# Patient Record
Sex: FEMALE | Race: WHITE | NOT HISPANIC OR LATINO | Employment: OTHER | URBAN - METROPOLITAN AREA
[De-identification: names, ages, dates, MRNs, and addresses within clinical notes are randomized per-mention and may not be internally consistent; named-entity substitution may affect disease eponyms.]

---

## 2017-01-12 ENCOUNTER — OFFICE VISIT (OUTPATIENT)
Dept: FAMILY MEDICINE | Facility: CLINIC | Age: 67
End: 2017-01-12
Payer: MEDICAID

## 2017-01-12 VITALS
SYSTOLIC BLOOD PRESSURE: 156 MMHG | DIASTOLIC BLOOD PRESSURE: 74 MMHG | TEMPERATURE: 97.7 F | BODY MASS INDEX: 19.04 KG/M2 | OXYGEN SATURATION: 98 % | WEIGHT: 113.6 LBS | HEART RATE: 76 BPM

## 2017-01-12 DIAGNOSIS — G24.3 SPASMODIC TORTICOLLIS: ICD-10-CM

## 2017-01-12 DIAGNOSIS — G25.81 RESTLESS LEG SYNDROME: ICD-10-CM

## 2017-01-12 DIAGNOSIS — Z12.11 SCREEN FOR COLON CANCER: ICD-10-CM

## 2017-01-12 DIAGNOSIS — Z23 NEED FOR PROPHYLACTIC VACCINATION AND INOCULATION AGAINST INFLUENZA: ICD-10-CM

## 2017-01-12 DIAGNOSIS — F17.200 TOBACCO USE DISORDER: ICD-10-CM

## 2017-01-12 DIAGNOSIS — M79.7 FIBROMYALGIA: Primary | ICD-10-CM

## 2017-01-12 PROCEDURE — 99213 OFFICE O/P EST LOW 20 MIN: CPT | Performed by: INTERNAL MEDICINE

## 2017-01-12 RX ORDER — NORTRIPTYLINE HCL 10 MG
50 CAPSULE ORAL AT BEDTIME
Qty: 450 CAPSULE | Refills: 3 | Status: SHIPPED | OUTPATIENT
Start: 2017-01-12 | End: 2017-11-14

## 2017-01-12 RX ORDER — BACLOFEN 10 MG/1
20 TABLET ORAL 3 TIMES DAILY
Qty: 540 TABLET | Refills: 3 | Status: SHIPPED | OUTPATIENT
Start: 2017-01-12 | End: 2017-11-14

## 2017-01-12 RX ORDER — HYDROCODONE BITARTRATE AND ACETAMINOPHEN 7.5; 325 MG/1; MG/1
2 TABLET ORAL 3 TIMES DAILY
Qty: 180 TABLET | Refills: 0 | Status: SHIPPED | OUTPATIENT
Start: 2017-01-12 | End: 2017-01-12

## 2017-01-12 RX ORDER — TRAZODONE HYDROCHLORIDE 100 MG/1
TABLET ORAL
Qty: 360 TABLET | Refills: 3 | Status: SHIPPED | OUTPATIENT
Start: 2017-01-12 | End: 2017-11-14

## 2017-01-12 RX ORDER — GABAPENTIN 300 MG/1
CAPSULE ORAL
Qty: 540 CAPSULE | Refills: 3 | Status: SHIPPED | OUTPATIENT
Start: 2017-01-12 | End: 2017-11-14

## 2017-01-12 RX ORDER — HYDROCODONE BITARTRATE AND ACETAMINOPHEN 7.5; 325 MG/1; MG/1
2 TABLET ORAL 3 TIMES DAILY
Qty: 180 TABLET | Refills: 0 | Status: SHIPPED | OUTPATIENT
Start: 2017-01-12 | End: 2017-05-15

## 2017-01-12 ASSESSMENT — PAIN SCALES - GENERAL: PAINLEVEL: MODERATE PAIN (5)

## 2017-01-12 ASSESSMENT — PATIENT HEALTH QUESTIONNAIRE - PHQ9: 5. POOR APPETITE OR OVEREATING: NOT AT ALL

## 2017-01-12 ASSESSMENT — ANXIETY QUESTIONNAIRES
2. NOT BEING ABLE TO STOP OR CONTROL WORRYING: NOT AT ALL
6. BECOMING EASILY ANNOYED OR IRRITABLE: NOT AT ALL
5. BEING SO RESTLESS THAT IT IS HARD TO SIT STILL: NOT AT ALL
1. FEELING NERVOUS, ANXIOUS, OR ON EDGE: NOT AT ALL
7. FEELING AFRAID AS IF SOMETHING AWFUL MIGHT HAPPEN: NOT AT ALL
3. WORRYING TOO MUCH ABOUT DIFFERENT THINGS: NOT AT ALL
GAD7 TOTAL SCORE: 0
IF YOU CHECKED OFF ANY PROBLEMS ON THIS QUESTIONNAIRE, HOW DIFFICULT HAVE THESE PROBLEMS MADE IT FOR YOU TO DO YOUR WORK, TAKE CARE OF THINGS AT HOME, OR GET ALONG WITH OTHER PEOPLE: NOT DIFFICULT AT ALL

## 2017-01-12 NOTE — MR AVS SNAPSHOT
After Visit Summary   1/12/2017    Carina Calvert    MRN: [id number]           Patient Information     Date Of Birth          1950        Visit Information        Provider Department      1/12/2017 2:10 PM Gerber Jain MD [facility]        Today's Diagnoses     Fibromyalgia    -  1     Spasmodic torticollis         Screen for colon cancer         Tobacco use disorder         Need for prophylactic vaccination and inoculation against influenza         Restless leg syndrome           Care Instructions      HOW TO QUIT SMOKING  Smoking is one of the hardest habits to break. About half of all those who have ever smoked have been able to quit, and most of those (about 70%) who still smoke want to quit. Here are some of the best ways to stop smoking.     KEEP TRYING:  It takes most smokers about 8 tries before they are finally able to fully quit. So, the more often you try and fail, the better your chance of quitting the next time! So, don't give up!    GO COLD TURKEY:  Most ex-smokers quit cold turkey. Trying to cut back gradually doesn't seem to work as well, perhaps because it continues the smoking habit. Also, it is possible to fool yourself by inhaling more while smoking fewer cigarettes. This results in the same amount of nicotine in your body!    GET SUPPORT:  Support programs can make an important difference, especially for the heavy smoker. These groups offer lectures, methods to change your behavior and peer support. Call the free national Quitline for more information. 800-QUIT-NOW (784-902-1252). Low-cost or free programs are offered by many hospitals, local chapters of the American Lung Association (734-901-6791) and the American Cancer Society (804-620-8068). Support at home is important too. Non-smokers can help by offering praise and encouragement. If the smoker fails to quit, encourage them to try again!    OVER-THE-COUNTER MEDICINES:  For those who can't quit on their  own, Nicotine Replacement Therapy (NRT) may make quitting much easier. Certain aids such as the nicotine patch, gum and lozenge are available without a prescription. However, it is best to use these under the guidance of your doctor. The skin patch provides a steady supply of nicotine to the body. Nicotine gum and lozenge gives temporary bursts of low levels of nicotine. Both methods take the edge off the craving for cigarettes. WARNING: If you feel symptoms of nicotine overdose, such as nausea, vomiting, dizziness, weakness, or fast heartbeat, stop using these and see your doctor.    PRESCRIPTION MEDICINES:  After evaluating your smoking patterns and prior attempts at quitting, your doctor may offer a prescription medicine such as bupropion (Zyban, Wellbutrin), varenicline (Chantix, Champix), a niocotine inhaler or nasal spray. Each has its unique advantage and side effects which your doctor can review with you.    HEALTH BENEFITS OF QUITTING:  The benefits of quitting start right away and keep improving the longer you go without smokin minutes: blood pressure and pulse return to normal  8 hours: oxygen levels return to normal  2 days: ability to smell and taste begins to improve as damaged nerves start to regrow  2-3 weeks: circulation and lung function improves  1-9 months: decreased cough, congestion and shortness of breath; less tired  1 year: risk of heart attack decreases by half  5 years: risk of lung cancer decreases by half; risk of stroke becomes the same as a non-smoker  For information about how to quit smoking, visit the following links:  National Cancer [place] ,   Clearing the Air, Quit Smoking Today   - an online booklet. http://www.smokefree.gov/pubs/clearing_the_air.pdf  Smokefree.gov http://smokefree.gov/  QuitNet http://www.quitnet.com/    7616-7971 Saskia Landry, [address] [facility] [address], [place] [address]. All rights reserved. This information is not intended as a substitute for  professional medical care. Always follow your healthcare professional's instructions.    The Benefits of Living Smoke Free  What do you want to gain from quitting? Check off some reasons to quit.  Health Benefits  ___ Reduce my risk of lung cancer, heart disease, chronic lung disease  ___ Have fewer wrinkles and softer skin  ___ Improve my sense of taste and smell  ___ For pregnant women--reduce the risk of having a miscarriage, stillbirth, premature birth, or low-birth-weight baby  Personal Benefits  ___ Feel more in control of my life  ___ Have better-smelling hair, breath, clothes, home, and car  ___ Save time by not having to take smoke breaks, buy cigarettes, or hunt for a light  ___ Have whiter teeth  Family Benefits  ___ Reduce my children s respiratory tract infections  ___ Set a good example for my children  ___ Reduce my family s cancer risk  Financial Benefits  ___ Save hundreds of dollars each year that would be spent on cigarettes  ___ Save money on medical bills  ___ Save on life, health, and car insurance premiums    Those Dollars Add Up!  Cigarettes are expensive, and getting more expensive all the time. Do you realize how much money you are spending on cigarettes per year? What is the average amount you spend on a pack of cigarettes? What is the average number of packs that you smoke per day? Using your answers to these questions, fill in this formula to help you find out:  ($ _____ per pack) ×  ( _____ number of packs per day) × (365 days) =  $ _____ yearly cost of smoking  Besides tobacco, there are other costs, including extra cleaning bills and replacement costs for clothing and furniture; medical expenses for smoking-related illnesses; and higher health, life, and car insurance premiums.    Cigars and Pipes Count Too!  Cigars and pipes are also dangerous. So are smokeless (chewing) tobacco and snuff. All of these products contain nicotine, a highly addictive substance that has harmful effects  on your body. Quitting smoking means giving up all tobacco products.      6023-3694 Saskia Landry, [address] [facility], [place] [address]. All rights reserved. This information is not intended as a substitute for professional medical care. Always follow your healthcare professional's instructions.      [facility]    If you have any questions regarding to your visit please contact your care team:     Team Pink:   Clinic Hours Telephone Number   Internal Medicine:  Dr. Lisset Ignacio NP       7am-7pm  Monday - Thursday   7am-5pm  Fridays  [phone]  (Appointment scheduling available 24/7)    Questions about your visit?  Team Line  [phone]   Urgent Care - [place] and [place] - 11am-9pm Monday-Friday Saturday-Sunday- 9am-5pm   [place] - 5pm-9pm Monday-Friday Saturday-Sunday- 9am-5pm  [phone] - [place]   [phone] - [place]       What options do I have for visits at the clinic other than the traditional office visit?  To expand how we care for you, many of our providers are utilizing electronic visits (e-visits) and telephone visits, when medically appropriate, for interactions with their patients rather than a visit in the clinic.   We also offer nurse visits for many medical concerns. Just like any other service, we will bill your insurance company for this type of visit based on time spent on the phone with your provider. Not all insurance companies cover these visits. Please check with your medical insurance if this type of visit is covered. You will be responsible for any charges that are not paid by your insurance.      E-visits via SiteJabber:  generally incur a $35.00 fee.  Telephone visits:  Time spent on the phone: *charged based on time that is spent on the phone in increments of 10 minutes. Estimated cost:   5-10 mins $30.00   11-20 mins. $59.00   21-30 mins. $85.00   Use SiteJabber (secure email communication and  "access to your chart) to send your primary care provider a message or make an appointment. Ask someone on your Team how to sign up for Medical Envelope.    For a Price Quote for your services, please call our Consumer Price Line at [phone].    As always, Thank you for trusting us with your health care needs!    Violette Mccullough, KARLY          Follow-ups after your visit        Who to contact     If you have questions or need follow up information about today's clinic visit or your schedule please contact [facility] directly at [phone].  Normal or non-critical lab and imaging results will be communicated to you by Triboldhart, letter or phone within 4 business days after the clinic has received the results. If you do not hear from us within 7 days, please contact the clinic through [employer] or phone. If you have a critical or abnormal lab result, we will notify you by phone as soon as possible.  Submit refill requests through Medical Envelope or call your pharmacy and they will forward the refill request to us. Please allow 3 business days for your refill to be completed.          Additional Information About Your Visit        Triboldhart Information     Medical Envelope lets you send messages to your doctor, view your test results, renew your prescriptions, schedule appointments and more. To sign up, go to [email or web address] . Click on \"Log in\" on the left side of the screen, which will take you to the Welcome page. Then click on \"Sign up Now\" on the right side of the page.     You will be asked to enter the access code listed below, as well as some personal information. Please follow the directions to create your username and password.     Your access code is: [email or web address]  Expires: 2017  2:19 PM     Your access code will  in 90 days. If you need help or a new code, please call your [facility] or [phone].        Care EveryWhere ID     This is your Care EveryWhere ID. This could be used by other " organizations to access your [place] medical records  [id number]        Your Vitals Were     Pulse Temperature Pulse Oximetry Breastfeeding?          76 97.7  F (36.5  C) (Oral) 98% No         Blood Pressure from Last 3 Encounters:   01/12/17 156/74   10/18/16 136/74   07/21/16 118/70    Weight from Last 3 Encounters:   01/12/17 113 lb 9.6 oz (51.529 kg)   10/18/16 112 lb (50.803 kg)   07/21/16 110 lb 3.2 oz (49.986 kg)              Today, you had the following     No orders found for display         Today's Medication Changes          These changes are accurate as of: 1/12/17  2:19 PM.  If you have any questions, ask your nurse or doctor.               Start taking these medicines.        Dose/Directions    HYDROcodone-acetaminophen 7.5-325 MG per tablet   Commonly known as:  NORCO   Used for:  Spasmodic torticollis, Fibromyalgia   Started by:  Gerber Jain MD        Dose:  2 tablet   Take 2 tablets by mouth 3 times daily 28 days or more between refills for controlled medications   Quantity:  180 tablet   Refills:  0         These medicines have changed or have updated prescriptions.        Dose/Directions    gabapentin 300 MG capsule   Commonly known as:  NEURONTIN   This may have changed:  additional instructions   Used for:  Spasmodic torticollis, Fibromyalgia, Restless leg syndrome   Changed by:  Gerber Jain MD        1 capsule am, 1 at noon 2 at 5pm and 2 at bedtime ( 6 per day ) Not to be refilled until patient calls   Quantity:  540 capsule   Refills:  3       nortriptyline 10 MG capsule   Commonly known as:  PAMELOR   This may have changed:  additional instructions   Used for:  Fibromyalgia   Changed by:  Gerber Jain MD        Dose:  50 mg   Take 5 capsules (50 mg) by mouth At Bedtime Not to be refilled until patient calls   Quantity:  450 capsule   Refills:  3            Where to get your medicines      These medications were sent to [facility] [address] [place] [address]   [address] [place] [id number]     Phone:  [phone]    - baclofen 10 MG tablet  - gabapentin 300 MG capsule  - nortriptyline 10 MG capsule  - traZODone 100 MG tablet      Some of these will need a paper prescription and others can be bought over the counter.  Ask your nurse if you have questions.     Bring a paper prescription for each of these medications    - HYDROcodone-acetaminophen 7.5-325 MG per tablet             Primary Care Provider Office Phone # Fax #    Gerber Jain [phone]       [address] [address]        Thank you!     Thank you for choosing [facility]  for your care. Our goal is always to provide you with excellent care. Hearing back from our patients is one way we can continue to improve our services. Please take a few minutes to complete the written survey that you may receive in the mail after your visit with us. Thank you!             Your Updated Medication List - Protect others around you: Learn how to safely use, store and throw away your medicines at www.disposemymeds.org.          This list is accurate as of: 1/12/17  2:19 PM.  Always use your most recent med list.                   Brand Name Dispense Instructions for use    aspirin 81 MG tablet      Take 81 mg by mouth daily       baclofen 10 MG tablet    LIORESAL    540 tablet    Take 2 tablets (20 mg) by mouth 3 times daily       cyanocobalamin 1000 MCG tablet    vitamin  B-12     1 tablet daily       gabapentin 300 MG capsule    NEURONTIN    540 capsule    1 capsule am, 1 at noon 2 at 5pm and 2 at bedtime ( 6 per day ) Not to be refilled until patient calls       HYDROcodone-acetaminophen 7.5-325 MG per tablet    NORCO    180 tablet    Take 2 tablets by mouth 3 times daily 28 days or more between refills for controlled medications       LIPITOR 80 MG tablet   Generic drug:  atorvastatin      Take 80 mg by mouth daily       metoprolol 25 MG  tablet    LOPRESSOR     Take 50 mg by mouth 2 times daily       nitroglycerin 0.4 MG sublingual tablet    NITROSTAT     Place 0.4 mg under the tongue every 5 minutes as needed for chest pain       nortriptyline 10 MG capsule    PAMELOR    450 capsule    Take 5 capsules (50 mg) by mouth At Bedtime Not to be refilled until patient calls       polyethylene glycol powder    MIRALAX/GLYCOLAX     Take 17 g by mouth daily       traZODone 100 MG tablet    DESYREL    360 tablet    TAKE FOUR TABLETS NIGHTLY AT BEDTIME       vitamin D 2000 UNITS tablet      Take 1 tablet by mouth daily

## 2017-01-12 NOTE — NURSING NOTE
"Chief Complaint   Patient presents with     Recheck Medication     Refills.       Initial /68 mmHg  Pulse 133  Temp(Src) 97.7  F (36.5  C) (Oral)  Wt 113 lb 9.6 oz (51.529 kg)  SpO2 98%  Breastfeeding? No Estimated body mass index is 19.04 kg/(m^2) as calculated from the following:    Height as of 1/22/16: 5' 4.75\" (1.645 m).    Weight as of this encounter: 113 lb 9.6 oz (51.529 kg).  BP completed using cuff size: piotr Mccullough CMA      "

## 2017-01-12 NOTE — PROGRESS NOTES
SUBJECTIVE:                                                    Carina Calvert is a 66 year old female who presents to clinic today for the following health issues:       Fibromyalgia  Spasmodic torticollis  Screen for colon cancer  Tobacco use disorder  Need for prophylactic vaccination and inoculation against influenza  Restless leg syndrome     Medication Followup of Norco, Trazaodone, Baclofen    Taking Medication as prescribed: yes    Side Effects:  None    Medication Helping Symptoms:  yes     A patient seen for refill on Norco 5/325 (Hydrocodone Bitartrate and Acetaminophen). She's a long term patient with me and has no new issues to discuss. She is status post myocardial infarction and continues with a close follow up with cardiology  With [facility] . During our office visit today I asked patient if it would be ok to download medical records from the Her[employer]ment system which allows immediate access to medical records from outside sources. Patient gave me verbal permission and agreed to sign care everywhere authorization form today.     She also continues in follow up with Dr. Junior Doyle with [facility] for botox injections to her neck for the spasmodic torticollis . She's really in a tough way today with neck stiffness and she's due for the injection in a matter of days. She's absolutely positive this is why her blood pressure is up because she's in pain. She wants the refills but otherwise has nothing else to discuss today.    In review of her chart she has made it explicitly clear that she eschews healthcare maintenance issues and we have agreed to only pursue these topics annually. She is due for the annual urine toxicology screen as a part of the chronic pain care package . She is unable to void today but will do so when we see her back in 3 months     Problem list and histories reviewed & adjusted, as indicated.  Additional history: as documented    Patient Active  Problem List   Diagnosis     Neuropathy (H)     Fibromyalgia     Restless leg syndrome     Spasmodic torticollis     Insomnia     Hyperlipidemia with target LDL less than 100     History of hepatitis C     Ex-smoker     Recurrent cold sores     Osteopenia     Migraine headache     Positive occult stool blood test     Subclinical hyperthyroidism     Hypovitaminosis D     Cramp of limb     Opioid use agreement exists     NSTEMI (non-ST elevated myocardial infarction) (H)     Chronic pain     Past Surgical History   Procedure Laterality Date     Tubal ligation       Tonsillectomy       Lithotripsy       2 x      Hernia repair  9/2009     ventral       Social History   Substance Use Topics     Smoking status: Current Every Day Smoker -- 0.05 packs/day for 15 years     Types: Cigarettes     Last Attempt to Quit: 03/31/2013     Smokeless tobacco: Never Used     Alcohol Use: No     Family History   Problem Relation Age of Onset     Neurologic Disorder Mother 65     Parkinsons     Alcohol/Drug Mother      Respiratory Father      COPD     Alcohol/Drug Father      DIABETES Sister 52     CEREBROVASCULAR DISEASE Mother      CANCER Maternal Aunt      CANCER Maternal Uncle      C.A.D. No family hx of      CANCER Brother          Current Outpatient Prescriptions   Medication Sig Dispense Refill     traZODone (DESYREL) 100 MG tablet TAKE FOUR TABLETS NIGHTLY AT BEDTIME 360 tablet 3     baclofen (LIORESAL) 10 MG tablet Take 2 tablets (20 mg) by mouth 3 times daily 540 tablet 3     gabapentin (NEURONTIN) 300 MG capsule 1 capsule am, 1 at noon 2 at 5pm and 2 at bedtime ( 6 per day ) Not to be refilled until patient calls 540 capsule 3     nortriptyline (PAMELOR) 10 MG capsule Take 5 capsules (50 mg) by mouth At Bedtime Not to be refilled until patient calls 450 capsule 3     HYDROcodone-acetaminophen (NORCO) 7.5-325 MG per tablet Take 2 tablets by mouth 3 times daily 28 days or more between refills for controlled medications 180  tablet 0     metoprolol (LOPRESSOR) 25 MG tablet Take 50 mg by mouth 2 times daily       aspirin 81 MG tablet Take 81 mg by mouth daily       atorvastatin (LIPITOR) 80 MG tablet Take 80 mg by mouth daily       nitroglycerin (NITROSTAT) 0.4 MG SL tablet Place 0.4 mg under the tongue every 5 minutes as needed for chest pain       polyethylene glycol (MIRALAX/GLYCOLAX) powder Take 17 g by mouth daily       Cholecalciferol (VITAMIN D) 2000 UNITS tablet Take 1 tablet by mouth daily       VITAMIN B-12 1000 MCG OR TABS 1 tablet daily       [DISCONTINUED] gabapentin (NEURONTIN) 300 MG capsule 1 capsule am, 1 at noon 2 at 5pm and 2 at bedtime ( 6 per day ) 540 capsule 3     [DISCONTINUED] traZODone (DESYREL) 100 MG tablet TAKE FOUR TABLETS NIGHTLY AT BEDTIME 360 tablet 3     [DISCONTINUED] baclofen (LIORESAL) 10 MG tablet Take 2 tablets (20 mg) by mouth 3 times daily 540 tablet 3     [DISCONTINUED] nortriptyline (PAMELOR) 10 MG capsule Take 5 capsules (50 mg) by mouth At Bedtime 450 capsule 3     Allergies   Allergen Reactions     Droperidol      Ivp Dye [Contrast Dye]      Recent Labs   Lab Test  10/18/16   1532  12/16/13   1410 07/15/13  05/29/12   05/12/11   1621   03/16/09   0913   LDL  50   --    --    --    --    --   96   --   123   HDL  42*   --    --    --    --    --    --    --   70   TRIG   --    --    --    --    --    --    --    --   60   ALT  29   --   12   --   11   --   <6   --   9   CR  0.69   --   0.75   < >   --    < >  0.67   < >  0.66   GFRESTIMATED  85   --   >60   < >   --    < >  >90   --   >90   GFRESTBLACK  >90   GFR Calc     --   >60   < >   --    < >  >90   --   >90   POTASSIUM  4.7   --   4.0   < >  4.4   --   3.8   < >  4.4   TSH  1.84  1.08  1.39   --   1.98   --    --    < >  1.26    < > = values in this interval not displayed.      BP Readings from Last 3 Encounters:   01/12/17 156/74   10/18/16 136/74   07/21/16 118/70    Wt Readings from Last 3 Encounters:   01/12/17 113  lb 9.6 oz (51.529 kg)   10/18/16 112 lb (50.803 kg)   07/21/16 110 lb 3.2 oz (49.986 kg)                  Labs reviewed in EPIC  Problem list, Medication list, Allergies, and Medical/Social/Surgical histories reviewed in [facility] and updated as appropriate.    ROS:  Constitutional, HEENT, cardiovascular, pulmonary, gi and gu systems are negative, except as otherwise noted.    OBJECTIVE:                                                    /74 mmHg  Pulse 76  Temp(Src) 97.7  F (36.5  C) (Oral)  Wt 113 lb 9.6 oz (51.529 kg)  SpO2 98%  Breastfeeding? No  Body mass index is 19.04 kg/(m^2).  GENERAL APPEARANCE: healthy, alert and no distress, a wiry patient with thin build, no significant weight changes  Wt Readings from Last 5 Encounters:   01/12/17 113 lb 9.6 oz (51.529 kg)   10/18/16 112 lb (50.803 kg)   07/21/16 110 lb 3.2 oz (49.986 kg)   01/22/16 109 lb (49.442 kg)   10/27/15 109 lb (49.442 kg)       EYES: Eyes grossly normal to inspection, PERRL and conjunctivae and sclerae normal  RESP: lungs clear to auscultation - no rales, rhonchi or wheezes  CV: regular rates and rhythm, normal S1 S2, no S3 or S4 and no murmur, click or rub  MS: extremities normal- no gross deformities noted    Diagnostic test results:  Diagnostic Test Results:  No orders of the defined types were placed in this encounter.          ASSESSMENT/PLAN:                                                    1. Fibromyalgia  3 month follow up   - traZODone (DESYREL) 100 MG tablet; TAKE FOUR TABLETS NIGHTLY AT BEDTIME  Dispense: 360 tablet; Refill: 3  - baclofen (LIORESAL) 10 MG tablet; Take 2 tablets (20 mg) by mouth 3 times daily  Dispense: 540 tablet; Refill: 3  - gabapentin (NEURONTIN) 300 MG capsule; 1 capsule am, 1 at noon 2 at 5pm and 2 at bedtime ( 6 per day ) Not to be refilled until patient calls  Dispense: 540 capsule; Refill: 3  - nortriptyline (PAMELOR) 10 MG capsule; Take 5 capsules (50 mg) by mouth At Bedtime Not to be refilled until  patient calls  Dispense: 450 capsule; Refill: 3  - HYDROcodone-acetaminophen (NORCO) 7.5-325 MG per tablet; Take 2 tablets by mouth 3 times daily 28 days or more between refills for controlled medications  Dispense: 180 tablet; Refill: 0    2. Spasmodic torticollis  3 month follow up   - baclofen (LIORESAL) 10 MG tablet; Take 2 tablets (20 mg) by mouth 3 times daily  Dispense: 540 tablet; Refill: 3  - gabapentin (NEURONTIN) 300 MG capsule; 1 capsule am, 1 at noon 2 at 5pm and 2 at bedtime ( 6 per day ) Not to be refilled until patient calls  Dispense: 540 capsule; Refill: 3  - HYDROcodone-acetaminophen (NORCO) 7.5-325 MG per tablet; Take 2 tablets by mouth 3 times daily 28 days or more between refills for controlled medications  Dispense: 180 tablet; Refill: 0    3. Screen for colon cancer  Declines     4. Tobacco use disorder  Not interested in the quit plan but intends to quit smoking on her own    5. Need for prophylactic vaccination and inoculation against influenza      6. Restless leg syndrome    - gabapentin (NEURONTIN) 300 MG capsule; 1 capsule am, 1 at noon 2 at 5pm and 2 at bedtime ( 6 per day ) Not to be refilled until patient calls  Dispense: 540 capsule; Refill: 3      Follow up with Provider - as detailed above      [facility]ain MD  [facility]    Strongly recommended recheck of blood pressure either with an MA blood pressure recheck or with her pharmacy

## 2017-01-12 NOTE — PATIENT INSTRUCTIONS
HOW TO QUIT SMOKING  Smoking is one of the hardest habits to break. About half of all those who have ever smoked have been able to quit, and most of those (about 70%) who still smoke want to quit. Here are some of the best ways to stop smoking.     KEEP TRYING:  It takes most smokers about 8 tries before they are finally able to fully quit. So, the more often you try and fail, the better your chance of quitting the next time! So, don't give up!    GO COLD TURKEY:  Most ex-smokers quit cold turkey. Trying to cut back gradually doesn't seem to work as well, perhaps because it continues the smoking habit. Also, it is possible to fool yourself by inhaling more while smoking fewer cigarettes. This results in the same amount of nicotine in your body!    GET SUPPORT:  Support programs can make an important difference, especially for the heavy smoker. These groups offer lectures, methods to change your behavior and peer support. Call the free national Quitline for more information. 800-QUIT-NOW (098-164-0243). Low-cost or free programs are offered by many hospitals, local chapters of the American Lung Association (801-323-0627) and the American Cancer Society (192-317-7600). Support at home is important too. Non-smokers can help by offering praise and encouragement. If the smoker fails to quit, encourage them to try again!    OVER-THE-COUNTER MEDICINES:  For those who can't quit on their own, Nicotine Replacement Therapy (NRT) may make quitting much easier. Certain aids such as the nicotine patch, gum and lozenge are available without a prescription. However, it is best to use these under the guidance of your doctor. The skin patch provides a steady supply of nicotine to the body. Nicotine gum and lozenge gives temporary bursts of low levels of nicotine. Both methods take the edge off the craving for cigarettes. WARNING: If you feel symptoms of nicotine overdose, such as nausea, vomiting, dizziness, weakness, or fast  heartbeat, stop using these and see your doctor.    PRESCRIPTION MEDICINES:  After evaluating your smoking patterns and prior attempts at quitting, your doctor may offer a prescription medicine such as bupropion (Zyban, Wellbutrin), varenicline (Chantix, Champix), a niocotine inhaler or nasal spray. Each has its unique advantage and side effects which your doctor can review with you.    HEALTH BENEFITS OF QUITTING:  The benefits of quitting start right away and keep improving the longer you go without smokin minutes: blood pressure and pulse return to normal  8 hours: oxygen levels return to normal  2 days: ability to smell and taste begins to improve as damaged nerves start to regrow  2-3 weeks: circulation and lung function improves  1-9 months: decreased cough, congestion and shortness of breath; less tired  1 year: risk of heart attack decreases by half  5 years: risk of lung cancer decreases by half; risk of stroke becomes the same as a non-smoker  For information about how to quit smoking, visit the following links:  National Cancer Ring[employer] ,   Clearing the Air, Quit Smoking Today   - an online booklet. http://www.smokefree.gov/pubs/clearing_the_air.pdf  Smokefree.gov http://smokefree.gov/  QuitNet http://www.quitnet.com/    9418-3225 Saskia Landry, [address] [place] [address], [place] [address]. All rights reserved. This information is not intended as a substitute for professional medical care. Always follow your healthcare professional's instructions.    The Benefits of Living Smoke Free  What do you want to gain from quitting? Check off some reasons to quit.  Health Benefits  ___ Reduce my risk of lung cancer, heart disease, chronic lung disease  ___ Have fewer wrinkles and softer skin  ___ Improve my sense of taste and smell  ___ For pregnant women--reduce the risk of having a miscarriage, stillbirth, premature birth, or low-birth-weight baby  Personal Benefits  ___ Feel more in control of my  life  ___ Have better-smelling hair, breath, clothes, home, and car  ___ Save time by not having to take smoke breaks, buy cigarettes, or hunt for a light  ___ Have whiter teeth  Family Benefits  ___ Reduce my children s respiratory tract infections  ___ Set a good example for my children  ___ Reduce my family s cancer risk  Financial Benefits  ___ Save hundreds of dollars each year that would be spent on cigarettes  ___ Save money on medical bills  ___ Save on life, health, and car insurance premiums    Those Dollars Add Up!  Cigarettes are expensive, and getting more expensive all the time. Do you realize how much money you are spending on cigarettes per year? What is the average amount you spend on a pack of cigarettes? What is the average number of packs that you smoke per day? Using your answers to these questions, fill in this formula to help you find out:  ($ _____ per pack) ×  ( _____ number of packs per day) × (365 days) =  $ _____ yearly cost of smoking  Besides tobacco, there are other costs, including extra cleaning bills and replacement costs for clothing and furniture; medical expenses for smoking-related illnesses; and higher health, life, and car insurance premiums.    Cigars and Pipes Count Too!  Cigars and pipes are also dangerous. So are smokeless (chewing) tobacco and snuff. All of these products contain nicotine, a highly addictive substance that has harmful effects on your body. Quitting smoking means giving up all tobacco products.      8277-7419 [address], [place] [address]. All rights reserved. This information is not intended as a substitute for professional medical care. Always follow your healthcare professional's instructions.      [facility]    If you have any questions regarding to your visit please contact your care team:     Team Pink:   Clinic Hours Telephone Number   Internal Medicine:  Dr. Lisset Ignacio,  NP       7am-7pm  Monday - Thursday   7am-5pm  Fridays  [phone]  (Appointment scheduling available 24/7)    Questions about your visit?  Team Line  [phone]   Urgent Care - [place] - 11am-9pm Monday-Friday Saturday-Sunday- 9am-5pm   [place] - 5pm-9pm Monday-Friday Saturday-Sunday- 9am-5pm  [phone] - [place]   [phone] - [place]       What options do I have for visits at the clinic other than the traditional office visit?  To expand how we care for you, many of our providers are utilizing electronic visits (e-visits) and telephone visits, when medically appropriate, for interactions with their patients rather than a visit in the clinic.   We also offer nurse visits for many medical concerns. Just like any other service, we will bill your insurance company for this type of visit based on time spent on the phone with your provider. Not all insurance companies cover these visits. Please check with your medical insurance if this type of visit is covered. You will be responsible for any charges that are not paid by your insurance.      E-visits via [employer]:  generally incur a $35.00 fee.  Telephone visits:  Time spent on the phone: *charged based on time that is spent on the phone in increments of 10 minutes. Estimated cost:   5-10 mins $30.00   11-20 mins. $59.00   21-30 mins. $85.00   Use GigsJamt (secure email communication and access to your chart) to send your primary care provider a message or make an appointment. Ask someone on your Team how to sign up for [employer].    For a Price Quote for your services, please call our Consumer Price Line at [phone].    As always, Thank you for trusting us with your health care needs!    Violette Mccullough CMA

## 2017-01-13 ASSESSMENT — ANXIETY QUESTIONNAIRES: GAD7 TOTAL SCORE: 0

## 2017-01-13 ASSESSMENT — PATIENT HEALTH QUESTIONNAIRE - PHQ9: SUM OF ALL RESPONSES TO PHQ QUESTIONS 1-9: 0

## 2017-04-25 ENCOUNTER — TELEPHONE (OUTPATIENT)
Dept: FAMILY MEDICINE | Facility: CLINIC | Age: 67
End: 2017-04-25

## 2017-04-25 NOTE — TELEPHONE ENCOUNTER
Panel Management Review      Patient has the following on her problem list:       Composite cancer screening  Chart review shows that this patient is due/due soon for the following Fecal Colorectal (FIT)  Summary:    Patient is due/failing the following:   FIT    Action needed:   Routed to provider for review.    Type of outreach:    None, routed to provider for review.    Questions for provider review:    Please review order and sign if mailing a fit card would be appropriate                                                                                                                                      Vera Alonso CMA       Chart routed to Provider .

## 2017-05-11 NOTE — PROGRESS NOTES
"  SUBJECTIVE:                                                    Carina Calvert is a 67 year old female who presents to clinic today for the following health issues:    Note 5/15/2017 --  Chief Complaint   Patient presents with     Recheck Medication     refill, take over heart medication     Musculoskeletal Problem     Arms     Constipation     x3 months     Musculoskeletal Pain -- Patient states she has pain in her arms for the last 4 months. She describes it as worse on the right side and worsening. Notes she does not exercise much. She has difficulty dressing and cannot lift her hands above her head. She has been compliant with Lipitor. Patient reports she did not take Lipitor last year for a short amount of time, but it did not work well for her.    NSTEMI -- Patient has been seeing Dr. Fernandez for cardiology. She had 3 stents and a balloon angioplasty at her hospital visit in 5/2015. When she first got out of the hospital she notes she took a few Nitro because she was \"scared\". She notes she has not had symptoms for the last year. Patient would like to transfer cardiovascular care to Dr. Jain.    Constipation -- Patient notes she has had constipation for the last 3 months. She believes it is related to her medications. She describes she has not have bowel movements for 7 days at a time.    Additional Notes -- Patient declines preventative health measures today. She notes she has been trying to quit smoking.    Note 7/18/2016 --  HISTORY OF PRESENT ILLNESS: Carina Calvert is a 66 y.o. old female with a PMH significant for fibromyalgia and tobacco use presenting to the cardiology clinic for follow-up. Pt was admitted to [facility] 5/2015 with c/o chest pain found to have NSTEMI. She undwerwent coronary angiogram 5/30/15 with PCI to RCA, and plan for staged intervention of LCx and LAD in 1 month. During her hospital admission, patient had recurrence of chest pain and subsequently underwent repeat angiogram " 2 days later and received PCI to LCx with DESx1, and LAD with DESx1, POBA of D1.     At our last visit (8/2015), patient reported some occasional chest pain since her hospitalization occuring maybe a couple times a month, always at rest and relieved after a couple minutes without intervention. I had offered her antianginal medication but she refused at the time.     At today's visit, patient reports she is doing very well. She has remained active with walking, using stairs, and recently just moved to a new [facility] in [place] with her . She denies any exertional symptoms. She denies any CP, SOB, palpitations or edema. She will occasionally have some dizziness when getting out of bed in the AM but does not experience dizziness during the day. She stopped taking her atorvastatin because she had some trouble getting it refilled.     Problem list and histories reviewed & adjusted, as indicated.  Additional history: as documented    Patient Active Problem List   Diagnosis     Neuropathy (H)     Fibromyalgia     Restless leg syndrome     Spasmodic torticollis     Insomnia     Hyperlipidemia with target LDL less than 100     History of hepatitis C     Ex-smoker     Recurrent cold sores     Osteopenia     Migraine headache     Positive occult stool blood test     Subclinical hyperthyroidism     Hypovitaminosis D     Cramp of limb     Opioid use agreement exists     NSTEMI (non-ST elevated myocardial infarction) (H)     Chronic pain     Past Surgical History:   Procedure Laterality Date     HERNIA REPAIR  9/2009    ventral     LITHOTRIPSY      2 x      TONSILLECTOMY       TUBAL LIGATION         Social History   Substance Use Topics     Smoking status: Current Every Day Smoker     Packs/day: 0.05     Years: 15.00     Types: Cigarettes     Last attempt to quit: 3/31/2013     Smokeless tobacco: Never Used     Alcohol use No     Family History   Problem Relation Age of Onset     Neurologic Disorder Mother 65     Parkinsons      Alcohol/Drug Mother      CEREBROVASCULAR DISEASE Mother      Respiratory Father      COPD     Alcohol/Drug Father      CANCER Brother      DIABETES Sister 52     CANCER Maternal Aunt      CANCER Maternal Uncle      C.A.D. No family hx of          Current Outpatient Prescriptions   Medication Sig Dispense Refill     rosuvastatin (CRESTOR) 10 MG tablet Take 1 tablet (10 mg) by mouth daily 30 tablet 1     HYDROcodone-acetaminophen (NORCO) 7.5-325 MG per tablet Take 2 tablets by mouth 3 times daily 28 days or more between refills for controlled medications 180 tablet 0     traZODone (DESYREL) 100 MG tablet TAKE FOUR TABLETS NIGHTLY AT BEDTIME 360 tablet 3     baclofen (LIORESAL) 10 MG tablet Take 2 tablets (20 mg) by mouth 3 times daily 540 tablet 3     gabapentin (NEURONTIN) 300 MG capsule 1 capsule am, 1 at noon 2 at 5pm and 2 at bedtime ( 6 per day ) Not to be refilled until patient calls 540 capsule 3     nortriptyline (PAMELOR) 10 MG capsule Take 5 capsules (50 mg) by mouth At Bedtime Not to be refilled until patient calls 450 capsule 3     metoprolol (LOPRESSOR) 25 MG tablet Take 50 mg by mouth 2 times daily       aspirin 81 MG tablet Take 81 mg by mouth daily       atorvastatin (LIPITOR) 80 MG tablet Take 80 mg by mouth daily       nitroglycerin (NITROSTAT) 0.4 MG SL tablet Place 0.4 mg under the tongue every 5 minutes as needed for chest pain       Cholecalciferol (VITAMIN D) 2000 UNITS tablet Take 1 tablet by mouth daily       VITAMIN B-12 1000 MCG OR TABS 1 tablet daily       polyethylene glycol (MIRALAX/GLYCOLAX) powder Take 17 g by mouth daily Reported on 5/15/2017       Labs reviewed in EPIC    Reviewed and updated as needed this visit by clinical staff  Tobacco  Allergies  Meds  Med Hx  Surg Hx  Fam Hx  Soc Hx      Reviewed and updated as needed this visit by Provider         ROS:  Constitutional, HEENT, cardiovascular, pulmonary, GI, , musculoskeletal, neuro, skin, endocrine and psych systems  "are negative, except as otherwise noted.    This document serves as a record of the services and decisions personally performed and made by Gerber Jain MD. It was created on their behalf by Davi Lucero, a trained medical scribe. The creation of this document is based the provider's statements to the medical scribe.  Davi Lucero May 15, 2017 10:29 AM    OBJECTIVE:                                                    /70 (BP Location: Right arm, Patient Position: Chair, Cuff Size: Adult Regular)  Pulse 60  Temp 97  F (36.1  C) (Oral)  Ht 5' 3.75\" (1.619 m)  Wt 116 lb 12.8 oz (53 kg)  SpO2 99%  BMI 20.21 kg/m2  Body mass index is 20.21 kg/(m^2).  GENERAL: healthy, alert and no distress  EYES: Eyes grossly normal to inspection, PERRL and conjunctivae and sclerae normal  RESP: lungs clear to auscultation - no rales, rhonchi or wheezes  CV: regular rate and rhythm, normal S1 S2, no S3 or S4, no murmur, click or rub, no peripheral edema and peripheral pulses strong  SKIN: no suspicious lesions or rashes to visible skin  NEURO: mentation intact and speech normal  PSYCH: mentation appears normal, affect normal/bright    Diagnostic Test Results:  Results for orders placed or performed in visit on 10/18/16   LDL cholesterol direct   Result Value Ref Range    LDL Cholesterol Direct 50 <100 mg/dL   HDL cholesterol   Result Value Ref Range    HDL Cholesterol 42 (L) >49 mg/dL   Cholesterol   Result Value Ref Range    Cholesterol 103 <200 mg/dL   TSH with free T4 reflex   Result Value Ref Range    TSH 1.84 0.40 - 4.00 mU/L   Vitamin D Deficiency   Result Value Ref Range    Vitamin D Deficiency screening 56 20 - 75 ug/L   CBC with platelets   Result Value Ref Range    WBC 7.3 4.0 - 11.0 10e9/L    RBC Count 4.47 3.8 - 5.2 10e12/L    Hemoglobin 14.6 11.7 - 15.7 g/dL    Hematocrit 44.3 35.0 - 47.0 %    MCV 99 78 - 100 fl    MCH 32.7 26.5 - 33.0 pg    MCHC 33.0 31.5 - 36.5 g/dL    RDW 13.1 10.0 - 15.0 %    Platelet " Count 120 (L) 150 - 450 10e9/L   Comprehensive metabolic panel   Result Value Ref Range    Sodium 140 133 - 144 mmol/L    Potassium 4.7 3.4 - 5.3 mmol/L    Chloride 102 94 - 109 mmol/L    Carbon Dioxide 34 (H) 20 - 32 mmol/L    Anion Gap 4 3 - 14 mmol/L    Glucose 88 70 - 99 mg/dL    Urea Nitrogen 12 7 - 30 mg/dL    Creatinine 0.69 0.52 - 1.04 mg/dL    GFR Estimate 85 >60 mL/min/1.7m2    GFR Estimate If Black >90   GFR Calc   >60 mL/min/1.7m2    Calcium 9.3 8.5 - 10.1 mg/dL    Bilirubin Total 0.3 0.2 - 1.3 mg/dL    Albumin 4.2 3.4 - 5.0 g/dL    Protein Total 7.4 6.8 - 8.8 g/dL    Alkaline Phosphatase 57 40 - 150 U/L    ALT 29 0 - 50 U/L    AST 19 0 - 45 U/L        ASSESSMENT/PLAN:                                                      (E78.5) Hyperlipidemia with target LDL less than 100  (primary encounter diagnosis)  Comment: we reviewed in significant detail  The possibilities here; maybe the atorvastatin [ lipitor ]  Is causing some muscle soreness and we canh try changed from atorvastatin [ lipitor ]  To Crestor [ rosuvastatin ]   Plan: rosuvastatin (CRESTOR) 10 MG tablet        And further follow up depending on how things go     (M79.7) Fibromyalgia  Comment: chronic severe fibromyalgia syndrome and she's willing to try [facility] for physical therapy for arm / shoulder mobilization / treatment   Plan: ART PT, HAND, AND CHIROPRACTIC REFERRAL,         HYDROcodone-acetaminophen (NORCO) 7.5-325 MG         per tablet, DISCONTINUED:         HYDROcodone-acetaminophen (NORCO) 7.5-325 MG         per tablet, DISCONTINUED:         HYDROcodone-acetaminophen (NORCO) 7.5-325 MG         per tablet            (G24.3) Spasmodic torticollis  Comment: she used to regularly get botox injections with Dr. Junior Doyle with [facility] and these are no longer a covered benefit . Will try physical therapy and possibly iontophoresis with Dexamethasone Sodium Phosphate - 4mg/ml  injectable, 30 cc total if recommended by physical therapy   Plan: ART PT, HAND, AND CHIROPRACTIC REFERRAL,         HYDROcodone-acetaminophen (NORCO) 7.5-325 MG         per tablet, DISCONTINUED:         HYDROcodone-acetaminophen (NORCO) 7.5-325 MG         per tablet, DISCONTINUED:         HYDROcodone-acetaminophen (NORCO) 7.5-325 MG         per tablet            (I21.4) NSTEMI (non-ST elevated myocardial infarction) (H)  Comment: she is no longer a covered benefit with her previous cardiologist and wants me to assume prescribing  And care of her cardiac disease   Plan: she is asymptomatic and we will refill her metoprolol and other medication when necessary . She needs recheck in 6 months for this also    (Z87.891) Personal history of tobacco use, presenting hazards to health  Comment: I was disheartened to hear of her continued smoking   Plan: smoking cessation and will revisit this issues anterior next appointment     Patient Instructions     - Miralax: If you have not had a bowel movement in 3 days, take 2 doses at night. If you have not had a bowel movement in 7 days, take 3 doses.    - Stop using Lipitor while using Crestor, for arm pain. If it reduces arm pain, we will switch to that instead.    - Follow up with physical therapy.    - When you need refills on heart medications, have your pharmacist send us a fax.    - We will follow up with you regarding potential interaction between Trazodone and Nortriptyline.    - Follow up with me in 3 months.    For helping with keeping stool pattern normal , recommend first a high fiber diet   SOURCES OF HIGH FIBER   1. Beans. Think three-bean salad, bean burritos, chili, soup.  2. Whole grains. That means whole-wheat bread, pasta, etc.  3. Brown rice. White rice doesn't offer much fiber.  4. Popcorn. It's a great source of fiber.  5. Nuts. Almonds, pecans, and walnuts have more fiber than other nuts.  6. Baked potato with skin. It's the skin that's important here.  7.  Berries. All those seeds, plus the skin, give great fiber to any berry.  8. Bran cereal. Actually, any cereal that has 5 grams of fiber or more in a serving counts as high fiber.  9. Oatmeal. Whether its microwaved or stove-cooked, oatmeal is good fiber.  10. Vegetables. The crunchier, the better.    If these diet modifications are not enough add metamucil first thing in the morning every day - this is an over the counter nonprescription product  If this is not enough then you have to add a nightly dose of miralax [ Glycolax ] - this is an over the counter nonprescription product    [facility]    If you have any questions regarding to your visit please contact your care team:     Team Pink:   Clinic Hours Telephone Number   Internal Medicine:  Dr. Lisset Ignacio, NP       7am-7pm  Monday - Thursday   7am-5pm  Fridays  [phone]  (Appointment scheduling available 24/7)    Questions about your visit?  Team Line  [phone]   [place] [facility] [place] - 11am-9pm Monday-Friday Saturday-Sunday- 9am-5pm   [place] - 5pm-9pm Monday-Friday Saturday-Sunday- 9am-5pm  [phone] - [place]   [phone] [facility]       What options do I have for visits at the clinic other than the traditional office visit?  To expand how we care for you, many of our providers are utilizing electronic visits (e-visits) and telephone visits, when medically appropriate, for interactions with their patients rather than a visit in the clinic.   We also offer nurse visits for many medical concerns. Just like any other service, we will bill your insurance company for this type of visit based on time spent on the phone with your provider. Not all insurance companies cover these visits. Please check with your medical insurance if this type of visit is covered. You will be responsible for any charges that are not paid by your insurance.      E-visits via Cloudvu:   generally incur a $35.00 fee.  Telephone visits:  Time spent on the phone: *charged based on time that is spent on the phone in increments of 10 minutes. Estimated cost:   5-10 mins $30.00   11-20 mins. $59.00   21-30 mins. $85.00   Use TransUnionhart (secure email communication and access to your chart) to send your primary care provider a message or make an appointment. Ask someone on your Team how to sign up for Instaclustrt.    For a Price Quote for your services, please call our Consumer Price Line at [phone].    As always, Thank you for trusting us with your health care needs!    Discharged by Yadira ANDERSON CMA ([facility])      The information in this document, created by the medical scribe for me, accurately reflects the services I personally performed and the decisions made by me. I have reviewed and approved this document for accuracy.   MD Gerber Dixon MD  [facility]

## 2017-05-15 ENCOUNTER — OFFICE VISIT (OUTPATIENT)
Dept: FAMILY MEDICINE | Facility: CLINIC | Age: 67
End: 2017-05-15
Payer: MEDICARE

## 2017-05-15 VITALS
TEMPERATURE: 97 F | OXYGEN SATURATION: 99 % | HEIGHT: 64 IN | HEART RATE: 60 BPM | DIASTOLIC BLOOD PRESSURE: 70 MMHG | WEIGHT: 116.8 LBS | SYSTOLIC BLOOD PRESSURE: 152 MMHG | BODY MASS INDEX: 19.94 KG/M2

## 2017-05-15 DIAGNOSIS — G24.3 SPASMODIC TORTICOLLIS: ICD-10-CM

## 2017-05-15 DIAGNOSIS — I21.4 NSTEMI (NON-ST ELEVATED MYOCARDIAL INFARCTION) (H): ICD-10-CM

## 2017-05-15 DIAGNOSIS — M79.7 FIBROMYALGIA: ICD-10-CM

## 2017-05-15 DIAGNOSIS — Z87.891 PERSONAL HISTORY OF TOBACCO USE, PRESENTING HAZARDS TO HEALTH: ICD-10-CM

## 2017-05-15 DIAGNOSIS — E78.5 HYPERLIPIDEMIA WITH TARGET LDL LESS THAN 100: Primary | ICD-10-CM

## 2017-05-15 PROCEDURE — 99214 OFFICE O/P EST MOD 30 MIN: CPT | Performed by: INTERNAL MEDICINE

## 2017-05-15 RX ORDER — HYDROCODONE BITARTRATE AND ACETAMINOPHEN 7.5; 325 MG/1; MG/1
2 TABLET ORAL 3 TIMES DAILY
Qty: 180 TABLET | Refills: 0 | Status: SHIPPED | OUTPATIENT
Start: 2017-05-15 | End: 2017-05-15

## 2017-05-15 RX ORDER — ROSUVASTATIN CALCIUM 10 MG/1
10 TABLET, COATED ORAL DAILY
Qty: 30 TABLET | Refills: 1 | Status: SHIPPED | OUTPATIENT
Start: 2017-05-15 | End: 2017-07-14

## 2017-05-15 RX ORDER — HYDROCODONE BITARTRATE AND ACETAMINOPHEN 7.5; 325 MG/1; MG/1
2 TABLET ORAL 3 TIMES DAILY
Qty: 180 TABLET | Refills: 0 | Status: SHIPPED | OUTPATIENT
Start: 2017-05-15 | End: 2017-08-18

## 2017-05-15 NOTE — PATIENT INSTRUCTIONS
- Miralax: If you have not had a bowel movement in 3 days, take 2 doses at night. If you have not had a bowel movement in 7 days, take 3 doses.    - Stop using Lipitor while using Crestor, for arm pain. If it reduces arm pain, we will switch to that instead.    - Follow up with physical therapy.    - When you need refills on heart medications, have your pharmacist send us a fax.    - We will follow up with you regarding potential interaction between Trazodone and Nortriptyline.    - Follow up with me in 3 months.    For helping with keeping stool pattern normal , recommend first a high fiber diet   SOURCES OF HIGH FIBER   1. Beans. Think three-bean salad, bean burritos, chili, soup.  2. Whole grains. That means whole-wheat bread, pasta, etc.  3. Brown rice. White rice doesn't offer much fiber.  4. Popcorn. It's a great source of fiber.  5. Nuts. Almonds, pecans, and walnuts have more fiber than other nuts.  6. Baked potato with skin. It's the skin that's important here.  7. Berries. All those seeds, plus the skin, give great fiber to any berry.  8. Bran cereal. Actually, any cereal that has 5 grams of fiber or more in a serving counts as high fiber.  9. Oatmeal. Whether its microwaved or stove-cooked, oatmeal is good fiber.  10. Vegetables. The crunchier, the better.    If these diet modifications are not enough add metamucil first thing in the morning every day - this is an over the counter nonprescription product  If this is not enough then you have to add a nightly dose of miralax [ Glycolax ] - this is an over the counter nonprescription product    [facility] [place]    If you have any questions regarding to your visit please contact your care team:     Team Pink:   Clinic Hours Telephone Number   Internal Medicine:  Dr. Lisset Ignacio NP       7am-7pm  Monday - Thursday   7am-5pm  Fridays  [phone]  (Appointment scheduling available 24/7)    Questions about your  visit?  Team Line  [phone]   Urgent Care - [place] and [place] - 11am-9pm Monday-Friday Saturday-Sunday- 9am-5pm   [place] - 5pm-9pm Monday-Friday Saturday-Sunday- 9am-5pm  [phone] - [place]  [phone] - [place]       What options do I have for visits at the clinic other than the traditional office visit?  To expand how we care for you, many of our providers are utilizing electronic visits (e-visits) and telephone visits, when medically appropriate, for interactions with their patients rather than a visit in the clinic.   We also offer nurse visits for many medical concerns. Just like any other service, we will bill your insurance company for this type of visit based on time spent on the phone with your provider. Not all insurance companies cover these visits. Please check with your medical insurance if this type of visit is covered. You will be responsible for any charges that are not paid by your insurance.      E-visits via Astech:  generally incur a $35.00 fee.  Telephone visits:  Time spent on the phone: *charged based on time that is spent on the phone in increments of 10 minutes. Estimated cost:   5-10 mins $30.00   11-20 mins. $59.00   21-30 mins. $85.00   Use Expert Dynamicshart (secure email communication and access to your chart) to send your primary care provider a message or make an appointment. Ask someone on your Team how to sign up for Astech.    For a Price Quote for your services, please call our Consumer Price Line at [phone].    As always, Thank you for trusting us with your health care needs!    Discharged by Yadira ANDERSON CMA ([facility])

## 2017-05-15 NOTE — MR AVS SNAPSHOT
After Visit Summary   5/15/2017    Carina Calvert    MRN: [id number]           Patient Information     Date Of Birth          1950        Visit Information        Provider Department      5/15/2017 10:30 AM [facility]ber Jain MD [facility]        Today's Diagnoses     Hyperlipidemia with target LDL less than 100    -  1    Fibromyalgia        Spasmodic torticollis        NSTEMI (non-ST elevated myocardial infarction) (H)        Personal history of tobacco use, presenting hazards to health          Care Instructions    - Miralax: If you have not had a bowel movement in 3 days, take 2 doses at night. If you have not had a bowel movement in 7 days, take 3 doses.    - Stop using Lipitor while using Crestor, for arm pain. If it reduces arm pain, we will switch to that instead.    - Follow up with physical therapy.    - When you need refills on heart medications, have your pharmacist send us a fax.    - We will follow up with you regarding potential interaction between Trazodone and Nortriptyline.    - Follow up with me in 3 months.    For helping with keeping stool pattern normal , recommend first a high fiber diet   SOURCES OF HIGH FIBER   1. Beans. Think three-bean salad, bean burritos, chili, soup.  2. Whole grains. That means whole-wheat bread, pasta, etc.  3. Brown rice. White rice doesn't offer much fiber.  4. Popcorn. It's a great source of fiber.  5. Nuts. Almonds, pecans, and walnuts have more fiber than other nuts.  6. Baked potato with skin. It's the skin that's important here.  7. Berries. All those seeds, plus the skin, give great fiber to any berry.  8. Bran cereal. Actually, any cereal that has 5 grams of fiber or more in a serving counts as high fiber.  9. Oatmeal. Whether its microwaved or stove-cooked, oatmeal is good fiber.  10. Vegetables. The crunchier, the better.    If these diet modifications are not enough add metamucil first thing in the morning every day - this is  an over the counter nonprescription product  If this is not enough then you have to add a nightly dose of miralax [ Glycolax ] - this is an over the counter nonprescription product    [facility]3]    If you have any questions regarding to your visit please contact your care team:     Team Pink:   Clinic Hours Telephone Number   Internal Medicine:  Dr. Lisset Ignacio, NP       7am-7pm  Monday - Thursday   7am-5pm  Fridays  [phone]  (Appointment scheduling available 24/7)    Questions about your visit?  Team Line  [phone]   [facility] [place] [facility]am-9pm Monday-Friday Saturday-Sunday- 9am-5pm   [place] - 5pm-9pm Monday-Friday Saturday-Sunday- 9am-5pm  [phone] - [place]   [phone] - [place]       What options do I have for visits at the clinic other than the traditional office visit?  To expand how we care for you, many of our providers are utilizing electronic visits (e-visits) and telephone visits, when medically appropriate, for interactions with their patients rather than a visit in the clinic.   We also offer nurse visits for many medical concerns. Just like any other service, we will bill your insurance company for this type of visit based on time spent on the phone with your provider. Not all insurance companies cover these visits. Please check with your medical insurance if this type of visit is covered. You will be responsible for any charges that are not paid by your insurance.      E-visits via Red-rabbit:  generally incur a $35.00 fee.  Telephone visits:  Time spent on the phone: *charged based on time that is spent on the phone in increments of 10 minutes. Estimated cost:   5-10 mins $30.00   11-20 mins. $59.00   21-30 mins. $85.00   Use Red-rabbit (secure email communication and access to your chart) to send your primary care provider a message or make an appointment. Ask someone on your Team how to sign  up for fake company 2.0.    For a Price Quote for your services, please call our Consumer Price Line at [phone].    As always, Thank you for trusting us with your health care needs!    Discharged by Yadira ANDERSON CMA ([facility])          Follow-ups after your visit        Additional Services     ART PT, HAND, AND CHIROPRACTIC REFERRAL       **This order will print in the [facility] Scheduling Office**    Physical Therapy, Hand Therapy and Chiropractic Care are available through:    *[facility]  *[facility]  *[facility]    Call one number to schedule at any of the above locations: [phone].    Your provider has referred you to: Physical Therapy at [facility] or [facility]    Indication/Reason for Referral: persistent spasmodic torticollis and chronic neck pain and worsened uper arm pains  Onset of Illness: worse since February 2017  Therapy Orders: Evaluate and Treat  Special Programs: None  Special Request: None    Sarina Branch      Additional Comments for the Therapist or Chiropractor: see office visit notes     Please be aware that coverage of these services is subject to the terms and limitations of your health insurance plan.  Call member services at your health plan with any benefit or coverage questions.      Please bring the following to your appointment:    *Your personal calendar for scheduling future appointments  *Comfortable clothing                  Who to contact     If you have questions or need follow up information about today's clinic visit or your schedule please contact [facility] directly at [phone].  Normal or non-critical lab and imaging results will be communicated to you by MyChart, letter or phone within 4 business days after the clinic has received the results. If you do not hear from us within 7 days, please contact the clinic through MyChart or phone. If you have a critical or abnormal lab result, we will notify you by phone as soon as  "possible.  Submit refill requests through [email or web address] or call your pharmacy and they will forward the refill request to us. Please allow 3 business days for your refill to be completed.          Additional Information About Your Visit        [email or web address] Information     [email or web address] lets you send messages to your doctor, view your test results, renew your prescriptions, schedule appointments and more. To sign up, go to [email or web address] . Click on \"Log in\" on the left side of the screen, which will take you to the Welcome page. Then click on \"Sign up Now\" on the right side of the page.     You will be asked to enter the access code listed below, as well as some personal information. Please follow the directions to create your username and password.     Your access code is: [email or web address]  Expires: 2017 10:55 AM     Your access code will  in 90 days. If you need help or a new code, please call your Mobile clinic or [phone].        Care EveryWhere ID     This is your Care EveryWhere ID. This could be used by other organizations to access your Mobile medical records  [id number]        Your Vitals Were     Pulse Temperature Height Pulse Oximetry BMI (Body Mass Index)       60 97  F (36.1  C) (Oral) 5' 3.75\" (1.619 m) 99% 20.21 kg/m2        Blood Pressure from Last 3 Encounters:   05/15/17 152/70   17 156/74   10/18/16 136/74    Weight from Last 3 Encounters:   05/15/17 116 lb 12.8 oz (53 kg)   17 113 lb 9.6 oz (51.5 kg)   10/18/16 112 lb (50.8 kg)              We Performed the Following     ART PT, HAND, AND CHIROPRACTIC REFERRAL          Today's Medication Changes          These changes are accurate as of: 5/15/17 10:55 AM.  If you have any questions, ask your nurse or doctor.               Start taking these medicines.        Dose/Directions    HYDROcodone-acetaminophen 7.5-325 MG per tablet   Commonly known as:  NORCO   Used for:  Spasmodic torticollis, Fibromyalgia   Started by:  Cristobal, " MD Gerber        Dose:  2 tablet   Take 2 tablets by mouth 3 times daily 28 days or more between refills for controlled medications   Quantity:  180 tablet   Refills:  0       rosuvastatin 10 MG tablet   Commonly known as:  CRESTOR   Used for:  Hyperlipidemia with target LDL less than 100   Started by:  Gerber Jain MD        Dose:  10 mg   Take 1 tablet (10 mg) by mouth daily   Quantity:  30 tablet   Refills:  1            Where to get your medicines      These medications were sent to [place] [address] [place] [address] [address]     Phone:  [phone]     rosuvastatin 10 MG tablet         Some of these will need a paper prescription and others can be bought over the counter.  Ask your nurse if you have questions.     Bring a paper prescription for each of these medications     HYDROcodone-acetaminophen 7.5-325 MG per tablet                Primary Care Provider Office Phone # Fax #    Gerber Jain [phone]       [address] [address]        Thank you!     Thank you for choosing [facility]  for your care. Our goal is always to provide you with excellent care. Hearing back from our patients is one way we can continue to improve our services. Please take a few minutes to complete the written survey that you may receive in the mail after your visit with us. Thank you!             Your Updated Medication List - Protect others around you: Learn how to safely use, store and throw away your medicines at www.disposemymeds.org.          This list is accurate as of: 5/15/17 10:55 AM.  Always use your most recent med list.                   Brand Name Dispense Instructions for use    aspirin 81 MG tablet      Take 81 mg by mouth daily       baclofen 10 MG tablet    LIORESAL    540 tablet    Take 2 tablets (20 mg) by mouth 3 times daily       cyanocobalamin 1000 MCG tablet    vitamin  B-12      1 tablet daily       gabapentin 300 MG capsule    NEURONTIN    540 capsule    1 capsule am, 1 at noon 2 at 5pm and 2 at bedtime ( 6 per day ) Not to be refilled until patient calls       HYDROcodone-acetaminophen 7.5-325 MG per tablet    NORCO    180 tablet    Take 2 tablets by mouth 3 times daily 28 days or more between refills for controlled medications       LIPITOR 80 MG tablet   Generic drug:  atorvastatin      Take 80 mg by mouth daily       metoprolol 25 MG tablet    LOPRESSOR     Take 50 mg by mouth 2 times daily       nitroglycerin 0.4 MG sublingual tablet    NITROSTAT     Place 0.4 mg under the tongue every 5 minutes as needed for chest pain       nortriptyline 10 MG capsule    PAMELOR    450 capsule    Take 5 capsules (50 mg) by mouth At Bedtime Not to be refilled until patient calls       polyethylene glycol powder    MIRALAX/GLYCOLAX     Take 17 g by mouth daily Reported on 5/15/2017       rosuvastatin 10 MG tablet    CRESTOR    30 tablet    Take 1 tablet (10 mg) by mouth daily       traZODone 100 MG tablet    DESYREL    360 tablet    TAKE FOUR TABLETS NIGHTLY AT BEDTIME       vitamin D 2000 UNITS tablet      Take 1 tablet by mouth daily

## 2017-05-15 NOTE — NURSING NOTE
"Chief Complaint   Patient presents with     Recheck Medication     refill, take over heart medication     Musculoskeletal Problem     Arms     Constipation     x3 months       Initial /70 (BP Location: Right arm, Patient Position: Chair, Cuff Size: Adult Regular)  Pulse 60  Temp 97  F (36.1  C) (Oral)  Ht 5' 3.75\" (1.619 m)  Wt 116 lb 12.8 oz (53 kg)  SpO2 99%  BMI 20.21 kg/m2 Estimated body mass index is 20.21 kg/(m^2) as calculated from the following:    Height as of this encounter: 5' 3.75\" (1.619 m).    Weight as of this encounter: 116 lb 12.8 oz (53 kg).  Medication Reconciliation: complete   Yadira ANDERSON CMA ([facility])      "

## 2017-05-18 ENCOUNTER — TELEPHONE (OUTPATIENT)
Dept: FAMILY MEDICINE | Facility: CLINIC | Age: 67
End: 2017-05-18

## 2017-05-18 DIAGNOSIS — Z78.9 DRUG-DRUG INTERACTION: ICD-10-CM

## 2017-05-18 DIAGNOSIS — Z79.899 HIGH RISK MEDICATION USE: Primary | ICD-10-CM

## 2017-05-18 NOTE — TELEPHONE ENCOUNTER
EKG order placed.  Need to schedule EKG (ancillary 30 min appointment) d/t possible drug to drug interaction between trazodone and nortriptyline that may cause prolong QT    Left message on voicemail for patient to return call to RN hotline at # [phone].  Earline Watt RN

## 2017-05-18 NOTE — TELEPHONE ENCOUNTER
Message  Received: Yesterday       Gerber Jain MD  P Fz Rn Triage Pool                   Please let patient know we reviewed her drug to drug interactions between the medications. She should come back to the clinic for an electrocardiogram , this is a safety precaution given her Desyrel Desyrel (Trazodone) and nortriptyline (PAMELOR), we want to rule out long QT interval which is a potential danger     Gerber Jain MD                  Previous Messages       ----- Message -----      From: Jacob Burgos, [facility]      Sent: 5/17/2017  11:06 AM        To: MD Dr. Cristobal Dixon:     I received the scan of the drug interaction between trazodone and nortriptyline.  Both of these medications can pro-long QT.  It is difficult to estimate her risk given the high dose of trazodone, her age, and her gender - but likely it would be elevated given that the risk increases with age. The best way to assess interaction would be to obtain an updated ECG since the last documented one was done in 2009.       Also could consider alternative to trazodone such as mirtazapine (lower doses such as 7.5-15 mg tend to be better than higher dose for sleep) to avoid QT prolongation risk (still would have two serotonergic agents).       Let me know if you have other questions,     Devin Burgos, PharmD   Medication Therapy Management Provider   Pager (voicemail): [phone]

## 2017-05-22 ENCOUNTER — THERAPY VISIT (OUTPATIENT)
Dept: PHYSICAL THERAPY | Facility: CLINIC | Age: 67
End: 2017-05-22
Payer: MEDICARE

## 2017-05-22 ENCOUNTER — ALLIED HEALTH/NURSE VISIT (OUTPATIENT)
Dept: NURSING | Facility: CLINIC | Age: 67
End: 2017-05-22
Payer: MEDICARE

## 2017-05-22 DIAGNOSIS — Z78.9 DRUG-DRUG INTERACTION: ICD-10-CM

## 2017-05-22 DIAGNOSIS — M25.519 SHOULDER PAIN: ICD-10-CM

## 2017-05-22 DIAGNOSIS — Z79.899 HIGH RISK MEDICATION USE: Primary | ICD-10-CM

## 2017-05-22 DIAGNOSIS — M54.2 CERVICALGIA: Primary | ICD-10-CM

## 2017-05-22 PROCEDURE — 93000 ELECTROCARDIOGRAM COMPLETE: CPT

## 2017-05-22 PROCEDURE — 99207 ZZC NO CHARGE NURSE ONLY: CPT

## 2017-05-22 PROCEDURE — G8981 BODY POS CURRENT STATUS: HCPCS | Mod: GP | Performed by: PHYSICAL THERAPIST

## 2017-05-22 PROCEDURE — 97161 PT EVAL LOW COMPLEX 20 MIN: CPT | Mod: GP | Performed by: PHYSICAL THERAPIST

## 2017-05-22 PROCEDURE — G8982 BODY POS GOAL STATUS: HCPCS | Mod: GP | Performed by: PHYSICAL THERAPIST

## 2017-05-22 PROCEDURE — 97110 THERAPEUTIC EXERCISES: CPT | Mod: GP | Performed by: PHYSICAL THERAPIST

## 2017-05-22 PROCEDURE — 97140 MANUAL THERAPY 1/> REGIONS: CPT | Mod: GP | Performed by: PHYSICAL THERAPIST

## 2017-05-22 NOTE — MR AVS SNAPSHOT
"              After Visit Summary   5/22/2017    Carina Calvert    MRN: [id number]           Patient Information     Date Of Birth          1950        Visit Information        Provider Department      5/22/2017 3:00 PM [facility] ANCILLARY [facility]        Today's Diagnoses     High risk medication use    -  1    Drug-drug interaction           Follow-ups after your visit        Your next 10 appointments already scheduled     May 30, 2017 11:40 AM CDT   ART Spine with Lencho Xavier, PT   ART REG PT (ART [place])    [address]  [address]  [place] [facility] [address]   [phone]            Jun 05, 2017  2:30 PM CDT   ART Spine with Lencho Xavier, PT   ART REG PT (ART [place])    [address]  [address]  [place] [facility] [id number] [phone]              Who to contact     If you have questions or need follow up information about today's clinic visit or your schedule please contact [facility] directly at [phone].  Normal or non-critical lab and imaging results will be communicated to you by Buzzerohart, letter or phone within 4 business days after the clinic has received the results. If you do not hear from us within 7 days, please contact the clinic through Buzzerohart or phone. If you have a critical or abnormal lab result, we will notify you by phone as soon as possible.  Submit refill requests through [employer] or call your pharmacy and they will forward the refill request to us. Please allow 3 business days for your refill to be completed.          Additional Information About Your Visit        [employer] Information     [employer] lets you send messages to your doctor, view your test results, renew your prescriptions, schedule appointments and more. To sign up, go to [email or web address] . Click on \"Log in\" on the left side of the screen, which will take you to the Welcome page. Then click on \"Sign up Now\" on the right side of the page.     You will be " asked to enter the access code listed below, as well as some personal information. Please follow the directions to create your username and password.     Your access code is: [email or web address]  Expires: 2017 10:55 AM     Your access code will  in 90 days. If you need help or a new code, please call your [place] clinic or [phone].        Care EveryWhere ID     This is your Care EveryWhere ID. This could be used by other organizations to access your [place] medical records  [id number]         Blood Pressure from Last 3 Encounters:   05/15/17 152/70   17 156/74   10/18/16 136/74    Weight from Last 3 Encounters:   05/15/17 116 lb 12.8 oz (53 kg)   17 113 lb 9.6 oz (51.5 kg)   10/18/16 112 lb (50.8 kg)              We Performed the Following     EKG 12-lead complete with read (future)        Primary Care Provider Office Phone # Fax #    Gerber Jain [phone]       [address] [address]        Thank you!     Thank you for choosing [facility]  for your care. Our goal is always to provide you with excellent care. Hearing back from our patients is one way we can continue to improve our services. Please take a few minutes to complete the written survey that you may receive in the mail after your visit with us. Thank you!             Your Updated Medication List - Protect others around you: Learn how to safely use, store and throw away your medicines at www.disposemymeds.org.          This list is accurate as of: 17  3:04 PM.  Always use your most recent med list.                   Brand Name Dispense Instructions for use    aspirin 81 MG tablet      Take 81 mg by mouth daily       baclofen 10 MG tablet    LIORESAL    540 tablet    Take 2 tablets (20 mg) by mouth 3 times daily       cyanocobalamin 1000 MCG tablet    vitamin  B-12     1 tablet daily       gabapentin 300 MG capsule    NEURONTIN    540 capsule    1  capsule am, 1 at noon 2 at 5pm and 2 at bedtime ( 6 per day ) Not to be refilled until patient calls       HYDROcodone-acetaminophen 7.5-325 MG per tablet    NORCO    180 tablet    Take 2 tablets by mouth 3 times daily 28 days or more between refills for controlled medications       LIPITOR 80 MG tablet   Generic drug:  atorvastatin      Take 80 mg by mouth daily       metoprolol 25 MG tablet    LOPRESSOR     Take 50 mg by mouth 2 times daily       nitroglycerin 0.4 MG sublingual tablet    NITROSTAT     Place 0.4 mg under the tongue every 5 minutes as needed for chest pain       nortriptyline 10 MG capsule    PAMELOR    450 capsule    Take 5 capsules (50 mg) by mouth At Bedtime Not to be refilled until patient calls       polyethylene glycol powder    MIRALAX/GLYCOLAX     Take 17 g by mouth daily Reported on 5/15/2017       rosuvastatin 10 MG tablet    CRESTOR    30 tablet    Take 1 tablet (10 mg) by mouth daily       traZODone 100 MG tablet    DESYREL    360 tablet    TAKE FOUR TABLETS NIGHTLY AT BEDTIME       vitamin D 2000 UNITS tablet      Take 1 tablet by mouth daily

## 2017-05-22 NOTE — LETTER
DEPARTMENT OF HEALTH AND HUMAN SERVICES  [facility]ENTERS FOR MEDICARE & MEDICAID SERVICES    PLAN/UPDATED PLAN OF PROGRESS FOR OUTPATIENT REHABILITATION    PATIENTS NAME:  Carina Calvert   : 1950  PROVIDER NUMBER:    [id number]  [id number]  PROVIDER NAME: [facility]  MEDICAL RECORD NUMBER: [id number]     START OF CARE DATE:  SOC Date: 17   TYPE:  PT    PRIMARY/TREATMENT DIAGNOSIS: (Pertinent Medical Diagnosis)     Cervicalgia  Shoulder pain    VISITS FROM START OF CARE:  Rxs Used: 1     Subjective:  Patient is a 67 year old female presenting with rehab cervical spine hpi.   Carina Calvert is a 67 year old female with a cervical spine condition.  Condition occurred with:  Insidious onset.  Condition occurred: for unknown reasons.  This is a new condition  Patient reports onset of neck pain and entire body pain in 2017 without a specific mechanism of injury or change in daily routine. Patient reports pain:  Cervical left side and cervical right side.  Radiates to:  Shoulder right and shoulder left.  Pain is described as aching and sharp and is constant and reported as 5/10.  Associated symptoms:  Loss of strength and loss of motion/stiffness. Pain is worse during the day.  Symptoms are exacerbated by lifting, carrying, rotating head and looking up or down and relieved by muscle relaxants and analgesics (takes pain medication every 4 hours).  Since onset symptoms are unchanged.  Special tests:  MRI (see epic). General health as reported by patient is good. MD order 5/15/2017.                 Red flags:  None as reported by the patient.              Objective:  Standing Alignment:    Cervical/Thoracic:  Forward head  Shoulder/UE:  Rounded shoulders  Cervical/Thoracic Evaluation  AROM:  AROM Cervical:  Flexion:            Moderate limitation, painful  Extension:       Moderate limitation, painful  Rotation:         Left: 30 deg, painful     Right: 30 deg, painful  Side Bend:      Left: 15  deg, painful     Right:  10 deg, painful    Headaches: migraine  Cervical Myotomes:  Cervical myotomes: grossly 3+/4 bilaterally.    PATIENTS NAME:  Carina Calvert   : 1950    Neural Tension:    Left side:  Radial; Ulnar and Median positive.  Right side:  Radial; Ulnar and Median positive.  Cervical Dermatomes:  normal  Cervical Palpation:    Tenderness present at Left:    Sternocleidomstoid; Scalenes; Rhomboids; Upper Trap; Levator; Erector Spinae; Facet and Suboccipitals  Tenderness present at Right:    Sternocleidomstoid; Scalenes; Rhomboids; Upper Trap; Levator; Erector Spinae; Facet and Suboccipitals  Cervical Stability/Joint Clearing:  normal  Spinal Segmental Conclusions:    Level:  Hypo at C3, C2, C1, C5, C4, C6 and C7  Shoulder Evaluation:  ROM:  AROM:    Flexion:  Left:  90 deg    Right:  90 deg  Extension: Left: WNLRight: WNL  Abduction:  Left: 80 deg   Right:  80 deg  Adduction:   Left:  WNL  Right:  WNL  Internal Rotation:  Left:  Back pocket    Right:  Back pocket  External Rotation:  Left:  40 deg    Right:  40 deg  Elbow Flexion:  Left:  WNL    Right:  WNL  Elbow Extension:  Left:  WNL   Right:  WNL  PROM:    Flexion:  Left:  130 deg    Right: 130 deg    Abduction:  Left:  90 deg    Right:  90 deg  External Rotation:  Left:  45 deg    Right:  45 deg  Strength:  : grossly 3+/5 bilaterally.  Stability Testing:  Stability testing shoulder: significant guarding, unable to obtain accurate information.  Special Tests:    Left shoulder positive for the following special tests:  Impingement; Neural Tension and Rotator cuff tear  Right shoulder positive for the following special tests:Impingement; Neural Tension and Rotator cuff tear (?)  Palpation:    Left shoulder tenderness present at:  Sternoclavicular; Acrimioclavicular; Biceps; Triceps; Supraspinatus; Infraspinatus; Subscapularis; Levator; Rhomboids; Upper Trap and Bicipital Groove  Right shoulder tenderness present at: Sternoclavicular;  Acrimioclavicular; Biceps; Triceps; Supraspinatus; Infraspinatus; Subscapularis; Levator; Rhomboids; Upper Trap and Bicipital Groove  Mobility Tests:  Mobility wnl shoulder: too painful to test today.            PATIENTS NAME:  Carina Calvert   : 1950    Assessment/Plan:    Patient is a 67 year old female with cervical and body pain complaints.    Patient has the following significant findings with corresponding treatment plan.                Diagnosis 1:  Neck pain; whole body pain  Pain -  hot/cold therapy, electric stimulation, mechanical traction, manual therapy, self management, education, directional preference exercise and home program  Decreased ROM/flexibility - manual therapy, therapeutic exercise, therapeutic activity and home program  Decreased joint mobility - manual therapy, therapeutic exercise, therapeutic activity and home program  Decreased strength - therapeutic exercise, therapeutic activities and home program  Decreased function - therapeutic activities and home program  Impaired posture - neuro re-education, therapeutic activities and home program    Therapy Evaluation Codes:   1) History comprised of:   Personal factors that impact the plan of care:      Overall behavior pattern, Past/current experiences, Social history/culture and Time since onset of symptoms.    Comorbidity factors that impact the plan of care are:      Fibromyalgia and Heart problems.     Medications impacting care: Muscle relaxant and Pain.  2) Examination of Body Systems comprised of:   Body structures and functions that impact the plan of care:      Cervical spine, Hip, Knee and Shoulder.   Activity limitations that impact the plan of care are:      Bathing, Bending, Cooking, Driving, Dressing, Grasping, Jumping, Lifting, Reading/Computer work, Running, Sitting, Sports, Squatting/kneeling, Stairs, Standing, Throwing, Walking, Sleeping and Laying down.  3) Clinical presentation characteristics  "are:   Unstable/Unpredictable.  4) Decision-Making    High complexity using standardized patient assessment instrument and/or measureable assessment of functional outcome.  Cumulative Therapy Evaluation is: High complexity.    Previous and current functional limitations:  (See Goal Flow Sheet for this information)    Short term and Long term goals: (See Goal Flow Sheet for this information)     Communication ability:  Patient appears to be able to clearly communicate and understand verbal and written communication and follow directions correctly.  Treatment Explanation - The following has been discussed with the patient:   RX ordered/plan of care  Anticipated outcomes  Possible risks and side effects  This patient would benefit from PT intervention to resume normal activities.   Rehab potential is fair.  PATIENTS NAME:  Carina Calvert   : 1950    Frequency:  1 X week, once daily  Duration:  for 6 weeks  Discharge Plan:  Achieve all LTG.  Independent in home treatment program.  Reach maximal therapeutic benefit.    Please refer to the daily flowsheet for treatment today, total treatment time and time spent performing 1:1 timed codes.             Caregiver Signature/Credentials _____________________________ Date ________       Treating Provider: Lencho Xavier DPT   I have reviewed and certified the need for these services and plan of treatment while under my care.        PHYSICIAN'S SIGNATURE:   _________________________________________  Date___________   Gerber Jain    Certification period:  Beginning of Cert date period: 17 to  End of Cert period date: 17     Functional Level Progress Report: Please see attached \"Goal Flow sheet for Functional level.\"    ____X____ Continue Services or       ________ DC Services                Service dates: From  SOC Date: 17 date to present                         "

## 2017-05-22 NOTE — MR AVS SNAPSHOT
"              After Visit Summary   5/22/2017    Carina Calvert    MRN: [id number]           Patient Information     Date Of Birth          1950        Visit Information        Provider Department      5/22/2017 1:50 PM Lencho Xavier, PT [facility]        Today's Diagnoses     Cervicalgia    -  1    Shoulder pain           Follow-ups after your visit        Your next 10 appointments already scheduled     May 30, 2017 11:40 AM CDT   [facility] with Lencho Xavier, PT   [facility]    [address]  [address]  [facility] [address]   [phone]            Jun 05, 2017  2:30 PM CDT   [facility] with Lencho Xavier, PT   [facility]    [address]  [address]  [place] [address]   [phone]              Who to contact     If you have questions or need follow up information about today's clinic visit or your schedule please contact [facility] directly at [phone].  Normal or non-critical lab and imaging results will be communicated to you by Audiencehart, letter or phone within 4 business days after the clinic has received the results. If you do not hear from us within 7 days, please contact the clinic through [employer] or phone. If you have a critical or abnormal lab result, we will notify you by phone as soon as possible.  Submit refill requests through Vungle or call your pharmacy and they will forward the refill request to us. Please allow 3 business days for your refill to be completed.          Additional Information About Your Visit        Vungle Information     Vungle lets you send messages to your doctor, view your test results, renew your prescriptions, schedule appointments and more. To sign up, go to [email or web address] . Click on \"Log in\" on the left side of the screen, which will take you to the Welcome page. Then click on \"Sign up Now\" on the right side of the page.     You will be asked to enter the access code " listed below, as well as some personal information. Please follow the directions to create your username and password.     Your access code is: [email or web address]  Expires: 2017 10:55 AM     Your access code will  in 90 days. If you need help or a new code, please call your [place] clinic or [phone].        Care EveryWhere ID     This is your Care EveryWhere ID. This could be used by other organizations to access your [place] medical records  [id number]         Blood Pressure from Last 3 Encounters:   05/15/17 152/70   17 156/74   10/18/16 136/74    Weight from Last 3 Encounters:   05/15/17 53 kg (116 lb 12.8 oz)   17 51.5 kg (113 lb 9.6 oz)   10/18/16 50.8 kg (112 lb)              We Performed the Following     Manual Ther Tech, 1+Regions, EA 15 min     PT Eval, Low Complexity (83901)     Therapeutic Exercises        Primary Care Provider Office Phone # Fax #    Gerber Jain [phone]       [address] [address]        Thank you!     Thank you for choosing [facility]  for your care. Our goal is always to provide you with excellent care. Hearing back from our patients is one way we can continue to improve our services. Please take a few minutes to complete the written survey that you may receive in the mail after your visit with us. Thank you!             Your Updated Medication List - Protect others around you: Learn how to safely use, store and throw away your medicines at www.disposemymeds.org.          This list is accurate as of: 17  3:06 PM.  Always use your most recent med list.                   Brand Name Dispense Instructions for use    aspirin 81 MG tablet      Take 81 mg by mouth daily       baclofen 10 MG tablet    LIORESAL    540 tablet    Take 2 tablets (20 mg) by mouth 3 times daily       cyanocobalamin 1000 MCG tablet    vitamin  B-12     1 tablet daily       gabapentin 300 MG capsule    NEURONTIN    540  capsule    1 capsule am, 1 at noon 2 at 5pm and 2 at bedtime ( 6 per day ) Not to be refilled until patient calls       HYDROcodone-acetaminophen 7.5-325 MG per tablet    NORCO    180 tablet    Take 2 tablets by mouth 3 times daily 28 days or more between refills for controlled medications       LIPITOR 80 MG tablet   Generic drug:  atorvastatin      Take 80 mg by mouth daily       metoprolol 25 MG tablet    LOPRESSOR     Take 50 mg by mouth 2 times daily       nitroglycerin 0.4 MG sublingual tablet    NITROSTAT     Place 0.4 mg under the tongue every 5 minutes as needed for chest pain       nortriptyline 10 MG capsule    PAMELOR    450 capsule    Take 5 capsules (50 mg) by mouth At Bedtime Not to be refilled until patient calls       polyethylene glycol powder    MIRALAX/GLYCOLAX     Take 17 g by mouth daily Reported on 5/15/2017       rosuvastatin 10 MG tablet    CRESTOR    30 tablet    Take 1 tablet (10 mg) by mouth daily       traZODone 100 MG tablet    DESYREL    360 tablet    TAKE FOUR TABLETS NIGHTLY AT BEDTIME       vitamin D 2000 UNITS tablet      Take 1 tablet by mouth daily

## 2017-05-22 NOTE — PROGRESS NOTES
Subjective:    Patient is a 67 year old female presenting with rehab cervical spine hpi.   Carina Calvert is a 67 year old female with a cervical spine condition.  Condition occurred with:  Insidious onset.  Condition occurred: for unknown reasons.  This is a new condition  Patient reports onset of neck pain and entire body pain in February 2017 without a specific mechanism of injury or change in daily routine..    Patient reports pain:  Cervical left side and cervical right side.  Radiates to:  Shoulder right and shoulder left.  Pain is described as aching and sharp and is constant and reported as 5/10.  Associated symptoms:  Loss of strength and loss of motion/stiffness. Pain is worse during the day.  Symptoms are exacerbated by lifting, carrying, rotating head and looking up or down and relieved by muscle relaxants and analgesics (takes pain medication every 4 hours).  Since onset symptoms are unchanged.  Special tests:  MRI (see epic).      General health as reported by patient is good.                      Red flags:  None as reported by the patient.                        Objective:    Standing Alignment:    Cervical/Thoracic:  Forward head  Shoulder/UE:  Rounded shoulders                                  Cervical/Thoracic Evaluation    AROM:  AROM Cervical:    Flexion:            Moderate limitation, painful  Extension:       Moderate limitation, painful  Rotation:         Left: 30 deg, painful     Right: 30 deg, painful  Side Bend:      Left: 15 deg, painful     Right:  10 deg, painful      Headaches: migraine  Cervical Myotomes:  Cervical myotomes: grossly 3+/4 bilaterally.                    Neural Tension:    Left side:  Radial; Ulnar and Median positive.  Right side:  Radial; Ulnar and Median positive.  Cervical Dermatomes:  normal                    Cervical Palpation:    Tenderness present at Left:    Sternocleidomstoid; Scalenes; Rhomboids; Upper Trap; Levator; Erector Spinae; Facet and  Suboccipitals  Tenderness present at Right:    Sternocleidomstoid; Scalenes; Rhomboids; Upper Trap; Levator; Erector Spinae; Facet and Suboccipitals    Cervical Stability/Joint Clearing:  normal      Spinal Segmental Conclusions:    Level:  Hypo at C3, C2, C1, C5, C4, C6 and C7             Shoulder Evaluation:  ROM:  AROM:    Flexion:  Left:  90 deg    Right:  90 deg  Extension: Left: WNLRight: WNL  Abduction:  Left: 80 deg   Right:  80 deg  Adduction:   Left:  WNL  Right:  WNL  Internal Rotation:  Left:  Back pocket    Right:  Back pocket  External Rotation:  Left:  40 deg    Right:  40 deg      Elbow Flexion:  Left:  WNL    Right:  WNL  Elbow Extension:  Left:  WNL   Right:  WNL        PROM:    Flexion:  Left:  130 deg    Right: 130 deg      Abduction:  Left:  90 deg    Right:  90 deg      External Rotation:  Left:  45 deg    Right:  45 deg                    Strength:  : grossly 3+/5 bilaterally.                      Stability Testing:  Stability testing shoulder: significant guarding, unable to obtain accurate information.      Special Tests:    Left shoulder positive for the following special tests:  Impingement; Neural Tension and Rotator cuff tear  Right shoulder positive for the following special tests:Impingement; Neural Tension and Rotator cuff tear (?)  Palpation:    Left shoulder tenderness present at:  Sternoclavicular; Acrimioclavicular; Biceps; Triceps; Supraspinatus; Infraspinatus; Subscapularis; Levator; Rhomboids; Upper Trap and Bicipital Groove  Right shoulder tenderness present at: Sternoclavicular; Acrimioclavicular; Biceps; Triceps; Supraspinatus; Infraspinatus; Subscapularis; Levator; Rhomboids; Upper Trap and Bicipital Groove  Mobility Tests:  Mobility wnl shoulder: too painful to test today.                                                 General     ROS    Assessment/Plan:      Patient is a 67 year old female with cervical and body pain complaints.    Patient has the following significant  findings with corresponding treatment plan.                Diagnosis 1:  Neck pain; whole body pain  Pain -  hot/cold therapy, electric stimulation, mechanical traction, manual therapy, self management, education, directional preference exercise and home program  Decreased ROM/flexibility - manual therapy, therapeutic exercise, therapeutic activity and home program  Decreased joint mobility - manual therapy, therapeutic exercise, therapeutic activity and home program  Decreased strength - therapeutic exercise, therapeutic activities and home program  Decreased function - therapeutic activities and home program  Impaired posture - neuro re-education, therapeutic activities and home program    Therapy Evaluation Codes:   1) History comprised of:   Personal factors that impact the plan of care:      Overall behavior pattern, Past/current experiences, Social history/culture and Time since onset of symptoms.    Comorbidity factors that impact the plan of care are:      Fibromyalgia and Heart problems.     Medications impacting care: Muscle relaxant and Pain.  2) Examination of Body Systems comprised of:   Body structures and functions that impact the plan of care:      Cervical spine, Hip, Knee and Shoulder.   Activity limitations that impact the plan of care are:      Bathing, Bending, Cooking, Driving, Dressing, Grasping, Jumping, Lifting, Reading/Computer work, Running, Sitting, Sports, Squatting/kneeling, Stairs, Standing, Throwing, Walking, Sleeping and Laying down.  3) Clinical presentation characteristics are:   Unstable/Unpredictable.  4) Decision-Making    High complexity using standardized patient assessment instrument and/or measureable assessment of functional outcome.  Cumulative Therapy Evaluation is: High complexity.    Previous and current functional limitations:  (See Goal Flow Sheet for this information)    Short term and Long term goals: (See Goal Flow Sheet for this information)     Communication  ability:  Patient appears to be able to clearly communicate and understand verbal and written communication and follow directions correctly.  Treatment Explanation - The following has been discussed with the patient:   RX ordered/plan of care  Anticipated outcomes  Possible risks and side effects  This patient would benefit from PT intervention to resume normal activities.   Rehab potential is fair.    Frequency:  1 X week, once daily  Duration:  for 6 weeks  Discharge Plan:  Achieve all LTG.  Independent in home treatment program.  Reach maximal therapeutic benefit.    Please refer to the daily flowsheet for treatment today, total treatment time and time spent performing 1:1 timed codes.

## 2017-05-23 NOTE — PROGRESS NOTES
Subjective:    Patient is a 67 year old female presenting with rehab left ankle/foot hpi.                                      Pertinent medical history includes:  Osteoporosis, heart problems, fibromyalgia, depression, migraines, hepatitis, smoking, sleep disorder/apnea and other.  Medical allergies: yes.  Other surgeries include:  Heart surgery and other.  Current medications:  Pain medication, sleep medication, cardiac medication, muscle relaxants and anti-depressants.  Current occupation is retired.                                    Objective:    System    Physical Exam    General     ROS    Assessment/Plan:

## 2017-05-30 ENCOUNTER — THERAPY VISIT (OUTPATIENT)
Dept: PHYSICAL THERAPY | Facility: CLINIC | Age: 67
End: 2017-05-30
Payer: MEDICARE

## 2017-05-30 DIAGNOSIS — M54.2 CERVICALGIA: Primary | ICD-10-CM

## 2017-05-30 DIAGNOSIS — M25.519 SHOULDER PAIN: ICD-10-CM

## 2017-05-30 PROCEDURE — 97110 THERAPEUTIC EXERCISES: CPT | Mod: GP | Performed by: PHYSICAL THERAPIST

## 2017-05-30 PROCEDURE — 97140 MANUAL THERAPY 1/> REGIONS: CPT | Mod: GP | Performed by: PHYSICAL THERAPIST

## 2017-06-05 ENCOUNTER — THERAPY VISIT (OUTPATIENT)
Dept: PHYSICAL THERAPY | Facility: CLINIC | Age: 67
End: 2017-06-05
Payer: MEDICARE

## 2017-06-05 DIAGNOSIS — M54.2 CERVICALGIA: Primary | ICD-10-CM

## 2017-06-05 DIAGNOSIS — M25.519 SHOULDER PAIN: ICD-10-CM

## 2017-06-05 PROCEDURE — 97140 MANUAL THERAPY 1/> REGIONS: CPT | Mod: GP | Performed by: PHYSICAL THERAPIST

## 2017-06-05 PROCEDURE — 97110 THERAPEUTIC EXERCISES: CPT | Mod: GP | Performed by: PHYSICAL THERAPIST

## 2017-06-12 ENCOUNTER — THERAPY VISIT (OUTPATIENT)
Dept: PHYSICAL THERAPY | Facility: CLINIC | Age: 67
End: 2017-06-12
Payer: MEDICARE

## 2017-06-12 DIAGNOSIS — M54.2 CERVICALGIA: Primary | ICD-10-CM

## 2017-06-12 DIAGNOSIS — M25.519 SHOULDER PAIN: ICD-10-CM

## 2017-06-12 PROCEDURE — 97110 THERAPEUTIC EXERCISES: CPT | Mod: GP | Performed by: PHYSICAL THERAPIST

## 2017-06-12 PROCEDURE — 97032 APPL MODALITY 1+ESTIM EA 15: CPT | Mod: GP | Performed by: PHYSICAL THERAPIST

## 2017-06-26 ENCOUNTER — THERAPY VISIT (OUTPATIENT)
Dept: PHYSICAL THERAPY | Facility: CLINIC | Age: 67
End: 2017-06-26
Payer: MEDICARE

## 2017-06-26 DIAGNOSIS — M25.512 LEFT SHOULDER PAIN: Primary | ICD-10-CM

## 2017-06-26 PROCEDURE — 97140 MANUAL THERAPY 1/> REGIONS: CPT | Mod: GP | Performed by: PHYSICAL THERAPIST

## 2017-06-26 NOTE — PROGRESS NOTES
Subjective:    HPI                    Objective:    System    Physical Exam    General     ROS    Assessment/Plan:      PROGRESS  REPORT/DISCHARGE NOTE    Progress reporting period is from 5/22/2017 to 6/26/2017.       SUBJECTIVE  Subjective changes noted by patient  Subjective: Patient reports no significant changes in her condition since last PT session    Current pain level is 10/10  .     Previous pain level was   Initial Pain level: 6/10.   Changes in function:  Yes, Worsening of condition  Adverse reaction to treatment or activity: None    OBJECTIVE  Changes noted in objective findings:  Yes,    Objective: Patient demonstrates a significant worsening in her condition with an increase in pain and remarkable decrease in left shoulder AROM/PROM over the last 2 weeks. It appears the shoulder is now frozen. Patient does not toelrate any manual contact including PROM and joint mobilizations. Patient did not respond to TENS with PROM or AAROM. Supine forward flexion 105 deg, ABduction 90 deg,  ER 0 deg, IR back pocket.    ASSESSMENT/PLAN  Updated problem list and treatment plan: Diagnosis 1:  Left shoulder pain; frozen shoulder  Pain -  hot/cold therapy, electric stimulation, manual therapy, self management, education and home program  Decreased ROM/flexibility - manual therapy, therapeutic exercise, therapeutic activity and home program  Decreased joint mobility - manual therapy, therapeutic exercise, therapeutic activity and home program  Decreased strength - therapeutic exercise, therapeutic activities and home program  Decreased function - therapeutic activities and home program  STG/LTGs have been met or progress has been made towards goals:  Yes (See Goal flow sheet completed today.)  Assessment of Progress: The patient's condition has exacerbated.  Self Management Plans:  Patient has been instructed in a home treatment program.  Patient  has been instructed in self management of symptoms.  I have re-evaluated  this patient and find that the nature, scope, duration and intensity of the therapy is appropriate for the medical condition of the patient.  Carina continues to require the following intervention to meet STG and LTG's:  PT    Recommendations:  This patient would benefit from further evaluation.    Please refer to the daily flowsheet for treatment today, total treatment time and time spent performing 1:1 timed codes.

## 2017-06-26 NOTE — MR AVS SNAPSHOT
"              After Visit Summary   2017    Carina Calvert    MRN: [id number]           Patient Information     Date Of Birth          1950        Visit Information        Provider Department      2017 1:50 PM Lencho Xavier, PT [facility]        Today's Diagnoses     Left shoulder pain    -  1       Follow-ups after your visit        Who to contact     If you have questions or need follow up information about today's clinic visit or your schedule please contact [facility] directly at [phone].  Normal or non-critical lab and imaging results will be communicated to you by [employer], letter or phone within 4 business days after the clinic has received the results. If you do not hear from us within 7 days, please contact the clinic through [employer] or phone. If you have a critical or abnormal lab result, we will notify you by phone as soon as possible.  Submit refill requests through Mercari or call your pharmacy and they will forward the refill request to us. Please allow 3 business days for your refill to be completed.          Additional Information About Your Visit        MyChart Information     Mercari lets you send messages to your doctor, view your test results, renew your prescriptions, schedule appointments and more. To sign up, go to [email or web address] . Click on \"Log in\" on the left side of the screen, which will take you to the Welcome page. Then click on \"Sign up Now\" on the right side of the page.     You will be asked to enter the access code listed below, as well as some personal information. Please follow the directions to create your username and password.     Your access code is: [email or web address]  Expires: 2017 10:55 AM     Your access code will  in 90 days. If you need help or a new code, please call your [place] clinic or [phone].        Care EveryWhere ID     This is your Care EveryWhere ID. This could be used by other organizations to access your " [place] medical records  [id number]         Blood Pressure from Last 3 Encounters:   05/15/17 152/70   01/12/17 156/74   10/18/16 136/74    Weight from Last 3 Encounters:   05/15/17 53 kg (116 lb 12.8 oz)   01/12/17 51.5 kg (113 lb 9.6 oz)   10/18/16 50.8 kg (112 lb)              We Performed the Following     Manual Ther Tech, 1+Regions, EA 15 min        Primary Care Provider Office Phone # Fax #    Gerber Jain [phone]       [facility] [address] [address]        Equal Access to Services     NICHOLAS BILLY : Hadii beverly ku hadasho Soomaali, waaxda luqadaha, qaybta kaalmada adeegyada, sofya gardner . So [facility] [phone].    ATENCIÓN: Si habla español, tiene a healy disposición servicios gratuitos de asistencia lingüística. Llame al [phone].    We comply with applicable federal civil rights laws and [place] laws. We do not discriminate on the basis of race, color, national origin, age, disability sex, sexual orientation or gender identity.            Thank you!     Thank you for choosing [facility]  for your care. Our goal is always to provide you with excellent care. Hearing back from our patients is one way we can continue to improve our services. Please take a few minutes to complete the written survey that you may receive in the mail after your visit with us. Thank you!             Your Updated Medication List - Protect others around you: Learn how to safely use, store and throw away your medicines at www.disposemymeds.org.          This list is accurate as of: 6/26/17  2:23 PM.  Always use your most recent med list.                   Brand Name Dispense Instructions for use Diagnosis    aspirin 81 MG tablet      Take 81 mg by mouth daily        baclofen 10 MG tablet    LIORESAL    540 tablet    Take 2 tablets (20 mg) by mouth 3 times daily    Spasmodic torticollis, Fibromyalgia       cyanocobalamin 1000 MCG tablet     vitamin  B-12     1 tablet daily        gabapentin 300 MG capsule    NEURONTIN    540 capsule    1 capsule am, 1 at noon 2 at 5pm and 2 at bedtime ( 6 per day ) Not to be refilled until patient calls    Spasmodic torticollis, Fibromyalgia, Restless leg syndrome       HYDROcodone-acetaminophen 7.5-325 MG per tablet    NORCO    180 tablet    Take 2 tablets by mouth 3 times daily 28 days or more between refills for controlled medications    Spasmodic torticollis, Fibromyalgia       LIPITOR 80 MG tablet   Generic drug:  atorvastatin      Take 80 mg by mouth daily        metoprolol 25 MG tablet    LOPRESSOR     Take 50 mg by mouth 2 times daily        nitroglycerin 0.4 MG sublingual tablet    NITROSTAT     Place 0.4 mg under the tongue every 5 minutes as needed for chest pain        nortriptyline 10 MG capsule    PAMELOR    450 capsule    Take 5 capsules (50 mg) by mouth At Bedtime Not to be refilled until patient calls    Fibromyalgia       polyethylene glycol powder    MIRALAX/GLYCOLAX     Take 17 g by mouth daily Reported on 5/15/2017        rosuvastatin 10 MG tablet    CRESTOR    30 tablet    Take 1 tablet (10 mg) by mouth daily    Hyperlipidemia with target LDL less than 100       traZODone 100 MG tablet    DESYREL    360 tablet    TAKE FOUR TABLETS NIGHTLY AT BEDTIME    Fibromyalgia       vitamin D 2000 UNITS tablet      Take 1 tablet by mouth daily    Hypovitaminosis D

## 2017-07-14 DIAGNOSIS — E78.5 HYPERLIPIDEMIA WITH TARGET LDL LESS THAN 100: ICD-10-CM

## 2017-07-14 NOTE — TELEPHONE ENCOUNTER
rosuvastatin (CRESTOR) 10 MG tablet     Last Written Prescription Date: 5/15/17  Last Fill Quantity: 30, # refills: 1  Last Office Visit with G, [facility] or [facility] prescribing provider: 5/15/17       Lab Results   Component Value Date    CHOL 103 10/18/2016     Lab Results   Component Value Date    HDL 42 10/18/2016     Lab Results   Component Value Date    LDL 50 10/18/2016     03/16/2009     Lab Results   Component Value Date    TRIG 60 03/16/2009     Lab Results   Component Value Date    CHOLHDLRATIO 2.9 03/16/2009

## 2017-07-17 RX ORDER — ROSUVASTATIN CALCIUM 10 MG/1
TABLET, COATED ORAL
Qty: 30 TABLET | Refills: 2 | Status: SHIPPED | OUTPATIENT
Start: 2017-07-17 | End: 2017-08-18

## 2017-07-17 NOTE — TELEPHONE ENCOUNTER
Prescription approved per [facility] Refill Protocol.  Angle Tariq RN

## 2017-08-07 ENCOUNTER — TELEPHONE (OUTPATIENT)
Dept: INTERNAL MEDICINE | Facility: CLINIC | Age: 67
End: 2017-08-07

## 2017-08-07 NOTE — LETTER
[address]  [place] [address]  Phone: [phone]            August 7, 2017          Carina Calvert,  [address]  [place] [address]        Dear Carina Calvert      Monitoring and managing your preventative and chronic health conditions are very important to us. Our records indicate that you have not scheduled for a Blood Pressure check and a Mammogram  which was recommended by Dr. Jain.      If you have received your health care elsewhere, please call the clinic so the information can be documented in your chart.    Please call [phone] or message us through your ViaCube account to schedule an appointment or provide information for your chart.     Feel free to contact us if you have any questions or concerns!    I look forward to seeing you and working with you on your health care needs.     Sincerely,         Gerber Jain / Violette Mccullough, CMA

## 2017-08-07 NOTE — TELEPHONE ENCOUNTER
Panel Management Review      Patient has the following on her problem list:     Hypertension   Last three blood pressure readings:  BP Readings from Last 3 Encounters:   05/15/17 152/70   01/12/17 156/74   10/18/16 136/74     Blood pressure: FAILED    HTN Guidelines:  Age 18-59 BP range:  Less than 140/90  Age 60-85 with Diabetes:  Less than 140/90  Age 60-85 without Diabetes:  less than 150/90        Composite cancer screening  Chart review shows that this patient is due/due soon for the following Mammogram  Summary:    Patient is due/failing the following:   BP CHECK    Action needed:   Patient needs office visit for BP check.    Type of outreach:    Sent letter.    Questions for provider review:    None                                                                                                                                    Violette Mccullough CMA       Chart routed to  .

## 2017-08-15 NOTE — PROGRESS NOTES
"  SUBJECTIVE:                                                    Carina Calvert is a 67 year old female who presents to clinic today for the following health issues:      Hypertension Follow-up      Outpatient blood pressures are not being checked.    Low Salt Diet: low salt        Amount of exercise or physical activity: 2-3 days/week for an average of 30-45 minutes    Problems taking medications regularly: No    Medication side effects: none  Diet: low salt and no sugar no fried food    CLIFTON/MA    {additional problems for provider to add:503087}    Problem list and histories reviewed & adjusted, as indicated.  Additional history: {NONE - AS DOCUMENTED:693117::\"as documented\"}    {HIST REVIEW/ LINKS 2:230283}    Reviewed and updated as needed this visit by clinical staff       Reviewed and updated as needed this visit by Provider         {PROVIDER CHARTING PREFERENCE:462880}  "

## 2017-08-18 ENCOUNTER — OFFICE VISIT (OUTPATIENT)
Dept: INTERNAL MEDICINE | Facility: CLINIC | Age: 67
End: 2017-08-18
Payer: MEDICARE

## 2017-08-18 VITALS
OXYGEN SATURATION: 94 % | WEIGHT: 121 LBS | SYSTOLIC BLOOD PRESSURE: 122 MMHG | DIASTOLIC BLOOD PRESSURE: 70 MMHG | HEART RATE: 62 BPM | TEMPERATURE: 97.7 F | BODY MASS INDEX: 20.93 KG/M2

## 2017-08-18 DIAGNOSIS — M54.2 CERVICALGIA: ICD-10-CM

## 2017-08-18 DIAGNOSIS — E78.5 HYPERLIPIDEMIA WITH TARGET LDL LESS THAN 100: ICD-10-CM

## 2017-08-18 DIAGNOSIS — M79.7 FIBROMYALGIA: ICD-10-CM

## 2017-08-18 DIAGNOSIS — I10 HYPERTENSION GOAL BP (BLOOD PRESSURE) < 140/90: ICD-10-CM

## 2017-08-18 DIAGNOSIS — G24.3 SPASMODIC TORTICOLLIS: Primary | ICD-10-CM

## 2017-08-18 PROCEDURE — 99214 OFFICE O/P EST MOD 30 MIN: CPT | Performed by: NURSE PRACTITIONER

## 2017-08-18 RX ORDER — HYDROCODONE BITARTRATE AND ACETAMINOPHEN 7.5; 325 MG/1; MG/1
2 TABLET ORAL 3 TIMES DAILY
Qty: 180 TABLET | Refills: 0 | Status: SHIPPED | OUTPATIENT
Start: 2017-08-18 | End: 2017-08-18

## 2017-08-18 RX ORDER — ROSUVASTATIN CALCIUM 10 MG/1
TABLET, COATED ORAL
Qty: 90 TABLET | Refills: 0 | Status: SHIPPED | OUTPATIENT
Start: 2017-08-18 | End: 2017-11-14

## 2017-08-18 RX ORDER — HYDROCODONE BITARTRATE AND ACETAMINOPHEN 7.5; 325 MG/1; MG/1
2 TABLET ORAL 3 TIMES DAILY
Qty: 180 TABLET | Refills: 0 | Status: SHIPPED | OUTPATIENT
Start: 2017-08-18 | End: 2017-11-14

## 2017-08-18 ASSESSMENT — PAIN SCALES - GENERAL: PAINLEVEL: SEVERE PAIN (6)

## 2017-08-18 NOTE — MR AVS SNAPSHOT
After Visit Summary   8/18/2017    Carina Calvert    MRN: [id number]           Patient Information     Date Of Birth          1950        Visit Information        Provider Department      8/18/2017 12:20 PM Dot Ignacio APRN [facility]        Today's Diagnoses     Spasmodic torticollis    -  1    Fibromyalgia        Cervicalgia        Hypertension goal BP (blood pressure) < 140/90        Hyperlipidemia with target LDL less than 100          Care Instructions    [facility]    If you have any questions regarding to your visit please contact your care team:     Team Pink:   Clinic Hours Telephone Number   Internal Medicine:  Dr. Lisset Ignacio, NP       7am-7pm  Monday - Thursday   7am-5pm  Fridays  [phone]  (Appointment scheduling available 24/7)    Questions about your visit?  Team Line  [phone]   [facility] [place] [facility] - 11am-9pm Monday-Friday Saturday-Sunday- 9am-5pm   [place] - 5pm-9pm Monday-Friday Saturday-Sunday- 9am-5pm  [phone] - [place]   [phone] - [place]       What options do I have for visits at the clinic other than the traditional office visit?  To expand how we care for you, many of our providers are utilizing electronic visits (e-visits) and telephone visits, when medically appropriate, for interactions with their patients rather than a visit in the clinic.   We also offer nurse visits for many medical concerns. Just like any other service, we will bill your insurance company for this type of visit based on time spent on the phone with your provider. Not all insurance companies cover these visits. Please check with your medical insurance if this type of visit is covered. You will be responsible for any charges that are not paid by your insurance.      E-visits via Virtualtwo:  generally incur a $35.00 fee.  Telephone visits:  Time spent on the  "phone: *charged based on time that is spent on the phone in increments of 10 minutes. Estimated cost:   5-10 mins $30.00   11-20 mins. $59.00   21-30 mins. $85.00   Use [employer] (secure email communication and access to your chart) to send your primary care provider a message or make an appointment. Ask someone on your Team how to sign up for [employer] home renovationst.    For a Price Quote for your services, please call our [employer] Line at [phone].    As always, Thank you for trusting us with your health care needs!    CLIFTON/MA            Follow-ups after your visit        Future tests that were ordered for you today     Open Future Orders        Priority Expected Expires Ordered    MR Cervical Spine w/o Contrast Routine  8/18/2018 8/18/2017            Who to contact     If you have questions or need follow up information about today's clinic visit or your schedule please contact [facility] directly at [phone].  Normal or non-critical lab and imaging results will be communicated to you by [employer], letter or phone within 4 business days after the clinic has received the results. If you do not hear from us within 7 days, please contact the clinic through [employer] or phone. If you have a critical or abnormal lab result, we will notify you by phone as soon as possible.  Submit refill requests through MyDocTime or call your pharmacy and they will forward the refill request to us. Please allow 3 business days for your refill to be completed.          Additional Information About Your Visit        MyDocTime Information     MyDocTime lets you send messages to your doctor, view your test results, renew your prescriptions, schedule appointments and more. To sign up, go to [email or web address] . Click on \"Log in\" on the left side of the screen, which will take you to the Welcome page. Then click on \"Sign up Now\" on the right side of the page.     You will be asked to enter the access code listed below, as well " as some personal information. Please follow the directions to create your username and password.     Your access code is: [email or web address]  Expires: 2017 12:52 PM     Your access code will  in 90 days. If you need help or a new code, please call your [place] clinic or [phone].        Care EveryWhere ID     This is your Care EveryWhere ID. This could be used by other organizations to access your [place] medical records  [id number]        Your Vitals Were     Pulse Temperature Pulse Oximetry Breastfeeding? BMI (Body Mass Index)       62 97.7  F (36.5  C) (Oral) 94% No 20.93 kg/m2        Blood Pressure from Last 3 Encounters:   17 122/70   05/15/17 152/70   17 156/74    Weight from Last 3 Encounters:   17 121 lb (54.9 kg)   05/15/17 116 lb 12.8 oz (53 kg)   17 113 lb 9.6 oz (51.5 kg)                 Today's Medication Changes          These changes are accurate as of: 17 12:52 PM.  If you have any questions, ask your nurse or doctor.               Start taking these medicines.        Dose/Directions    HYDROcodone-acetaminophen 7.5-325 MG per tablet   Commonly known as:  NORCO   Used for:  Spasmodic torticollis, Fibromyalgia   Started by:  Dot Ignacio APRN CNP        Dose:  2 tablet   Take 2 tablets by mouth 3 times daily 28 days or more between refills for controlled medications   Quantity:  180 tablet   Refills:  0         These medicines have changed or have updated prescriptions.        Dose/Directions    rosuvastatin 10 MG tablet   Commonly known as:  CRESTOR   This may have changed:  See the new instructions.   Used for:  Hyperlipidemia with target LDL less than 100   Changed by:  Dot Ignacio APRN CNP        TAKE 1 TABLET (10 MG) BY MOUTH DAILY   Quantity:  90 tablet   Refills:  0            Where to get your medicines      These medications were sent to [facility] [address] [place] [address],  [facility] [address]     Phone:  [phone]     rosuvastatin 10 MG tablet         Some of these will need a paper prescription and others can be bought over the counter.  Ask your nurse if you have questions.     Bring a paper prescription for each of these medications     HYDROcodone-acetaminophen 7.5-325 MG per tablet                Primary Care Provider Office Phone # Fax #    Gerber Jain [phone] [address] [address]        Equal Access to Services     NICHOLAS BILLY : Hadii aad ku hadasho Soomaali, waaxda luqadaha, qaybta kaalmada adeegyada, waxay idiin hayaan alfreda gardner . So [facility] [phone].    ATENCIÓN: Si habla español, tiene a healy disposición servicios gratuitos de asistencia lingüística. [facility] [phone].    We comply with applicable federal civil rights laws and [place] laws. We do not discriminate on the basis of race, color, national origin, age, disability sex, sexual orientation or gender identity.            Thank you!     Thank you for choosing [facility]  for your care. Our goal is always to provide you with excellent care. Hearing back from our patients is one way we can continue to improve our services. Please take a few minutes to complete the written survey that you may receive in the mail after your visit with us. Thank you!             Your Updated Medication List - Protect others around you: Learn how to safely use, store and throw away your medicines at www.disposemymeds.org.          This list is accurate as of: 8/18/17 12:52 PM.  Always use your most recent med list.                   Brand Name Dispense Instructions for use Diagnosis    aspirin 81 MG tablet      Take 81 mg by mouth daily        baclofen 10 MG tablet    LIORESAL    540 tablet    Take 2 tablets (20 mg) by mouth 3 times daily    Spasmodic torticollis, Fibromyalgia       cyanocobalamin 1000 MCG tablet    vitamin  B-12     1 tablet daily         gabapentin 300 MG capsule    NEURONTIN    540 capsule    1 capsule am, 1 at noon 2 at 5pm and 2 at bedtime ( 6 per day ) Not to be refilled until patient calls    Spasmodic torticollis, Fibromyalgia, Restless leg syndrome       HYDROcodone-acetaminophen 7.5-325 MG per tablet    NORCO    180 tablet    Take 2 tablets by mouth 3 times daily 28 days or more between refills for controlled medications    Spasmodic torticollis, Fibromyalgia       metoprolol 25 MG tablet    LOPRESSOR     Take 50 mg by mouth 2 times daily        nitroGLYcerin 0.4 MG sublingual tablet    NITROSTAT     Place 0.4 mg under the tongue every 5 minutes as needed for chest pain        nortriptyline 10 MG capsule    PAMELOR    450 capsule    Take 5 capsules (50 mg) by mouth At Bedtime Not to be refilled until patient calls    Fibromyalgia       polyethylene glycol powder    MIRALAX/GLYCOLAX     Take 17 g by mouth daily Reported on 5/15/2017        rosuvastatin 10 MG tablet    CRESTOR    90 tablet    TAKE 1 TABLET (10 MG) BY MOUTH DAILY    Hyperlipidemia with target LDL less than 100       traZODone 100 MG tablet    DESYREL    360 tablet    TAKE FOUR TABLETS NIGHTLY AT BEDTIME    Fibromyalgia       vitamin D 2000 UNITS tablet      Take 1 tablet by mouth daily    Hypovitaminosis D

## 2017-08-18 NOTE — NURSING NOTE
"Chief Complaint   Patient presents with     Hypertension       Initial /68  Pulse 62  Temp 97.7  F (36.5  C) (Oral)  Wt 121 lb (54.9 kg)  SpO2 94%  Breastfeeding? No  BMI 20.93 kg/m2 Estimated body mass index is 20.93 kg/(m^2) as calculated from the following:    Height as of 5/15/17: 5' 3.75\" (1.619 m).    Weight as of this encounter: 121 lb (54.9 kg).  Medication Reconciliation: complete   CLIFTON/MA      "

## 2017-08-18 NOTE — PATIENT INSTRUCTIONS
[facility]    If you have any questions regarding to your visit please contact your care team:     Team Pink:   Clinic Hours Telephone Number   Internal Medicine:  Dr. Lisset Ignacio NP       7am-7pm  Monday - Thursday   7am-5pm  Fridays  [phone]  (Appointment scheduling available 24/7)    Questions about your visit?  Team Line  [phone]   Urgent Care - [place] [facility] and [facility] - 11am-9pm Monday-Friday Saturday-Sunday- 9am-5pm   [place] - 5pm-9pm Monday-Friday Saturday-Sunday- 9am-5pm  [phone] - [place]   [phone] - [place]       What options do I have for visits at the clinic other than the traditional office visit?  To expand how we care for you, many of our providers are utilizing electronic visits (e-visits) and telephone visits, when medically appropriate, for interactions with their patients rather than a visit in the clinic.   We also offer nurse visits for many medical concerns. Just like any other service, we will bill your insurance company for this type of visit based on time spent on the phone with your provider. Not all insurance companies cover these visits. Please check with your medical insurance if this type of visit is covered. You will be responsible for any charges that are not paid by your insurance.      E-visits via appCREAR:  generally incur a $35.00 fee.  Telephone visits:  Time spent on the phone: *charged based on time that is spent on the phone in increments of 10 minutes. Estimated cost:   5-10 mins $30.00   11-20 mins. $59.00   21-30 mins. $85.00   Use [employer] (secure email communication and access to your chart) to send your primary care provider a message or make an appointment. Ask someone on your Team how to sign up for appCREAR.    For a Price Quote for your services, please call our Consumer Price Line at [phone].    As always, Thank you for trusting us with your health care  needs!    JOSUE

## 2017-08-21 NOTE — PROGRESS NOTES
SUBJECTIVE:   Carina Calvert is a 67 year old female who presents to clinic today for the following health issues:    Patient presents with:  Hypertension  Medication Follow-up      Hypertension Follow-up      Outpatient blood pressures are not being checked.    Low Salt Diet: no added salt    Patient is here for her chronic pain med refills.  She notes an increase in pain since her insurance stopped covering botox.  She also notes pain to bilateral shoulders and a decrease in the range of motion of both shoulder.  She feels that her arms are weak.  She has tried physical therapy without much improvement.  She is wondering what else she can do for her chronic pain.  Patient denies paresthesias to her arms.      Problem list and histories reviewed & adjusted, as indicated.  Additional history: as documented    Patient Active Problem List   Diagnosis     Neuropathy (H)     Fibromyalgia     Restless leg syndrome     Spasmodic torticollis     Insomnia     Hyperlipidemia with target LDL less than 100     History of hepatitis C     Tobacco abuse     Recurrent cold sores     Osteopenia     Migraine headache     Positive occult stool blood test     Subclinical hyperthyroidism     Hypovitaminosis D     Cramp of limb     Opioid use agreement exists     NSTEMI (non-ST elevated myocardial infarction) (H)     Chronic pain     Hypertension goal BP (blood pressure) < 140/90     Past Surgical History:   Procedure Laterality Date     HERNIA REPAIR  9/2009    ventral     LITHOTRIPSY      2 x      TONSILLECTOMY       TUBAL LIGATION         Social History   Substance Use Topics     Smoking status: Current Every Day Smoker     Packs/day: 0.05     Years: 15.00     Types: Cigarettes     Last attempt to quit: 3/31/2013     Smokeless tobacco: Never Used     Alcohol use No     Family History   Problem Relation Age of Onset     Neurologic Disorder Mother 65     Parkinsons     Alcohol/Drug Mother      CEREBROVASCULAR DISEASE Mother       Respiratory Father      COPD     Alcohol/Drug Father      CANCER Brother      DIABETES Sister 52     CANCER Maternal Aunt      CANCER Maternal Uncle      C.A.D. No family hx of          Current Outpatient Prescriptions   Medication Sig Dispense Refill     HYDROcodone-acetaminophen (NORCO) 7.5-325 MG per tablet Take 2 tablets by mouth 3 times daily 28 days or more between refills for controlled medications 180 tablet 0     rosuvastatin (CRESTOR) 10 MG tablet TAKE 1 TABLET (10 MG) BY MOUTH DAILY 90 tablet 0     traZODone (DESYREL) 100 MG tablet TAKE FOUR TABLETS NIGHTLY AT BEDTIME 360 tablet 3     baclofen (LIORESAL) 10 MG tablet Take 2 tablets (20 mg) by mouth 3 times daily 540 tablet 3     gabapentin (NEURONTIN) 300 MG capsule 1 capsule am, 1 at noon 2 at 5pm and 2 at bedtime ( 6 per day ) Not to be refilled until patient calls 540 capsule 3     nortriptyline (PAMELOR) 10 MG capsule Take 5 capsules (50 mg) by mouth At Bedtime Not to be refilled until patient calls 450 capsule 3     metoprolol (LOPRESSOR) 25 MG tablet Take 50 mg by mouth 2 times daily       aspirin 81 MG tablet Take 81 mg by mouth daily       nitroglycerin (NITROSTAT) 0.4 MG SL tablet Place 0.4 mg under the tongue every 5 minutes as needed for chest pain       polyethylene glycol (MIRALAX/GLYCOLAX) powder Take 17 g by mouth daily Reported on 5/15/2017       Cholecalciferol (VITAMIN D) 2000 UNITS tablet Take 1 tablet by mouth daily       VITAMIN B-12 1000 MCG OR TABS 1 tablet daily       Allergies   Allergen Reactions     Droperidol      Ivp Dye [Contrast Dye]      Recent Labs   Lab Test  10/18/16   1532  12/16/13   1410 07/15/13  05/29/12   05/12/11   1621   LDL  50   --    --    --    --    --   96   HDL  42*   --    --    --    --    --    --    ALT  29   --   12   --   11   --   <6   CR  0.69   --   0.75   < >   --    < >  0.67   GFRESTIMATED  85   --   >60   < >   --    < >  >90   GFRESTBLACK  >90   GFR Calc     --   >60   < >    --    < >  >90   POTASSIUM  4.7   --   4.0   < >  4.4   --   3.8   TSH  1.84  1.08  1.39   --   1.98   --    --     < > = values in this interval not displayed.      BP Readings from Last 3 Encounters:   08/18/17 122/70   05/15/17 152/70   01/12/17 156/74    Wt Readings from Last 3 Encounters:   08/18/17 121 lb (54.9 kg)   05/15/17 116 lb 12.8 oz (53 kg)   01/12/17 113 lb 9.6 oz (51.5 kg)                  Labs reviewed in EPIC          Reviewed and updated as needed this visit by clinical staffTobacco  Allergies  Meds  Med Hx  Surg Hx  Fam Hx  Soc Hx      Reviewed and updated as needed this visit by Provider  Allergies  Meds         ROS:  Constitutional, HEENT, cardiovascular, pulmonary, gi and gu systems are negative, except as otherwise noted.      OBJECTIVE:   /70  Pulse 62  Temp 97.7  F (36.5  C) (Oral)  Wt 121 lb (54.9 kg)  SpO2 94%  Breastfeeding? No  BMI 20.93 kg/m2  Body mass index is 20.93 kg/(m^2).  GENERAL: healthy, alert and no distress  RESP: lungs clear to auscultation - no rales, rhonchi or wheezes  CV: regular rate and rhythm, normal S1 S2, no S3 or S4, no murmur, click or rub, no peripheral edema and peripheral pulses strong  MS: Neck: Tenderness to bilateral paracervical muscles; decreased range of motion; Pain with all range of motion; 4/5 strength and sensation intact to bilateral upper extremities  PSYCH: mentation appears normal, affect normal/bright    Diagnostic Test Results:  none     ASSESSMENT/PLAN:     1. Spasmodic torticollis    - HYDROcodone-acetaminophen (NORCO) 7.5-325 MG per tablet; Take 2 tablets by mouth 3 times daily 28 days or more between refills for controlled medications  Dispense: 180 tablet; Refill: 0    2. Fibromyalgia    - HYDROcodone-acetaminophen (NORCO) 7.5-325 MG per tablet; Take 2 tablets by mouth 3 times daily 28 days or more between refills for controlled medications  Dispense: 180 tablet; Refill: 0    3. Cervicalgia  We discussed other possible  treatments for pain and further evaluation of her arm weakness and pain.  Patient to undergo MRI and consider cervical epidural injection to see if this improves pain in the future after ruling out any significant cause for weakness.  We also discussed acupuncture, MMJ program.    - MR Cervical Spine w/o Contrast; Future    4. Hypertension goal BP (blood pressure) < 140/90  Stable.  Continue current treatment plan and medications.      5. Hyperlipidemia with target LDL less than 100  Refill requested.  - rosuvastatin (CRESTOR) 10 MG tablet; TAKE 1 TABLET (10 MG) BY MOUTH DAILY  Dispense: 90 tablet; Refill: 0    FUTURE APPOINTMENTS:       - Follow-up visit in 3 months.    VINOD Malloy [facility]

## 2017-08-23 ENCOUNTER — RADIANT APPOINTMENT (OUTPATIENT)
Dept: MRI IMAGING | Facility: CLINIC | Age: 67
End: 2017-08-23
Attending: NURSE PRACTITIONER
Payer: MEDICARE

## 2017-08-23 DIAGNOSIS — M54.2 CERVICALGIA: ICD-10-CM

## 2017-08-23 PROCEDURE — 72141 MRI NECK SPINE W/O DYE: CPT | Mod: TC

## 2017-08-24 ENCOUNTER — TELEPHONE (OUTPATIENT)
Dept: PALLIATIVE MEDICINE | Facility: CLINIC | Age: 67
End: 2017-08-24

## 2017-08-24 NOTE — TELEPHONE ENCOUNTER
Pre-screening questions for Radiology Injections:    Injection to be done at which interventional clinic site? [facility] [place]    Procedure ordered by Dot Ignacio APRN CNP    Procedure ordered? Cervical KARSON    What insurance would patient like us to bill for this procedure? Medicare      [employer]'s comp- Any injection DO NOT SCHEDULE and route to Mary Kay Chamberlain.      [employer] insurance - If scheduling an SI joint injection DO NOT SCHEDULE and route to Neelam Cruz.     [employer]- MBB's must be scheduled at LEAST two weeks apart      [employer] - Any injection besides hip/shoulder/knee joint DO NOT SCHEDULE and route to Neelam Cruz. She will obtain PA and call pt back to schedule procedure or notify pt of denial.     [employer]- PA required for all MBB's    Is an  needed? No     Patient has a drive home? (mandatory) Yes     Is patient taking any blood thinners (plavix, coumadin, jantoven, warfarin, heparin, pradaxa or dabigatran )? No   (If so, do not schedule, contact RN and/or MD)     Is patient taking any aspirin products? Yes - Pt takes 81 mg daily; instructed to hold 6 day(s) prior to procedure.    (If more than 325mg/day do not schedule; Contact RN/MD. For all non-cervical interventional procedures if patient is taking MORE than 325mg/day, limit aspirin to 81-325mg/day x 1 week. No hold required day of procedure.  For CERVICAL procedures, hold all aspirin products for 6 days.)      Does the patient have a bleeding or clotting disorder? No   (If yes, okay to schedule, but contact RN/MD).  **For any patients with platelet count <100, must be forwarded to provider**    Is patient diabetic? No If YES, have them bring their glucometer.    Does patient have an active infection or treated for one within the past week? No    Is patient currently taking any antibiotics?  No  For patients on chronic, preventative, or prophylactic antibiotics, procedures can be  scheduled.   For patients on antibiotics for active or recent infection:  Silvia Hernandez [place], [place]-antibiotic course must have been completed for 4 days  Lobo Hitchcock-antibiotic course must have been completed for 7 days    Is patient currently taking any steroid medications? (i.e. Prednisone, Medrol)  No   For patients on steroid medications:  Silvia Hernandez [place], [place]-steroid course must have been completed for 4 days  Lobo Hitchcock-steroid course must have been completed for 7 days    Review with patient:  If you are started on any steroids or antibiotics between now and your appointment, you must contact us because it may affect our ability to perform your procedure INFORMED    Is patient actively being treated for cancer or immunocompromised, including the spleen having been removed? No  **For Dr. Levin patients without spleens should have the chart sent to her**  (If YES, do NOT schedule and route to RN)    Are you able to get on and off an exam table with minimal or no assistance? Yes  (If NO, do NOT schedule and route to RN)  Are you able to roll over and lay on your stomach with minimal or no assistance? Yes  (If NO, do NOT schedule and route to RN)         Any allergies to contrast dye, iodine, shellfish, or numbing and steroid medications? Yes - Contrast Dye  (If so, inform nursing and note in scheduling comments.)    Allergies: Droperidol and Ivp dye [contrast dye]      Any chance of pregnancy? No    Has the patient had a flu shot or any other vaccinations within 7 days before or after the procedure.    No       Does patient have an MRI/CT? 8/23/2017  (SI joint, hip injections, lumbar sympathetic blocks, and stellate ganglion blocks do not require an MRI)    If so, was it done at [place]? Yes      If not, where was it done? NA     Was the MRI done w/in the last 3 years? Yes     If MRI was not done at [place], [facility] or [facility] do NOT schedule. Route to  nursing.  (If pt has disc the injection can be scheduled but pt has to bring disc to appt. If they show up w/out disc the injection cannot be done)      **Must be scheduled with elapsed time interval of at least 2 weeks and not more than 6 months between the First MBB and the Second MBB**       Medial Branch Block Pre-Procedure Instructions    It is okay to take long acting pain medications (if you are on them) the day of the procedure but try not to take any short acting medications unless absolutely necessary. INFORMED        Long acting meds would include: Gabapentin (Neurontin), MS Contin, Oxycontin        Short acting meds would include:  Percocet, Oxycodone, Vicodin, Ibuprofen     The day of the procedure, you should try to do things that provoke your pain, since the injection is being done to see if it will relieve your pain . INFORMED    If your pain level is a 4 out of 10 or less on the day of the procedure, please call [phone] to reschedule.  INFORMED      Reminders (please tell patient if applicable):        Instructed pt to arrive 30 minutes early for IV start if this is for a cervical procedure, ALL sympathetic (stellate ganglion, hypogastric, or lumbar sympathetic block) and all sedation procedures (RFA, spinal cord stimulation trials). INFORMED        -IVs are not routinely placed for [place] cervical cases       If NPO for sedation, it is okay to take medications with sips of water (except if they are to hold blood thinners). INFORMED   *DO take blood pressure medication if it is prescribed*      If this is for a cervical MBB aspirin needs to be held for 6 days.  INFORMED      Do not schedule procedures requiring IV placement in the first appointment after lunch         For patients 85 or older we recommend having an adult stay w/ them for the remainder of the day.              Does the patient have any questions? SHAMA HARE    [facility]

## 2017-08-25 NOTE — TELEPHONE ENCOUNTER
Information noted in appt notes already. Closing    Mattie Childers  BSN-RN Care Coordinator  [facility]

## 2017-09-22 ENCOUNTER — TELEPHONE (OUTPATIENT)
Dept: INTERNAL MEDICINE | Facility: CLINIC | Age: 67
End: 2017-09-22

## 2017-09-22 NOTE — TELEPHONE ENCOUNTER
Panel Management Review      Patient has the following on her problem list: None      Composite cancer screening  Chart review shows that this patient is due/due soon for the following Fecal Colorectal (FIT)  Summary:    Patient is due/failing the following:   FIT    Action needed:   Patient needs office visit for FIT.    Type of outreach:    Sent letter.    Questions for provider review:    None                                                                                                                                    Violette Mccullough CMA       Chart routed to  .

## 2017-10-23 ENCOUNTER — RADIANT APPOINTMENT (OUTPATIENT)
Dept: RADIOLOGY | Facility: CLINIC | Age: 67
End: 2017-10-23
Attending: ANESTHESIOLOGY

## 2017-10-23 ENCOUNTER — RADIOLOGY INJECTION OFFICE VISIT (OUTPATIENT)
Dept: PALLIATIVE MEDICINE | Facility: CLINIC | Age: 67
End: 2017-10-23
Payer: MEDICARE

## 2017-10-23 VITALS — SYSTOLIC BLOOD PRESSURE: 181 MMHG | OXYGEN SATURATION: 99 % | DIASTOLIC BLOOD PRESSURE: 93 MMHG | HEART RATE: 60 BPM

## 2017-10-23 DIAGNOSIS — M47.812 CERVICAL FACET JOINT SYNDROME: Primary | ICD-10-CM

## 2017-10-23 DIAGNOSIS — G44.86 CERVICOGENIC HEADACHE: ICD-10-CM

## 2017-10-23 DIAGNOSIS — M47.812 CERVICAL SPONDYLOSIS WITHOUT MYELOPATHY: ICD-10-CM

## 2017-10-23 DIAGNOSIS — M54.2 CERVICALGIA: ICD-10-CM

## 2017-10-23 PROCEDURE — 64490 INJ PARAVERT F JNT C/T 1 LEV: CPT | Mod: RT | Performed by: ANESTHESIOLOGY

## 2017-10-23 PROCEDURE — 64491 INJ PARAVERT F JNT C/T 2 LEV: CPT | Mod: RT | Performed by: ANESTHESIOLOGY

## 2017-10-23 ASSESSMENT — PAIN SCALES - GENERAL: PAINLEVEL: EXTREME PAIN (8)

## 2017-10-23 NOTE — PROGRESS NOTES
Pre procedure Diagnosis: cervical facet arthropathy, cervical spondylosis   Post procedure Diagnosis: Same  Procedure performed: cervical facet joint injections at C4/5 and C5/6 on the right, fluoroscopically guided, contrast controlled  Indication:  Therapeutic for pain  Anesthesia: none  Complication:  none  Operators: MD Claudy Cerda DO (pain fellow)                                    Indications:   Carina Calvert is a 67 year old female was sent by Dr. Ignacio for cervical procedures.  The patient has a history of chronic neck pain associated with occipital headache without significant radiation.  Exam shows tenderness to palpation along the bilateral cervical paraspinals and articular pillars and reproduction of pain with extension and lateral rotation of the cervical spine.  They have tried conservative treatment including physical therapy and oral medications with minimal relief .    MRI CERVICAL SPINE WITHOUT CONTRAST  8/23/2017 1:38 PM     FINDINGS: Normal cervical lordosis. Anterior posterior alignment of  the spine is within normal limits. Vertebral body height is maintained  without evidence of fracture. There are no destructive osseous lesions  in the cervical spine. Partially visualized STIR hyperintense ovoid  lesion within the T3 vertebral body.      Mild loss of intervertebral disc space with disc dehydration and  associated degenerative endplate changes at C4-C5, C5-C6, and C6-C7.  No associated bone marrow edema.     The cervical spinal cord and visualized portions of the thoracic  spinal cord appear normal. The visualized portions of the brainstem  and cerebellum appear normal.     Level by level as follows:     C2-C3:  No significant spinal canal or neural foraminal narrowing.      C3-C4:  Bilateral facet hypertrophy without spinal canal or neural  foraminal narrowing.      C4-C5:  Posterior disc osteophyte complex along with bilateral facet  hypertrophy  without significant spinal canal or neural foraminal  narrowing.      C5-C6:  Posterior disc osteophyte complex and bilateral facet  hypertrophy without significant spinal canal or neural foraminal  narrowing.      C6-C7:  Posterior disc osteophyte complex abuts the ventral aspect of  the spinal cord and causes mild spinal canal narrowing. Bilateral  uncinate spurring and facet hypertrophy results in mild bilateral  neural foraminal narrowing.      C7-T1: No significant spinal canal or neural foraminal narrowing.      Paraspinous soft tissues are unremarkable.       IMPRESSION:  Multilevel degenerative changes in the cervical spine as  described above, most pronounced at C6-C7 where there is mild spinal  canal narrowing and mild bilateral neural foraminal narrowing.       Options/alternatives, benefits and risks were discussed with the patient including bleeding, infection, flared pain, tissue trauma, exposure to radiation, reaction to medications including seizure, spinal cord injury, paralysis, weakness, numbness and headache.     Questions were answered to his satisfaction and he wishes to proceed. Voluntary informed consent was obtained and signed.      Vitals were reviewed: Yes  BP (!) 166/105  Pulse 52  Allergies were reviewed:  Yes   Medications were reviewed:  Yes   Pre-procedure pain score: 8/10     Procedure:  After obtaining signed informed consent, the patient was brought into the procedure suite and was placed in a prone position on the [facility] Frame.   A Pause for the Cause was performed.  The patient was prepped and draped in the usual sterile fashion.      Under AP fluoroscopic guidance the C4/5 and C5/6 facet joints on the right side were identified, and the C-arm was rotated caudally to open the joint spaces. Then, Lidocaine 1% was injected at the needle entry points and needle tracts for local anesthetic. Then 27 gauge 3.5 inch spinal needles were inserted and advanced under AP and Lateral  fluoroscopic guidance into the facet joints with positive pain reproduction at all the levels.  Aspiration was negative at all the levels.      Then, Omniscan contrast dye was injected after negative aspiration for heme and CSF in each joint, confirming appropriate placement.  A total of  0.5 ml of Omniscan was used.  Omnipaque wasted:  4.5 ml.     Then, each facet joint was injected with 1 ml of a combination of Depomedrol 40 mg and Bupivacaine 0.5% 1 ml for a total injectate volume of 2 ml and the needles were flushed with Lidocaine 1% as they were withdrawn.     Patient initially intended to have bilateral C4/5 and C5/6 facet joints injected however during the procedure she stated she only wanted to do the right side because she was unable to tolerate the procedure.        Hemostasis was achieved, the area was cleaned, and bandaids were placed when appropriate.  The patient tolerated the procedure well, and was taken to the recovery room.    Images were saved to PACS.     Post-procedure pain score: 6/10 with improved range of motion  Follow-up includes:   -f/u phone call in one week  -f/u with referring provider     Naldo Stoll MD  [facility]

## 2017-10-23 NOTE — NURSING NOTE
"Chief Complaint   Patient presents with     Pain       Initial BP (!) 166/105  Pulse 52 Estimated body mass index is 20.93 kg/(m^2) as calculated from the following:    Height as of 5/15/17: 1.619 m (5' 3.75\").    Weight as of 8/18/17: 54.9 kg (121 lb).  Medication Reconciliation: complete       Pre-procedure Intake    Have you been fasting? Yes    If yes, for how long? 3 hr    Are you taking a prescribed blood thinner such as coumadin, Plavix, Xarelto?    No    If yes, when did you take your last dose? No    Do you take aspirin?  Yes  If cervical procedure, have you held aspirin for 6 days?   No  Do you have any allergies to contrast dye, iodine, steroid and/or numbing medications?  YES: contrast Dye     Are you currently taking antibiotics or have an active infection?  NO    Have you had a fever/elevated temperature within the past week? NO    Are you currently taking oral steroids? NO    Do you have a ? Yes       Are you pregnant or breastfeeding?  NO    Are the vital signs normal?  Yes    Amberly Winston MA          "

## 2017-10-23 NOTE — NURSING NOTE
Discharge Information    IV Discontiued Time:  1220 catheter intact    Amount of Fluid Infused:  NA    Discharge Criteria = When patient returns to baseline or as per MD order    Consciousness:  Pt is fully awake    Circulation:  BP +/- 20% of pre-procedure level    Respiration:  Patient is able to breathe deeply    O2 Sat:  Patient is able to maintain O2 Sat >92% on room air    Activity:  Moves 4 extremities on command    Ambulation:  Patient is able to stand and walk or stand and pivot into wheelchair    Dressing:  Clean/dry or No Dressing    Notes:   Discharge instructions and AVS given to patient    Patient meets criteria for discharge?  YES    Admitted to PCU?  No    Responsible adult present to accompany patient home?  Yes    Signature/Title:    Radha Jay RN Care Coordinator  [facility]

## 2017-10-23 NOTE — MR AVS SNAPSHOT
After Visit Summary   10/23/2017    Carina Calvert    MRN: [id number]           Patient Information     Date Of Birth          1950        Visit Information        Provider Department      10/23/2017 10:45 AM Naldo Palomares MD [facility]        Care Instructions    [facility]   Procedure Discharge Instructions    Today you saw:  Dr. Naldo Stoll      You had an:  Cervical  facet joint injection     Medications used:  Lidocaine   Bupivacaine  Depo-medrol  Omniscan            Be cautious when walking. Numbness and/or weakness in the lower extremities may occur for up to 6-8 hours after the procedure due to effect of the local anesthetic    Do not drive for 6 hours. The effect of the local anesthetic could slow your reflexes.     You may resume your regular activities after 24 hours    Avoid strenuous activity for the first 24 hours    You may shower, however avoid swimming, tub baths or hot tubs for 24 hours following your procedure    You may have a mild to moderate increase in pain for several days following the injection.    It may take up to 14 days for the steroid medication to start working although you may feel the effect as early as a few days after the procedure.       You may use ice packs for 10-15 minutes, 3 to 4 times a day at the injection site for comfort    Do not use heat to painful areas for 6 to 8 hours. This will give the local anesthetic time to wear off and prevent you from accidentally burning your skin.     You may use anti-inflammatory medications (such as Ibuprofen or Aleve or Advil) or Tylenol for pain control if necessary    If you were fasting, you may resume your normal diet and medications after the procedure    If you experience any of the following, call the pain center nursing line during work hours at [phone] or the after hours provider line at [phone]:  -Fever over 100 degree F  -Swelling, bleeding,  "redness, drainage, warmth at the injection site  -Progressive weakness or numbness in your legs or arms  -Unusual headache that is not relieved by Tylenol  -Unusual new onset of pain that is not improving      Phone #s:  Appointment line: [phone];  Nurse line: [phone]              Follow-ups after your visit        Who to contact     If you have questions or need follow up information about today's clinic visit or your schedule please contact [facility] directly at [phone].  Normal or non-critical lab and imaging results will be communicated to you by [employer], letter or phone within 4 business days after the clinic has received the results. If you do not hear from us within 7 days, please contact the clinic through [employer] or phone. If you have a critical or abnormal lab result, we will notify you by phone as soon as possible.  Submit refill requests through [employer] or call your pharmacy and they will forward the refill request to us. Please allow 3 business days for your refill to be completed.          Additional Information About Your Visit        [employer] Information     [employer] lets you send messages to your doctor, view your test results, renew your prescriptions, schedule appointments and more. To sign up, go to [email or web address] . Click on \"Log in\" on the left side of the screen, which will take you to the Welcome page. Then click on \"Sign up Now\" on the right side of the page.     You will be asked to enter the access code listed below, as well as some personal information. Please follow the directions to create your username and password.     Your access code is: [email or web address]  Expires: 2017 12:52 PM     Your access code will  in 90 days. If you need help or a new code, please call your [facility] or [phone].        Care EveryWhere ID     This is your Care EveryWhere ID. This could be used by other organizations to access your [place] medical " records  [id number]        Your Vitals Were     Pulse                   52            Blood Pressure from Last 3 Encounters:   10/23/17 (!) 166/105   08/18/17 122/70   05/15/17 152/70    Weight from Last 3 Encounters:   08/18/17 54.9 kg (121 lb)   05/15/17 53 kg (116 lb 12.8 oz)   01/12/17 51.5 kg (113 lb 9.6 oz)              Today, you had the following     No orders found for display       Primary Care Provider Office Phone # Fax #    Gerber Jain [phone] [address] [facility]  [place] [address]        Equal Access to Services     [facility] : Hadii beverly montero Soarpit, wasamir tavares, qadiana kaalmada adeshelby, sofya gardner . So [facility] [phone].    ATENCIÓN: Si habla español, tiene a healy disposición servicios gratuitos de asistencia lingüística. Llame al [phone].    We comply with applicable federal civil rights laws and [place] laws. We do not discriminate on the basis of race, color, national origin, age, disability, sex, sexual orientation, or gender identity.            Thank you!     Thank you for choosing [facility]  for your care. Our goal is always to provide you with excellent care. Hearing back from our patients is one way we can continue to improve our services. Please take a few minutes to complete the written survey that you may receive in the mail after your visit with us. Thank you!             Your Updated Medication List - Protect others around you: Learn how to safely use, store and throw away your medicines at www.disposemymeds.org.          This list is accurate as of: 10/23/17 12:04 PM.  Always use your most recent med list.                   Brand Name Dispense Instructions for use Diagnosis    aspirin 81 MG tablet      Take 81 mg by mouth daily        baclofen 10 MG tablet    LIORESAL    540 tablet    Take 2 tablets (20 mg) by mouth 3 times daily    Spasmodic torticollis, Fibromyalgia       cyanocobalamin  1000 MCG tablet    vitamin  B-12     1 tablet daily        gabapentin 300 MG capsule    NEURONTIN    540 capsule    1 capsule am, 1 at noon 2 at 5pm and 2 at bedtime ( 6 per day ) Not to be refilled until patient calls    Spasmodic torticollis, Fibromyalgia, Restless leg syndrome       HYDROcodone-acetaminophen 7.5-325 MG per tablet    NORCO    180 tablet    Take 2 tablets by mouth 3 times daily 28 days or more between refills for controlled medications    Spasmodic torticollis, Fibromyalgia       metoprolol 25 MG tablet    LOPRESSOR     Take 50 mg by mouth 2 times daily        nitroGLYcerin 0.4 MG sublingual tablet    NITROSTAT     Place 0.4 mg under the tongue every 5 minutes as needed for chest pain        nortriptyline 10 MG capsule    PAMELOR    450 capsule    Take 5 capsules (50 mg) by mouth At Bedtime Not to be refilled until patient calls    Fibromyalgia       polyethylene glycol powder    MIRALAX/GLYCOLAX     Take 17 g by mouth daily Reported on 5/15/2017        rosuvastatin 10 MG tablet    CRESTOR    90 tablet    TAKE 1 TABLET (10 MG) BY MOUTH DAILY    Hyperlipidemia with target LDL less than 100       traZODone 100 MG tablet    DESYREL    360 tablet    TAKE FOUR TABLETS NIGHTLY AT BEDTIME    Fibromyalgia       vitamin D 2000 UNITS tablet      Take 1 tablet by mouth daily    Hypovitaminosis D

## 2017-10-23 NOTE — PATIENT INSTRUCTIONS
[facility]   Procedure Discharge Instructions    Today you saw:  Dr. Naldo Stoll      You had an:  Cervical  facet joint injection     Medications used:  Lidocaine   Bupivacaine  Depo-medrol  Omniscan            Be cautious when walking. Numbness and/or weakness in the lower extremities may occur for up to 6-8 hours after the procedure due to effect of the local anesthetic    Do not drive for 6 hours. The effect of the local anesthetic could slow your reflexes.     You may resume your regular activities after 24 hours    Avoid strenuous activity for the first 24 hours    You may shower, however avoid swimming, tub baths or hot tubs for 24 hours following your procedure    You may have a mild to moderate increase in pain for several days following the injection.    It may take up to 14 days for the steroid medication to start working although you may feel the effect as early as a few days after the procedure.       You may use ice packs for 10-15 minutes, 3 to 4 times a day at the injection site for comfort    Do not use heat to painful areas for 6 to 8 hours. This will give the local anesthetic time to wear off and prevent you from accidentally burning your skin.     You may use anti-inflammatory medications (such as Ibuprofen or Aleve or Advil) or Tylenol for pain control if necessary    If you were fasting, you may resume your normal diet and medications after the procedure    If you experience any of the following, call the pain center nursing line during work hours at [phone] or the after hours provider line at [phone]:  -Fever over 100 degree F  -Swelling, bleeding, redness, drainage, warmth at the injection site  -Progressive weakness or numbness in your legs or arms  -Unusual headache that is not relieved by Tylenol  -Unusual new onset of pain that is not improving      Phone #s:  Appointment line: [phone];  Nurse line: [phone]

## 2017-10-23 NOTE — NURSING NOTE
22 gauge Peripheral IV inserted into right anticubital - attempts: 2    GEETA RobleroN, RN-BC  Care Coordinator  [facility] [place]

## 2017-10-24 ENCOUNTER — TELEPHONE (OUTPATIENT)
Dept: INTERNAL MEDICINE | Facility: CLINIC | Age: 67
End: 2017-10-24

## 2017-10-24 DIAGNOSIS — I10 HYPERTENSION GOAL BP (BLOOD PRESSURE) < 140/90: ICD-10-CM

## 2017-10-24 DIAGNOSIS — I21.4 NSTEMI (NON-ST ELEVATED MYOCARDIAL INFARCTION) (H): Primary | ICD-10-CM

## 2017-10-24 RX ORDER — METOPROLOL TARTRATE 25 MG/1
50 TABLET, FILM COATED ORAL 2 TIMES DAILY
Qty: 60 TABLET | Refills: 3 | Status: SHIPPED | OUTPATIENT
Start: 2017-10-24 | End: 2017-11-14 | Stop reason: DRUGHIGH

## 2017-10-24 NOTE — TELEPHONE ENCOUNTER
Reason for Call:  Other prescription    Detailed comments: Patient states the pharmacy told her to call to request refill of metoprolol. Please call.    Phone Number Patient can be reached at: Home number on file [phone] (home)    Best Time: any    Can we leave a detailed message on this number? YES    Call taken on 10/24/2017 at 3:57 PM by Daphne Herrmann

## 2017-10-24 NOTE — TELEPHONE ENCOUNTER
metoprolol (LOPRESSOR) 25 MG tablet    Last Written Prescription Date:    Last Fill Quantity: ,   # refills:   Future Office visit:       Routing refill request to provider for review/approval because:  Medication is reported/historical    Kaya Smith MA

## 2017-10-25 NOTE — TELEPHONE ENCOUNTER
Spoke to patient and confirmed she is taking Metoprolol 50 mg twice daily sent to the [facility] in [employer] in [place].  Elizabet POSEY CMA ([facility])

## 2017-10-31 ENCOUNTER — TELEPHONE (OUTPATIENT)
Dept: PALLIATIVE MEDICINE | Facility: CLINIC | Age: 67
End: 2017-10-31

## 2017-10-31 NOTE — TELEPHONE ENCOUNTER
Patient had cervical facet joint injections at C4/5 and C5/6 on the right, on 10/23/17.  Called patient for an update.      Pt reported the following details:  She said the injections are working well, no concerns.   Told patient that the information will be forwarded to her provider.  Also explained that, if a steroid medication was used, it could take up to 14 days to feel the full effect and if pt has any further questions or concerns pt should call the nurse line at [phone].

## 2017-11-03 ENCOUNTER — TELEPHONE (OUTPATIENT)
Dept: FAMILY MEDICINE | Facility: CLINIC | Age: 67
End: 2017-11-03

## 2017-11-03 NOTE — TELEPHONE ENCOUNTER
The Patient declined Preventive Health Screens for: Colonoscopy and Mammogram  Please review chart and follow-up with patient if needed.    Thank you

## 2017-11-14 ENCOUNTER — OFFICE VISIT (OUTPATIENT)
Dept: INTERNAL MEDICINE | Facility: CLINIC | Age: 67
End: 2017-11-14
Payer: MEDICARE

## 2017-11-14 VITALS
HEART RATE: 62 BPM | OXYGEN SATURATION: 97 % | TEMPERATURE: 97.9 F | WEIGHT: 124 LBS | SYSTOLIC BLOOD PRESSURE: 136 MMHG | DIASTOLIC BLOOD PRESSURE: 70 MMHG | BODY MASS INDEX: 21.45 KG/M2

## 2017-11-14 DIAGNOSIS — I10 HYPERTENSION GOAL BP (BLOOD PRESSURE) < 140/90: ICD-10-CM

## 2017-11-14 DIAGNOSIS — G24.3 SPASMODIC TORTICOLLIS: ICD-10-CM

## 2017-11-14 DIAGNOSIS — Z23 NEED FOR PROPHYLACTIC VACCINATION AGAINST STREPTOCOCCUS PNEUMONIAE (PNEUMOCOCCUS): ICD-10-CM

## 2017-11-14 DIAGNOSIS — E78.5 HYPERLIPIDEMIA WITH TARGET LDL LESS THAN 100: ICD-10-CM

## 2017-11-14 DIAGNOSIS — Z78.0 ASYMPTOMATIC POSTMENOPAUSAL STATUS: ICD-10-CM

## 2017-11-14 DIAGNOSIS — F17.200 TOBACCO USE DISORDER: ICD-10-CM

## 2017-11-14 DIAGNOSIS — Z12.11 SCREEN FOR COLON CANCER: ICD-10-CM

## 2017-11-14 DIAGNOSIS — I21.4 NSTEMI (NON-ST ELEVATED MYOCARDIAL INFARCTION) (H): ICD-10-CM

## 2017-11-14 DIAGNOSIS — Z23 NEED FOR PROPHYLACTIC VACCINATION AND INOCULATION AGAINST INFLUENZA: ICD-10-CM

## 2017-11-14 DIAGNOSIS — E05.90 SUBCLINICAL HYPERTHYROIDISM: ICD-10-CM

## 2017-11-14 DIAGNOSIS — Z12.31 VISIT FOR SCREENING MAMMOGRAM: ICD-10-CM

## 2017-11-14 DIAGNOSIS — G25.81 RESTLESS LEG SYNDROME: ICD-10-CM

## 2017-11-14 DIAGNOSIS — M79.7 FIBROMYALGIA: Primary | ICD-10-CM

## 2017-11-14 LAB
ALBUMIN SERPL-MCNC: 4.1 G/DL (ref 3.4–5)
ALP SERPL-CCNC: 46 U/L (ref 40–150)
ALT SERPL W P-5'-P-CCNC: 23 U/L (ref 0–50)
ANION GAP SERPL CALCULATED.3IONS-SCNC: 9 MMOL/L (ref 3–14)
AST SERPL W P-5'-P-CCNC: 15 U/L (ref 0–45)
BASOPHILS # BLD AUTO: 0.1 10E9/L (ref 0–0.2)
BASOPHILS NFR BLD AUTO: 1 %
BILIRUB SERPL-MCNC: 0.4 MG/DL (ref 0.2–1.3)
BUN SERPL-MCNC: 10 MG/DL (ref 7–30)
CALCIUM SERPL-MCNC: 9.4 MG/DL (ref 8.5–10.1)
CHLORIDE SERPL-SCNC: 103 MMOL/L (ref 94–109)
CHOLEST SERPL-MCNC: 108 MG/DL
CO2 SERPL-SCNC: 28 MMOL/L (ref 20–32)
CREAT SERPL-MCNC: 0.67 MG/DL (ref 0.52–1.04)
DIFFERENTIAL METHOD BLD: ABNORMAL
ERYTHROCYTE [DISTWIDTH] IN BLOOD BY AUTOMATED COUNT: 13.5 % (ref 10–15)
GFR SERPL CREATININE-BSD FRML MDRD: 88 ML/MIN/1.7M2
GLUCOSE SERPL-MCNC: 86 MG/DL (ref 70–99)
HCT VFR BLD AUTO: 45.4 % (ref 35–47)
HDLC SERPL-MCNC: 57 MG/DL
HGB BLD-MCNC: 15.1 G/DL (ref 11.7–15.7)
LDLC SERPL CALC-MCNC: 30 MG/DL
LYMPHOCYTES # BLD AUTO: 4.1 10E9/L (ref 0.8–5.3)
LYMPHOCYTES NFR BLD AUTO: 59 %
MCH RBC QN AUTO: 32.1 PG (ref 26.5–33)
MCHC RBC AUTO-ENTMCNC: 33.3 G/DL (ref 31.5–36.5)
MCV RBC AUTO: 96 FL (ref 78–100)
MONOCYTES # BLD AUTO: 0.4 10E9/L (ref 0–1.3)
MONOCYTES NFR BLD AUTO: 6 %
NEUTROPHILS # BLD AUTO: 2.3 10E9/L (ref 1.6–8.3)
NEUTROPHILS NFR BLD AUTO: 34 %
NONHDLC SERPL-MCNC: 51 MG/DL
PLATELET # BLD AUTO: 108 10E9/L (ref 150–450)
POTASSIUM SERPL-SCNC: 4.3 MMOL/L (ref 3.4–5.3)
PROT SERPL-MCNC: 7.7 G/DL (ref 6.8–8.8)
RBC # BLD AUTO: 4.71 10E12/L (ref 3.8–5.2)
RBC MORPH BLD: NORMAL
SODIUM SERPL-SCNC: 140 MMOL/L (ref 133–144)
TRIGL SERPL-MCNC: 103 MG/DL
TSH SERPL DL<=0.005 MIU/L-ACNC: 1.03 MU/L (ref 0.4–4)
VARIANT LYMPHS BLD QL SMEAR: PRESENT
WBC # BLD AUTO: 6.9 10E9/L (ref 4–11)

## 2017-11-14 PROCEDURE — 36415 COLL VENOUS BLD VENIPUNCTURE: CPT | Performed by: INTERNAL MEDICINE

## 2017-11-14 PROCEDURE — 80061 LIPID PANEL: CPT | Performed by: INTERNAL MEDICINE

## 2017-11-14 PROCEDURE — 80050 GENERAL HEALTH PANEL: CPT | Performed by: INTERNAL MEDICINE

## 2017-11-14 PROCEDURE — 99214 OFFICE O/P EST MOD 30 MIN: CPT | Performed by: INTERNAL MEDICINE

## 2017-11-14 RX ORDER — TRAZODONE HYDROCHLORIDE 100 MG/1
TABLET ORAL
Qty: 360 TABLET | Refills: 3 | Status: SHIPPED | OUTPATIENT
Start: 2017-11-14 | End: 2018-05-10

## 2017-11-14 RX ORDER — HYDROCODONE BITARTRATE AND ACETAMINOPHEN 7.5; 325 MG/1; MG/1
2 TABLET ORAL 3 TIMES DAILY
Qty: 180 TABLET | Refills: 0 | Status: SHIPPED | OUTPATIENT
Start: 2017-11-14 | End: 2017-11-14

## 2017-11-14 RX ORDER — ROSUVASTATIN CALCIUM 10 MG/1
TABLET, COATED ORAL
Qty: 90 TABLET | Refills: 3 | Status: SHIPPED | OUTPATIENT
Start: 2017-11-14 | End: 2018-05-10

## 2017-11-14 RX ORDER — METOPROLOL TARTRATE 50 MG
50 TABLET ORAL 2 TIMES DAILY
Qty: 180 TABLET | Refills: 1 | Status: SHIPPED | OUTPATIENT
Start: 2017-11-14 | End: 2018-02-27

## 2017-11-14 RX ORDER — NORTRIPTYLINE HCL 10 MG
50 CAPSULE ORAL AT BEDTIME
Qty: 450 CAPSULE | Refills: 3 | Status: SHIPPED | OUTPATIENT
Start: 2017-11-14 | End: 2018-02-15

## 2017-11-14 RX ORDER — GABAPENTIN 300 MG/1
CAPSULE ORAL
Qty: 540 CAPSULE | Refills: 3 | Status: SHIPPED | OUTPATIENT
Start: 2017-11-14 | End: 2018-02-15

## 2017-11-14 RX ORDER — HYDROCODONE BITARTRATE AND ACETAMINOPHEN 7.5; 325 MG/1; MG/1
2 TABLET ORAL 3 TIMES DAILY
Qty: 180 TABLET | Refills: 0 | Status: SHIPPED | OUTPATIENT
Start: 2017-11-14 | End: 2018-02-15

## 2017-11-14 RX ORDER — BACLOFEN 10 MG/1
20 TABLET ORAL 3 TIMES DAILY
Qty: 540 TABLET | Refills: 3 | Status: SHIPPED | OUTPATIENT
Start: 2017-11-14 | End: 2018-05-10

## 2017-11-14 NOTE — LETTER
[facility]  [address] [place] [address]    November 15, 2017    Carina Calvert  [address]  [facility] [address]          Dear Carina,  All of these tests are within acceptable limits , there's a mild and stable low platelet count without change.   Recheck in 6 months to a year.  Results for orders placed or performed in visit on 11/14/17   Lipid panel reflex to direct LDL Non-fasting   Result Value Ref Range    Cholesterol 108 <200 mg/dL    Triglycerides 103 <150 mg/dL    HDL Cholesterol 57 >49 mg/dL    LDL Cholesterol Calculated 30 <100 mg/dL    Non HDL Cholesterol 51 <130 mg/dL   CBC with platelets differential   Result Value Ref Range    WBC 6.9 4.0 - 11.0 10e9/L    RBC Count 4.71 3.8 - 5.2 10e12/L    Hemoglobin 15.1 11.7 - 15.7 g/dL    Hematocrit 45.4 35.0 - 47.0 %    MCV 96 78 - 100 fl    MCH 32.1 26.5 - 33.0 pg    MCHC 33.3 31.5 - 36.5 g/dL    RDW 13.5 10.0 - 15.0 %    Platelet Count 108 (L) 150 - 450 10e9/L    % Neutrophils 34.0 %    % Lymphocytes 59.0 %    % Monocytes 6.0 %    % Basophils 1.0 %    Absolute Neutrophil 2.3 1.6 - 8.3 10e9/L    Absolute Lymphocytes 4.1 0.8 - 5.3 10e9/L    Absolute Monocytes 0.4 0.0 - 1.3 10e9/L    Absolute Basophils 0.1 0.0 - 0.2 10e9/L    Reactive Lymphs Present     RBC Morphology Normal     Diff Method Manual Differential    Comprehensive metabolic panel   Result Value Ref Range    Sodium 140 133 - 144 mmol/L    Potassium 4.3 3.4 - 5.3 mmol/L    Chloride 103 94 - 109 mmol/L    Carbon Dioxide 28 20 - 32 mmol/L    Anion Gap 9 3 - 14 mmol/L    Glucose 86 70 - 99 mg/dL    Urea Nitrogen 10 7 - 30 mg/dL    Creatinine 0.67 0.52 - 1.04 mg/dL    GFR Estimate 88 >60 mL/min/1.7m2    GFR Estimate If Black >90 >60 mL/min/1.7m2    Calcium 9.4 8.5 - 10.1 mg/dL    Bilirubin Total 0.4 0.2 - 1.3 mg/dL    Albumin 4.1 3.4 - 5.0 g/dL    Protein Total 7.7 6.8 - 8.8 g/dL    Alkaline Phosphatase 46  40 - 150 U/L    ALT 23 0 - 50 U/L    AST 15 0 - 45 U/L   TSH with free T4 reflex   Result Value Ref Range    TSH 1.03 0.40 - 4.00 mU/L       If you have any questions or concerns, please me or my clinic team at [phone].      Sincerely,        Gerber Jain MD/bt

## 2017-11-14 NOTE — NURSING NOTE
"Chief Complaint   Patient presents with     Recheck Medication     Refills.     Headache       Initial /70  Pulse 62  Temp 97.9  F (36.6  C) (Oral)  Wt 124 lb (56.2 kg)  SpO2 97%  BMI 21.45 kg/m2 Estimated body mass index is 21.45 kg/(m^2) as calculated from the following:    Height as of 5/15/17: 5' 3.75\" (1.619 m).    Weight as of this encounter: 124 lb (56.2 kg).  Medication Reconciliation: complete     Kaya Smith MA    "

## 2017-11-14 NOTE — PROGRESS NOTES
SUBJECTIVE:   Carina Calvert is a 67 year old female who presents to clinic today for the following health issues:       Fibromyalgia  Screen for colon cancer  Asymptomatic postmenopausal status  Visit for screening mammogram  Tobacco use disorder  Need for prophylactic vaccination and inoculation against influenza  Need for prophylactic vaccination against Streptococcus pneumoniae (pneumococcus)  Hyperlipidemia with target LDL less than 100  Spasmodic torticollis  NSTEMI (non-ST elevated myocardial infarction) (H)  Subclinical hyperthyroidism  Hypertension goal BP (blood pressure) < 140/90  Restless leg syndrome     Longterm patient with me for primary care . Here for refills and discussion of different issues.    Headache -- Patient states she had a steroid shot in the right side of her head which helped with the pain. She was scheduled to have injections on both sides of her head, but was unable to tolerate the shot and skipped the second injection. Still has pain in her neck and on the left side of her head. She had Botox injections in the past which worked well, but it is no longer covered by her insurance. Patient requests refills of Neurontin [Gabapentin] and Norco [Hydrocodone-Acetaminophen] as well as many other medications.     Insomnia -- She would like to wean off of Pamelor [Nortriptyline] and feels Desyrel [Trazodone] is working well for her.see patient after-visit summary for titration / gradual dose reduction schedule    Hypertension -- Wants 50 MG tablets of Lopressor [Metoprolol] instead of two 25 MG tablets.  BP Readings from Last 3 Encounters:   11/14/17 136/70   10/23/17 (!) 181/93   08/18/17 122/70     Hyperlipidemia -- Needs a refill of Crestor [Rosuvastatin] 10 MG qd. She continues with annual cardiologist appointment for her history of ST elevation myocardial infarction   Recent Labs   Lab Test  10/18/16   1532  05/12/11   1621   CHOL  103   --    HDL  42*   --    LDL  50  96     Tobacco  Cessation -- She reports she has cut down on smoking. Not quit smoking completely though    Additional Notes -- Declines all health maintenance and preventative health measures today.    Problem list and histories reviewed & adjusted, as indicated.  Additional history: as documented    Patient Active Problem List   Diagnosis     Neuropathy     Fibromyalgia     Restless leg syndrome     Spasmodic torticollis     Insomnia     Hyperlipidemia with target LDL less than 100     History of hepatitis C     Tobacco abuse     Recurrent cold sores     Osteopenia     Migraine headache     Positive occult stool blood test     Subclinical hyperthyroidism     Hypovitaminosis D     Cramp of limb     Opioid use agreement exists     NSTEMI (non-ST elevated myocardial infarction) (H)     Chronic pain     Hypertension goal BP (blood pressure) < 140/90     Past Surgical History:   Procedure Laterality Date     HERNIA REPAIR  9/2009    ventral     LITHOTRIPSY      2 x      TONSILLECTOMY       TUBAL LIGATION         Social History   Substance Use Topics     Smoking status: Current Every Day Smoker     Packs/day: 0.05     Years: 15.00     Types: Cigarettes     Last attempt to quit: 3/31/2013     Smokeless tobacco: Never Used     Alcohol use No     Family History   Problem Relation Age of Onset     Neurologic Disorder Mother 65     Parkinsons     Alcohol/Drug Mother      CEREBROVASCULAR DISEASE Mother      Respiratory Father      COPD     Alcohol/Drug Father      CANCER Brother      DIABETES Sister 52     CANCER Maternal Aunt      CANCER Maternal Uncle      C.A.D. No family hx of          Current Outpatient Prescriptions   Medication Sig Dispense Refill     rosuvastatin (CRESTOR) 10 MG tablet TAKE 1 TABLET (10 MG) BY MOUTH DAILY 90 tablet 3     metoprolol (LOPRESSOR) 50 MG tablet Take 1 tablet (50 mg) by mouth 2 times daily 180 tablet 1     baclofen (LIORESAL) 10 MG tablet Take 2 tablets (20 mg) by mouth 3 times daily 540 tablet 3      gabapentin (NEURONTIN) 300 MG capsule 1 capsule am, 1 at noon 2 at 5pm and 2 at bedtime ( 6 per day ) Not to be refilled until patient calls 540 capsule 3     nortriptyline (PAMELOR) 10 MG capsule Take 5 capsules (50 mg) by mouth At Bedtime Not to be refilled until patient calls 450 capsule 3     traZODone (DESYREL) 100 MG tablet TAKE FOUR TABLETS NIGHTLY AT BEDTIME 360 tablet 3     HYDROcodone-acetaminophen (NORCO) 7.5-325 MG per tablet Take 2 tablets by mouth 3 times daily 28 days or more between refills for controlled medications 180 tablet 0     aspirin 81 MG tablet Take 81 mg by mouth daily       nitroglycerin (NITROSTAT) 0.4 MG SL tablet Place 0.4 mg under the tongue every 5 minutes as needed for chest pain       polyethylene glycol (MIRALAX/GLYCOLAX) powder Take 17 g by mouth daily Reported on 5/15/2017       Cholecalciferol (VITAMIN D) 2000 UNITS tablet Take 1 tablet by mouth daily       VITAMIN B-12 1000 MCG OR TABS 1 tablet daily       [DISCONTINUED] metoprolol (LOPRESSOR) 25 MG tablet Take 2 tablets (50 mg) by mouth 2 times daily 60 tablet 3     [DISCONTINUED] rosuvastatin (CRESTOR) 10 MG tablet TAKE 1 TABLET (10 MG) BY MOUTH DAILY 90 tablet 0     [DISCONTINUED] traZODone (DESYREL) 100 MG tablet TAKE FOUR TABLETS NIGHTLY AT BEDTIME 360 tablet 3     [DISCONTINUED] baclofen (LIORESAL) 10 MG tablet Take 2 tablets (20 mg) by mouth 3 times daily 540 tablet 3     [DISCONTINUED] gabapentin (NEURONTIN) 300 MG capsule 1 capsule am, 1 at noon 2 at 5pm and 2 at bedtime ( 6 per day ) Not to be refilled until patient calls 540 capsule 3     [DISCONTINUED] nortriptyline (PAMELOR) 10 MG capsule Take 5 capsules (50 mg) by mouth At Bedtime Not to be refilled until patient calls 450 capsule 3     Labs reviewed in EPIC      Reviewed and updated as needed this visit by clinical staffTobacco  Allergies  Meds       Reviewed and updated as needed this visit by Provider         ROS:  Constitutional, HEENT, cardiovascular,  pulmonary, GI, , musculoskeletal, neuro, skin, endocrine and psych systems are negative, except as otherwise noted.    This document serves as a record of the services and decisions personally performed and made by Gerber Jain MD. It was created on their behalf by Davi Lucero, a trained medical scribe. The creation of this document is based the provider's statements to the medical scribe.  Davi Lucero November 14, 2017 2:31 PM    OBJECTIVE:   /70  Pulse 62  Temp 97.9  F (36.6  C) (Oral)  Wt 124 lb (56.2 kg)  SpO2 97%  BMI 21.45 kg/m2  Body mass index is 21.45 kg/(m^2).  GENERAL: healthy, alert and no distress  EYES: Eyes grossly normal to inspection, EOMI, conjunctivae and sclerae normal  SKIN: no suspicious lesions or rashes to visible skin   NEURO: mentation intact and speech normal  PSYCH: mentation appears normal, affect normal/bright    Diagnostic Test Results:  Results for orders placed or performed in visit on 10/23/17   XR Cervical/Thoracic Epidural Inj Incl Imaging    Narrative    This exam was marked as non-reportable because it will not be read by a   radiologist or a [place] non-radiologist provider.               ASSESSMENT/PLAN:     (M79.7) Fibromyalgia  (primary encounter diagnosis)  Comment: refills provided   Plan: baclofen (LIORESAL) 10 MG tablet, gabapentin         (NEURONTIN) 300 MG capsule, nortriptyline         (PAMELOR) 10 MG capsule, traZODone (DESYREL)         100 MG tablet, Comprehensive metabolic panel,         HYDROcodone-acetaminophen (NORCO) 7.5-325 MG         per tablet, DISCONTINUED:         HYDROcodone-acetaminophen (NORCO) 7.5-325 MG         per tablet, DISCONTINUED:         HYDROcodone-acetaminophen (NORCO) 7.5-325 MG         per tablet            (Z12.11) Screen for colon cancer  Comment: completely dismisses   Plan:     (Z78.0) Asymptomatic postmenopausal status  Comment: dismisses   Plan:     (Z12.31) Visit for screening mammogram  Comment: dismisses    Plan:     (F17.200) Tobacco use disorder  Comment: trying to quit smoking   Plan: TOBACCO CESSATION ORDER FOR HM            (Z23) Need for prophylactic vaccination and inoculation against influenza  Comment: dismisses   Plan:     (Z23) Need for prophylactic vaccination against Streptococcus pneumoniae (pneumococcus)  Comment: dismisses   Plan:     (E78.5) Hyperlipidemia with target LDL less than 100  Comment: recheck   Plan: rosuvastatin (CRESTOR) 10 MG tablet, Lipid         panel reflex to direct LDL Non-fasting            (G24.3) Spasmodic torticollis  Comment: refills provided   Plan: baclofen (LIORESAL) 10 MG tablet, gabapentin         (NEURONTIN) 300 MG capsule,         HYDROcodone-acetaminophen (NORCO) 7.5-325 MG         per tablet, DISCONTINUED:         HYDROcodone-acetaminophen (NORCO) 7.5-325 MG         per tablet, DISCONTINUED:         HYDROcodone-acetaminophen (NORCO) 7.5-325 MG         per tablet            (I21.4) NSTEMI (non-ST elevated myocardial infarction) (H)  Comment: metoprolol dose adjustment   Plan: metoprolol (LOPRESSOR) 50 MG tablet, CBC with         platelets differential, Comprehensive metabolic        panel            (E05.90) Subclinical hyperthyroidism  Comment: recheck   Plan: TSH with free T4 reflex        And follow up as indicated     (I10) Hypertension goal BP (blood pressure) < 140/90  Comment: controlled within acceptable limits   Plan: metoprolol (LOPRESSOR) 50 MG tablet,         Comprehensive metabolic panel            (G25.81) Restless leg syndrome  Comment: refills provided   Plan: gabapentin (NEURONTIN) 300 MG capsule, CBC with        platelets differential, Comprehensive metabolic        panel              Patient Instructions   - Reduce Pamelor usage by 1 tablet per month.    - I will follow up with you about lab results.     The information in this document, created by the medical scribe for me, accurately reflects the services I personally performed and the decisions  made by me. I have reviewed and approved this document for accuracy.   MD Gerber Dixon MD  [facility]

## 2017-11-14 NOTE — MR AVS SNAPSHOT
After Visit Summary   11/14/2017    Carina Calvert    MRN: [id number]           Patient Information     Date Of Birth          1950        Visit Information        Provider Department      11/14/2017 2:10 PM Gerber Jain MD [facility]        Today's Diagnoses     Fibromyalgia    -  1    Screen for colon cancer        Asymptomatic postmenopausal status        Visit for screening mammogram        Tobacco use disorder        Need for prophylactic vaccination and inoculation against influenza        Need for prophylactic vaccination against Streptococcus pneumoniae (pneumococcus)        Hyperlipidemia with target LDL less than 100        Spasmodic torticollis        NSTEMI (non-ST elevated myocardial infarction) (H)        Subclinical hyperthyroidism        Hypertension goal BP (blood pressure) < 140/90        Restless leg syndrome          Care Instructions    - Reduce Pamelor usage by 1 tablet per month.    - I will follow up with you about lab results.     [facility]    If you have any questions regarding to your visit please contact your care team:     Team Pink:   Clinic Hours Telephone Number   Internal Medicine:  Dr. Lisset Ignacio NP       7am-7pm  Monday - Thursday   7am-5pm  Fridays  [phone]  (Appointment scheduling available 24/7)    Questions about your visit?  Team Line  [phone]   Urgent Care - [place] - 11am-9pm Monday-Friday Saturday-Sunday- 9am-5pm   [place] - 5pm-9pm Monday-Friday Saturday-Sunday- 9am-5pm  [phone] - [place]   [phone] - [place]       What options do I have for visits at the clinic other than the traditional office visit?  To expand how we care for you, many of our providers are utilizing electronic visits (e-visits) and telephone visits, when medically appropriate, for interactions with their patients rather than a visit in the clinic.   We  also offer nurse visits for many medical concerns. Just like any other service, we will bill your insurance company for this type of visit based on time spent on the phone with your provider. Not all insurance companies cover these visits. Please check with your medical insurance if this type of visit is covered. You will be responsible for any charges that are not paid by your insurance.      E-visits via [employer] (ROOF)hart:  generally incur a $35.00 fee.  Telephone visits:  Time spent on the phone: *charged based on time that is spent on the phone in increments of 10 minutes. Estimated cost:   5-10 mins $30.00   11-20 mins. $59.00   21-30 mins. $85.00   Use Affinaquest (secure email communication and access to your chart) to send your primary care provider a message or make an appointment. Ask someone on your Team how to sign up for Affinaquest.    For a Price Quote for your services, please call our PeopleCube Line at [phone].    As always, Thank you for trusting us with your health care needs!    Discharged by Wendy Rosenthal MA.          Follow-ups after your visit        Who to contact     If you have questions or need follow up information about today's clinic visit or your schedule please contact [facility] directly at [phone].  Normal or non-critical lab and imaging results will be communicated to you by [employer] (ROOF)hart, letter or phone within 4 business days after the clinic has received the results. If you do not hear from us within 7 days, please contact the clinic through [employer] (ROO[employer]hart or phone. If you have a critical or abnormal lab result, we will notify you by phone as soon as possible.  Submit refill requests through Affinaquest or call your pharmacy and they will forward the refill request to us. Please allow 3 business days for your refill to be completed.          Additional Information About Your Visit        Affinaquest Information     Affinaquest lets you send messages to your doctor, view your test results,  "renew your prescriptions, schedule appointments and more. To sign up, go to [email or web address] . Click on \"Log in\" on the left side of the screen, which will take you to the Welcome page. Then click on \"Sign up Now\" on the right side of the page.     You will be asked to enter the access code listed below, as well as some personal information. Please follow the directions to create your username and password.     Your access code is: [email or web address]  Expires: 2017 11:52 AM     Your access code will  in 90 days. If you need help or a new code, please call your [place] clinic or [phone].        Care EveryWhere ID     This is your Care EveryWhere ID. This could be used by other organizations to access your [place] medical records  [id number]        Your Vitals Were     Pulse Temperature Pulse Oximetry BMI (Body Mass Index)          62 97.9  F (36.6  C) (Oral) 97% 21.45 kg/m2         Blood Pressure from Last 3 Encounters:   17 140/70   10/23/17 (!) 181/93   17 122/70    Weight from Last 3 Encounters:   17 124 lb (56.2 kg)   17 121 lb (54.9 kg)   05/15/17 116 lb 12.8 oz (53 kg)              We Performed the Following     CBC with platelets differential     Comprehensive metabolic panel     Lipid panel reflex to direct LDL Non-fasting     TOBACCO CESSATION ORDER FOR HM     TSH with free T4 reflex          Today's Medication Changes          These changes are accurate as of: 17  2:40 PM.  If you have any questions, ask your nurse or doctor.               Start taking these medicines.        Dose/Directions    HYDROcodone-acetaminophen 7.5-325 MG per tablet   Commonly known as:  NORCO   Used for:  Spasmodic torticollis, Fibromyalgia   Started by:  Gerber Jain MD        Dose:  2 tablet   Take 2 tablets by mouth 3 times daily 28 days or more between refills for controlled medications   Quantity:  180 tablet   Refills:  0         These medicines have changed or have " updated prescriptions.        Dose/Directions    metoprolol 50 MG tablet   Commonly known as:  LOPRESSOR   This may have changed:  medication strength   Used for:  NSTEMI (non-ST elevated myocardial infarction) (H), Hypertension goal BP (blood pressure) < 140/90   Changed by:  Gerber Jain MD        Dose:  50 mg   Take 1 tablet (50 mg) by mouth 2 times daily   Quantity:  180 tablet   Refills:  1            Where to get your medicines      These medications were sent to [place] [address] [place] [address] [address]     Phone:  [phone]     baclofen 10 MG tablet    gabapentin 300 MG capsule    metoprolol 50 MG tablet    nortriptyline 10 MG capsule    rosuvastatin 10 MG tablet    traZODone 100 MG tablet         Some of these will need a paper prescription and others can be bought over the counter.  Ask your nurse if you have questions.     Bring a paper prescription for each of these medications     HYDROcodone-acetaminophen 7.5-325 MG per tablet                Primary Care Provider Office Phone # Fax #    Gerber Jain [phone] [address] [facility] [address]        Equal Access to Services     [facility]: Hadii beverly dalton hadasho Soarpit, waaxda luqadaha, qaybta kaalmada adeegyada, sofya gardner . So [facility] [phone].    ATENCIÓN: Si habla español, tiene a healy disposición servicios gratPresbyterian Esp[facility]ola Hospitalos de asistencia lingüística. Llame al [phone].    We comply with applicable federal civil rights laws and [place] laws. We do not discriminate on the basis of race, color, national origin, age, disability, sex, sexual orientation, or gender identity.            Thank you!     Thank you for choosing [facility]  for your care. Our goal is always to provide you with excellent care. Hearing back from our patients is one way we can continue to improve our services. Please take a few minutes to  complete the written survey that you may receive in the mail after your visit with us. Thank you!             Your Updated Medication List - Protect others around you: Learn how to safely use, store and throw away your medicines at www.disposemymeds.org.          This list is accurate as of: 11/14/17  2:40 PM.  Always use your most recent med list.                   Brand Name Dispense Instructions for use Diagnosis    aspirin 81 MG tablet      Take 81 mg by mouth daily        baclofen 10 MG tablet    LIORESAL    540 tablet    Take 2 tablets (20 mg) by mouth 3 times daily    Spasmodic torticollis, Fibromyalgia       cyanocobalamin 1000 MCG tablet    vitamin  B-12     1 tablet daily        gabapentin 300 MG capsule    NEURONTIN    540 capsule    1 capsule am, 1 at noon 2 at 5pm and 2 at bedtime ( 6 per day ) Not to be refilled until patient calls    Spasmodic torticollis, Fibromyalgia, Restless leg syndrome       HYDROcodone-acetaminophen 7.5-325 MG per tablet    NORCO    180 tablet    Take 2 tablets by mouth 3 times daily 28 days or more between refills for controlled medications    Spasmodic torticollis, Fibromyalgia       metoprolol 50 MG tablet    LOPRESSOR    180 tablet    Take 1 tablet (50 mg) by mouth 2 times daily    NSTEMI (non-ST elevated myocardial infarction) (H), Hypertension goal BP (blood pressure) < 140/90       nitroGLYcerin 0.4 MG sublingual tablet    NITROSTAT     Place 0.4 mg under the tongue every 5 minutes as needed for chest pain        nortriptyline 10 MG capsule    PAMELOR    450 capsule    Take 5 capsules (50 mg) by mouth At Bedtime Not to be refilled until patient calls    Fibromyalgia       polyethylene glycol powder    MIRALAX/GLYCOLAX     Take 17 g by mouth daily Reported on 5/15/2017        rosuvastatin 10 MG tablet    CRESTOR    90 tablet    TAKE 1 TABLET (10 MG) BY MOUTH DAILY    Hyperlipidemia with target LDL less than 100       traZODone 100 MG tablet    DESYREL    360 tablet     TAKE FOUR TABLETS NIGHTLY AT BEDTIME    Fibromyalgia       vitamin D 2000 UNITS tablet      Take 1 tablet by mouth daily    Hypovitaminosis D

## 2017-11-14 NOTE — PATIENT INSTRUCTIONS
- Reduce Pamelor usage by 1 tablet per month.    - I will follow up with you about lab results.     [facility]    If you have any questions regarding to your visit please contact your care team:     Team Pink:   Clinic Hours Telephone Number   Internal Medicine:  Dr. Lisset Ignacio NP       7am-7pm  Monday - Thursday   7am-5pm  Fridays  [phone]  (Appointment scheduling available 24/7)    Questions about your visit?  Team Line  [phone]   Urgent Care - [place] - 11am-9pm Monday-Friday Saturday-Sunday- 9am-5pm   [place] - 5pm-9pm Monday-Friday Saturday-Sunday- 9am-5pm  [phone] - [facility]  [phone] - [place]       What options do I have for visits at the clinic other than the traditional office visit?  To expand how we care for you, many of our providers are utilizing electronic visits (e-visits) and telephone visits, when medically appropriate, for interactions with their patients rather than a visit in the clinic.   We also offer nurse visits for many medical concerns. Just like any other service, we will bill your insurance company for this type of visit based on time spent on the phone with your provider. Not all insurance companies cover these visits. Please check with your medical insurance if this type of visit is covered. You will be responsible for any charges that are not paid by your insurance.      E-visits via [employer]:  generally incur a $35.00 fee.  Telephone visits:  Time spent on the phone: *charged based on time that is spent on the phone in increments of 10 minutes. Estimated cost:   5-10 mins $30.00   11-20 mins. $59.00   21-30 mins. $85.00   Use [employer] (secure email communication and access to your chart) to send your primary care provider a message or make an appointment. Ask someone on your Team how to sign up for [employer]Hour.    For a Price Quote for your services, please call our Consumer  [employer] Line at [phone].    As always, Thank you for trusting us with your health care needs!    Discharged by Wendy Rosenthal MA.

## 2018-02-15 ENCOUNTER — OFFICE VISIT (OUTPATIENT)
Dept: INTERNAL MEDICINE | Facility: CLINIC | Age: 68
End: 2018-02-15
Payer: MEDICARE

## 2018-02-15 VITALS
RESPIRATION RATE: 14 BRPM | BODY MASS INDEX: 21.13 KG/M2 | HEART RATE: 56 BPM | OXYGEN SATURATION: 100 % | HEIGHT: 64 IN | TEMPERATURE: 97.3 F | WEIGHT: 123.8 LBS | DIASTOLIC BLOOD PRESSURE: 70 MMHG | SYSTOLIC BLOOD PRESSURE: 168 MMHG

## 2018-02-15 DIAGNOSIS — M79.7 FIBROMYALGIA: ICD-10-CM

## 2018-02-15 DIAGNOSIS — G24.3 SPASMODIC TORTICOLLIS: ICD-10-CM

## 2018-02-15 DIAGNOSIS — Z23 NEED FOR PROPHYLACTIC VACCINATION AGAINST STREPTOCOCCUS PNEUMONIAE (PNEUMOCOCCUS): ICD-10-CM

## 2018-02-15 DIAGNOSIS — G25.81 RESTLESS LEG SYNDROME: ICD-10-CM

## 2018-02-15 DIAGNOSIS — Z12.31 VISIT FOR SCREENING MAMMOGRAM: Primary | ICD-10-CM

## 2018-02-15 DIAGNOSIS — I10 HYPERTENSION GOAL BP (BLOOD PRESSURE) < 140/90: ICD-10-CM

## 2018-02-15 DIAGNOSIS — Z23 NEED FOR PROPHYLACTIC VACCINATION AND INOCULATION AGAINST INFLUENZA: ICD-10-CM

## 2018-02-15 PROCEDURE — 99214 OFFICE O/P EST MOD 30 MIN: CPT | Performed by: INTERNAL MEDICINE

## 2018-02-15 RX ORDER — HYDROCODONE BITARTRATE AND ACETAMINOPHEN 7.5; 325 MG/1; MG/1
2 TABLET ORAL 3 TIMES DAILY
Qty: 180 TABLET | Refills: 0 | Status: SHIPPED | OUTPATIENT
Start: 2018-02-15 | End: 2018-02-15

## 2018-02-15 RX ORDER — GABAPENTIN 300 MG/1
CAPSULE ORAL
Qty: 540 CAPSULE | Refills: 3 | Status: SHIPPED | OUTPATIENT
Start: 2018-02-15 | End: 2018-05-10

## 2018-02-15 RX ORDER — HYDROCODONE BITARTRATE AND ACETAMINOPHEN 7.5; 325 MG/1; MG/1
2 TABLET ORAL 3 TIMES DAILY
Qty: 180 TABLET | Refills: 0 | Status: SHIPPED | OUTPATIENT
Start: 2018-02-15 | End: 2018-05-10

## 2018-02-15 RX ORDER — NORTRIPTYLINE HCL 10 MG
50 CAPSULE ORAL AT BEDTIME
Qty: 450 CAPSULE | Refills: 3 | Status: SHIPPED | OUTPATIENT
Start: 2018-02-15 | End: 2018-02-15

## 2018-02-15 ASSESSMENT — PAIN SCALES - GENERAL: PAINLEVEL: SEVERE PAIN (6)

## 2018-02-15 NOTE — PROGRESS NOTES
SUBJECTIVE:   Carina Calvert is a 67 year old female who presents to clinic today for the following health issues:    Migraines-- Patient has been having ongoing problems with migraines. She has recently had botox injections to provide some relief for her headaches, but says that she could not withstand the pain and only let him do the injections in one side. It has recently been recommended that she try acupuncture, which she is considering as a possible treatment course. Patient has chronic neck pain, which worsens her problems with headaches. She has had cortisone injections in her neck before to some relief. She has also not tried chiropractics, but is not interested. Patient has successfully tapered down her nortriptyline, and continues to take gabapentin as prescribed.    NSTEMI-- Patient says that she is no longer seeing cardiology for management of her heart health. She says that because I can fill her prescriptions she does not see the need to see them. There have been no changes in her heart health and she believes she is in good health. Her blood pressure is elevated today at 168/70, and says that her pressure is high today because of her neck and head pain. She is status post ST elevation myocardial infarction about 2 years ago with no recurrence of symptoms, for complete details please see care everywhere.    Problem list and histories reviewed & adjusted, as indicated.  Additional history: as documented    Patient Active Problem List   Diagnosis     Neuropathy     Fibromyalgia     Restless leg syndrome     Spasmodic torticollis     Insomnia     Hyperlipidemia with target LDL less than 100     History of hepatitis C     Tobacco abuse     Recurrent cold sores     Osteopenia     Migraine headache     Positive occult stool blood test     Subclinical hyperthyroidism     Hypovitaminosis D     Cramp of limb     Opioid use agreement exists     NSTEMI (non-ST elevated myocardial infarction) (H)     Chronic  pain     Hypertension goal BP (blood pressure) < 140/90     Past Surgical History:   Procedure Laterality Date     HERNIA REPAIR  9/2009    ventral     LITHOTRIPSY      2 x      TONSILLECTOMY       TUBAL LIGATION         Social History   Substance Use Topics     Smoking status: Current Every Day Smoker     Packs/day: 0.05     Years: 15.00     Types: Cigarettes     Last attempt to quit: 3/31/2013     Smokeless tobacco: Never Used     Alcohol use No     Family History   Problem Relation Age of Onset     Neurologic Disorder Mother 65     Parkinsons     Alcohol/Drug Mother      CEREBROVASCULAR DISEASE Mother      Respiratory Father      COPD     Alcohol/Drug Father      CANCER Brother      DIABETES Sister 52     CANCER Maternal Aunt      CANCER Maternal Uncle      C.A.D. No family hx of          Current Outpatient Prescriptions   Medication Sig Dispense Refill     rosuvastatin (CRESTOR) 10 MG tablet TAKE 1 TABLET (10 MG) BY MOUTH DAILY 90 tablet 3     metoprolol (LOPRESSOR) 50 MG tablet Take 1 tablet (50 mg) by mouth 2 times daily 180 tablet 1     baclofen (LIORESAL) 10 MG tablet Take 2 tablets (20 mg) by mouth 3 times daily 540 tablet 3     gabapentin (NEURONTIN) 300 MG capsule 1 capsule am, 1 at noon 2 at 5pm and 2 at bedtime ( 6 per day ) Not to be refilled until patient calls 540 capsule 3     nortriptyline (PAMELOR) 10 MG capsule Take 5 capsules (50 mg) by mouth At Bedtime Not to be refilled until patient calls 450 capsule 3     traZODone (DESYREL) 100 MG tablet TAKE FOUR TABLETS NIGHTLY AT BEDTIME 360 tablet 3     HYDROcodone-acetaminophen (NORCO) 7.5-325 MG per tablet Take 2 tablets by mouth 3 times daily 28 days or more between refills for controlled medications 180 tablet 0     aspirin 81 MG tablet Take 81 mg by mouth daily       nitroglycerin (NITROSTAT) 0.4 MG SL tablet Place 0.4 mg under the tongue every 5 minutes as needed for chest pain       polyethylene glycol (MIRALAX/GLYCOLAX) powder Take 17 g by mouth  "daily Reported on 5/15/2017       Cholecalciferol (VITAMIN D) 2000 UNITS tablet Take 1 tablet by mouth daily       VITAMIN B-12 1000 MCG OR TABS 1 tablet daily       Allergies   Allergen Reactions     Droperidol      Ivp Dye [Contrast Dye]      BP Readings from Last 3 Encounters:   02/15/18 168/70   11/14/17 136/70   10/23/17 (!) 181/93    Wt Readings from Last 3 Encounters:   02/15/18 56.2 kg (123 lb 12.8 oz)   11/14/17 56.2 kg (124 lb)   08/18/17 54.9 kg (121 lb)        Reviewed and updated as needed this visit by clinical staff  Tobacco  Allergies  Meds  Med Hx  Surg Hx  Fam Hx  Soc Hx      Reviewed and updated as needed this visit by Provider         ROS:  Positive for headaches and neck pain.    Constitutional, HEENT, cardiovascular, pulmonary, gi and gu systems are negative, except as otherwise noted.    This document serves as a record of the services and decisions personally performed and made by Gerber Jain MD. It was created on his behalf by Chicho Larose, a trained medical scribe. The creation of this document is based on the provider's statements to the medical scribe.  Chicho Larose 3:43 PM February 15, 2018    OBJECTIVE:     /70  Pulse 56  Temp 97.3  F (36.3  C) (Oral)  Resp 14  Ht 5' 3.75\" (1.619 m)  Wt 123 lb 12.8 oz (56.2 kg)  SpO2 100%  BMI 21.42 kg/m2  Body mass index is 21.42 kg/(m^2).  GENERAL: healthy, alert and no distress  RESP: lungs clear to auscultation - no rales, rhonchi or wheezes  CV: regular rate and rhythm, normal S1 S2, no S3 or S4, no murmur, click or rub, no peripheral edema and peripheral pulses strong  NEURO: Normal strength and tone, mentation intact and speech normal  PSYCH: mentation appears normal, affect normal/bright    Diagnostic Test Results:  none     ASSESSMENT/PLAN:     (Z12.31) Visit for screening mammogram  (primary encounter diagnosis) declines - I tried to update healthcare maintenance section so we would stop bothering this one " who has significant changes disdain for healthcare maintenance issues     (G25.81) Restless leg syndrome  Comment: Controlled on gabapentin.  Plan: gabapentin (NEURONTIN) 300 MG capsule        Continue on current medication. Follow up as needed.    (M79.7) Fibromyalgia  Comment: Controlled on current medications.  Plan: gabapentin (NEURONTIN) 300 MG capsule,         HYDROcodone-acetaminophen (NORCO) 7.5-325 MG         per tablet, DISCONTINUED: nortriptyline         (PAMELOR) 10 MG capsule, DISCONTINUED:         HYDROcodone-acetaminophen (NORCO) 7.5-325 MG         per tablet, DISCONTINUED:         HYDROcodone-acetaminophen (NORCO) 7.5-325 MG         per tablet        Continue on current medications. Follow up as needed.    (G24.3) Spasmodic torticollis  Comment: Controlled on current medications. Referral given for acupuncture.  Plan: gabapentin (NEURONTIN) 300 MG capsule, ART PT,         HAND, AND CHIROPRACTIC REFERRAL,         HYDROcodone-acetaminophen (NORCO) 7.5-325 MG         per tablet, DISCONTINUED:         HYDROcodone-acetaminophen (NORCO) 7.5-325 MG         per tablet, DISCONTINUED:         HYDROcodone-acetaminophen (NORCO) 7.5-325 MG         per tablet        Continue on current medications. Follow up with acupuncture.    (Z23) Need for prophylactic vaccination and inoculation against influenza    (I10) Hypertension goal BP (blood pressure) < 140/90  Comment: Not at goal. Controlled on metoprolol.   Plan: Continue on current medication. Return in 2-4 weeks for BP recheck. I argued unsuccessfully  To adjustment anti-hypertensive medication ; if blood pressure remains elevated we will need to MAKE BP MEDICATION ADJUSTMENT     (Z23) Need for prophylactic vaccination against Streptococcus pneumoniae (pneumococcus)    Patient Instructions     -Please return in 2-4 weeks for a MA visit to have your blood pressure rechecked.    [facility]    If you have any questions regarding to your visit please  contact your care team:     Team Pink:   Clinic Hours Telephone Number   Internal Medicine:  Dr. Lisset Ignacio, NP       7am-7pm  Monday - Thursday   7am-5pm  Fridays  [phone]  (Appointment scheduling available 24/7)    Questions about your visit?  Team Line  [phone]   Urgent Care - [place] - 11am-9pm Monday-Friday Saturday-Sunday- 9am-5pm   [place] - 5pm-9pm Monday-Friday Saturday-Sunday- 9am-5pm  [phone] - [facility]  [phone] - [place]       What options do I have for visits at the clinic other than the traditional office visit?  To expand how we care for you, many of our providers are utilizing electronic visits (e-visits) and telephone visits, when medically appropriate, for interactions with their patients rather than a visit in the clinic.   We also offer nurse visits for many medical concerns. Just like any other service, we will bill your insurance company for this type of visit based on time spent on the phone with your provider. Not all insurance companies cover these visits. Please check with your medical insurance if this type of visit is covered. You will be responsible for any charges that are not paid by your insurance.      E-visits via [employer]:  generally incur a $35.00 fee.  Telephone visits:  Time spent on the phone: *charged based on time that is spent on the phone in increments of 10 minutes. Estimated cost:   5-10 mins $30.00   11-20 mins. $59.00   21-30 mins. $85.00   Use import2t (secure email communication and access to your chart) to send your primary care provider a message or make an appointment. Ask someone on your Team how to sign up for [employer].    For a Price Quote for your services, please call our Consumer Price Line at [phone].    As always, Thank you for trusting us with your health care needs!    Violette Mccullough, CMA      The information in this document, created by the medical [facility]  for me, accurately reflects the services I personally performed and the decisions made by me. I have reviewed and approved this document for accuracy prior to leaving the patient care area.   Gerber Jain MD 3:44 PM February 15, 2018  [facility]

## 2018-02-15 NOTE — MR AVS SNAPSHOT
After Visit Summary   2/15/2018    Carina Calvert    MRN: [id number]           Patient Information     Date Of Birth          1950        Visit Information        Provider Department      2/15/2018 3:10 PM Gerber Jain MD [facility]        Today's Diagnoses     Visit for screening mammogram    -  1    Restless leg syndrome        Fibromyalgia        Spasmodic torticollis        Need for prophylactic vaccination and inoculation against influenza        Hypertension goal BP (blood pressure) < 140/90        Need for prophylactic vaccination against Streptococcus pneumoniae (pneumococcus)          Care Instructions    -Please return in 2-4 weeks for a MA visit to have your blood pressure rechecked.    [facility] (formerly [facility])    If you have any questions regarding to your visit please contact your care team:     Team Pink:   Clinic Hours Telephone Number   Internal Medicine:  Dr. Lisset Ignacio, NP       7am-7pm  Monday - Thursday   7am-5pm  Fridays  [phone]  (Appointment scheduling available 24/7)    Questions about your visit?  Team Line  [phone]   Urgent Care - [place] and [place] - 11am-9pm Monday-Friday Saturday-Sunday- 9am-5pm   [place] - 5pm-9pm Monday-Friday Saturday-Sunday- 9am-5pm  [phone] - [place] LOPEZ  [phone] - [place]       What options do I have for visits at the clinic other than the traditional office visit?  To expand how we care for you, many of our providers are utilizing electronic visits (e-visits) and telephone visits, when medically appropriate, for interactions with their patients rather than a visit in the clinic.   We also offer nurse visits for many medical concerns. Just like any other service, we will bill your insurance company for this type of visit based on time spent on the phone with your provider. Not all insurance companies cover these visits. Please check with your  medical insurance if this type of visit is covered. You will be responsible for any charges that are not paid by your insurance.      E-visits via ClearCyclehart:  generally incur a $35.00 fee.  Telephone visits:  Time spent on the phone: *charged based on time that is spent on the phone in increments of 10 minutes. Estimated cost:   5-10 mins $30.00   11-20 mins. $59.00   21-30 mins. $85.00   Use RemitDATA (secure email communication and access to your chart) to send your primary care provider a message or make an appointment. Ask someone on your Team how to sign up for RemitDATA.    For a Price Quote for your services, please call our Paystik Line at [phone].    As always, Thank you for trusting us with your health care needs!    Violette Mccullough, [facility]          Follow-ups after your visit        Additional Services     ART PT, HAND, AND CHIROPRACTIC REFERRAL       **This order will print in the ART Scheduling Office**    Physical Therapy, Hand Therapy and Chiropractic Care are available through:    *[facility]  *[facility]  *[facility]    Call one number to schedule at any of the above locations: [phone].    Your provider has referred you to: acupuncture     Indication/Reason for Referral: Neck Pain  Onset of Illness: chronic   Therapy Orders: Evaluate and Treat  Special Programs: Acupuncture and None  Special Request: None    Sarina Branch      Additional Comments for the Therapist or Chiropractor: see office visit notes     Please be aware that coverage of these services is subject to the terms and limitations of your health insurance plan.  Call member services at your health plan with any benefit or coverage questions.      Please bring the following to your appointment:    *Your personal calendar for scheduling future appointments  *Comfortable clothing                  Who to contact     If you have questions or need follow up information about  "today's clinic visit or your schedule please contact [facility] directly at [phone].  Normal or non-critical lab and imaging results will be communicated to you by Second Decimalhart, letter or phone within 4 business days after the clinic has received the results. If you do not hear from us within 7 days, please contact the clinic through Second Decimalhart or phone. If you have a critical or abnormal lab result, we will notify you by phone as soon as possible.  Submit refill requests through [employer] or call your pharmacy and they will forward the refill request to us. Please allow 3 business days for your refill to be completed.          Additional Information About Your Visit        [employer]el[employer] Information     [employer] lets you send messages to your doctor, view your test results, renew your prescriptions, schedule appointments and more. To sign up, go to [email or web address] . Click on \"Log in\" on the left side of the screen, which will take you to the Welcome page. Then click on \"Sign up Now\" on the right side of the page.     You will be asked to enter the access code listed below, as well as some personal information. Please follow the directions to create your username and password.     Your access code is: [email or web address]  Expires: 2018  3:58 PM     Your access code will  in 90 days. If you need help or a new code, please call your [place] clinic or [phone].        Care EveryWhere ID     This is your Care EveryWhere ID. This could be used by other organizations to access your [place] medical records  [id number]        Your Vitals Were     Pulse Temperature Respirations Height Pulse Oximetry BMI (Body Mass Index)    56 97.3  F (36.3  C) (Oral) 14 5' 3.75\" (1.619 m) 100% 21.42 kg/m2       Blood Pressure from Last 3 Encounters:   02/15/18 168/70   17 136/70   10/23/17 (!) 181/93    Weight from Last 3 Encounters:   02/15/18 123 lb 12.8 oz (56.2 kg)   17 124 lb (56.2 kg)   17 121 lb " (54.9 kg)              We Performed the Following     ART PT, HAND, AND CHIROPRACTIC REFERRAL          Today's Medication Changes          These changes are accurate as of 2/15/18  3:58 PM.  If you have any questions, ask your nurse or doctor.               Start taking these medicines.        Dose/Directions    HYDROcodone-acetaminophen 7.5-325 MG per tablet   Commonly known as:  NORCO   Used for:  Spasmodic torticollis, Fibromyalgia   Started by:  Gerber Jain MD        Dose:  2 tablet   Take 2 tablets by mouth 3 times daily 28 days or more between refills for controlled medications   Quantity:  180 tablet   Refills:  0         Stop taking these medicines if you haven't already. Please contact your care team if you have questions.     nortriptyline 10 MG capsule   Commonly known as:  PAMELOR   Stopped by:  Gerber Jain MD                Where to get your medicines      These medications were sent to [place] [address] [place] [address] [address]     Phone:  [phone]     gabapentin 300 MG capsule         Some of these will need a paper prescription and others can be bought over the counter.  Ask your nurse if you have questions.     Bring a paper prescription for each of these medications     HYDROcodone-acetaminophen 7.5-325 MG per tablet                Primary Care Provider Office Phone # Fax #    Gerber Jain [phone]       [address] [facility] [address]        Equal Access to Services     [facility] AH: Hadii beverly dalton hadasho Soarpit, waaxda luqadaha, qaybta kaalmada adeegyada, sofay gardner . So [facility] [phone].    ATENCIÓN: Si habla español, tiene a healy disposición servicios gratuitos de asistencia lingüística. Llame al [phone].    We comply with applicable federal civil rights laws and [place] laws. We do not discriminate on the basis of race, color, national origin, age, disability,  sex, sexual orientation, or gender identity.            Thank you!     Thank you for choosing [facility]  for your care. Our goal is always to provide you with excellent care. Hearing back from our patients is one way we can continue to improve our services. Please take a few minutes to complete the written survey that you may receive in the mail after your visit with us. Thank you!             Your Updated Medication List - Protect others around you: Learn how to safely use, store and throw away your medicines at www.disposemymeds.org.          This list is accurate as of 2/15/18  3:58 PM.  Always use your most recent med list.                   Brand Name Dispense Instructions for use Diagnosis    aspirin 81 MG tablet      Take 81 mg by mouth daily        baclofen 10 MG tablet    LIORESAL    540 tablet    Take 2 tablets (20 mg) by mouth 3 times daily    Spasmodic torticollis, Fibromyalgia       cyanocobalamin 1000 MCG tablet    vitamin  B-12     1 tablet daily        gabapentin 300 MG capsule    NEURONTIN    540 capsule    1 capsule am, 1 at noon 2 at 5pm and 2 at bedtime ( 6 per day ) Not to be refilled until patient calls    Spasmodic torticollis, Fibromyalgia, Restless leg syndrome       HYDROcodone-acetaminophen 7.5-325 MG per tablet    NORCO    180 tablet    Take 2 tablets by mouth 3 times daily 28 days or more between refills for controlled medications    Spasmodic torticollis, Fibromyalgia       metoprolol tartrate 50 MG tablet    LOPRESSOR    180 tablet    Take 1 tablet (50 mg) by mouth 2 times daily    NSTEMI (non-ST elevated myocardial infarction) (H), Hypertension goal BP (blood pressure) < 140/90       nitroGLYcerin 0.4 MG sublingual tablet    NITROSTAT     Place 0.4 mg under the tongue every 5 minutes as needed for chest pain        polyethylene glycol powder    MIRALAX/GLYCOLAX     Take 17 g by mouth daily Reported on 5/15/2017        rosuvastatin 10 MG tablet    CRESTOR    90 tablet     TAKE 1 TABLET (10 MG) BY MOUTH DAILY    Hyperlipidemia with target LDL less than 100       traZODone 100 MG tablet    DESYREL    360 tablet    TAKE FOUR TABLETS NIGHTLY AT BEDTIME    Fibromyalgia       vitamin D 2000 UNITS tablet      Take 1 tablet by mouth daily    Hypovitaminosis D

## 2018-02-15 NOTE — PATIENT INSTRUCTIONS
-Please return in 2-4 weeks for a MA visit to have your blood pressure rechecked.    [facility]    If you have any questions regarding to your visit please contact your care team:     Team Pink:   Clinic Hours Telephone Number   Internal Medicine:  Dr. Lisset Ignacio, NP       7am-7pm  Monday - Thursday   7am-5pm  Fridays  [phone]  (Appointment scheduling available 24/7)    Questions about your visit?  Team Line  [phone]   [facility] [place] [facility] 11am-9pm Monday-Friday Saturday-Sunday- 9am-5pm   [place] - 5pm-9pm Monday-Friday Saturday-Sunday- 9am-5pm  [phone] - [place]   [phone] - [place]       What options do I have for visits at the clinic other than the traditional office visit?  To expand how we care for you, many of our providers are utilizing electronic visits (e-visits) and telephone visits, when medically appropriate, for interactions with their patients rather than a visit in the clinic.   We also offer nurse visits for many medical concerns. Just like any other service, we will bill your insurance company for this type of visit based on time spent on the phone with your provider. Not all insurance companies cover these visits. Please check with your medical insurance if this type of visit is covered. You will be responsible for any charges that are not paid by your insurance.      E-visits via Kinnek:  generally incur a $35.00 fee.  Telephone visits:  Time spent on the phone: *charged based on time that is spent on the phone in increments of 10 minutes. Estimated cost:   5-10 mins $30.00   11-20 mins. $59.00   21-30 mins. $85.00   Use Lyftt (secure email communication and access to your chart) to send your primary care provider a message or make an appointment. Ask someone on your Team how to sign up for Kinnek.    For a Price Quote for your services, please call our Consumer Price Line at  [phone].    As always, Thank you for trusting us with your health care needs!    Violette Mccullough CMA

## 2018-02-27 ENCOUNTER — OFFICE VISIT (OUTPATIENT)
Dept: FAMILY MEDICINE | Facility: CLINIC | Age: 68
End: 2018-02-27
Payer: MEDICARE

## 2018-02-27 VITALS
WEIGHT: 121.6 LBS | SYSTOLIC BLOOD PRESSURE: 120 MMHG | BODY MASS INDEX: 21.04 KG/M2 | HEART RATE: 56 BPM | RESPIRATION RATE: 18 BRPM | OXYGEN SATURATION: 100 % | DIASTOLIC BLOOD PRESSURE: 62 MMHG | TEMPERATURE: 97.8 F

## 2018-02-27 DIAGNOSIS — I21.4 NSTEMI (NON-ST ELEVATED MYOCARDIAL INFARCTION) (H): ICD-10-CM

## 2018-02-27 DIAGNOSIS — Z23 NEED FOR PROPHYLACTIC VACCINATION AND INOCULATION AGAINST INFLUENZA: ICD-10-CM

## 2018-02-27 DIAGNOSIS — Z72.0 TOBACCO ABUSE: ICD-10-CM

## 2018-02-27 DIAGNOSIS — I10 HYPERTENSION GOAL BP (BLOOD PRESSURE) < 140/90: ICD-10-CM

## 2018-02-27 DIAGNOSIS — Z12.11 SCREEN FOR COLON CANCER: ICD-10-CM

## 2018-02-27 DIAGNOSIS — M47.812 CERVICAL FACET JOINT SYNDROME: Primary | ICD-10-CM

## 2018-02-27 PROCEDURE — 99214 OFFICE O/P EST MOD 30 MIN: CPT | Performed by: INTERNAL MEDICINE

## 2018-02-27 RX ORDER — METOPROLOL TARTRATE 50 MG
50 TABLET ORAL 2 TIMES DAILY
Qty: 180 TABLET | Refills: 1 | Status: SHIPPED | OUTPATIENT
Start: 2018-02-27 | End: 2018-05-10

## 2018-02-27 ASSESSMENT — PAIN SCALES - GENERAL: PAINLEVEL: SEVERE PAIN (6)

## 2018-02-27 NOTE — MR AVS SNAPSHOT
After Visit Summary   2/27/2018    Carina Calvert    MRN: [id number]           Patient Information     Date Of Birth          1950        Visit Information        Provider Department      2/27/2018 1:50 PM Gerber Jain MD [facility]        Today's Diagnoses     Cervical facet joint syndrome    -  1    Screen for colon cancer        Need for prophylactic vaccination and inoculation against influenza        NSTEMI (non-ST elevated myocardial infarction) (H)        Hypertension goal BP (blood pressure) < 140/90        Tobacco abuse          Care Instructions      HOW TO QUIT SMOKING  Smoking is one of the hardest habits to break. About half of all those who have ever smoked have been able to quit, and most of those (about 70%) who still smoke want to quit. Here are some of the best ways to stop smoking.     KEEP TRYING:  It takes most smokers about 8 tries before they are finally able to fully quit. So, the more often you try and fail, the better your chance of quitting the next time! So, don't give up!    GO COLD TURKEY:  Most ex-smokers quit cold turkey. Trying to cut back gradually doesn't seem to work as well, perhaps because it continues the smoking habit. Also, it is possible to fool yourself by inhaling more while smoking fewer cigarettes. This results in the same amount of nicotine in your body!    GET SUPPORT:  Support programs can make an important difference, especially for the heavy smoker. These groups offer lectures, methods to change your behavior and peer support. Call the free national Quitline for more information. 800-QUIT-NOW (913-539-6793). Low-cost or free programs are offered by many hospitals, local chapters of the American Lung Association (855-053-5000) and the American Cancer Society (649-807-0387). Support at home is important too. Non-smokers can help by offering praise and encouragement. If the smoker fails to quit, encourage them to try  again!    OVER-THE-COUNTER MEDICINES:  For those who can't quit on their own, Nicotine Replacement Therapy (NRT) may make quitting much easier. Certain aids such as the nicotine patch, gum and lozenge are available without a prescription. However, it is best to use these under the guidance of your doctor. The skin patch provides a steady supply of nicotine to the body. Nicotine gum and lozenge gives temporary bursts of low levels of nicotine. Both methods take the edge off the craving for cigarettes. WARNING: If you feel symptoms of nicotine overdose, such as nausea, vomiting, dizziness, weakness, or fast heartbeat, stop using these and see your doctor.    PRESCRIPTION MEDICINES:  After evaluating your smoking patterns and prior attempts at quitting, your doctor may offer a prescription medicine such as bupropion (Zyban, Wellbutrin), varenicline (Chantix, Champix), a niocotine inhaler or nasal spray. Each has its unique advantage and side effects which your doctor can review with you.    HEALTH BENEFITS OF QUITTING:  The benefits of quitting start right away and keep improving the longer you go without smokin minutes: blood pressure and pulse return to normal  8 hours: oxygen levels return to normal  2 days: ability to smell and taste begins to improve as damaged nerves start to regrow  2-3 weeks: circulation and lung function improves  1-9 months: decreased cough, congestion and shortness of breath; less tired  1 year: risk of heart attack decreases by half  5 years: risk of lung cancer decreases by half; risk of stroke becomes the same as a non-smoker  For information about how to quit smoking, visit the following links:  National Cancer [place] ,   Clearing the Air, Quit Smoking Today   - an online booklet. http://www.smokefree.gov/pubs/clearing_the_air.pdf  Smokefree.gov http://smokefree.gov/  QuitNet http://www.quitnet.com/    0205-6287 Saskia Landry, [address] [facility], [place] [address]. All  rights reserved. This information is not intended as a substitute for professional medical care. Always follow your healthcare professional's instructions.    The Benefits of Living Smoke Free  What do you want to gain from quitting? Check off some reasons to quit.  Health Benefits  ___ Reduce my risk of lung cancer, heart disease, chronic lung disease  ___ Have fewer wrinkles and softer skin  ___ Improve my sense of taste and smell  ___ For pregnant women--reduce the risk of having a miscarriage, stillbirth, premature birth, or low-birth-weight baby  Personal Benefits  ___ Feel more in control of my life  ___ Have better-smelling hair, breath, clothes, home, and car  ___ Save time by not having to take smoke breaks, buy cigarettes, or hunt for a light  ___ Have whiter teeth  Family Benefits  ___ Reduce my children s respiratory tract infections  ___ Set a good example for my children  ___ Reduce my family s cancer risk  Financial Benefits  ___ Save hundreds of dollars each year that would be spent on cigarettes  ___ Save money on medical bills  ___ Save on life, health, and car insurance premiums    Those Dollars Add Up!  Cigarettes are expensive, and getting more expensive all the time. Do you realize how much money you are spending on cigarettes per year? What is the average amount you spend on a pack of cigarettes? What is the average number of packs that you smoke per day? Using your answers to these questions, fill in this formula to help you find out:  ($ _____ per pack) ×  ( _____ number of packs per day) × (365 days) =  $ _____ yearly cost of smoking  Besides tobacco, there are other costs, including extra cleaning bills and replacement costs for clothing and furniture; medical expenses for smoking-related illnesses; and higher health, life, and car insurance premiums.    Cigars and Pipes Count Too!  Cigars and pipes are also dangerous. So are smokeless (chewing) tobacco and snuff. All of these products  contain nicotine, a highly addictive substance that has harmful effects on your body. Quitting smoking means giving up all tobacco products.      4278-8273 Saskia Landry, [address] [facility], [place] [address]. All rights reserved. This information is not intended as a substitute for professional medical care. Always follow your healthcare professional's instructions.    [facility]    If you have any questions regarding to your visit please contact your care team:     Team Pink:   Clinic Hours Telephone Number   Internal Medicine:  Dr. Lisset Ignacio, NP       7am-7pm  Monday - Thursday   7am-5pm  Fridays  [phone]  (Appointment scheduling available 24/7)    Questions about your visit?  Team Line  [phone]   Urgent Care - [place] and [place] - 11am-9pm Monday-Friday Saturday-Sunday- 9am-5pm   [place] - 5pm-9pm Monday-Friday Saturday-Sunday- 9am-5pm  [phone] - [place]   [phone] - [place]       What options do I have for visits at the clinic other than the traditional office visit?  To expand how we care for you, many of our providers are utilizing electronic visits (e-visits) and telephone visits, when medically appropriate, for interactions with their patients rather than a visit in the clinic.   We also offer nurse visits for many medical concerns. Just like any other service, we will bill your insurance company for this type of visit based on time spent on the phone with your provider. Not all insurance companies cover these visits. Please check with your medical insurance if this type of visit is covered. You will be responsible for any charges that are not paid by your insurance.      E-visits via [employer]:  generally incur a $35.00 fee.  Telephone visits:  Time spent on the phone: *charged based on time that is spent on the phone in increments of 10 minutes. Estimated cost:   5-10 mins $30.00   11-20 mins. $59.00  "  21-30 mins. $85.00   Use AtriCurehart (secure email communication and access to your chart) to send your primary care provider a message or make an appointment. Ask someone on your Team how to sign up for Chase[employer].    For a Price Quote for your services, please call our Consumer Price Line at [phone].    As always, Thank you for trusting us with your health care needs!    Discharged By:  YONY ROMAN            Follow-ups after your visit        Your next 10 appointments already scheduled     Feb 28, 2018  2:15 PM CST   Nurse Only with [facility]   [facility]    [address] [facility] [address] [phone]              Who to contact     If you have questions or need follow up information about today's clinic visit or your schedule please contact [facility] directly at [phone].  Normal or non-critical lab and imaging results will be communicated to you by AtriCurehart, letter or phone within 4 business days after the clinic has received the results. If you do not hear from us within 7 days, please contact the clinic through AtriCurehart or phone. If you have a critical or abnormal lab result, we will notify you by phone as soon as possible.  Submit refill requests through Vi[employer] or call your pharmacy and they will forward the refill request to us. Please allow 3 business days for your refill to be completed.          Additional Information About Your Visit        Viagogo Information     Viagogo lets you send messages to your doctor, view your test results, renew your prescriptions, schedule appointments and more. To sign up, go to [email or web address] . Click on \"Log in\" on the left side of the screen, which will take you to the Welcome page. Then click on \"Sign up Now\" on the right side of the page.     You will be asked to enter the access code listed below, as well as some personal information. Please follow the directions to create your username " and password.     Your access code is: [email or web address]  Expires: 2018  3:58 PM     Your access code will  in 90 days. If you need help or a new code, please call your [place] clinic or [phone].        Care EveryWhere ID     This is your Care EveryWhere ID. This could be used by other organizations to access your [place] medical records  [id number]        Your Vitals Were     Pulse Temperature Respirations Pulse Oximetry Breastfeeding? BMI (Body Mass Index)    56 97.8  F (36.6  C) (Oral) 18 100% No 21.04 kg/m2       Blood Pressure from Last 3 Encounters:   18 120/62   02/15/18 168/70   17 136/70    Weight from Last 3 Encounters:   18 121 lb 9.6 oz (55.2 kg)   02/15/18 123 lb 12.8 oz (56.2 kg)   17 124 lb (56.2 kg)              We Performed the Following     Tobacco Cessation - Order to Satisfy Health Maintenance          Where to get your medicines      These medications were sent to [place] [address] [place] [address]     Phone:  [phone]     metoprolol tartrate 50 MG tablet          Primary Care Provider Office Phone # Fax #    Gerber Jain [phone] [address] [facility] [address]        Equal Access to Services     [facility]: Hadii beverly elio Simon, waaxda luclarisa, qaybta kaalsofya banuelos aderoberta james. So [facility] [phone].    ATENCIÓN: Si habla español, tiene a healy disposición servicios gratuitos de asistencia lingüística. Llame al [phone].    We comply with applicable federal civil rights laws and [place] laws. We do not discriminate on the basis of race, color, national origin, age, disability, sex, sexual orientation, or gender identity.            Thank you!     Thank you for choosing [facility]  for your care. Our goal is always to provide you with excellent care. Hearing back from our patients is one  way we can continue to improve our services. Please take a few minutes to complete the written survey that you may receive in the mail after your visit with us. Thank you!             Your Updated Medication List - Protect others around you: Learn how to safely use, store and throw away your medicines at www.disposemymeds.org.          This list is accurate as of 2/27/18  2:27 PM.  Always use your most recent med list.                   Brand Name Dispense Instructions for use Diagnosis    aspirin 81 MG tablet      Take 81 mg by mouth daily        baclofen 10 MG tablet    LIORESAL    540 tablet    Take 2 tablets (20 mg) by mouth 3 times daily    Spasmodic torticollis, Fibromyalgia       cyanocobalamin 1000 MCG tablet    vitamin  B-12     1 tablet daily        gabapentin 300 MG capsule    NEURONTIN    540 capsule    1 capsule am, 1 at noon 2 at 5pm and 2 at bedtime ( 6 per day ) Not to be refilled until patient calls    Spasmodic torticollis, Fibromyalgia, Restless leg syndrome       HYDROcodone-acetaminophen 7.5-325 MG per tablet    NORCO    180 tablet    Take 2 tablets by mouth 3 times daily 28 days or more between refills for controlled medications    Spasmodic torticollis, Fibromyalgia       metoprolol tartrate 50 MG tablet    LOPRESSOR    180 tablet    Take 1 tablet (50 mg) by mouth 2 times daily    NSTEMI (non-ST elevated myocardial infarction) (H), Hypertension goal BP (blood pressure) < 140/90       nitroGLYcerin 0.4 MG sublingual tablet    NITROSTAT     Place 0.4 mg under the tongue every 5 minutes as needed for chest pain        polyethylene glycol powder    MIRALAX/GLYCOLAX     Take 17 g by mouth daily Reported on 5/15/2017        rosuvastatin 10 MG tablet    CRESTOR    90 tablet    TAKE 1 TABLET (10 MG) BY MOUTH DAILY    Hyperlipidemia with target LDL less than 100       traZODone 100 MG tablet    DESYREL    360 tablet    TAKE FOUR TABLETS NIGHTLY AT BEDTIME    Fibromyalgia       vitamin D 2000 UNITS  tablet      Take 1 tablet by mouth daily    Hypovitaminosis D

## 2018-02-27 NOTE — PATIENT INSTRUCTIONS
HOW TO QUIT SMOKING  Smoking is one of the hardest habits to break. About half of all those who have ever smoked have been able to quit, and most of those (about 70%) who still smoke want to quit. Here are some of the best ways to stop smoking.     KEEP TRYING:  It takes most smokers about 8 tries before they are finally able to fully quit. So, the more often you try and fail, the better your chance of quitting the next time! So, don't give up!    GO COLD TURKEY:  Most ex-smokers quit cold turkey. Trying to cut back gradually doesn't seem to work as well, perhaps because it continues the smoking habit. Also, it is possible to fool yourself by inhaling more while smoking fewer cigarettes. This results in the same amount of nicotine in your body!    GET SUPPORT:  Support programs can make an important difference, especially for the heavy smoker. These groups offer lectures, methods to change your behavior and peer support. Call the free national Quitline for more information. 800-QUIT-NOW (605-017-4301). Low-cost or free programs are offered by many hospitals, local chapters of the American Lung Association (779-783-3720) and the American Cancer Society (922-259-4737). Support at home is important too. Non-smokers can help by offering praise and encouragement. If the smoker fails to quit, encourage them to try again!    OVER-THE-COUNTER MEDICINES:  For those who can't quit on their own, Nicotine Replacement Therapy (NRT) may make quitting much easier. Certain aids such as the nicotine patch, gum and lozenge are available without a prescription. However, it is best to use these under the guidance of your doctor. The skin patch provides a steady supply of nicotine to the body. Nicotine gum and lozenge gives temporary bursts of low levels of nicotine. Both methods take the edge off the craving for cigarettes. WARNING: If you feel symptoms of nicotine overdose, such as nausea, vomiting, dizziness, weakness, or fast  heartbeat, stop using these and see your doctor.    PRESCRIPTION MEDICINES:  After evaluating your smoking patterns and prior attempts at quitting, your doctor may offer a prescription medicine such as bupropion (Zyban, Wellbutrin), varenicline (Chantix, Champix), a niocotine inhaler or nasal spray. Each has its unique advantage and side effects which your doctor can review with you.    HEALTH BENEFITS OF QUITTING:  The benefits of quitting start right away and keep improving the longer you go without smokin minutes: blood pressure and pulse return to normal  8 hours: oxygen levels return to normal  2 days: ability to smell and taste begins to improve as damaged nerves start to regrow  2-3 weeks: circulation and lung function improves  1-9 months: decreased cough, congestion and shortness of breath; less tired  1 year: risk of heart attack decreases by half  5 years: risk of lung cancer decreases by half; risk of stroke becomes the same as a non-smoker  For information about how to quit smoking, visit the following links:  National Cancer [place] ,   Clearing the Air, Quit Smoking Today   - an online booklet. http://www.smokefree.gov/pubs/clearing_the_air.pdf  Smokefree.gov http://smokefree.gov/  QuitNet http://www.quitnet.com/    7840-6791 Saskia Landry, [address] [place] [address], [place] [address]. All rights reserved. This information is not intended as a substitute for professional medical care. Always follow your healthcare professional's instructions.    The Benefits of Living Smoke Free  What do you want to gain from quitting? Check off some reasons to quit.  Health Benefits  ___ Reduce my risk of lung cancer, heart disease, chronic lung disease  ___ Have fewer wrinkles and softer skin  ___ Improve my sense of taste and smell  ___ For pregnant women--reduce the risk of having a miscarriage, stillbirth, premature birth, or low-birth-weight baby  Personal Benefits  ___ Feel more in control of my  life  ___ Have better-smelling hair, breath, clothes, home, and car  ___ Save time by not having to take smoke breaks, buy cigarettes, or hunt for a light  ___ Have whiter teeth  Family Benefits  ___ Reduce my children s respiratory tract infections  ___ Set a good example for my children  ___ Reduce my family s cancer risk  Financial Benefits  ___ Save hundreds of dollars each year that would be spent on cigarettes  ___ Save money on medical bills  ___ Save on life, health, and car insurance premiums    Those Dollars Add Up!  Cigarettes are expensive, and getting more expensive all the time. Do you realize how much money you are spending on cigarettes per year? What is the average amount you spend on a pack of cigarettes? What is the average number of packs that you smoke per day? Using your answers to these questions, fill in this formula to help you find out:  ($ _____ per pack) ×  ( _____ number of packs per day) × (365 days) =  $ _____ yearly cost of smoking  Besides tobacco, there are other costs, including extra cleaning bills and replacement costs for clothing and furniture; medical expenses for smoking-related illnesses; and higher health, life, and car insurance premiums.    Cigars and Pipes Count Too!  Cigars and pipes are also dangerous. So are smokeless (chewing) tobacco and snuff. All of these products contain nicotine, a highly addictive substance that has harmful effects on your body. Quitting smoking means giving up all tobacco products.      0064-8667 [address], [place] [address]. All rights reserved. This information is not intended as a substitute for professional medical care. Always follow your healthcare professional's instructions.    [facility]    If you have any questions regarding to your visit please contact your care team:     Team Pink:   Clinic Hours Telephone Number   Internal Medicine:  Dr. Lisset Ignacio,  NP       7am-7pm  Monday - Thursday   7am-5pm  Fridays  [phone]  (Appointment scheduling available 24/7)    Questions about your visit?  Team Line  [phone]   Urgent Care - [place] - 11am-9pm Monday-Friday Saturday-Sunday- 9am-5pm   [place] - 5pm-9pm Monday-Friday Saturday-Sunday- 9am-5pm  [phone] - [place]   [phone] - [place]       What options do I have for visits at the clinic other than the traditional office visit?  To expand how we care for you, many of our providers are utilizing electronic visits (e-visits) and telephone visits, when medically appropriate, for interactions with their patients rather than a visit in the clinic.   We also offer nurse visits for many medical concerns. Just like any other service, we will bill your insurance company for this type of visit based on time spent on the phone with your provider. Not all insurance companies cover these visits. Please check with your medical insurance if this type of visit is covered. You will be responsible for any charges that are not paid by your insurance.      E-visits via [employer]:  generally incur a $35.00 fee.  Telephone visits:  Time spent on the phone: *charged based on time that is spent on the phone in increments of 10 minutes. Estimated cost:   5-10 mins $30.00   11-20 mins. $59.00   21-30 mins. $85.00   Use [employer] (secure email communication and access to your chart) to send your primary care provider a message or make an appointment. Ask someone on your Team how to sign up for [employer].    For a Price Quote for your services, please call our Consumer Price Line at [phone].    As always, Thank you for trusting us with your health care needs!    Discharged By:  YONY ROMAN

## 2018-02-27 NOTE — NURSING NOTE
"Chief Complaint   Patient presents with     Other     Discuss accupuncture/steriod injection     Health Maintenance     PHQ-9, TRUNG-7, Flu Shot, FIT, Urine, ADP      Hypertension     BP follow-up       Initial /62 (BP Location: Left arm, Cuff Size: Adult Regular)  Pulse 56  Temp 97.8  F (36.6  C) (Oral)  Resp 18  Wt 121 lb 9.6 oz (55.2 kg)  SpO2 100%  Breastfeeding? No  BMI 21.04 kg/m2 Estimated body mass index is 21.04 kg/(m^2) as calculated from the following:    Height as of 2/15/18: 5' 3.75\" (1.619 m).    Weight as of this encounter: 121 lb 9.6 oz (55.2 kg).  Medication Reconciliation: complete     Violette Mccullough CMA    "

## 2018-02-27 NOTE — PROGRESS NOTES
SUBJECTIVE:   Carina Calvert is a 67 year old female who presents to clinic today for the following health issues:      Chronic Neck Pain    She would like get an appointment again with Dr. Jamila Stoll for an injection. He diagnosed her with cervical facet joint syndrome. The injection she had in October 2017 was helpful and relieved her pain for some time, but she only had the right side done. She did have a scare at this appointment when he grabbed onto her neck and almost fell on top of her and she fell on the ground. She would still like to see Dr. Jamila Stoll again and have both sides injected.       Hypertension Follow-up    Her blood pressure is doing much better today. She states that she took her pain medication before coming today, and this is probably why her blood pressure is much lower.     BP Readings from Last 3 Encounters:   02/27/18 120/62   02/15/18 168/70   11/14/17 136/70         Problem list and histories reviewed & adjusted, as indicated.  Additional history: as documented    Patient Active Problem List   Diagnosis     Neuropathy     Fibromyalgia     Restless leg syndrome     Spasmodic torticollis     Insomnia     Hyperlipidemia with target LDL less than 100     History of hepatitis C     Tobacco abuse     Recurrent cold sores     Osteopenia     Migraine headache     Positive occult stool blood test     Subclinical hyperthyroidism     Hypovitaminosis D     Cramp of limb     Opioid use agreement exists     NSTEMI (non-ST elevated myocardial infarction) (H)     Chronic pain     Hypertension goal BP (blood pressure) < 140/90     Cervical facet joint syndrome     Past Surgical History:   Procedure Laterality Date     HERNIA REPAIR  9/2009    ventral     LITHOTRIPSY      2 x      TONSILLECTOMY       TUBAL LIGATION         Social History   Substance Use Topics     Smoking status: Current Every Day Smoker     Packs/day: 0.20     Years: 15.00     Types: Cigarettes     Last attempt to quit: 3/31/2013      Smokeless tobacco: Never Used     Alcohol use No     Family History   Problem Relation Age of Onset     Neurologic Disorder Mother 65     Parkinsons     Alcohol/Drug Mother      CEREBROVASCULAR DISEASE Mother      Respiratory Father      COPD     Alcohol/Drug Father      CANCER Brother      DIABETES Sister 52     CANCER Maternal Aunt      CANCER Maternal Uncle      C.A.D. No family hx of          Current Outpatient Prescriptions   Medication Sig Dispense Refill     gabapentin (NEURONTIN) 300 MG capsule 1 capsule am, 1 at noon 2 at 5pm and 2 at bedtime ( 6 per day ) Not to be refilled until patient calls 540 capsule 3     HYDROcodone-acetaminophen (NORCO) 7.5-325 MG per tablet Take 2 tablets by mouth 3 times daily 28 days or more between refills for controlled medications 180 tablet 0     rosuvastatin (CRESTOR) 10 MG tablet TAKE 1 TABLET (10 MG) BY MOUTH DAILY 90 tablet 3     metoprolol (LOPRESSOR) 50 MG tablet Take 1 tablet (50 mg) by mouth 2 times daily 180 tablet 1     baclofen (LIORESAL) 10 MG tablet Take 2 tablets (20 mg) by mouth 3 times daily 540 tablet 3     traZODone (DESYREL) 100 MG tablet TAKE FOUR TABLETS NIGHTLY AT BEDTIME 360 tablet 3     aspirin 81 MG tablet Take 81 mg by mouth daily       nitroglycerin (NITROSTAT) 0.4 MG SL tablet Place 0.4 mg under the tongue every 5 minutes as needed for chest pain       polyethylene glycol (MIRALAX/GLYCOLAX) powder Take 17 g by mouth daily Reported on 5/15/2017       Cholecalciferol (VITAMIN D) 2000 UNITS tablet Take 1 tablet by mouth daily       VITAMIN B-12 1000 MCG OR TABS 1 tablet daily       Allergies   Allergen Reactions     Droperidol      Ivp Dye [Contrast Dye]        Reviewed and updated as needed this visit by clinical staff  Tobacco  Allergies  Meds  Med Hx  Surg Hx  Fam Hx  Soc Hx      Reviewed and updated as needed this visit by Provider         ROS:  Constitutional, HEENT, cardiovascular, pulmonary, GI, , musculoskeletal, neuro, skin,  endocrine and psych systems are negative, except as otherwise noted.    This document serves as a record of the services and decisions personally performed and made by Gerber Jain MD. It was created on his behalf by Chantale Hawley, a trained medical scribe. The creation of this document is based on the provider's statements to the medical scribe.  Chantale Hawley February 27, 2018 2:23 PM      OBJECTIVE:     /62 (BP Location: Left arm, Cuff Size: Adult Regular)  Pulse 56  Temp 97.8  F (36.6  C) (Oral)  Resp 18  Wt 55.2 kg (121 lb 9.6 oz)  SpO2 100%  Breastfeeding? No  BMI 21.04 kg/m2  Body mass index is 21.04 kg/(m^2).  GENERAL: healthy, alert and no distress, appears well groomed, stated age, and appropriate  MS: no gross musculoskeletal defects noted, no edema, has taut and tense neck musculature with especially tense splenius capitus and splenius cervicis bilateral   SKIN: no suspicious lesions or rashes to visible skin  NEURO: mentation intact and speech normal  PSYCH: mentation appears normal, affect normal/bright    Diagnostic Test Results:  No results found for this or any previous visit (from the past 24 hour(s)).    ASSESSMENT/PLAN:     (M12.88) Cervical facet joint syndrome  (primary encounter diagnosis)  Comment: patient has a very straightforwards request. She wants my help in getting set up with Dr. Jamila Stoll, a chronic pain management specialist who gave her a steroid injection into the neck last November. Patient really wants this repeated prior to leaving for her birthday trip to [place]   Plan: gave patient the telephone number for the nurse for [facility] and will have patient seen for steroid injection. I can place a further referral order if necessary     (Z12.11) Screen for colon cancer  Comment: declines   Plan:     (Z23) Need for prophylactic vaccination and inoculation against influenza  Comment: declines   Plan:     (I21.4) NSTEMI (non-ST elevated myocardial  infarction) (H)  Comment: refills provided . Doing well !  Plan: metoprolol tartrate (LOPRESSOR) 50 MG tablet            (I10) Hypertension goal BP (blood pressure) < 140/90  Comment: controlled within acceptable limits   Plan: metoprolol tartrate (LOPRESSOR) 50 MG tablet            (Z72.0) Tobacco abuse  Comment: not interested to quit smoking   Plan: Tobacco Cessation - Order to Satisfy Health         Maintenance              The information in this document, created by the medical scribe for me, accurately reflects the services I personally performed and the decisions made by me. I have reviewed and approved this document for accuracy.   MD Gerber Dixon MD  [facility]

## 2018-05-08 NOTE — PROGRESS NOTES
SUBJECTIVE:   Carina Calvert is a 68 year old female who presents to clinic today for the following health issues:      Sleep - Trazodone not working as well for sleep as it used to. She started noticing this about 3 months ago. One week ago, she started taking 1 tablet or sometimes 2 Tylenol PM.     Pain medication - Taking extra strength Tylenol with Vicodin during the day. She takes 6 tablets of Vicodin during the day. If she takes 2 Vicodin, she will take 1 extra strength Tylenol. If she only takes 1 Vicodin, she will take 2 Tylenol with gabapentin. Does not think she can afford medical cannabis. This patient has had a very longterm situation with chronic pain medication for her neck pain, we are out towards 20 years of treatment     Additional notes:  Walks for exercise      Problem list and histories reviewed & adjusted, as indicated.  Additional history: as documented    Patient Active Problem List   Diagnosis     Neuropathy     Fibromyalgia     Restless leg syndrome     Spasmodic torticollis     Insomnia     Hyperlipidemia with target LDL less than 100     History of hepatitis C     Tobacco abuse     Recurrent cold sores     Osteopenia     Migraine headache     Positive occult stool blood test     Subclinical hyperthyroidism     Hypovitaminosis D     Cramp of limb     Opioid use agreement exists     NSTEMI (non-ST elevated myocardial infarction) (H)     Chronic pain     Hypertension goal BP (blood pressure) < 140/90     Cervical facet joint syndrome     Past Surgical History:   Procedure Laterality Date     HERNIA REPAIR  9/2009    ventral     LITHOTRIPSY      2 x      TONSILLECTOMY       TUBAL LIGATION         Social History   Substance Use Topics     Smoking status: Current Every Day Smoker     Packs/day: 0.20     Years: 15.00     Types: Cigarettes     Last attempt to quit: 3/31/2013     Smokeless tobacco: Never Used     Alcohol use No     Family History   Problem Relation Age of Onset     Neurologic  Disorder Mother 65     Parkinsons     Alcohol/Drug Mother      CEREBROVASCULAR DISEASE Mother      Respiratory Father      COPD     Alcohol/Drug Father      CANCER Brother      DIABETES Sister 52     CANCER Maternal Aunt      CANCER Maternal Uncle      C.A.D. No family hx of          Current Outpatient Prescriptions   Medication Sig Dispense Refill     aspirin 81 MG tablet Take 81 mg by mouth daily       baclofen (LIORESAL) 10 MG tablet Take 2 tablets (20 mg) by mouth 3 times daily 540 tablet 3     Cholecalciferol (VITAMIN D) 2000 UNITS tablet Take 1 tablet by mouth daily       gabapentin (NEURONTIN) 300 MG capsule 1 capsule am, 1 at noon 2 at 5pm and 2 at bedtime ( 6 per day ) Not to be refilled until patient calls 540 capsule 3     HYDROcodone-acetaminophen (NORCO) 7.5-325 MG per tablet Take 2 tablets by mouth 3 times daily 28 days or more between refills for controlled medications 180 tablet 0     metoprolol tartrate (LOPRESSOR) 50 MG tablet Take 1 tablet (50 mg) by mouth 2 times daily 180 tablet 1     nitroglycerin (NITROSTAT) 0.4 MG SL tablet Place 0.4 mg under the tongue every 5 minutes as needed for chest pain       polyethylene glycol (MIRALAX/GLYCOLAX) powder Take 17 g by mouth daily Reported on 5/15/2017       rosuvastatin (CRESTOR) 10 MG tablet TAKE 1 TABLET (10 MG) BY MOUTH DAILY 90 tablet 3     traZODone (DESYREL) 100 MG tablet TAKE FOUR TABLETS NIGHTLY AT BEDTIME 360 tablet 3     VITAMIN B-12 1000 MCG OR TABS 1 tablet daily       Labs reviewed in EPIC    Reviewed and updated as needed this visit by clinical staff  Tobacco  Allergies  Meds  Med Hx  Surg Hx  Fam Hx  Soc Hx      Reviewed and updated as needed this visit by Provider         ROS:  Constitutional, HEENT, cardiovascular, pulmonary, GI, , musculoskeletal, neuro, skin, endocrine and psych systems are negative, except as otherwise noted.    This document serves as a record of the services and decisions personally performed and made by  Gerber Jain MD. It was created on his/her behalf by Rommel Pepper, trained medical scribe. The creation of this document is based the provider's statements to the medical scribes.    Maryluibsolomon Pepper 2:51 PM, May 10, 2018  OBJECTIVE:   /68  Pulse 50  Temp 97.7  F (36.5  C) (Oral)  Resp 14  Wt 55.3 kg (122 lb)  SpO2 98%  BMI 21.11 kg/m2  Body mass index is 21.11 kg/(m^2).  GENERAL: healthy, alert and no distress  RESP: lungs clear to auscultation - no rales, rhonchi or wheezes  CV: regular rate and rhythm, normal S1 S2, no S3 or S4, no murmur, click or rub, no peripheral edema and peripheral pulses strong  NEURO: Normal strength and tone, mentation intact and speech normal  PSYCH: mentation appears normal, affect normal/bright    Diagnostic Test Results:  No orders of the defined types were placed in this encounter.        ASSESSMENT/PLAN:   (Z12.11) Screen for colon cancer  (primary encounter diagnosis)  Comment: declines   Plan: as above     (G24.3) Spasmodic torticollis  Comment: continue current plan of care     Plan: baclofen (LIORESAL) 10 MG tablet, gabapentin         (NEURONTIN) 300 MG capsule,         HYDROcodone-acetaminophen (NORCO) 7.5-325 MG         per tablet, DISCONTINUED:         HYDROcodone-acetaminophen (NORCO) 7.5-325 MG         per tablet, DISCONTINUED:         HYDROcodone-acetaminophen (NORCO) 7.5-325 MG         per tablet            (M79.7) Fibromyalgia  Comment: continue current plan of care     Plan: baclofen (LIORESAL) 10 MG tablet, traZODone         (DESYREL) 100 MG tablet, gabapentin (NEURONTIN)        300 MG capsule, HYDROcodone-acetaminophen         (NORCO) 7.5-325 MG per tablet, DISCONTINUED:         HYDROcodone-acetaminophen (NORCO) 7.5-325 MG         per tablet, DISCONTINUED:         HYDROcodone-acetaminophen (NORCO) 7.5-325 MG         per tablet            (E78.5) Hyperlipidemia with target LDL less than 100  Comment: refills provided   Plan: rosuvastatin (CRESTOR) 10 MG  tablet            (I21.4) NSTEMI (non-ST elevated myocardial infarction) (H)  Comment: refills provided   Plan: metoprolol tartrate (LOPRESSOR) 50 MG tablet,         nitroGLYcerin (NITROSTAT) 0.4 MG sublingual         tablet            (I10) Hypertension goal BP (blood pressure) < 140/90  Comment: refills provided   Plan: metoprolol tartrate (LOPRESSOR) 50 MG tablet            (G25.81) Restless leg syndrome  Comment: refills provided   Plan: gabapentin (NEURONTIN) 300 MG capsule             Patient Instructions  To help you with sleep, I recommend you switch from Tylenol PM to diphenhydramine 25mg. You can take 1-2 tablets diphenhydramine.       The information in this document, created by the medical scribe, Rommel Pepper, for me, accurately reflects the services I personally performed and the decisions made by me. I have reviewed and approved this document for accuracy prior to leaving the patient care area.    Gerber Jain MD  [facility]

## 2018-05-10 ENCOUNTER — OFFICE VISIT (OUTPATIENT)
Dept: FAMILY MEDICINE | Facility: CLINIC | Age: 68
End: 2018-05-10
Payer: MEDICARE

## 2018-05-10 VITALS
DIASTOLIC BLOOD PRESSURE: 68 MMHG | TEMPERATURE: 97.7 F | WEIGHT: 122 LBS | BODY MASS INDEX: 21.11 KG/M2 | OXYGEN SATURATION: 98 % | SYSTOLIC BLOOD PRESSURE: 124 MMHG | RESPIRATION RATE: 14 BRPM | HEART RATE: 50 BPM

## 2018-05-10 DIAGNOSIS — I10 HYPERTENSION GOAL BP (BLOOD PRESSURE) < 140/90: ICD-10-CM

## 2018-05-10 DIAGNOSIS — E78.5 HYPERLIPIDEMIA WITH TARGET LDL LESS THAN 100: ICD-10-CM

## 2018-05-10 DIAGNOSIS — Z12.11 SCREEN FOR COLON CANCER: Primary | ICD-10-CM

## 2018-05-10 DIAGNOSIS — I21.4 NSTEMI (NON-ST ELEVATED MYOCARDIAL INFARCTION) (H): ICD-10-CM

## 2018-05-10 DIAGNOSIS — G25.81 RESTLESS LEG SYNDROME: ICD-10-CM

## 2018-05-10 DIAGNOSIS — G24.3 SPASMODIC TORTICOLLIS: ICD-10-CM

## 2018-05-10 DIAGNOSIS — M79.7 FIBROMYALGIA: ICD-10-CM

## 2018-05-10 PROCEDURE — 99214 OFFICE O/P EST MOD 30 MIN: CPT | Performed by: INTERNAL MEDICINE

## 2018-05-10 RX ORDER — TRAZODONE HYDROCHLORIDE 100 MG/1
TABLET ORAL
Qty: 360 TABLET | Refills: 3 | Status: SHIPPED | OUTPATIENT
Start: 2018-05-10 | End: 2019-02-22

## 2018-05-10 RX ORDER — HYDROCODONE BITARTRATE AND ACETAMINOPHEN 7.5; 325 MG/1; MG/1
2 TABLET ORAL 3 TIMES DAILY
Qty: 180 TABLET | Refills: 0 | Status: SHIPPED | OUTPATIENT
Start: 2018-05-10 | End: 2018-05-10

## 2018-05-10 RX ORDER — ROSUVASTATIN CALCIUM 10 MG/1
TABLET, COATED ORAL
Qty: 90 TABLET | Refills: 3 | Status: SHIPPED | OUTPATIENT
Start: 2018-05-10 | End: 2019-02-22

## 2018-05-10 RX ORDER — NITROGLYCERIN 0.4 MG/1
0.4 TABLET SUBLINGUAL EVERY 5 MIN PRN
Qty: 25 TABLET | Refills: 3 | Status: SHIPPED | OUTPATIENT
Start: 2018-05-10 | End: 2020-07-06

## 2018-05-10 RX ORDER — BACLOFEN 10 MG/1
20 TABLET ORAL 3 TIMES DAILY
Qty: 540 TABLET | Refills: 3 | Status: SHIPPED | OUTPATIENT
Start: 2018-05-10 | End: 2019-02-22

## 2018-05-10 RX ORDER — METOPROLOL TARTRATE 50 MG
50 TABLET ORAL 2 TIMES DAILY
Qty: 180 TABLET | Refills: 1 | Status: SHIPPED | OUTPATIENT
Start: 2018-05-10 | End: 2018-11-04

## 2018-05-10 RX ORDER — HYDROCODONE BITARTRATE AND ACETAMINOPHEN 7.5; 325 MG/1; MG/1
2 TABLET ORAL 3 TIMES DAILY
Qty: 180 TABLET | Refills: 0 | Status: SHIPPED | OUTPATIENT
Start: 2018-05-10 | End: 2018-08-06

## 2018-05-10 RX ORDER — GABAPENTIN 300 MG/1
CAPSULE ORAL
Qty: 540 CAPSULE | Refills: 3 | Status: SHIPPED | OUTPATIENT
Start: 2018-05-10 | End: 2019-02-22

## 2018-05-10 ASSESSMENT — ANXIETY QUESTIONNAIRES
7. FEELING AFRAID AS IF SOMETHING AWFUL MIGHT HAPPEN: NOT AT ALL
2. NOT BEING ABLE TO STOP OR CONTROL WORRYING: NOT AT ALL
GAD7 TOTAL SCORE: 0
5. BEING SO RESTLESS THAT IT IS HARD TO SIT STILL: NOT AT ALL
IF YOU CHECKED OFF ANY PROBLEMS ON THIS QUESTIONNAIRE, HOW DIFFICULT HAVE THESE PROBLEMS MADE IT FOR YOU TO DO YOUR WORK, TAKE CARE OF THINGS AT HOME, OR GET ALONG WITH OTHER PEOPLE: NOT DIFFICULT AT ALL
1. FEELING NERVOUS, ANXIOUS, OR ON EDGE: NOT AT ALL
6. BECOMING EASILY ANNOYED OR IRRITABLE: NOT AT ALL
3. WORRYING TOO MUCH ABOUT DIFFERENT THINGS: NOT AT ALL

## 2018-05-10 ASSESSMENT — PATIENT HEALTH QUESTIONNAIRE - PHQ9: 5. POOR APPETITE OR OVEREATING: NOT AT ALL

## 2018-05-10 NOTE — MR AVS SNAPSHOT
After Visit Summary   5/10/2018    Carina Calvert    MRN: [id number]           Patient Information     Date Of Birth          1950        Visit Information        Provider Department      5/10/2018 2:50 PM Gerber Jain MD [facility]        Today's Diagnoses     Screen for colon cancer    -  1    Spasmodic torticollis        Fibromyalgia        Hyperlipidemia with target LDL less than 100        NSTEMI (non-ST elevated myocardial infarction) (H)        Hypertension goal BP (blood pressure) < 140/90        Restless leg syndrome          Care Instructions    To help you with sleep, I recommend you switch from Tylenol PM to diphenhydramine 25mg. You can take 1-2 tablets diphenhydramine.       [facility]    If you have any questions regarding to your visit please contact your care team:     Team Pink:   Clinic Hours Telephone Number   Internal Medicine:  Dr. Lisset Ignacio NP       7am-7pm  Monday - Thursday   7am-5pm  Fridays  [phone]  (Appointment scheduling available 24/7)    Questions about your recent visit?  Team Line  [phone]   Urgent Care - [place] and [facility] - 11am-9pm Monday-Friday Saturday-Sunday- 9am-5pm   [place] - 5pm-9pm Monday-Friday Saturday-Sunday- 9am-5pm  [phone] - Emily Vaz  [phone] - [place]       What options do I have for a visit other than an office visit? We offer electronic visits (e-visits) and telephone visits, when medically appropriate.  Please check with your medical insurance to see if these types of visits are covered, as you will be responsible for any charges that are not paid by your insurance.      You can use [employer] (secure electronic communication) to access to your chart, send your primary care provider a message, or make an appointment. Ask a team member how to get started.     For a price quote for your services, please call our Consumer Price  "Line at [phone] or our Imaging Cost estimation line at [phone] (for imaging tests).  Yadira ANDERSON CMA ([facility])            Follow-ups after your visit        Who to contact     If you have questions or need follow up information about today's clinic visit or your schedule please contact [facility] directly at [phone].  Normal or non-critical lab and imaging results will be communicated to you by MyChart, letter or phone within 4 business days after the clinic has received the results. If you do not hear from us within 7 days, please contact the clinic through [employer] or phone. If you have a critical or abnormal lab result, we will notify you by phone as soon as possible.  Submit refill requests through [employer] or call your pharmacy and they will forward the refill request to us. Please allow 3 business days for your refill to be completed.          Additional Information About Your Visit        [employer] Information     [employer] lets you send messages to your doctor, view your test results, renew your prescriptions, schedule appointments and more. To sign up, go to [email or web address] . Click on \"Log in\" on the left side of the screen, which will take you to the Welcome page. Then click on \"Sign up Now\" on the right side of the page.     You will be asked to enter the access code listed below, as well as some personal information. Please follow the directions to create your username and password.     Your access code is: [email or web address]  Expires: 2018  4:58 PM     Your access code will  in 90 days. If you need help or a new code, please call your [place] clinic or [phone].        Care EveryWhere ID     This is your Care EveryWhere ID. This could be used by other organizations to access your [place] medical records  [id number]        Your Vitals Were     Pulse Temperature Respirations Pulse Oximetry BMI (Body Mass Index)       50 97.7  F (36.5  C) (Oral) 14 98% 21.11 kg/m2     "    Blood Pressure from Last 3 Encounters:   05/10/18 124/68   02/27/18 120/62   02/15/18 168/70    Weight from Last 3 Encounters:   05/10/18 122 lb (55.3 kg)   02/27/18 121 lb 9.6 oz (55.2 kg)   02/15/18 123 lb 12.8 oz (56.2 kg)              Today, you had the following     No orders found for display         Today's Medication Changes          These changes are accurate as of 5/10/18  3:06 PM.  If you have any questions, ask your nurse or doctor.               Start taking these medicines.        Dose/Directions    HYDROcodone-acetaminophen 7.5-325 MG per tablet   Commonly known as:  NORCO   Used for:  Spasmodic torticollis, Fibromyalgia   Started by:  Gerber Jain MD        Dose:  2 tablet   Take 2 tablets by mouth 3 times daily 28 days or more between refills for controlled medications   Quantity:  180 tablet   Refills:  0            Where to get your medicines      These medications were sent to [place] [address] [place] [address]     Phone:  [phone]     baclofen 10 MG tablet    gabapentin 300 MG capsule    metoprolol tartrate 50 MG tablet    nitroGLYcerin 0.4 MG sublingual tablet    rosuvastatin 10 MG tablet    traZODone 100 MG tablet         Some of these will need a paper prescription and others can be bought over the counter.  Ask your nurse if you have questions.     Bring a paper prescription for each of these medications     HYDROcodone-acetaminophen 7.5-325 MG per tablet               Information about OPIOIDS     PRESCRIPTION OPIOIDS: WHAT YOU NEED TO KNOW   You have a prescription for an opioid (narcotic) pain medicine. Opioids can cause addiction. If you have a history of chemical dependency of any type, you are at a higher risk of becoming addicted to opioids. Only take this medicine after all other options have been tried. Take it for as short a time and as few doses as possible.     Do not:    Drive. If you drive while taking  these medicines, you could be arrested for driving under the influence (DUI).    Operate heavy machinery    Do any other dangerous activities while taking these medicines.     Drink any alcohol while taking these medicines.      Take with any other medicines that contain acetaminophen. Read all labels carefully. Look for the word  acetaminophen  or  Tylenol.  Ask your pharmacist if you have questions or are unsure.    Store your pills in a secure place, locked if possible. We will not replace any lost or stolen medicine. If you don t finish your medicine, please throw away (dispose) as directed by your pharmacist. The [place] Pollution [employer] has more information about safe disposal: https://www.pca.[facility].us/living-green/managing-unwanted-medications    All opioids tend to cause constipation. Drink plenty of water and eat foods that have a lot of fiber, such as fruits, vegetables, prune juice, apple juice and high-fiber cereal. Take a laxative (Miralax, milk of magnesia, Colace, Senna) if you don t move your bowels at least every other day.          Primary Care Provider Office Phone # Fax #    Gerber Jain [phone] [address] [facility] [address]        Equal Access to Services     NICHOLAS BILLY AH: Hadii beverly elio Soarpit, waaxda luqadaha, qaybta kaalmada aderobertayada, sofya james. So [facility] [phone].    ATENCIÓN: Si habla español, tiene a healy disposición servicios gratuitos de asistencia lingüística. Gary al [phone].    We comply with applicable federal civil rights laws and [place] laws. We do not discriminate on the basis of race, color, national origin, age, disability, sex, sexual orientation, or gender identity.            Thank you!     Thank you for choosing [facility]  for your care. Our goal is always to provide you with excellent care. Hearing back from our patients is one way we can continue to improve  our services. Please take a few minutes to complete the written survey that you may receive in the mail after your visit with us. Thank you!             Your Updated Medication List - Protect others around you: Learn how to safely use, store and throw away your medicines at www.disposemymeds.org.          This list is accurate as of 5/10/18  3:06 PM.  Always use your most recent med list.                   Brand Name Dispense Instructions for use Diagnosis    aspirin 81 MG tablet      Take 81 mg by mouth daily        baclofen 10 MG tablet    LIORESAL    540 tablet    Take 2 tablets (20 mg) by mouth 3 times daily    Spasmodic torticollis, Fibromyalgia       cyanocobalamin 1000 MCG tablet    vitamin  B-12     1 tablet daily        gabapentin 300 MG capsule    NEURONTIN    540 capsule    1 capsule am, 1 at noon 2 at 5pm and 2 at bedtime ( 6 per day ) Not to be refilled until patient calls    Spasmodic torticollis, Fibromyalgia, Restless leg syndrome       HYDROcodone-acetaminophen 7.5-325 MG per tablet    NORCO    180 tablet    Take 2 tablets by mouth 3 times daily 28 days or more between refills for controlled medications    Spasmodic torticollis, Fibromyalgia       metoprolol tartrate 50 MG tablet    LOPRESSOR    180 tablet    Take 1 tablet (50 mg) by mouth 2 times daily    NSTEMI (non-ST elevated myocardial infarction) (H), Hypertension goal BP (blood pressure) < 140/90       nitroGLYcerin 0.4 MG sublingual tablet    NITROSTAT    25 tablet    Place 1 tablet (0.4 mg) under the tongue every 5 minutes as needed for chest pain    NSTEMI (non-ST elevated myocardial infarction) (H)       polyethylene glycol powder    MIRALAX/GLYCOLAX     Take 17 g by mouth daily Reported on 5/15/2017        rosuvastatin 10 MG tablet    CRESTOR    90 tablet    TAKE 1 TABLET (10 MG) BY MOUTH DAILY    Hyperlipidemia with target LDL less than 100       traZODone 100 MG tablet    DESYREL    360 tablet    TAKE FOUR TABLETS NIGHTLY AT BEDTIME     Fibromyalgia       vitamin D 2000 units tablet      Take 1 tablet by mouth daily    Hypovitaminosis D

## 2018-05-10 NOTE — PATIENT INSTRUCTIONS
To help you with sleep, I recommend you switch from Tylenol PM to diphenhydramine 25mg. You can take 1-2 tablets diphenhydramine.       [facility]    If you have any questions regarding to your visit please contact your care team:     Team Pink:   Clinic Hours Telephone Number   Internal Medicine:  Dr. Lisset Ignacio, NP       7am-7pm  Monday - Thursday   7am-5pm  Fridays  [phone]  (Appointment scheduling available 24/7)    Questions about your recent visit?  Team Line  [phone]   Urgent Care - [facility] and [facility] - 11am-9pm Monday-Friday Saturday-Sunday- 9am-5pm   [place] - 5pm-9pm Monday-Friday Saturday-Sunday- 9am-5pm  [phone] - [facility]  [phone] - [place]       What options do I have for a visit other than an office visit? We offer electronic visits (e-visits) and telephone visits, when medically appropriate.  Please check with your medical insurance to see if these types of visits are covered, as you will be responsible for any charges that are not paid by your insurance.      You can use [employer] (secure electronic communication) to access to your chart, send your primary care provider a message, or make an appointment. Ask a team member how to get started.     For a price quote for your services, please call our Consumer Price Line at [phone] or our Imaging Cost estimation line at [phone] (for imaging tests).  Yadira ANDERSON CMA ([facility])

## 2018-05-11 ASSESSMENT — PATIENT HEALTH QUESTIONNAIRE - PHQ9: SUM OF ALL RESPONSES TO PHQ QUESTIONS 1-9: 3

## 2018-05-11 ASSESSMENT — ANXIETY QUESTIONNAIRES: GAD7 TOTAL SCORE: 0

## 2018-06-01 ENCOUNTER — TELEPHONE (OUTPATIENT)
Dept: PALLIATIVE MEDICINE | Facility: CLINIC | Age: 68
End: 2018-06-01

## 2018-06-01 NOTE — TELEPHONE ENCOUNTER
VM left today at: 11:13 am    Pt requesting a Cervical KARSON with Dr. Jamila Stoll.       [phone] (home)       Brea HARE    [facility] [place]

## 2018-06-01 NOTE — TELEPHONE ENCOUNTER
Called patient and advised that she was referred for injection only. Advised that this injection was completed in Oct 2017. If she would like another injection she should contact referring or PCP for new order. Acknowledged understanding.       Mattie CONNORN, RN Care Coordinator  [facility]

## 2018-06-04 ENCOUNTER — TELEPHONE (OUTPATIENT)
Dept: INTERNAL MEDICINE | Facility: CLINIC | Age: 68
End: 2018-06-04

## 2018-06-04 DIAGNOSIS — M54.2 CERVICALGIA: Primary | ICD-10-CM

## 2018-06-04 NOTE — TELEPHONE ENCOUNTER
Reason for Call:  Other call back    Detailed comments:  Patient calling. She needs a referral and order for neck injections. Please call when done.     Phone Number Patient can be reached at: Home number on file [phone] (home)    Best Time:  Any     Can we leave a detailed message on this number? YES    Call taken on 6/4/2018 at 9:23 AM by Ericka Allen

## 2018-06-04 NOTE — TELEPHONE ENCOUNTER
Please place orders as requested . Reroute if additional input requested from me or for signing of orders.     Gerber Jain MD

## 2018-06-04 NOTE — TELEPHONE ENCOUNTER
Referral for injection with the pain clinic placed.  They will contact patient but patient can call if she does not hear from them in within the next 24 business hours    Earline Watt RN

## 2018-06-05 ENCOUNTER — TELEPHONE (OUTPATIENT)
Dept: PALLIATIVE MEDICINE | Facility: CLINIC | Age: 68
End: 2018-06-05

## 2018-06-05 NOTE — TELEPHONE ENCOUNTER
Allergy noted    Violette CONNORN-RN Care Coordinator  [facility]

## 2018-06-05 NOTE — TELEPHONE ENCOUNTER
Pre-screening Questions for Radiology Injections:    Injection to be done at which interventional clinic site? [facility] [place]   [facility], instruct patient to arrive 30 minutes before the scheduled appointment time.     Procedure ordered by Dr. Jain    Procedure ordered? Cervical TBD    What insurance would patient like us to bill for this procedure? Medicare      Worker's comp or MVA (motor vehicle accident) -Any injection DO NOT SCHEDULE and route to Brea Keys.      [employer] - For SI joint injections, DO NOT SCHEDULE and route Neelam Cruz. [employer] NO PA REQUIRED EFFECTIVE 11/1/2017      [employer]- MBB's must be scheduled at LEAST two weeks apart      [employer] - Any injection besides hip/shoulder/knee joint DO NOT SCHEDULE and route to Neelam Cruz. She will obtain PA and call pt back to schedule procedure or notify pt of denial.       [employer]-Route to Neelam for review    Any chance of pregnancy? NO   If YES, do NOT schedule and route to RN pool    Is an  needed? No     Patient has a drive home? (mandatory) YES: INFORMED    Is patient taking any blood thinners (plavix, coumadin, jantoven, warfarin, heparin, pradaxa or dabigatran )? No   If hold needed, do NOT schedule, route to RN pool     Is patient taking any aspirin products? Yes - Pt takes 81mg daily; instructed to hold 6 day(s) prior to procedure.      If more than 325mg/day do NOT schedule; route to RN pool     For CERVICAL procedures, hold all aspirin products for 6 days.      Does the patient have a bleeding or clotting disorder? No     If YES, okay to schedule AND route to RN nurse pool    **For any patients with platelet count <100, must be forwarded to provider**    Is patient diabetic?  No  If YES, have them bring their glucometer.    Does patient have an active infection or treated for one within the past week? No     Is patient currently taking any antibiotics?  No     For  patients on chronic, preventative, or prophylactic antibiotics, procedures may be scheduled.     For patients on antibiotics for active or recent infection:    Marnie Hernandez Burton, Snitzer-antibiotic course must have been completed for 4 days    Is patient currently taking any steroid medications? (i.e. Prednisone, Medrol)  No     For patients on steroid medications:    Marnie Hernandez Burton, Snitzer-steroid course must have been completed for 4 days    Reviewed with patient:  If you are started on any steroids or antibiotics between now and your appointment, you must contact us because it may affect our ability to perform your procedure.  Yes    Is patient actively being treated for cancer or immunocompromised? No  If YES, do NOT schedule and route to RN pool     Are you able to get on and off an exam table with minimal or no assistance? Yes  If NO, do NOT schedule and route to RN pool    Are you able to roll over and lay on your stomach with minimal or no assistance? Yes  If NO, do NOT schedule and route to RN pool     Any allergies to contrast dye, iodine, shellfish, or numbing and steroid medications? Yes - Contrast Dye  If YES, route to RN pool AND add allergy information to appointment notes    Allergies: Droperidol and Ivp dye [contrast dye]      Has the patient had a flu shot or any other vaccinations within 7 days before or after the procedure.  No     Does patient have an MRI/CT?  YES: MRI  (SI joint, hip injections, lumbar sympathetic blocks, and stellate ganglion blocks do not require an MRI)    Was the MRI done w/in the last 3 years?  Yes    Was MRI done at [facility]? Yes      If not, where was it done? N/A       If MRI was not done at [facility], [facility] or [facility] do NOT schedule and route to nursing.  If pt has an imaging disc, the injection may be scheduled but pt has to bring disc to appt. If they show up w/out disc the injection cannot be done    Reminders (please tell  patient if applicable):       Instructed pt to arrive 30 minutes early for IV start if this is for a cervical procedure, ALL sympathetic (stellate ganglion, hypogastric, or lumbar sympathetic block) and all sedation procedures (RFA, spinal cord stimulation trials).  YES: INFORMED   -IVs are not routinely placed for Dr. Mcclure cervical cases   -Dr. Curran: IVs for cervical ESIs and cervical TBDs (not CMBBs/facet inj)      If NPO for sedation, informed patient that it is okay to take medications with sips of water (except if they are to hold blood thinners).  YES: INFORMED   *DO take blood pressure medication if it is prescribed*      If this is for a cervical KARSON, informed patient that aspirin needs to be held for 6 days.   YES: INFORMED      For all patients not having spinal cord stimulator (SCS) trials or radiofrequency ablations (RFAs), informed patient:    IV sedation is not provided for this procedure.  If you feel that an oral anti-anxiety medication is needed, you can discuss this further with your referring provider or primary care provider.  The Pain Clinic provider will discuss specifics of what the procedure includes at your appointment.  Most procedures last 10-20 minutes.  We use numbing medications to help with any discomfort during the procedure.  YES: INFORMED      Do not schedule procedures requiring IV placement in the first appointment of the day or first appointment after lunch. Do NOT schedule at 0745, 0815 or 1245. REVIEWED      For patients 85 or older we recommend having an adult stay w/ them for the remainder of the day.   N/A    Does the patient have any questions?  NO  Hannah Hamilton  [facility]

## 2018-06-11 ENCOUNTER — TELEPHONE (OUTPATIENT)
Dept: FAMILY MEDICINE | Facility: CLINIC | Age: 68
End: 2018-06-11

## 2018-06-11 NOTE — TELEPHONE ENCOUNTER
Panel Management Review      Patient has the following on her problem list: None      Composite cancer screening  Chart review shows that this patient is due/due soon for the following Mammogram and Colonoscopy  Summary:    Patient is due/failing the following:   COLONOSCOPY and MAMMOGRAM    Action needed:   Patient needs office visit for colon cancer and breast cancer screening.    Type of outreach:    Sent letter.    Questions for provider review:    None                                                                                                                                    Yadira ANDERSON CMA ([facility])       Chart routed to none .

## 2018-06-11 NOTE — LETTER
June 11, 2018          Carina Calvert,  [address]  [place] [address]        Dear Carina Calvert      Monitoring and managing your preventative and chronic health conditions are very important to us. Our records indicate that you have not scheduled for Mammogram and Colonoscopy  which was recommended by Dr. Jain.      If you have received your health care elsewhere, please call the clinic so the information can be documented in your chart.    Please call [phone] or message us through your [employer] account to schedule an appointment or provide information for your chart.     Feel free to contact us if you have any questions or concerns!    I look forward to seeing you and working with you on your health care needs.   Sincerely,         Gerber Jain / Yadira ANDERSON CMA ([facility])

## 2018-07-05 ENCOUNTER — RADIOLOGY INJECTION OFFICE VISIT (OUTPATIENT)
Dept: PALLIATIVE MEDICINE | Facility: CLINIC | Age: 68
End: 2018-07-05
Payer: MEDICARE

## 2018-07-05 ENCOUNTER — RADIANT APPOINTMENT (OUTPATIENT)
Dept: RADIOLOGY | Facility: CLINIC | Age: 68
End: 2018-07-05
Attending: PAIN MEDICINE
Payer: MEDICARE

## 2018-07-05 VITALS — SYSTOLIC BLOOD PRESSURE: 164 MMHG | HEART RATE: 49 BPM | OXYGEN SATURATION: 98 % | DIASTOLIC BLOOD PRESSURE: 77 MMHG

## 2018-07-05 DIAGNOSIS — M47.812 FACET ARTHROPATHY, CERVICAL: ICD-10-CM

## 2018-07-05 DIAGNOSIS — M47.812 CERVICAL SPONDYLOSIS WITHOUT MYELOPATHY: Primary | ICD-10-CM

## 2018-07-05 DIAGNOSIS — M54.12 CERVICAL RADICULOPATHY: ICD-10-CM

## 2018-07-05 PROCEDURE — 64491 INJ PARAVERT F JNT C/T 2 LEV: CPT | Mod: LT | Performed by: PAIN MEDICINE

## 2018-07-05 PROCEDURE — 64490 INJ PARAVERT F JNT C/T 1 LEV: CPT | Mod: LT | Performed by: PAIN MEDICINE

## 2018-07-05 RX ORDER — GADOBUTROL 604.72 MG/ML
5 INJECTION INTRAVENOUS ONCE
Status: COMPLETED | OUTPATIENT
Start: 2018-07-05 | End: 2018-07-05

## 2018-07-05 RX ADMIN — GADOBUTROL 1 ML: 604.72 INJECTION INTRAVENOUS at 15:00

## 2018-07-05 ASSESSMENT — PAIN SCALES - GENERAL
PAINLEVEL: EXTREME PAIN (8)
PAINLEVEL: EXTREME PAIN (8)

## 2018-07-05 NOTE — NURSING NOTE
Discharge Information    IV Discontiued Time:  1355 catheter intact    Amount of Fluid Infused:  Saline locked    Discharge Criteria = When patient returns to baseline or as per MD order    Consciousness:  Pt is fully awake    Circulation:  BP +/- 20% of pre-procedure level    Respiration:  Patient is able to breathe deeply    O2 Sat:  Patient is able to maintain O2 Sat >92% on room air    Activity:  Moves 4 extremities on command    Ambulation:  Patient is able to stand and walk or stand and pivot into wheelchair    Dressing:  Clean/dry or No Dressing    Notes:   Discharge instructions and AVS given to patient    Patient meets criteria for discharge?  YES    Admitted to PCU?  No    Responsible adult present to accompany patient home?  Yes    Signature/Title:    neva boyle RN Care Coordinator  [facility] [place]

## 2018-07-05 NOTE — MR AVS SNAPSHOT
After Visit Summary   7/5/2018    Carina Calvert    MRN: [id number]           Patient Information     Date Of Birth          1950        Visit Information        Provider Department      7/5/2018 1:15 PM [facility] MD [facility]        Today's Diagnoses     Cervical spondylosis without myelopathy    -  1      Care Instructions    [facility]   Procedure Discharge Instructions    Today you saw: Dr. Cecil Curran      You had an: cervical   facet joint injection      Medications used:  Lidocaine   Bupivacaine   Dexamethasone Omniscan           Be cautious when walking. Numbness and/or weakness in the lower extremities may occur for up to 6-8 hours after the procedure due to effect of the local anesthetic    Do not drive for 6 hours. The effect of the local anesthetic could slow your reflexes.     You may resume your regular activities after 24 hours    Avoid strenuous activity for the first 24 hours    You may shower, however avoid swimming, tub baths or hot tubs for 24 hours following your procedure    You may have a mild to moderate increase in pain for several days following the injection.    It may take up to 14 days for the steroid medication to start working although you may feel the effect as early as a few days after the procedure.       You may use ice packs for 10-15 minutes, 3 to 4 times a day at the injection site for comfort    Do not use heat to painful areas for 6 to 8 hours. This will give the local anesthetic time to wear off and prevent you from accidentally burning your skin.     You may use anti-inflammatory medications (such as Ibuprofen or Aleve or Advil) or Tylenol for pain control if necessary    If you were fasting, you may resume your normal diet and medications after the procedure    If you have diabetes, check your blood sugar more frequently than usual as your blood sugar may be higher than normal for 10-14 days  "following a steroid injection. Contact your doctor who manages your diabetes if your blood sugar is higher than usual    If you experience any of the following, call the Pain Clinic during work hours at [phone] or the Provider Line after hours at [phone]:  -Fever over 100 degree F  -Swelling, bleeding, redness, drainage, warmth at the injection site  -Progressive weakness or numbness in your legs or arms  -Loss of bowel or bladder function  -Unusual headache that is not relieved by Tylenol or other pain reliever  -Unusual new onset of pain that is not improving                Follow-ups after your visit        Who to contact     If you have questions or need follow up information about today's clinic visit or your schedule please contact [facility] directly at [phone].  Normal or non-critical lab and imaging results will be communicated to you by MyChart, letter or phone within 4 business days after the clinic has received the results. If you do not hear from us within 7 days, please contact the clinic through MyChart or phone. If you have a critical or abnormal lab result, we will notify you by phone as soon as possible.  Submit refill requests through [employer] or call your pharmacy and they will forward the refill request to us. Please allow 3 business days for your refill to be completed.          Additional Information About Your Visit        [employer] Information     [employer] lets you send messages to your doctor, view your test results, renew your prescriptions, schedule appointments and more. To sign up, go to [email or web address] . Click on \"Log in\" on the left side of the screen, which will take you to the Welcome page. Then click on \"Sign up Now\" on the right side of the page.     You will be asked to enter the access code listed below, as well as some personal information. Please follow the directions to create your username and password.     Your access code is: " [email or web address]  Expires: 10/3/2018  2:24 PM     Your access code will  in 90 days. If you need help or a new code, please call your [place] clinic or [phone].        Care EveryWhere ID     This is your Care EveryWhere ID. This could be used by other organizations to access your [place] medical records  [id number]        Your Vitals Were     Pulse Pulse Oximetry                49 98%           Blood Pressure from Last 3 Encounters:   18 164/77   05/10/18 124/68   18 120/62    Weight from Last 3 Encounters:   05/10/18 55.3 kg (122 lb)   18 55.2 kg (121 lb 9.6 oz)   02/15/18 56.2 kg (123 lb 12.8 oz)              We Performed the Following     NO CHARGE LOS        Primary Care Provider Office Phone # Fax #    Gerber Jain [phone]       [address] [facility] [address]        Equal Access to Services     [facility] : Hadii aad ku hadasho Soomaali, waaxda luqadaha, qaybta kaalmada adeegyada, waxay idiin haydahlian alfreda khroseann gardner . So [facility] [phone].    ATENCIÓN: Si habla español, tiene a healy disposición servicios gratuitos de asistencia lingüística. Llame al [phone].    We comply with applicable federal civil rights laws and [place] laws. We do not discriminate on the basis of race, color, national origin, age, disability, sex, sexual orientation, or gender identity.            Thank you!     Thank you for choosing [facility]  for your care. Our goal is always to provide you with excellent care. Hearing back from our patients is one way we can continue to improve our services. Please take a few minutes to complete the written survey that you may receive in the mail after your visit with us. Thank you!             Your Updated Medication List - Protect others around you: Learn how to safely use, store and throw away your medicines at www.disposemymeds.org.          This list is accurate as of 18  2:24 PM.  Always use your most recent  med list.                   Brand Name Dispense Instructions for use Diagnosis    aspirin 81 MG tablet      Take 81 mg by mouth daily        baclofen 10 MG tablet    LIORESAL    540 tablet    Take 2 tablets (20 mg) by mouth 3 times daily    Spasmodic torticollis, Fibromyalgia       cyanocobalamin 1000 MCG tablet    vitamin  B-12     1 tablet daily        gabapentin 300 MG capsule    NEURONTIN    540 capsule    1 capsule am, 1 at noon 2 at 5pm and 2 at bedtime ( 6 per day ) Not to be refilled until patient calls    Spasmodic torticollis, Fibromyalgia, Restless leg syndrome       HYDROcodone-acetaminophen 7.5-325 MG per tablet    NORCO    180 tablet    Take 2 tablets by mouth 3 times daily 28 days or more between refills for controlled medications    Spasmodic torticollis, Fibromyalgia       metoprolol tartrate 50 MG tablet    LOPRESSOR    180 tablet    Take 1 tablet (50 mg) by mouth 2 times daily    NSTEMI (non-ST elevated myocardial infarction) (H), Hypertension goal BP (blood pressure) < 140/90       nitroGLYcerin 0.4 MG sublingual tablet    NITROSTAT    25 tablet    Place 1 tablet (0.4 mg) under the tongue every 5 minutes as needed for chest pain    NSTEMI (non-ST elevated myocardial infarction) (H)       polyethylene glycol powder    MIRALAX/GLYCOLAX     Take 17 g by mouth daily Reported on 5/15/2017        rosuvastatin 10 MG tablet    CRESTOR    90 tablet    TAKE 1 TABLET (10 MG) BY MOUTH DAILY    Hyperlipidemia with target LDL less than 100       traZODone 100 MG tablet    DESYREL    360 tablet    TAKE FOUR TABLETS NIGHTLY AT BEDTIME    Fibromyalgia       vitamin D 2000 units tablet      Take 1 tablet by mouth daily    Hypovitaminosis D

## 2018-07-05 NOTE — PATIENT INSTRUCTIONS
[facility]   Procedure Discharge Instructions    Today you saw: Dr. Cecil Curran      You had an: cervical   facet joint injection      Medications used:  Lidocaine   Bupivacaine   Dexamethasone Omniscan           Be cautious when walking. Numbness and/or weakness in the lower extremities may occur for up to 6-8 hours after the procedure due to effect of the local anesthetic    Do not drive for 6 hours. The effect of the local anesthetic could slow your reflexes.     You may resume your regular activities after 24 hours    Avoid strenuous activity for the first 24 hours    You may shower, however avoid swimming, tub baths or hot tubs for 24 hours following your procedure    You may have a mild to moderate increase in pain for several days following the injection.    It may take up to 14 days for the steroid medication to start working although you may feel the effect as early as a few days after the procedure.       You may use ice packs for 10-15 minutes, 3 to 4 times a day at the injection site for comfort    Do not use heat to painful areas for 6 to 8 hours. This will give the local anesthetic time to wear off and prevent you from accidentally burning your skin.     You may use anti-inflammatory medications (such as Ibuprofen or Aleve or Advil) or Tylenol for pain control if necessary    If you were fasting, you may resume your normal diet and medications after the procedure    If you have diabetes, check your blood sugar more frequently than usual as your blood sugar may be higher than normal for 10-14 days following a steroid injection. Contact your doctor who manages your diabetes if your blood sugar is higher than usual    If you experience any of the following, call the Pain Clinic during work hours at [phone] or the Provider Line after hours at [phone]:  -Fever over 100 degree F  -Swelling, bleeding, redness, drainage, warmth at the injection site  -Progressive weakness or  numbness in your legs or arms  -Loss of bowel or bladder function  -Unusual headache that is not relieved by Tylenol or other pain reliever  -Unusual new onset of pain that is not improving

## 2018-07-05 NOTE — PROGRESS NOTES
Pre procedure Diagnosis: cervical facet arthropathy, cervical spondylosis   Post procedure Diagnosis: Same  Procedure performed: cervical facet joint injections at C4/5 and C5/6 on the Left fluoroscopically guided, contrast controlled  Indication:  Therapeutic for pain  Anesthesia: none  Complication:  none  Operators: Cecil Curran MD                                              Indications:   Carina Calvert is a 67 year old female was sent by Dr. Ignacio for cervical procedures.  The patient has a history of chronic neck pain associated with occipital headache without significant radiation.  Exam shows tenderness to palpation along the bilateral cervical paraspinals and articular pillars and reproduction of pain with extension and lateral rotation of the cervical spine.  They have tried conservative treatment including physical therapy and oral medications with minimal relief . Of note the pt pain is worse on L>R. Prior to the pain was R>L. The pt notes sig relief w/ prior facet joint injection in the past.     MRI CERVICAL SPINE WITHOUT CONTRAST  8/23/2017 1:38 PM     FINDINGS: Normal cervical lordosis. Anterior posterior alignment of  the spine is within normal limits. Vertebral body height is maintained  without evidence of fracture. There are no destructive osseous lesions  in the cervical spine. Partially visualized STIR hyperintense ovoid  lesion within the T3 vertebral body.      Mild loss of intervertebral disc space with disc dehydration and  associated degenerative endplate changes at C4-C5, C5-C6, and C6-C7.  No associated bone marrow edema.     The cervical spinal cord and visualized portions of the thoracic  spinal cord appear normal. The visualized portions of the brainstem  and cerebellum appear normal.     Level by level as follows:     C2-C3:  No significant spinal canal or neural foraminal narrowing.      C3-C4:  Bilateral facet hypertrophy without spinal canal or neural  foraminal  narrowing.      C4-C5:  Posterior disc osteophyte complex along with bilateral facet  hypertrophy without significant spinal canal or neural foraminal  narrowing.      C5-C6:  Posterior disc osteophyte complex and bilateral facet  hypertrophy without significant spinal canal or neural foraminal  narrowing.      C6-C7:  Posterior disc osteophyte complex abuts the ventral aspect of  the spinal cord and causes mild spinal canal narrowing. Bilateral  uncinate spurring and facet hypertrophy results in mild bilateral  neural foraminal narrowing.      C7-T1: No significant spinal canal or neural foraminal narrowing.      Paraspinous soft tissues are unremarkable.       IMPRESSION:  Multilevel degenerative changes in the cervical spine as  described above, most pronounced at C6-C7 where there is mild spinal  canal narrowing and mild bilateral neural foraminal narrowing.       Options/alternatives, benefits and risks were discussed with the patient including bleeding, infection, flared pain, tissue trauma, exposure to radiation, reaction to medications including seizure, spinal cord injury, paralysis, weakness, numbness and headache.     Questions were answered to his satisfaction and he wishes to proceed. Voluntary informed consent was obtained and signed.      Vitals were reviewed: Yes  /77  Pulse (!) 49  SpO2 98%  Allergies were reviewed:  Yes   Medications were reviewed:  Yes   Pre-procedure pain score: 8/10     Procedure:  After obtaining signed informed consent, the patient was brought into the procedure suite and was placed in a prone position on the [facility] Frame.   A Pause for the Cause was performed.  The patient was prepped and draped in the usual sterile fashion.      Under AP fluoroscopic guidance the C4/5 and C5/6 facet joints on the Left side were identified, and the C-arm was rotated caudally to open the joint spaces. Then, Lidocaine 1% was injected at the needle entry points and needle tracts for  local anesthetic. Then 27 gauge 3.5 inch spinal needles were inserted and advanced under AP and Lateral fluoroscopic guidance into the facet joints with positive pain reproduction at all the levels.  Aspiration was negative at all the levels.      Then, Omniscan contrast dye was injected after negative aspiration for heme and CSF in each joint, confirming appropriate placement.  A total of  1 ml of Omniscan was used.  Omnipaque wasted:   ml.     Then, each facet joint was injected with 1 ml of a combination of dexamethasone 10 mg and Bupivacaine 0.25% 1 ml for a total injectate volume of 2 ml and the needles were flushed with Lidocaine 1% as they were withdrawn.     Patient initially intended to have bilateral C4/5 and C5/6 facet joints injected however during the procedure she stated she only wanted to do the left side because she was unable to tolerate the procedure.        Hemostasis was achieved, the area was cleaned, and bandaids were placed when appropriate.  The patient tolerated the procedure well, and was taken to the recovery room.    Images were saved to PACS.     Post-procedure pain score: 6/10 with improved range of motion  Follow-up includes:   -f/u phone call in one week  -f/u with referring provider     Cecil Curran MD  [facility] [place]

## 2018-07-05 NOTE — NURSING NOTE
Pre-procedure Intake    Have you been fasting? Yes    If yes, for how long? 2 hrs     Are you taking a prescribed blood thinner such as coumadin, Plavix, Xarelto?    No    If yes, when did you take your last dose? No     Do you take aspirin?  Yes    If cervical procedure, have you held aspirin for 6 days?   No, 07/01/18    Do you have any allergies to contrast dye, iodine, steroid and/or numbing medications?  Yes, Dye allergy     Are you currently taking antibiotics or have an active infection?  NO    Have you had a fever/elevated temperature within the past week? NO    Are you currently taking oral steroids? NO    Do you have a ? Yes       Are you pregnant or breastfeeding?  NO    Are the vital signs normal?  Yes    Amberly Winston MA

## 2018-07-11 ENCOUNTER — TELEPHONE (OUTPATIENT)
Dept: FAMILY MEDICINE | Facility: CLINIC | Age: 68
End: 2018-07-11

## 2018-07-11 NOTE — TELEPHONE ENCOUNTER
Reason for Call:  Other appointment    Detailed comments: Patient calling to let Dr. Jain know she is getting treatment for her neck pain and will call to schedule her colonoscopy and mammogram when she is feeling up to after her neck pain treatment.     Phone Number Patient can be reached at: Home number on file [phone] (home)    Best Time: Any    Can we leave a detailed message on this number? YES    Call taken on 7/11/2018 at 2:56 PM by Anjana Miller

## 2018-08-06 ENCOUNTER — OFFICE VISIT (OUTPATIENT)
Dept: FAMILY MEDICINE | Facility: CLINIC | Age: 68
End: 2018-08-06
Payer: MEDICARE

## 2018-08-06 VITALS
HEART RATE: 55 BPM | TEMPERATURE: 97.9 F | WEIGHT: 118 LBS | SYSTOLIC BLOOD PRESSURE: 132 MMHG | BODY MASS INDEX: 20.41 KG/M2 | RESPIRATION RATE: 18 BRPM | DIASTOLIC BLOOD PRESSURE: 70 MMHG | OXYGEN SATURATION: 99 %

## 2018-08-06 DIAGNOSIS — M79.7 FIBROMYALGIA: ICD-10-CM

## 2018-08-06 DIAGNOSIS — I21.4 NSTEMI (NON-ST ELEVATED MYOCARDIAL INFARCTION) (H): Primary | ICD-10-CM

## 2018-08-06 DIAGNOSIS — Z87.891 EX-SMOKER: ICD-10-CM

## 2018-08-06 DIAGNOSIS — F51.01 PRIMARY INSOMNIA: ICD-10-CM

## 2018-08-06 DIAGNOSIS — G24.3 SPASMODIC TORTICOLLIS: ICD-10-CM

## 2018-08-06 PROCEDURE — 99214 OFFICE O/P EST MOD 30 MIN: CPT | Performed by: INTERNAL MEDICINE

## 2018-08-06 RX ORDER — HYDROCODONE BITARTRATE AND ACETAMINOPHEN 7.5; 325 MG/1; MG/1
2 TABLET ORAL 3 TIMES DAILY
Qty: 180 TABLET | Refills: 0 | Status: SHIPPED | OUTPATIENT
Start: 2018-08-06 | End: 2018-08-06

## 2018-08-06 RX ORDER — HYDROCODONE BITARTRATE AND ACETAMINOPHEN 7.5; 325 MG/1; MG/1
2 TABLET ORAL 3 TIMES DAILY
Qty: 180 TABLET | Refills: 0 | Status: SHIPPED | OUTPATIENT
Start: 2018-08-06 | End: 2018-10-29

## 2018-08-06 NOTE — MR AVS SNAPSHOT
After Visit Summary   8/6/2018    Carina Calvert    MRN: [id number]           Patient Information     Date Of Birth          1950        Visit Information        Provider Department      8/6/2018 2:10 PM Gerber Jain MD [facility]        Today's Diagnoses     NSTEMI (non-ST elevated myocardial infarction) (H)    -  1    Spasmodic torticollis        Fibromyalgia        Ex-smoker        Primary insomnia          Care Instructions    We discussed insomnia , recommended trying an addition of Melatonin between 5 milligrams one hour before sleep . Send me feedback in one month. If not successful we can try other things.  [facility]    If you have any questions regarding to your visit please contact your care team:     Team Pink:   Clinic Hours Telephone Number   Internal Medicine:  Dr. Lisset Ignacio, NP       7am-7pm  Monday - Thursday   7am-5pm  Fridays  [phone]  (Appointment scheduling available 24/7)    Questions about your recent visit?  Team Line  [phone]   Urgent Care - [place] and [place] - 11am-9pm Monday-Friday Saturday-Sunday- 9am-5pm   [place] - 5pm-9pm Monday-Friday Saturday-Sunday- 9am-5pm  [phone] - Emily Vaz  [phone] - [place]       What options do I have for a visit other than an office visit? We offer electronic visits (e-visits) and telephone visits, when medically appropriate.  Please check with your medical insurance to see if these types of visits are covered, as you will be responsible for any charges that are not paid by your insurance.      You can use Gogo (secure electronic communication) to access to your chart, send your primary care provider a message, or make an appointment. Ask a team member how to get started.     For a price quote for your services, please call our Consumer Price Line at [phone] or our Imaging Cost estimation line at [phone]  (for imaging tests).  Xiomara Fonseca MA            Follow-ups after your visit        Who to contact     If you have questions or need follow up information about today's clinic visit or your schedule please contact [facility] directly at [phone].  Normal or non-critical lab and imaging results will be communicated to you by MyChart, letter or phone within 4 business days after the clinic has received the results. If you do not hear from us within 7 days, please contact the clinic through MyChart or phone. If you have a critical or abnormal lab result, we will notify you by phone as soon as possible.  Submit refill requests through [email or web address] or call your pharmacy and they will forward the refill request to us. Please allow 3 business days for your refill to be completed.          Additional Information About Your Visit        Care EveryWhere ID     This is your Care EveryWhere ID. This could be used by other organizations to access your [facility] medical records  [id number]        Your Vitals Were     Pulse Temperature Respirations Pulse Oximetry BMI (Body Mass Index)       55 97.9  F (36.6  C) (Oral) 18 99% 20.41 kg/m2        Blood Pressure from Last 3 Encounters:   08/06/18 132/70   07/05/18 164/77   05/10/18 124/68    Weight from Last 3 Encounters:   08/06/18 118 lb (53.5 kg)   05/10/18 122 lb (55.3 kg)   02/27/18 121 lb 9.6 oz (55.2 kg)              Today, you had the following     No orders found for display         Today's Medication Changes          These changes are accurate as of 8/6/18  2:56 PM.  If you have any questions, ask your nurse or doctor.               Start taking these medicines.        Dose/Directions    HYDROcodone-acetaminophen 7.5-325 MG per tablet   Commonly known as:  NORCO   Used for:  Spasmodic torticollis, Fibromyalgia   Started by:  Gerber Jain MD        Dose:  2 tablet   Take 2 tablets by mouth 3 times daily 28 days or more between refills for controlled  medications   Quantity:  180 tablet   Refills:  0         These medicines have changed or have updated prescriptions.        Dose/Directions    baclofen 10 MG tablet   Commonly known as:  LIORESAL   This may have changed:  additional instructions   Used for:  Spasmodic torticollis, Fibromyalgia        Dose:  20 mg   Take 2 tablets (20 mg) by mouth 3 times daily   Quantity:  540 tablet   Refills:  3       gabapentin 300 MG capsule   Commonly known as:  NEURONTIN   This may have changed:  additional instructions   Used for:  Spasmodic torticollis, Fibromyalgia, Restless leg syndrome        1 capsule am, 1 at noon 2 at 5pm and 2 at bedtime ( 6 per day ) Not to be refilled until patient calls   Quantity:  540 capsule   Refills:  3            Where to get your medicines      Some of these will need a paper prescription and others can be bought over the counter.  Ask your nurse if you have questions.     Bring a paper prescription for each of these medications     HYDROcodone-acetaminophen 7.5-325 MG per tablet               Information about OPIOIDS     PRESCRIPTION OPIOIDS: WHAT YOU NEED TO KNOW   We gave you an opioid (narcotic) pain medicine. It is important to manage your pain, but opioids are not always the best choice. You should first try all the other options your care team gave you. Take this medicine for as short a time (and as few doses) as possible.     These medicines have risks:    DO NOT drive when on new or higher doses of pain medicine. These medicines can affect your alertness and reaction times, and you could be arrested for driving under the influence (DUI). If you need to use opioids long-term, talk to your care team about driving.    DO NOT operate heave machinery    DO NOT do any other dangerous activities while taking these medicines.     DO NOT drink any alcohol while taking these medicines.      If the opioid prescribed includes acetaminophen, DO NOT take with any other medicines that contain  acetaminophen. Read all labels carefully. Look for the word  acetaminophen  or  Tylenol.  Ask your pharmacist if you have questions or are unsure.    You can get addicted to pain medicines, especially if you have a history of addiction (chemical, alcohol or substance dependence). Talk to your care team about ways to reduce this risk.    Store your pills in a secure place, locked if possible. We will not replace any lost or stolen medicine. If you don t finish your medicine, please throw away (dispose) as directed by your pharmacist. The [place] Pollution Control Agency has more information about safe disposal: https://www.pca.[facility].mn.us/living-green/managing-unwanted-medications.     All opioids tend to cause constipation. Drink plenty of water and eat foods that have a lot of fiber, such as fruits, vegetables, prune juice, apple juice and high-fiber cereal. Take a laxative (Miralax, milk of magnesia, Colace, Senna) if you don t move your bowels at least every other day.          Primary Care Provider Office Phone # Fax #    Gerber Jain [phone] [address] [facility] [address]        Equal Access to Services     JC BILLY AH: Hadii aad ku hadasho Soyobaniali, waaxda luqadaha, qaybta kaalmada adeegyada, sofya james. So [facility] [phone].    ATENCIÓN: Si habla español, tiene a healy disposición servicios gratuitos de asistencia lingüística. Kaseyame al [phone].    We comply with applicable federal civil rights laws and [place] laws. We do not discriminate on the basis of race, color, national origin, age, disability, sex, sexual orientation, or gender identity.            Thank you!     Thank you for choosing [facility]  for your care. Our goal is always to provide you with excellent care. Hearing back from our patients is one way we can continue to improve our services. Please take a few minutes to complete the written survey that you  may receive in the mail after your visit with us. Thank you!             Your Updated Medication List - Protect others around you: Learn how to safely use, store and throw away your medicines at www.disposemymeds.org.          This list is accurate as of 8/6/18  2:56 PM.  Always use your most recent med list.                   Brand Name Dispense Instructions for use Diagnosis    aspirin 81 MG tablet      Take 81 mg by mouth daily        baclofen 10 MG tablet    LIORESAL    540 tablet    Take 2 tablets (20 mg) by mouth 3 times daily    Spasmodic torticollis, Fibromyalgia       cyanocobalamin 1000 MCG tablet    vitamin  B-12     1 tablet daily        gabapentin 300 MG capsule    NEURONTIN    540 capsule    1 capsule am, 1 at noon 2 at 5pm and 2 at bedtime ( 6 per day ) Not to be refilled until patient calls    Spasmodic torticollis, Fibromyalgia, Restless leg syndrome       HYDROcodone-acetaminophen 7.5-325 MG per tablet    NORCO    180 tablet    Take 2 tablets by mouth 3 times daily 28 days or more between refills for controlled medications    Spasmodic torticollis, Fibromyalgia       metoprolol tartrate 50 MG tablet    LOPRESSOR    180 tablet    Take 1 tablet (50 mg) by mouth 2 times daily    NSTEMI (non-ST elevated myocardial infarction) (H), Hypertension goal BP (blood pressure) < 140/90       nitroGLYcerin 0.4 MG sublingual tablet    NITROSTAT    25 tablet    Place 1 tablet (0.4 mg) under the tongue every 5 minutes as needed for chest pain    NSTEMI (non-ST elevated myocardial infarction) (H)       polyethylene glycol powder    MIRALAX/GLYCOLAX     Take 17 g by mouth daily Reported on 5/15/2017        rosuvastatin 10 MG tablet    CRESTOR    90 tablet    TAKE 1 TABLET (10 MG) BY MOUTH DAILY    Hyperlipidemia with target LDL less than 100       traZODone 100 MG tablet    DESYREL    360 tablet    TAKE FOUR TABLETS NIGHTLY AT BEDTIME    Fibromyalgia       vitamin D 2000 units tablet      Take 1 tablet by mouth daily     Hypovitaminosis D

## 2018-08-06 NOTE — PATIENT INSTRUCTIONS
We discussed insomnia , recommended trying an addition of Melatonin between 5 milligrams one hour before sleep . Send me feedback in one month. If not successful we can try other things.  [facility]    If you have any questions regarding to your visit please contact your care team:     Team Pink:   Clinic Hours Telephone Number   Internal Medicine:  Dr. Lisset Ignacio, NP       7am-7pm  Monday - Thursday   7am-5pm  Fridays  [phone]  (Appointment scheduling available 24/7)    Questions about your recent visit?  Team Line  [phone]   [facility] [place] [facility] [place]am-9pm Monday-Friday Saturday-Sunday- 9am-5pm   [place] - 5pm-9pm Monday-Friday Saturday-Sunday- 9am-5pm  [phone] - [place]  [phone] - [place]       What options do I have for a visit other than an office visit? We offer electronic visits (e-visits) and telephone visits, when medically appropriate.  Please check with your medical insurance to see if these types of visits are covered, as you will be responsible for any charges that are not paid by your insurance.      You can use WebTV (secure electronic communication) to access to your chart, send your primary care provider a message, or make an appointment. Ask a team member how to get started.     For a price quote for your services, please call our Consumer Price Line at [phone] or our Imaging Cost estimation line at [phone] (for imaging tests).  Xiomara Fonseca MA

## 2018-08-06 NOTE — PROGRESS NOTES
SUBJECTIVE:   Carina Calvert is a 68 year old female who presents to clinic today for the following health issues:      Patient presents with:  RECHECK: recheck medication  Health Maintenance: urine drug, adv directives, fall risk   Insomnia  Heart Problem: would like to get heart checked          NSTEMI (non-ST elevated myocardial infarction) (H)  Spasmodic torticollis  Fibromyalgia  Ex-smoker  Primary insomnia     Carina Calvert is a patient I have seen for quite a long time. She's in for medication refill and need for review of multiple issues and concerns.    Quit smoking march 2018. Diagnosis altered to ex-smoker    Body mass index is 20.41 kg/(m^2).  BP Readings from Last 6 Encounters:   08/06/18 132/70   07/05/18 164/77   05/10/18 124/68   02/27/18 120/62   02/15/18 168/70   11/14/17 136/70     Patient has many complaints today.    Poor quality of sleeping continues. Desyrel (Trazodone) is just not as effective as it was in the past. She's tried Doxylamine succinate[Unasom] and benadryl 25 to 50 milligrams one hour before sleep but not Melatonin. See patient after-visit summary.  Wants heart check.  Myocardial infarction 2015 ? No recurrence of arteriosclerosis / acute coronary syndromes. Possibly some lowgrade angina pectoralis  - 1-2 times over the last year , especially the last 3 months has taken nitroglycerin two times. This was of uncertain benefit. Symptoms not worsened with activity, or relieved with rest   . Was random; symptoms lasted for roughly 5 minutes, imprecise recollections. She had to reduce the baclofen (LIORESAL) , because she fell at home, felt wrist dropping. Her strength is better now. Instead of 6 a day now she's at 4 per day. Also she self-modified her Gabapentin [Neurontin] she modified things downwards to only 2 tabs bedtime one at 5, one am, one noon. With baclofen (LIORESAL) now is 1 in the morning and the evening , 1/2 dose noon and 5 pm.    Once again discusses needs for  botox injections. This is to be every third month. This would be with Dr. Junior Doyle with [facility]  . She originally was dissuaded from continuing her long term picture with botox injections secondary to cost but now feels          Her quality of life can't go forwards without restarting her botox injections     Desyrel (Trazodone) just not helping with quality of sleeping and diphenhydrAMINE (BENADRYL) not working , tried Doxylamine succinate[Unasom] with limited help. Chronic insomnia     Problem list and histories reviewed & adjusted, as indicated.  Additional history: as documented    Patient Active Problem List   Diagnosis     Neuropathy     Fibromyalgia     Restless leg syndrome     Spasmodic torticollis     Insomnia     Hyperlipidemia with target LDL less than 100     History of hepatitis C     Ex-smoker     Recurrent cold sores     Osteopenia     Migraine headache     Positive occult stool blood test     Subclinical hyperthyroidism     Hypovitaminosis D     Cramp of limb     NSTEMI (non-ST elevated myocardial infarction) (H)     Chronic pain     Hypertension goal BP (blood pressure) < 140/90     Cervical facet joint syndrome     Primary insomnia     Past Surgical History:   Procedure Laterality Date     HERNIA REPAIR  9/2009    ventral     LITHOTRIPSY      2 x      TONSILLECTOMY       TUBAL LIGATION         Social History   Substance Use Topics     Smoking status: Current Every Day Smoker     Packs/day: 0.20     Years: 15.00     Types: Cigarettes     Last attempt to quit: 3/31/2013     Smokeless tobacco: Never Used     Alcohol use No     Family History   Problem Relation Age of Onset     Neurologic Disorder Mother 65     Parkinsons     Alcohol/Drug Mother      Cerebrovascular Disease Mother      Respiratory Father      COPD     Alcohol/Drug Father      Cancer Brother      Diabetes Sister 52     Cancer Maternal Aunt      Cancer Maternal Uncle      C.A.D. No family hx of          Current  Outpatient Prescriptions   Medication Sig Dispense Refill     aspirin 81 MG tablet Take 81 mg by mouth daily       baclofen (LIORESAL) 10 MG tablet Take 2 tablets (20 mg) by mouth 3 times daily (Patient taking differently: Take 20 mg by mouth 3 times daily Patient taking 1 tab in am, and half tab 4 hours later, and another half 4 hours later, then 1 tab at bedtime.) 540 tablet 3     Cholecalciferol (VITAMIN D) 2000 UNITS tablet Take 1 tablet by mouth daily       gabapentin (NEURONTIN) 300 MG capsule 1 capsule am, 1 at noon 2 at 5pm and 2 at bedtime ( 6 per day ) Not to be refilled until patient calls (Patient taking differently: 1 capsule am, 1 at noon 1 at 5pm and 2 at bedtime ( 6 per day ) Not to be refilled until patient calls) 540 capsule 3     HYDROcodone-acetaminophen (NORCO) 7.5-325 MG per tablet Take 2 tablets by mouth 3 times daily 28 days or more between refills for controlled medications 180 tablet 0     metoprolol tartrate (LOPRESSOR) 50 MG tablet Take 1 tablet (50 mg) by mouth 2 times daily 180 tablet 1     nitroGLYcerin (NITROSTAT) 0.4 MG sublingual tablet Place 1 tablet (0.4 mg) under the tongue every 5 minutes as needed for chest pain 25 tablet 3     polyethylene glycol (MIRALAX/GLYCOLAX) powder Take 17 g by mouth daily Reported on 5/15/2017       rosuvastatin (CRESTOR) 10 MG tablet TAKE 1 TABLET (10 MG) BY MOUTH DAILY 90 tablet 3     traZODone (DESYREL) 100 MG tablet TAKE FOUR TABLETS NIGHTLY AT BEDTIME 360 tablet 3     VITAMIN B-12 1000 MCG OR TABS 1 tablet daily       Allergies   Allergen Reactions     Droperidol      Ivp Dye [Contrast Dye]      Recent Labs   Lab Test  11/14/17   1450  10/18/16   1532  07/15/13   05/12/11   1621   LDL  30  50   --    --    --   96   HDL  57  42*   --    --    --    --    TRIG  103   --    --    --    --    --    ALT  23  29   --   12   < >  <6   CR  0.67  0.69   --   0.75   < >  0.67   GFRESTIMATED  88  85   --   >60   < >  >90   GFRESTBLACK  >90  >90    American GFR Calc     --   >60   < >  >90   POTASSIUM  4.3  4.7   --   4.0   < >  3.8   TSH  1.03  1.84   < >  1.39   < >   --     < > = values in this interval not displayed.      BP Readings from Last 3 Encounters:   08/06/18 132/70   07/05/18 164/77   05/10/18 124/68    Wt Readings from Last 3 Encounters:   08/06/18 118 lb (53.5 kg)   05/10/18 122 lb (55.3 kg)   02/27/18 121 lb 9.6 oz (55.2 kg)                  Labs reviewed in EPIC    Reviewed and updated as needed this visit by clinical staff       Reviewed and updated as needed this visit by Provider         ROS:  Constitutional, HEENT, cardiovascular, pulmonary, gi and gu systems are negative, except as otherwise noted.    OBJECTIVE:                                                    /70  Pulse 55  Temp 97.9  F (36.6  C) (Oral)  Resp 18  Wt 118 lb (53.5 kg)  SpO2 99%  BMI 20.41 kg/m2  Body mass index is 20.41 kg/(m^2).  GENERAL APPEARANCE: healthy, alert and no distress  EYES: Eyes grossly normal to inspection, PERRL and conjunctivae and sclerae normal  RESP: lungs clear to auscultation - no rales, rhonchi or wheezes  CV: regular rates and rhythm, normal S1 S2, no S3 or S4 and no murmur, click or rub  ABDOMEN: soft, nontender, without hepatosplenomegaly or masses and bowel sounds normal    Diagnostic test results:  Diagnostic Test Results:  No orders of the defined types were placed in this encounter.         ASSESSMENT/PLAN:                                                    1. NSTEMI (non-ST elevated myocardial infarction) (H)  We did a careful review of systems / history of present illness and there's absolutely no symptoms of concern. Likewise nothing on exam. We could easily justify a functional cardiac exam but there's no convincing evidence this is necessary . We discussed what to be on the watch for  , update me if significant changes have taken place.     2. Spasmodic torticollis  Refills provided . To restart botox injections with   Junior Doyle with [facility]    - HYDROcodone-acetaminophen (NORCO) 7.5-325 MG per tablet; Take 2 tablets by mouth 3 times daily 28 days or more between refills for controlled medications  Dispense: 180 tablet; Refill: 0    3. Fibromyalgia  Issues reviewed  , diagnosis explained   - HYDROcodone-acetaminophen (NORCO) 7.5-325 MG per tablet; Take 2 tablets by mouth 3 times daily 28 days or more between refills for controlled medications  Dispense: 180 tablet; Refill: 0    4. Ex-smoker  Noted as a point of historical importance     5. Primary insomnia  Reviewed paradoxical hyper-excitiation and wonder about psycho-physiologic insomnia. Will try adding Melatonin , see patient after-visit summary       Follow up with Provider - 3 months      Gerber Jain MD  [facility]

## 2018-08-15 ENCOUNTER — TELEPHONE (OUTPATIENT)
Dept: FAMILY MEDICINE | Facility: CLINIC | Age: 68
End: 2018-08-15

## 2018-08-15 NOTE — TELEPHONE ENCOUNTER
Please advise if ok to continue with the 10mg dose and add this to med list    Per 8/6/18 OV notes:    Instructions   Patient Instructions    We discussed insomnia , recommended trying an addition of Melatonin between 5 milligrams one hour before sleep . Send me feedback in one month. If not successful we can try other things.        Earline Watt RN

## 2018-08-15 NOTE — TELEPHONE ENCOUNTER
Reason for Call:  Other inform    Detailed comments: Patient states she increase her dosage of melatonin from 5 to 10 and 10 has been helping.     Phone Number Patient can be reached at: Home number on file [phone] (home)    Best Time: any    Can we leave a detailed message on this number? YES    Call taken on 8/15/2018 at 1:57 PM by Kevin Khan

## 2018-08-16 RX ORDER — MELATONIN 10 MG
20 CAPSULE ORAL AT BEDTIME
COMMUNITY
Start: 2018-08-16 | End: 2023-11-27

## 2018-10-05 ENCOUNTER — TRANSFERRED RECORDS (OUTPATIENT)
Dept: HEALTH INFORMATION MANAGEMENT | Facility: CLINIC | Age: 68
End: 2018-10-05

## 2018-10-12 ENCOUNTER — TELEPHONE (OUTPATIENT)
Dept: FAMILY MEDICINE | Facility: CLINIC | Age: 68
End: 2018-10-12

## 2018-10-12 NOTE — TELEPHONE ENCOUNTER
Panel Management Review      Patient has the following on her problem list: None      Composite cancer screening  Chart review shows that this patient is due/due soon for the following Mammogram and Colonoscopy  Summary:    Patient is due/failing the following:   Urine drug, adv directives, fall risk, flu     Action needed:   Patient needs office visit for mammo gram/ colonoscopy.    Type of outreach:    Sent letter.    Questions for provider review:    None                                                                                                                                    Yadira ANDERSON CMA ([facility])       Chart routed to none .

## 2018-10-12 NOTE — LETTER
October 12, 2018          Carina Calvert,  [address]  [place] [address]        Dear Carina Calvert      Monitoring and managing your preventative and chronic health conditions are very important to us. Our records indicate that you have not scheduled for Mammogram and Colonoscopy  which was recommended by Dr. Jain.      If you have received your health care elsewhere, please call the clinic so the information can be documented in your chart.    Please call [phone] or message us through your [facility] account to schedule an appointment or provide information for your chart.     Feel free to contact us if you have any questions or concerns!    I look forward to seeing you and working with you on your health care needs.     Sincerely,         Gerber Jain / Yadira ANDERSON CMA ([facility])

## 2018-10-27 ENCOUNTER — TELEPHONE (OUTPATIENT)
Dept: FAMILY MEDICINE | Facility: CLINIC | Age: 68
End: 2018-10-27

## 2018-10-27 DIAGNOSIS — M79.7 FIBROMYALGIA: ICD-10-CM

## 2018-10-27 DIAGNOSIS — G24.3 SPASMODIC TORTICOLLIS: ICD-10-CM

## 2018-10-27 NOTE — TELEPHONE ENCOUNTER
Reason for Call:  Other prescription    Detailed comments: Pt called requesting a refill for her medication. She has an appointment scheduled for 11/5/18, but states she will be out before then.    Phone Number Patient can be reached at: Cell number on file:    Telephone Information:   Mobile [phone]       Best Time: Any time.    Can we leave a detailed message on this number? YES    Call taken on 10/27/2018 at 1:04 PM by Josef Strange

## 2018-10-29 RX ORDER — HYDROCODONE BITARTRATE AND ACETAMINOPHEN 7.5; 325 MG/1; MG/1
2 TABLET ORAL 3 TIMES DAILY
Qty: 180 TABLET | Refills: 0 | Status: SHIPPED | OUTPATIENT
Start: 2018-10-29 | End: 2018-11-05

## 2018-10-29 NOTE — TELEPHONE ENCOUNTER
[place] reviewed:  reviewed- no concerns    [place]  [email or web address]    Nolvia Castellanos RN - [facility]

## 2018-10-29 NOTE — TELEPHONE ENCOUNTER
Spoke to patient and she states she is not quite out yet, but if afraid she will run out before her appointment.    Controlled Substance Refill Request for HYDROcodone-acetaminophen (NORCO) 7.5-325 MG per tablet  Problem List Complete:  Yes    Chronic pain         Problem Detail      Noted:  8/4/2015      Priority:  Medium      Overview Signed 4/19/2016  1:46 PM by Gerber Jain MD     Patient is followed by GERBER JAIN for ongoing prescription of pain medication.  All refills should be approved by this provider, or covering partner.     Medication(s): HYDROcodone (VICODIN) 7.5/325   .   Maximum quantity per month: 180  Clinic visit frequency required: Q 3 months      Controlled substance agreement on file: Yes       Date(s):   11/2015     Pain Clinic evaluation in the past: Yes       Date/Location:   [facility]      DIRE Total Score(s):  No flowsheet data found.     Last [facility] website verification:  done on  January 2016   [email or web address]        checked in past 3 months?  No, route to RN

## 2018-10-29 NOTE — TELEPHONE ENCOUNTER
Spoke to patient.  Informed her that we are unable to fax this prescription because it is a narcotic.  She will need to pick it up.  Daughter, Isra will  for her.  Alice John,

## 2018-11-04 DIAGNOSIS — I10 HYPERTENSION GOAL BP (BLOOD PRESSURE) < 140/90: ICD-10-CM

## 2018-11-04 DIAGNOSIS — I21.4 NSTEMI (NON-ST ELEVATED MYOCARDIAL INFARCTION) (H): ICD-10-CM

## 2018-11-05 ENCOUNTER — OFFICE VISIT (OUTPATIENT)
Dept: FAMILY MEDICINE | Facility: CLINIC | Age: 68
End: 2018-11-05
Payer: MEDICARE

## 2018-11-05 VITALS
OXYGEN SATURATION: 98 % | TEMPERATURE: 99.7 F | RESPIRATION RATE: 18 BRPM | DIASTOLIC BLOOD PRESSURE: 60 MMHG | HEART RATE: 59 BPM | WEIGHT: 122.6 LBS | SYSTOLIC BLOOD PRESSURE: 90 MMHG | BODY MASS INDEX: 21.21 KG/M2

## 2018-11-05 DIAGNOSIS — I10 HYPERTENSION GOAL BP (BLOOD PRESSURE) < 140/90: ICD-10-CM

## 2018-11-05 DIAGNOSIS — R68.89 FORGETFULNESS: Primary | ICD-10-CM

## 2018-11-05 DIAGNOSIS — E55.9 HYPOVITAMINOSIS D: ICD-10-CM

## 2018-11-05 DIAGNOSIS — G24.3 SPASMODIC TORTICOLLIS: ICD-10-CM

## 2018-11-05 DIAGNOSIS — E05.90 SUBCLINICAL HYPERTHYROIDISM: ICD-10-CM

## 2018-11-05 DIAGNOSIS — M79.7 FIBROMYALGIA: ICD-10-CM

## 2018-11-05 DIAGNOSIS — E78.5 HYPERLIPIDEMIA WITH TARGET LDL LESS THAN 100: ICD-10-CM

## 2018-11-05 PROCEDURE — 99214 OFFICE O/P EST MOD 30 MIN: CPT | Performed by: INTERNAL MEDICINE

## 2018-11-05 RX ORDER — METOPROLOL TARTRATE 50 MG
TABLET ORAL
Qty: 180 TABLET | Refills: 2 | Status: SHIPPED | OUTPATIENT
Start: 2018-11-05 | End: 2019-02-22

## 2018-11-05 RX ORDER — HYDROCODONE BITARTRATE AND ACETAMINOPHEN 7.5; 325 MG/1; MG/1
2 TABLET ORAL 3 TIMES DAILY
Qty: 180 TABLET | Refills: 0 | Status: SHIPPED | OUTPATIENT
Start: 2018-11-05 | End: 2018-11-05

## 2018-11-05 RX ORDER — NORTRIPTYLINE HCL 10 MG
10 CAPSULE ORAL AT BEDTIME
Refills: 2 | COMMUNITY
Start: 2018-10-05 | End: 2019-05-17

## 2018-11-05 RX ORDER — HYDROCODONE BITARTRATE AND ACETAMINOPHEN 7.5; 325 MG/1; MG/1
2 TABLET ORAL 3 TIMES DAILY
Qty: 180 TABLET | Refills: 0 | Status: SHIPPED | OUTPATIENT
Start: 2018-11-05 | End: 2019-02-22

## 2018-11-05 ASSESSMENT — PAIN SCALES - GENERAL: PAINLEVEL: MODERATE PAIN (5)

## 2018-11-05 NOTE — MR AVS SNAPSHOT
After Visit Summary   11/5/2018    Carina Calvert    MRN: [id number]           Patient Information     Date Of Birth          1950        Visit Information        Provider Department      11/5/2018 1:30 PM Gerber Jain MD [facility]        Today's Diagnoses     Forgetfulness    -  1    Spasmodic torticollis        Fibromyalgia        Hyperlipidemia with target LDL less than 100        Subclinical hyperthyroidism        Hypovitaminosis D        Hypertension goal BP (blood pressure) < 140/90           Follow-ups after your visit        Who to contact     If you have questions or need follow up information about today's clinic visit or your schedule please contact [facility] directly at [phone].  Normal or non-critical lab and imaging results will be communicated to you by MyChart, letter or phone within 4 business days after the clinic has received the results. If you do not hear from us within 7 days, please contact the clinic through MyChart or phone. If you have a critical or abnormal lab result, we will notify you by phone as soon as possible.  Submit refill requests through [employer] or call your pharmacy and they will forward the refill request to us. Please allow 3 business days for your refill to be completed.          Additional Information About Your Visit        Care EveryWhere ID     This is your Care EveryWhere ID. This could be used by other organizations to access your [facility] medical records  [id number]        Your Vitals Were     Pulse Temperature Respirations Pulse Oximetry Breastfeeding? BMI (Body Mass Index)    59 99.7  F (37.6  C) (Oral) 18 98% No 21.21 kg/m2       Blood Pressure from Last 3 Encounters:   11/05/18 90/60   08/06/18 132/70   07/05/18 164/77    Weight from Last 3 Encounters:   11/05/18 122 lb 9.6 oz (55.6 kg)   08/06/18 118 lb (53.5 kg)   05/10/18 122 lb (55.3 kg)              We Performed the Following     Basic metabolic  panel     CBC with platelets differential     Lipid panel reflex to direct LDL Non-fasting     TSH with free T4 reflex     Vitamin D Deficiency          Today's Medication Changes          These changes are accurate as of 11/5/18  1:55 PM.  If you have any questions, ask your nurse or doctor.               Start taking these medicines.        Dose/Directions    HYDROcodone-acetaminophen 7.5-325 MG per tablet   Commonly known as:  NORCO   Used for:  Spasmodic torticollis, Fibromyalgia   Started by:  Gerber Jain MD        Dose:  2 tablet   Take 2 tablets by mouth 3 times daily 28 days or more between refills for controlled medications   Quantity:  180 tablet   Refills:  0         These medicines have changed or have updated prescriptions.        Dose/Directions    baclofen 10 MG tablet   Commonly known as:  LIORESAL   This may have changed:  additional instructions   Used for:  Spasmodic torticollis, Fibromyalgia        Dose:  20 mg   Take 2 tablets (20 mg) by mouth 3 times daily   Quantity:  540 tablet   Refills:  3       gabapentin 300 MG capsule   Commonly known as:  NEURONTIN   This may have changed:  additional instructions   Used for:  Spasmodic torticollis, Fibromyalgia, Restless leg syndrome        1 capsule am, 1 at noon 2 at 5pm and 2 at bedtime ( 6 per day ) Not to be refilled until patient calls   Quantity:  540 capsule   Refills:  3            Where to get your medicines      Some of these will need a paper prescription and others can be bought over the counter.  Ask your nurse if you have questions.     Bring a paper prescription for each of these medications     HYDROcodone-acetaminophen 7.5-325 MG per tablet               Information about OPIOIDS     PRESCRIPTION OPIOIDS: WHAT YOU NEED TO KNOW   We gave you an opioid (narcotic) pain medicine. It is important to manage your pain, but opioids are not always the best choice. You should first try all the other options your care team gave you. Take this  medicine for as short a time (and as few doses) as possible.    Some activities can increase your pain, such as bandage changes or therapy sessions. It may help to take your pain medicine 30 to 60 minutes before these activities. Reduce your stress by getting enough sleep, working on hobbies you enjoy and practicing relaxation or meditation. Talk to your care team about ways to manage your pain beyond prescription opioids.    These medicines have risks:    DO NOT drive when on new or higher doses of pain medicine. These medicines can affect your alertness and reaction times, and you could be arrested for driving under the influence (DUI). If you need to use opioids long-term, talk to your care team about driving.    DO NOT operate heavy machinery    DO NOT do any other dangerous activities while taking these medicines.    DO NOT drink any alcohol while taking these medicines.     If the opioid prescribed includes acetaminophen, DO NOT take with any other medicines that contain acetaminophen. Read all labels carefully. Look for the word  acetaminophen  or  Tylenol.  Ask your pharmacist if you have questions or are unsure.    You can get addicted to pain medicines, especially if you have a history of addiction (chemical, alcohol or substance dependence). Talk to your care team about ways to reduce this risk.    All opioids tend to cause constipation. Drink plenty of water and eat foods that have a lot of fiber, such as fruits, vegetables, prune juice, apple juice and high-fiber cereal. Take a laxative (Miralax, milk of magnesia, Colace, Senna) if you don t move your bowels at least every other day. Other side effects include upset stomach, sleepiness, dizziness, throwing up, tolerance (needing more of the medicine to have the same effect), physical dependence and slowed breathing.    Store your pills in a secure place, locked if possible. We will not replace any lost or stolen medicine. If you don t finish your  medicine, please throw away (dispose) as directed by your pharmacist. The [place] Pollution Control Agency has more information about safe disposal: [email or web address]         Primary Care Provider Office Phone # Fax #    Gerber Jain [phone] [address] [facility] [address]        Equal Access to Services     NICHOLAS BILLY : Hadii aad ku hadasho Soomaali, waaxda luqadaha, qaybta kaalmada adeegyada, waxay idiin hayaan adeeg kadenelyse lakyra . So [facility] [phone].    ATENCIÓN: Si eddila chaitanya, tiene a healy disposición servicios gratuitos de asistencia lingüística. Gary al [phone].    We comply with applicable federal civil rights laws and [place] laws. We do not discriminate on the basis of race, color, national origin, age, disability, sex, sexual orientation, or gender identity.            Thank you!     Thank you for choosing [facility]  for your care. Our goal is always to provide you with excellent care. Hearing back from our patients is one way we can continue to improve our services. Please take a few minutes to complete the written survey that you may receive in the mail after your visit with us. Thank you!             Your Updated Medication List - Protect others around you: Learn how to safely use, store and throw away your medicines at www.disposemymeds.org.          This list is accurate as of 11/5/18  1:55 PM.  Always use your most recent med list.                   Brand Name Dispense Instructions for use Diagnosis    aspirin 81 MG tablet      Take 81 mg by mouth daily        baclofen 10 MG tablet    LIORESAL    540 tablet    Take 2 tablets (20 mg) by mouth 3 times daily    Spasmodic torticollis, Fibromyalgia       cyanocobalamin 1000 MCG tablet    vitamin  B-12     1 tablet daily        gabapentin 300 MG capsule    NEURONTIN    540 capsule    1 capsule am, 1 at noon 2 at 5pm and 2 at bedtime (  6 per day ) Not to be refilled until patient calls    Spasmodic torticollis, Fibromyalgia, Restless leg syndrome       HYDROcodone-acetaminophen 7.5-325 MG per tablet    NORCO    180 tablet    Take 2 tablets by mouth 3 times daily 28 days or more between refills for controlled medications    Spasmodic torticollis, Fibromyalgia       Melatonin 10 MG Caps      Take 1 capsule by mouth 1 hour prior to bedtime        metoprolol tartrate 50 MG tablet    LOPRESSOR    180 tablet    TAKE 1 TABLET BY MOUTH TWICE DAILY.    NSTEMI (non-ST elevated myocardial infarction) (H), Hypertension goal BP (blood pressure) < 140/90       nitroGLYcerin 0.4 MG sublingual tablet    NITROSTAT    25 tablet    Place 1 tablet (0.4 mg) under the tongue every 5 minutes as needed for chest pain    NSTEMI (non-ST elevated myocardial infarction) (H)       nortriptyline 10 MG capsule    PAMELOR     Take 10 mg by mouth At Bedtime        polyethylene glycol powder    MIRALAX/GLYCOLAX     Take 17 g by mouth daily Reported on 5/15/2017        rosuvastatin 10 MG tablet    CRESTOR    90 tablet    TAKE 1 TABLET (10 MG) BY MOUTH DAILY    Hyperlipidemia with target LDL less than 100       traZODone 100 MG tablet    DESYREL    360 tablet    TAKE FOUR TABLETS NIGHTLY AT BEDTIME    Fibromyalgia       vitamin D 2000 units tablet      Take 1 tablet by mouth daily    Hypovitaminosis D

## 2018-11-05 NOTE — PROGRESS NOTES
"  SUBJECTIVE:   Carina Calvert is a 68 year old female who presents to clinic today for the following health issues:    Back Pain Recheck   States that she \"hasn't had any good days\" lately. She only works two days a week now, not entirely retired. 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 She is unable to do any bending or squatting because of her back pain. This pain radiates from her lower back into her legs and feet. She is on nortriptyline again, prescribed from the [facility] clinic.     Memory Concerns  She notes increasing memory difficulty. She offers for example that she was recently talking on the phone, and forgot that she is supposed to bring in paper prescriptions, she started vehemently denying ever doing this before, then after she hung up, about five minutes later she remembered that she does indeed has to have a paper prescriptions and has in point of fact brought them in many times in the past to the pharmacy .        Problem list and histories reviewed & adjusted, as indicated.  Additional history: as documented    Patient Active Problem List   Diagnosis     Neuropathy     Fibromyalgia     Restless leg syndrome     Spasmodic torticollis     Insomnia     Hyperlipidemia with target LDL less than 100     History of hepatitis C     Ex-smoker     Recurrent cold sores     Osteopenia     Migraine headache     Positive occult stool blood test     Subclinical hyperthyroidism     Hypovitaminosis D     Cramp of limb     NSTEMI (non-ST elevated myocardial infarction) (H)     Chronic pain     Hypertension goal BP (blood pressure) < 140/90     Cervical facet joint syndrome     Primary insomnia     Past Surgical History:   Procedure Laterality Date     HERNIA REPAIR  9/2009    ventral     LITHOTRIPSY      2 x      TONSILLECTOMY       TUBAL LIGATION         Social History   Substance Use Topics     Smoking status: Former Smoker     Packs/day: 0.20     Years: 15.00     Types: Cigarettes "     Quit date: 3/31/2013     Smokeless tobacco: Never Used     Alcohol use No     Family History   Problem Relation Age of Onset     Neurologic Disorder Mother 65     Parkinsons     Alcohol/Drug Mother      Cerebrovascular Disease Mother      Respiratory Father      COPD     Alcohol/Drug Father      Cancer Brother      Diabetes Sister 52     Cancer Maternal Aunt      Cancer Maternal Uncle      C.A.D. No family hx of          Current Outpatient Prescriptions   Medication Sig Dispense Refill     aspirin 81 MG tablet Take 81 mg by mouth daily       baclofen (LIORESAL) 10 MG tablet Take 2 tablets (20 mg) by mouth 3 times daily (Patient taking differently: Take 20 mg by mouth 3 times daily Patient taking 1 tab in am, and half tab 4 hours later, and another half 4 hours later, then 1 tab at bedtime.) 540 tablet 3     Cholecalciferol (VITAMIN D) 2000 UNITS tablet Take 1 tablet by mouth daily       gabapentin (NEURONTIN) 300 MG capsule 1 capsule am, 1 at noon 2 at 5pm and 2 at bedtime ( 6 per day ) Not to be refilled until patient calls (Patient taking differently: 1 capsule am, 1 at noon 1 at 5pm and 2 at bedtime ( 6 per day ) Not to be refilled until patient calls) 540 capsule 3     HYDROcodone-acetaminophen (NORCO) 7.5-325 MG per tablet Take 2 tablets by mouth 3 times daily 28 days or more between refills for controlled medications 180 tablet 0     Melatonin 10 MG CAPS Take 1 capsule by mouth 1 hour prior to bedtime       metoprolol tartrate (LOPRESSOR) 50 MG tablet TAKE 1 TABLET BY MOUTH TWICE DAILY. 180 tablet 2     nitroGLYcerin (NITROSTAT) 0.4 MG sublingual tablet Place 1 tablet (0.4 mg) under the tongue every 5 minutes as needed for chest pain 25 tablet 3     nortriptyline (PAMELOR) 10 MG capsule Take 10 mg by mouth At Bedtime  2     polyethylene glycol (MIRALAX/GLYCOLAX) powder Take 17 g by mouth daily Reported on 5/15/2017       rosuvastatin (CRESTOR) 10 MG tablet TAKE 1 TABLET (10 MG) BY MOUTH DAILY 90 tablet 3      traZODone (DESYREL) 100 MG tablet TAKE FOUR TABLETS NIGHTLY AT BEDTIME 360 tablet 3     VITAMIN B-12 1000 MCG OR TABS 1 tablet daily       Allergies   Allergen Reactions     Droperidol      Ivp Dye [Contrast Dye]        Reviewed and updated as needed this visit by clinical staff  Tobacco  Allergies  Meds  Med Hx  Surg Hx  Fam Hx  Soc Hx      Reviewed and updated as needed this visit by Provider         ROS:  Constitutional, HEENT, cardiovascular, pulmonary, GI, , musculoskeletal, neuro, skin, endocrine and psych systems are negative, except as otherwise noted.    This document serves as a record of the services and decisions personally performed and made by Gerber Jain MD. It was created on his behalf by Chantale Hawley, a trained medical scribe. The creation of this document is based on the provider's statements to the medical scribe.  Chantale Hawley November 5, 2018 1:41 PM    OBJECTIVE:     BP 90/60 (BP Location: Left arm, Cuff Size: Adult Regular)  Pulse 59  Temp 99.7  F (37.6  C) (Oral)  Resp 18  Wt 55.6 kg (122 lb 9.6 oz)  SpO2 98%  Breastfeeding? No  BMI 21.21 kg/m2  Body mass index is 21.21 kg/(m^2).  GENERAL: healthy, alert and no distress  MS: no gross musculoskeletal defects noted, no edema  SKIN: no suspicious lesions or rashes  PSYCH: mentation appears normal, affect flat     Diagnostic Test Results:  No results found for this or any previous visit (from the past 24 hour(s)).    ASSESSMENT/PLAN:     (R68.89) Forgetfulness  (primary encounter diagnosis)  Comment: she scored a 6/28 on the 6 CIT dementia screen which is at the upper limit of normal     Plan: CBC with platelets differential        Suspect we are seeing early mild cognitive impairment . We discussed some concepts. For now we simply keep an eye on things     (G24.3) Spasmodic torticollis  Comment: refills provided   Plan: HYDROcodone-acetaminophen (NORCO) 7.5-325 MG         per tablet, CBC with platelets differential,          DISCONTINUED: HYDROcodone-acetaminophen (NORCO)        7.5-325 MG per tablet, DISCONTINUED:         HYDROcodone-acetaminophen (NORCO) 7.5-325 MG         per tablet            (M79.7) Fibromyalgia  Comment: as above   Plan: HYDROcodone-acetaminophen (NORCO) 7.5-325 MG         per tablet, CBC with platelets differential,         DISCONTINUED: HYDROcodone-acetaminophen (NORCO)        7.5-325 MG per tablet, DISCONTINUED:         HYDROcodone-acetaminophen (NORCO) 7.5-325 MG         per tablet            (E78.5) Hyperlipidemia with target LDL less than 100  Comment: due for recheck   Plan: Lipid panel reflex to direct LDL Non-fasting            (E05.90) Subclinical hyperthyroidism  Comment: Patient due for thyroid lab recheck today.    Plan: TSH with free T4 reflex, CBC with platelets         differential            (E55.9) Hypovitaminosis D  Comment: Recheck labs today.   Plan: Vitamin D Deficiency, CBC with platelets         differential            (I10) Hypertension goal BP (blood pressure) < 140/90  Comment: Well controlled. BP low today. Patient due for lab recheck.   Plan: Basic metabolic panel, CBC with platelets         differential        Due for recheck       The information in this document, created by the medical scribe for me, accurately reflects the services I personally performed and the decisions made by me. I have reviewed and approved this document for accuracy.   MD Gerber Dixon MD  [facility]

## 2018-11-05 NOTE — NURSING NOTE
Six Item Cognitive Impairment Test   (6CIT):      What year is it?                               Correct - 0 points    What month is it?                               Correct - 0 points      Give the patient an address to remember with five components:   [place] Alonso ( first and last name - 2 components)   [address]  (number and name of street - 2 components)   [place] ( city - 1 component)      About what time is it (within the hour)? Correct - 0 points    Count backwards from 20 to 1:   Correct - 0 points    Say the months of the year in reverse: Correct - 0 points    Repeat the address phrase:   3 errors - 6 points    Total 6CIT Score:      6/28    Interpretation: The 6CIT uses an inverse score and questions are weighted to produce a total out of 28. Scores of 0-7 are considered normal and 8 or more significant.    Advantages The test has high sensitivity without compromising specificity even in mild dementia. It is easy to translate linguistically and culturally.  Disadvantages The main disadvantage is in the scoring and weighting of the test, which is initially confusing, however computer models have simplified this greatly.    Probability Statistics: At the 7/8 cut off: Overall figures sensitivity 90% specificity 100%, in mild dementia sensitivity = 78% , specificity = 100%    Copyright 2000 [facility], [facility], [place]. Courtesy of Dr. Latrell Figueroa

## 2018-11-05 NOTE — TELEPHONE ENCOUNTER
Signed Prescriptions:                        Disp   Refills    metoprolol tartrate (LOPRESSOR) 50 MG tabl*180 ta*2        Sig: TAKE 1 TABLET BY MOUTH TWICE DAILY.  Authorizing Provider: CHIQUIS LITTLE  Ordering User: AYAH KENNEDY RN refilled medication per [facility] Refill Protocol.     Ayah Kennedy RN

## 2018-11-08 DIAGNOSIS — G24.3 SPASMODIC TORTICOLLIS: ICD-10-CM

## 2018-11-08 DIAGNOSIS — E55.9 HYPOVITAMINOSIS D: ICD-10-CM

## 2018-11-08 DIAGNOSIS — E78.5 HYPERLIPIDEMIA WITH TARGET LDL LESS THAN 100: ICD-10-CM

## 2018-11-08 DIAGNOSIS — M79.7 FIBROMYALGIA: ICD-10-CM

## 2018-11-08 DIAGNOSIS — E05.90 SUBCLINICAL HYPERTHYROIDISM: ICD-10-CM

## 2018-11-08 DIAGNOSIS — I10 HYPERTENSION GOAL BP (BLOOD PRESSURE) < 140/90: ICD-10-CM

## 2018-11-08 DIAGNOSIS — R68.89 FORGETFULNESS: ICD-10-CM

## 2018-11-08 LAB
ANION GAP SERPL CALCULATED.3IONS-SCNC: 5 MMOL/L (ref 3–14)
BASOPHILS # BLD AUTO: 0 10E9/L (ref 0–0.2)
BASOPHILS NFR BLD AUTO: 0.2 %
BUN SERPL-MCNC: 9 MG/DL (ref 7–30)
CALCIUM SERPL-MCNC: 9.2 MG/DL (ref 8.5–10.1)
CHLORIDE SERPL-SCNC: 105 MMOL/L (ref 94–109)
CHOLEST SERPL-MCNC: 110 MG/DL
CO2 SERPL-SCNC: 31 MMOL/L (ref 20–32)
CREAT SERPL-MCNC: 0.72 MG/DL (ref 0.52–1.04)
DIFFERENTIAL METHOD BLD: ABNORMAL
EOSINOPHIL # BLD AUTO: 0.1 10E9/L (ref 0–0.7)
EOSINOPHIL NFR BLD AUTO: 1.6 %
ERYTHROCYTE [DISTWIDTH] IN BLOOD BY AUTOMATED COUNT: 12.4 % (ref 10–15)
GFR SERPL CREATININE-BSD FRML MDRD: 80 ML/MIN/1.7M2
GLUCOSE SERPL-MCNC: 87 MG/DL (ref 70–99)
HCT VFR BLD AUTO: 38.7 % (ref 35–47)
HDLC SERPL-MCNC: 54 MG/DL
HGB BLD-MCNC: 13.4 G/DL (ref 11.7–15.7)
LDLC SERPL CALC-MCNC: 43 MG/DL
LYMPHOCYTES # BLD AUTO: 3 10E9/L (ref 0.8–5.3)
LYMPHOCYTES NFR BLD AUTO: 47.6 %
MCH RBC QN AUTO: 33.8 PG (ref 26.5–33)
MCHC RBC AUTO-ENTMCNC: 34.6 G/DL (ref 31.5–36.5)
MCV RBC AUTO: 98 FL (ref 78–100)
MONOCYTES # BLD AUTO: 0.6 10E9/L (ref 0–1.3)
MONOCYTES NFR BLD AUTO: 9.3 %
NEUTROPHILS # BLD AUTO: 2.6 10E9/L (ref 1.6–8.3)
NEUTROPHILS NFR BLD AUTO: 41.3 %
NONHDLC SERPL-MCNC: 56 MG/DL
PLATELET # BLD AUTO: 116 10E9/L (ref 150–450)
POTASSIUM SERPL-SCNC: 4 MMOL/L (ref 3.4–5.3)
RBC # BLD AUTO: 3.96 10E12/L (ref 3.8–5.2)
SODIUM SERPL-SCNC: 141 MMOL/L (ref 133–144)
TRIGL SERPL-MCNC: 64 MG/DL
TSH SERPL DL<=0.005 MIU/L-ACNC: 1.82 MU/L (ref 0.4–4)
WBC # BLD AUTO: 6.4 10E9/L (ref 4–11)

## 2018-11-08 PROCEDURE — 82306 VITAMIN D 25 HYDROXY: CPT | Performed by: INTERNAL MEDICINE

## 2018-11-08 PROCEDURE — 85025 COMPLETE CBC W/AUTO DIFF WBC: CPT | Performed by: INTERNAL MEDICINE

## 2018-11-08 PROCEDURE — 84443 ASSAY THYROID STIM HORMONE: CPT | Performed by: INTERNAL MEDICINE

## 2018-11-08 PROCEDURE — 80061 LIPID PANEL: CPT | Performed by: INTERNAL MEDICINE

## 2018-11-08 PROCEDURE — 80048 BASIC METABOLIC PNL TOTAL CA: CPT | Performed by: INTERNAL MEDICINE

## 2018-11-08 PROCEDURE — 36415 COLL VENOUS BLD VENIPUNCTURE: CPT | Performed by: INTERNAL MEDICINE

## 2018-11-09 LAB — DEPRECATED CALCIDIOL+CALCIFEROL SERPL-MC: 44 UG/L (ref 20–75)

## 2019-01-17 ENCOUNTER — TELEPHONE (OUTPATIENT)
Dept: INTERNAL MEDICINE | Facility: CLINIC | Age: 69
End: 2019-01-17

## 2019-01-21 NOTE — TELEPHONE ENCOUNTER
Called patient to schedule a screening mammogram.  She said she did not want to schedule one at this time.  ORLANDO Flores

## 2019-02-21 NOTE — PROGRESS NOTES
SUBJECTIVE:   Carina Calvert is a 68 year old female who presents to clinic today for the following health issues:        Hyperlipidemia Follow-Up      Rate your low fat/cholesterol diet?: good    Taking statin?  Yes, no muscle aches from statin    Other lipid medications/supplements?:  none    Hypertension Follow-up      Outpatient blood pressures are not being checked.    Low Salt Diet: low salt      Amount of exercise or physical activity: 6-7 days/week for an average of 30-45 minutes    Problems taking medications regularly: No    Medication side effects: none    Diet: regular (no restrictions)    Patient notes that she recently restarted nortriptyline through [facility] and notes some improvement in pain.  She continues to do botox injections through [facility].  Patient's chronic pain is worse at this time.  She notes flares of fibromyalgia in cold weather.   She continues on norco without side effects.  She has reduced her baclofen dose due to fatigue.      Problem list and histories reviewed & adjusted, as indicated.  Additional history: as documented    Patient Active Problem List   Diagnosis     Neuropathy     Fibromyalgia     Restless leg syndrome     Spasmodic torticollis     Insomnia     Hyperlipidemia with target LDL less than 100     History of hepatitis C     Ex-smoker     Recurrent cold sores     Osteopenia     Migraine headache     Positive occult stool blood test     Subclinical hyperthyroidism     Hypovitaminosis D     Cramp of limb     NSTEMI (non-ST elevated myocardial infarction) (H)     Chronic pain     Hypertension goal BP (blood pressure) < 140/90     Cervical facet joint syndrome     Primary insomnia     Past Surgical History:   Procedure Laterality Date     HERNIA REPAIR  9/2009    ventral     LITHOTRIPSY      2 x      TONSILLECTOMY       TUBAL LIGATION         Social History     Tobacco Use     Smoking status: Former Smoker     Packs/day: 0.20     Years: 15.00     Pack years:  3.00     Types: Cigarettes     Last attempt to quit: 3/31/2013     Years since quittin.9     Smokeless tobacco: Never Used   Substance Use Topics     Alcohol use: No     Family History   Problem Relation Age of Onset     Neurologic Disorder Mother 65        Parkinsons     Alcohol/Drug Mother      Cerebrovascular Disease Mother      Respiratory Father         COPD     Alcohol/Drug Father      Cancer Brother      Diabetes Sister 52     Cancer Maternal Aunt      Cancer Maternal Uncle      C.A.D. No family hx of          Current Outpatient Medications   Medication Sig Dispense Refill     aspirin 81 MG tablet Take 81 mg by mouth daily       baclofen (LIORESAL) 10 MG tablet Take 1 tablet (10 mg) by mouth 4 times daily 360 tablet 1     Cholecalciferol (VITAMIN D) 2000 UNITS tablet Take 1 tablet by mouth daily       gabapentin (NEURONTIN) 300 MG capsule 1 capsule am, 1 at noon 2 at 5pm and 2 at bedtime ( 6 per day ) Not to be refilled until patient calls 540 capsule 3     HYDROcodone-acetaminophen (NORCO) 7.5-325 MG per tablet Take 2 tablets by mouth 3 times daily 28 days or more between refills for controlled medications 180 tablet 0     Melatonin 10 MG CAPS Take 1 capsule by mouth 1 hour prior to bedtime       metoprolol tartrate (LOPRESSOR) 50 MG tablet Take 1 tablet (50 mg) by mouth 2 times daily 180 tablet 1     nitroGLYcerin (NITROSTAT) 0.4 MG sublingual tablet Place 1 tablet (0.4 mg) under the tongue every 5 minutes as needed for chest pain 25 tablet 3     nortriptyline (PAMELOR) 10 MG capsule Take 10 mg by mouth At Bedtime  2     polyethylene glycol (MIRALAX/GLYCOLAX) powder Take 17 g by mouth daily Reported on 5/15/2017       rosuvastatin (CRESTOR) 10 MG tablet TAKE 1 TABLET (10 MG) BY MOUTH DAILY 90 tablet 2     traZODone (DESYREL) 100 MG tablet TAKE FOUR TABLETS NIGHTLY AT BEDTIME 360 tablet 1     VITAMIN B-12 1000 MCG OR TABS 1 tablet daily       Allergies   Allergen Reactions     Droperidol      Ivp Dye  "[Contrast Dye]      BP Readings from Last 3 Encounters:   02/22/19 120/80   11/05/18 90/60   08/06/18 132/70    Wt Readings from Last 3 Encounters:   02/22/19 53.5 kg (118 lb)   11/05/18 55.6 kg (122 lb 9.6 oz)   08/06/18 53.5 kg (118 lb)                  Labs reviewed in EPIC    Reviewed and updated as needed this visit by clinical staff       Reviewed and updated as needed this visit by Provider         ROS:  Constitutional, HEENT, cardiovascular, pulmonary, gi and gu systems are negative, except as otherwise noted.    OBJECTIVE:     /80   Pulse 65   Temp 97.9  F (36.6  C) (Oral)   Resp 20   Ht 1.626 m (5' 4\")   Wt 53.5 kg (118 lb)   SpO2 100%   BMI 20.25 kg/m    Body mass index is 20.25 kg/m .  GENERAL: healthy, alert and no distress  RESP: lungs clear to auscultation - no rales, rhonchi or wheezes  CV: regular rate and rhythm, normal S1 S2, no S3 or S4, no murmur, click or rub, no peripheral edema and peripheral pulses strong  MS: no gross musculoskeletal defects noted, no edema    Diagnostic Test Results:  none     ASSESSMENT/PLAN:     1. Spasmodic torticollis  Stable.  Continue current treatment plan and medications.    - gabapentin (NEURONTIN) 300 MG capsule; 1 capsule am, 1 at noon 2 at 5pm and 2 at bedtime ( 6 per day ) Not to be refilled until patient calls  Dispense: 540 capsule; Refill: 3  - baclofen (LIORESAL) 10 MG tablet; Take 1 tablet (10 mg) by mouth 4 times daily  Dispense: 360 tablet; Refill: 1  - HYDROcodone-acetaminophen (NORCO) 7.5-325 MG per tablet; Take 2 tablets by mouth 3 times daily 28 days or more between refills for controlled medications  Dispense: 180 tablet; Refill: 0    2. Fibromyalgia  Stable.  Continue current treatment plan and medications.    - traZODone (DESYREL) 100 MG tablet; TAKE FOUR TABLETS NIGHTLY AT BEDTIME  Dispense: 360 tablet; Refill: 1  - gabapentin (NEURONTIN) 300 MG capsule; 1 capsule am, 1 at noon 2 at 5pm and 2 at bedtime ( 6 per day ) Not to be " refilled until patient calls  Dispense: 540 capsule; Refill: 3  - baclofen (LIORESAL) 10 MG tablet; Take 1 tablet (10 mg) by mouth 4 times daily  Dispense: 360 tablet; Refill: 1  - HYDROcodone-acetaminophen (NORCO) 7.5-325 MG per tablet; Take 2 tablets by mouth 3 times daily 28 days or more between refills for controlled medications  Dispense: 180 tablet; Refill: 0    3. NSTEMI (non-ST elevated myocardial infarction) (H)  Stable.  Continue current treatment plan and medications.    - metoprolol tartrate (LOPRESSOR) 50 MG tablet; Take 1 tablet (50 mg) by mouth 2 times daily  Dispense: 180 tablet; Refill: 1    4. Hypertension goal BP (blood pressure) < 140/90  Stable.  Continue current treatment plan and medications.    - metoprolol tartrate (LOPRESSOR) 50 MG tablet; Take 1 tablet (50 mg) by mouth 2 times daily  Dispense: 180 tablet; Refill: 1    5. Hyperlipidemia with target LDL less than 100  Stable.  Continue current treatment plan and medications.    - rosuvastatin (CRESTOR) 10 MG tablet; TAKE 1 TABLET (10 MG) BY MOUTH DAILY  Dispense: 90 tablet; Refill: 2    6. Restless leg syndrome  Stable.  Continue current treatment plan and medications.    - gabapentin (NEURONTIN) 300 MG capsule; 1 capsule am, 1 at noon 2 at 5pm and 2 at bedtime ( 6 per day ) Not to be refilled until patient calls  Dispense: 540 capsule; Refill: 3    FUTURE APPOINTMENTS:       - Follow-up visit in 3 months with PCP.    VINOD Malloy [facility]

## 2019-02-22 ENCOUNTER — OFFICE VISIT (OUTPATIENT)
Dept: FAMILY MEDICINE | Facility: CLINIC | Age: 69
End: 2019-02-22
Payer: MEDICARE

## 2019-02-22 VITALS
HEART RATE: 65 BPM | TEMPERATURE: 97.9 F | HEIGHT: 64 IN | SYSTOLIC BLOOD PRESSURE: 120 MMHG | OXYGEN SATURATION: 100 % | WEIGHT: 118 LBS | DIASTOLIC BLOOD PRESSURE: 80 MMHG | RESPIRATION RATE: 20 BRPM | BODY MASS INDEX: 20.14 KG/M2

## 2019-02-22 DIAGNOSIS — G25.81 RESTLESS LEG SYNDROME: ICD-10-CM

## 2019-02-22 DIAGNOSIS — M79.7 FIBROMYALGIA: ICD-10-CM

## 2019-02-22 DIAGNOSIS — G24.3 SPASMODIC TORTICOLLIS: ICD-10-CM

## 2019-02-22 DIAGNOSIS — I10 HYPERTENSION GOAL BP (BLOOD PRESSURE) < 140/90: ICD-10-CM

## 2019-02-22 DIAGNOSIS — I21.4 NSTEMI (NON-ST ELEVATED MYOCARDIAL INFARCTION) (H): ICD-10-CM

## 2019-02-22 DIAGNOSIS — E78.5 HYPERLIPIDEMIA WITH TARGET LDL LESS THAN 100: ICD-10-CM

## 2019-02-22 PROCEDURE — 99214 OFFICE O/P EST MOD 30 MIN: CPT | Performed by: NURSE PRACTITIONER

## 2019-02-22 RX ORDER — TRAZODONE HYDROCHLORIDE 100 MG/1
TABLET ORAL
Qty: 360 TABLET | Refills: 1 | Status: SHIPPED | OUTPATIENT
Start: 2019-02-22 | End: 2019-05-17

## 2019-02-22 RX ORDER — HYDROCODONE BITARTRATE AND ACETAMINOPHEN 7.5; 325 MG/1; MG/1
2 TABLET ORAL 3 TIMES DAILY
Qty: 180 TABLET | Refills: 0 | Status: SHIPPED | OUTPATIENT
Start: 2019-02-22 | End: 2019-02-22

## 2019-02-22 RX ORDER — ROSUVASTATIN CALCIUM 10 MG/1
TABLET, COATED ORAL
Qty: 90 TABLET | Refills: 2 | Status: SHIPPED | OUTPATIENT
Start: 2019-02-22 | End: 2019-08-13

## 2019-02-22 RX ORDER — HYDROCODONE BITARTRATE AND ACETAMINOPHEN 7.5; 325 MG/1; MG/1
2 TABLET ORAL 3 TIMES DAILY
Qty: 180 TABLET | Refills: 0 | Status: SHIPPED | OUTPATIENT
Start: 2019-02-22 | End: 2019-05-17

## 2019-02-22 RX ORDER — GABAPENTIN 300 MG/1
CAPSULE ORAL
Qty: 540 CAPSULE | Refills: 3 | Status: SHIPPED | OUTPATIENT
Start: 2019-02-22 | End: 2019-11-01

## 2019-02-22 RX ORDER — BACLOFEN 10 MG/1
10 TABLET ORAL 4 TIMES DAILY
Qty: 360 TABLET | Refills: 1 | Status: SHIPPED | OUTPATIENT
Start: 2019-02-22 | End: 2019-05-17

## 2019-02-22 RX ORDER — METOPROLOL TARTRATE 50 MG
50 TABLET ORAL 2 TIMES DAILY
Qty: 180 TABLET | Refills: 1 | Status: SHIPPED | OUTPATIENT
Start: 2019-02-22 | End: 2019-05-17

## 2019-02-22 ASSESSMENT — MIFFLIN-ST. JEOR: SCORE: 1060.24

## 2019-02-22 ASSESSMENT — PAIN SCALES - GENERAL: PAINLEVEL: NO PAIN (0)

## 2019-04-30 DIAGNOSIS — G24.3 SPASMODIC TORTICOLLIS: ICD-10-CM

## 2019-04-30 DIAGNOSIS — M79.7 FIBROMYALGIA: ICD-10-CM

## 2019-05-01 RX ORDER — BACLOFEN 10 MG/1
20 TABLET ORAL 3 TIMES DAILY
Qty: 540 TABLET | Refills: 3
Start: 2019-05-01

## 2019-05-01 NOTE — TELEPHONE ENCOUNTER
Spoke to pharmacy and confirmed duplicate request.  Elizabet POSEY CMA ([facility])

## 2019-05-17 ENCOUNTER — OFFICE VISIT (OUTPATIENT)
Dept: FAMILY MEDICINE | Facility: CLINIC | Age: 69
End: 2019-05-17
Payer: MEDICARE

## 2019-05-17 VITALS
WEIGHT: 122.2 LBS | OXYGEN SATURATION: 97 % | RESPIRATION RATE: 18 BRPM | DIASTOLIC BLOOD PRESSURE: 68 MMHG | HEART RATE: 81 BPM | TEMPERATURE: 97.2 F | BODY MASS INDEX: 20.98 KG/M2 | SYSTOLIC BLOOD PRESSURE: 128 MMHG

## 2019-05-17 DIAGNOSIS — G43.909 MIGRAINE WITHOUT STATUS MIGRAINOSUS, NOT INTRACTABLE, UNSPECIFIED MIGRAINE TYPE: ICD-10-CM

## 2019-05-17 DIAGNOSIS — I10 HYPERTENSION GOAL BP (BLOOD PRESSURE) < 140/90: ICD-10-CM

## 2019-05-17 DIAGNOSIS — F51.01 PRIMARY INSOMNIA: ICD-10-CM

## 2019-05-17 DIAGNOSIS — M79.7 FIBROMYALGIA: ICD-10-CM

## 2019-05-17 DIAGNOSIS — G24.3 SPASMODIC TORTICOLLIS: Primary | ICD-10-CM

## 2019-05-17 DIAGNOSIS — I21.4 NSTEMI (NON-ST ELEVATED MYOCARDIAL INFARCTION) (H): ICD-10-CM

## 2019-05-17 PROCEDURE — 99214 OFFICE O/P EST MOD 30 MIN: CPT | Performed by: INTERNAL MEDICINE

## 2019-05-17 RX ORDER — HYDROCODONE BITARTRATE AND ACETAMINOPHEN 7.5; 325 MG/1; MG/1
2 TABLET ORAL 3 TIMES DAILY
Qty: 180 TABLET | Refills: 0 | Status: SHIPPED | OUTPATIENT
Start: 2019-05-17 | End: 2019-07-14

## 2019-05-17 RX ORDER — TRAZODONE HYDROCHLORIDE 100 MG/1
TABLET ORAL
Qty: 360 TABLET | Refills: 1 | Status: SHIPPED | OUTPATIENT
Start: 2019-05-17 | End: 2020-03-22

## 2019-05-17 RX ORDER — HYDROCODONE BITARTRATE AND ACETAMINOPHEN 7.5; 325 MG/1; MG/1
2 TABLET ORAL 3 TIMES DAILY
Qty: 180 TABLET | Refills: 0 | Status: SHIPPED | OUTPATIENT
Start: 2019-05-17 | End: 2019-05-17

## 2019-05-17 RX ORDER — NORTRIPTYLINE HCL 10 MG
30 CAPSULE ORAL AT BEDTIME
Qty: 270 CAPSULE | Refills: 3 | Status: SHIPPED | OUTPATIENT
Start: 2019-05-17 | End: 2020-07-06

## 2019-05-17 RX ORDER — BACLOFEN 10 MG/1
10 TABLET ORAL 4 TIMES DAILY
Qty: 360 TABLET | Refills: 1 | Status: SHIPPED | OUTPATIENT
Start: 2019-05-17 | End: 2020-03-11

## 2019-05-17 RX ORDER — METOPROLOL TARTRATE 50 MG
50 TABLET ORAL 2 TIMES DAILY
Qty: 180 TABLET | Refills: 1 | Status: SHIPPED | OUTPATIENT
Start: 2019-05-17 | End: 2020-02-04

## 2019-05-17 NOTE — PROGRESS NOTES
SUBJECTIVE:   Carina Calvert is a 69 year old female who presents to clinic today for the following   health issues:        Chief Complaint   Patient presents with     Recheck Medication        Spasmodic torticollis  Fibromyalgia  NSTEMI (non-ST elevated myocardial infarction) (H)  Hypertension goal BP (blood pressure) < 140/90  Migraine without status migrainosus, not intractable, unspecified migraine type  Primary insomnia     A patient we have seen longterm for medication management and primary care. Generally doing well.    Last appointment with me was 11/2018, saw RAFFI Malloy nurse practitioner  2-    I postponed essentially everything from Preventative healthcare maintenance goals today at patient request    Using dysport now for botox injections [ a generic medication she says ?] , with Dr. 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 So the botox injections are supposed to help with migraine headaches . Symptoms have been ongoing for many weeks.     She had an original injury jumping on a trampoline nearly broke her neck roughly 10 years ago.       Additional history: as documented    Reviewed  and updated as needed this visit by clinical staff  Tobacco  Allergies  Meds  Med Hx  Surg Hx  Fam Hx  Soc Hx        Reviewed and updated as needed this visit by Provider         Patient Active Problem List   Diagnosis     Neuropathy     Fibromyalgia     Restless leg syndrome     Spasmodic torticollis     Insomnia     Hyperlipidemia with target LDL less than 100     History of hepatitis C     Ex-smoker     Recurrent cold sores     Osteopenia     Migraine headache     Positive occult stool blood test     Subclinical hyperthyroidism     Hypovitaminosis D     Cramp of limb     NSTEMI (non-ST elevated myocardial infarction) (H)     Chronic pain     Hypertension goal BP (blood pressure) < 140/90     Cervical facet  joint syndrome     Primary insomnia     Past Surgical History:   Procedure Laterality Date     HERNIA REPAIR  2009    ventral     LITHOTRIPSY      2 x      TONSILLECTOMY       TUBAL LIGATION         Social History     Tobacco Use     Smoking status: Former Smoker     Packs/day: 0.20     Years: 15.00     Pack years: 3.00     Types: Cigarettes     Last attempt to quit: 3/31/2013     Years since quittin.1     Smokeless tobacco: Never Used   Substance Use Topics     Alcohol use: No     Family History   Problem Relation Age of Onset     Neurologic Disorder Mother 65        Parkinsons     Alcohol/Drug Mother      Cerebrovascular Disease Mother      Respiratory Father         COPD     Alcohol/Drug Father      Cancer Brother      Diabetes Sister 52     Cancer Maternal Aunt      Cancer Maternal Uncle      C.A.D. No family hx of          Current Outpatient Medications   Medication Sig Dispense Refill     aspirin 81 MG tablet Take 81 mg by mouth daily       baclofen (LIORESAL) 10 MG tablet Take 1 tablet (10 mg) by mouth 4 times daily 360 tablet 1     Cholecalciferol (VITAMIN D) 2000 UNITS tablet Take 1 tablet by mouth daily       gabapentin (NEURONTIN) 300 MG capsule 1 capsule am, 1 at noon 2 at 5pm and 2 at bedtime ( 6 per day ) Not to be refilled until patient calls 540 capsule 3     HYDROcodone-acetaminophen (NORCO) 7.5-325 MG per tablet Take 2 tablets by mouth 3 times daily 28 days or more between refills for controlled medications 180 tablet 0     Melatonin 10 MG CAPS Take 1 capsule by mouth 1 hour prior to bedtime       metoprolol tartrate (LOPRESSOR) 50 MG tablet Take 1 tablet (50 mg) by mouth 2 times daily 180 tablet 1     nitroGLYcerin (NITROSTAT) 0.4 MG sublingual tablet Place 1 tablet (0.4 mg) under the tongue every 5 minutes as needed for chest pain 25 tablet 3     nortriptyline (PAMELOR) 10 MG capsule Take 3 capsules (30 mg) by mouth At Bedtime 270 capsule 3     polyethylene glycol (MIRALAX/GLYCOLAX) powder  Take 17 g by mouth daily Reported on 5/15/2017       rosuvastatin (CRESTOR) 10 MG tablet TAKE 1 TABLET (10 MG) BY MOUTH DAILY 90 tablet 2     traZODone (DESYREL) 100 MG tablet TAKE FOUR TABLETS NIGHTLY AT BEDTIME 360 tablet 1     VITAMIN B-12 1000 MCG OR TABS 1 tablet daily       Allergies   Allergen Reactions     Diatrizoate Hives     Droperidol      Ivp Dye [Contrast Dye]      Recent Labs   Lab Test 11/08/18  1308 11/14/17  1450 10/18/16  1532  07/15/13   LDL 43 30 50  --   --    HDL 54 57 42*  --   --    TRIG 64 103  --   --   --    ALT  --  23 29  --  12   CR 0.72 0.67 0.69  --  0.75   GFRESTIMATED 80 88 85  --  >60   GFRESTBLACK >90 >90 >90  African American GFR Calc    --  >60   POTASSIUM 4.0 4.3 4.7  --  4.0   TSH 1.82 1.03 1.84   < > 1.39    < > = values in this interval not displayed.      BP Readings from Last 3 Encounters:   05/17/19 128/68   02/22/19 120/80   11/05/18 90/60    Wt Readings from Last 3 Encounters:   05/17/19 55.4 kg (122 lb 3.2 oz)   02/22/19 53.5 kg (118 lb)   11/05/18 55.6 kg (122 lb 9.6 oz)                  Labs reviewed in EPIC    ROS:  Constitutional, HEENT, cardiovascular, pulmonary, gi and gu systems are negative, except as otherwise noted.    OBJECTIVE:                                                    /68   Pulse 81   Temp 97.2  F (36.2  C) (Oral)   Resp 18   Wt 55.4 kg (122 lb 3.2 oz)   SpO2 97%   BMI 20.98 kg/m    Body mass index is 20.98 kg/m .  GENERAL APPEARANCE: healthy, alert and no distress  EYES: Eyes grossly normal to inspection, PERRL and conjunctivae and sclerae normal  NECK: taut musculature   MS: extremities normal- no gross deformities noted  PSYCH: mentation appears normal and affect normal/bright    Diagnostic test results:  Diagnostic Test Results:  No orders of the defined types were placed in this encounter.         ASSESSMENT/PLAN:                                                    1. Spasmodic torticollis  We reviewed her medication and we are  providing refills for 6 months on all medications except for the HYDROcodone (VICODIN)   - baclofen (LIORESAL) 10 MG tablet; Take 1 tablet (10 mg) by mouth 4 times daily  Dispense: 360 tablet; Refill: 1  - HYDROcodone-acetaminophen (NORCO) 7.5-325 MG per tablet; Take 2 tablets by mouth 3 times daily 28 days or more between refills for controlled medications  Dispense: 180 tablet; Refill: 0    2. Fibromyalgia  As detailed above   - baclofen (LIORESAL) 10 MG tablet; Take 1 tablet (10 mg) by mouth 4 times daily  Dispense: 360 tablet; Refill: 1  - traZODone (DESYREL) 100 MG tablet; TAKE FOUR TABLETS NIGHTLY AT BEDTIME  Dispense: 360 tablet; Refill: 1  - HYDROcodone-acetaminophen (NORCO) 7.5-325 MG per tablet; Take 2 tablets by mouth 3 times daily 28 days or more between refills for controlled medications  Dispense: 180 tablet; Refill: 0    3. NSTEMI (non-ST elevated myocardial infarction) (H)    - metoprolol tartrate (LOPRESSOR) 50 MG tablet; Take 1 tablet (50 mg) by mouth 2 times daily  Dispense: 180 tablet; Refill: 1    4. Hypertension goal BP (blood pressure) < 140/90  As above   - metoprolol tartrate (LOPRESSOR) 50 MG tablet; Take 1 tablet (50 mg) by mouth 2 times daily  Dispense: 180 tablet; Refill: 1    5. Migraine without status migrainosus, not intractable, unspecified migraine type  Reviewed her history of migraine headaches  And treatment with Dr. Junior Doyle with [facility]  , continue current plan of care   / ongoing management     6. Primary insomnia  I agree to take over prescribing  For her nortriptyline (PAMELOR)   - nortriptyline (PAMELOR) 10 MG capsule; Take 3 capsules (30 mg) by mouth At Bedtime  Dispense: 270 capsule; Refill: 3  - traZODone (DESYREL) 100 MG tablet; TAKE FOUR TABLETS NIGHTLY AT BEDTIME  Dispense: 360 tablet; Refill: 1      Follow up with Provider - 3 months for HYDROcodone (VICODIN)      Gerber Jain MD  [facility]

## 2019-07-11 ENCOUNTER — TELEPHONE (OUTPATIENT)
Dept: SCHEDULING | Facility: CLINIC | Age: 69
End: 2019-07-11

## 2019-07-12 ENCOUNTER — NURSE TRIAGE (OUTPATIENT)
Dept: NURSING | Facility: CLINIC | Age: 69
End: 2019-07-12

## 2019-07-12 DIAGNOSIS — G24.3 SPASMODIC TORTICOLLIS: ICD-10-CM

## 2019-07-12 DIAGNOSIS — M79.7 FIBROMYALGIA: ICD-10-CM

## 2019-07-12 NOTE — TELEPHONE ENCOUNTER
Controlled Substance Refill Request for HYDROcodone-acetaminophen (NORCO) 7.5-325 MG per tablet  Problem List Complete:  Yes  Overview  Edited:  Gerber Jain MD  4/19/2016  Patient is followed by GERBER JAIN for ongoing prescription of pain medication. All refills should be approved by this provider, or covering partner.     Medication(s): HYDROcodone (VICODIN) 7.5/325   .   Maximum quantity per month: 180  Clinic visit frequency required: Q 3 months      Controlled substance agreement on file: Yes  Date(s): 11/2015     Pain Clinic evaluation in the past: Yes  Date/Location: [facility]      DIRE Total Score(s):  No flowsheet data found.     Last [facility] website verification: done on January 2016  [email or web address]                  checked in past 3 months?  No, route to RN

## 2019-07-12 NOTE — TELEPHONE ENCOUNTER
Pt calls in with concern of getting her [place] prescription filled  She was given 3 prescriptions for Norco on May 17, 2019 by Dr Jain ( 60 tablets each )    Pt filled first in May - no problem   Pt filled second one in June - no problem     Today - went to [facility] [address] [place] [address] to get 3 rd and final prescription for Norco filled and was told they could not fill it because of a New Law that started July 1 - whereas - can only have 30 day old script    Pharmacist called at pharmacy above and was told the same - they could not fill because of NEW LAW that started July 1     Pager  called >   Page out to a Dr Mejía at 5:45 pm    Paged out 2nd time to Dr Mejía 6:08 pm    Paged 3 rd time 6 :30 --  This time  connected me directly to Dr Mejía -- MD informed me of same -- yes - aware of new law - suggested pt go to Urgent Care and if that failed go to nearest emergency Dept to see if can get enough [place] to get thru the weekend     Pt called back - and relayed the above information     Pt states she WILL try Urgent care first     Protocol and care advice reviewed  Caller states understanding of the recommended disposition    Advised to call back if further questions or concerns    Daniel Wang , RN / [facility]      Reason for Disposition    Caller has URGENT medication question about med that PCP prescribed and triager unable to answer question    Protocols used: MEDICATION QUESTION CALL-A-

## 2019-07-12 NOTE — TELEPHONE ENCOUNTER
Patient calling needs a new script for her Hydrocodone, ran out yesterday. states took a script dated for may to pharmacy and was told need new script. Please call to advise when script is ready to be picked up at .

## 2019-07-14 RX ORDER — HYDROCODONE BITARTRATE AND ACETAMINOPHEN 7.5; 325 MG/1; MG/1
2 TABLET ORAL 3 TIMES DAILY
Qty: 180 TABLET | Refills: 0 | Status: SHIPPED | OUTPATIENT
Start: 2019-07-14 | End: 2019-08-13

## 2019-07-15 NOTE — TELEPHONE ENCOUNTER
Norco prescription at  for .  Patient called and informed.  Patient said her , Tony, will . Alice John,

## 2019-07-31 ENCOUNTER — TELEPHONE (OUTPATIENT)
Dept: FAMILY MEDICINE | Facility: CLINIC | Age: 69
End: 2019-07-31

## 2019-07-31 NOTE — TELEPHONE ENCOUNTER
Reason for call:  Medication question  Patient called regarding (reason for call): prescription  Additional comments: Patient has questions on filling her next Rx for Vicodin and also she would like for Dr. Jain to know that her  has passed away. Please call.     Phone number to reach patient:  Home number on file [phone] (home)    Best Time:  any    Can we leave a detailed message on this number?  YES

## 2019-08-01 NOTE — TELEPHONE ENCOUNTER
Called pt to let her know that her  picked up on 7/15/19 and her pharmacy also said that it was filled on 7/15/19 for 180 pills. Pt said she is going to be out of it by mid August.  Dr Jain's last office note says that pt needs to make appointment for med recheck by 6/17/19. So made appointment for 8/13/19 with Dr Jain for [place] med recheck.  Anny REIS CMA

## 2019-08-01 NOTE — TELEPHONE ENCOUNTER
Called pt to verify if she picked up her Norco Rx that has been waiting for her to  since 7/15/19. Pt is saying that she received on 7/12/19 and she is out of it. She has only few left for August. She couldn't tell if the Rx was picked up or not so I will check  if the Rx is still there and will call her back.  Anny REIS CMA

## 2019-08-13 ENCOUNTER — OFFICE VISIT (OUTPATIENT)
Dept: FAMILY MEDICINE | Facility: CLINIC | Age: 69
End: 2019-08-13
Payer: MEDICARE

## 2019-08-13 VITALS
HEIGHT: 64 IN | DIASTOLIC BLOOD PRESSURE: 68 MMHG | TEMPERATURE: 97.9 F | OXYGEN SATURATION: 98 % | SYSTOLIC BLOOD PRESSURE: 158 MMHG | HEART RATE: 63 BPM | RESPIRATION RATE: 18 BRPM | WEIGHT: 120 LBS | BODY MASS INDEX: 20.49 KG/M2

## 2019-08-13 DIAGNOSIS — G47.00 PERSISTENT INSOMNIA: ICD-10-CM

## 2019-08-13 DIAGNOSIS — G24.3 SPASMODIC TORTICOLLIS: Primary | ICD-10-CM

## 2019-08-13 DIAGNOSIS — I10 HYPERTENSION GOAL BP (BLOOD PRESSURE) < 140/90: ICD-10-CM

## 2019-08-13 DIAGNOSIS — M79.7 FIBROMYALGIA: ICD-10-CM

## 2019-08-13 DIAGNOSIS — F43.21 GRIEF: ICD-10-CM

## 2019-08-13 DIAGNOSIS — E78.5 HYPERLIPIDEMIA WITH TARGET LDL LESS THAN 100: ICD-10-CM

## 2019-08-13 DIAGNOSIS — I21.4 NSTEMI (NON-ST ELEVATED MYOCARDIAL INFARCTION) (H): ICD-10-CM

## 2019-08-13 PROCEDURE — 99214 OFFICE O/P EST MOD 30 MIN: CPT | Performed by: INTERNAL MEDICINE

## 2019-08-13 RX ORDER — HYDROCODONE BITARTRATE AND ACETAMINOPHEN 7.5; 325 MG/1; MG/1
2 TABLET ORAL 3 TIMES DAILY
Qty: 180 TABLET | Refills: 0 | Status: SHIPPED | OUTPATIENT
Start: 2019-08-13 | End: 2019-09-13

## 2019-08-13 RX ORDER — ROSUVASTATIN CALCIUM 10 MG/1
TABLET, COATED ORAL
Qty: 90 TABLET | Refills: 3 | Status: SHIPPED | OUTPATIENT
Start: 2019-08-13 | End: 2020-02-04

## 2019-08-13 ASSESSMENT — MIFFLIN-ST. JEOR: SCORE: 1064.32

## 2019-08-13 NOTE — PROGRESS NOTES
Subjective     Carina Calvert is a 69 year old female who presents to clinic today for the following health issues:    HPI   Acute Grief, Depression  She reports that she recently lost her . This was a pretty tragic accident for her and so her daughter has since moved in with her. She reports that her family has been very helpful for her during this time and even supplying her with food. She says that sometimes she isn't very hungry but her weight remains unchanged. She has a prior history of depression and this recent event has really taken a toll on her mood. She has not needed formal counseling psychologist referral and I only just learned of all of this today     Wt Readings from Last 5 Encounters:   08/13/19 54.4 kg (120 lb)   05/17/19 55.4 kg (122 lb 3.2 oz)   02/22/19 53.5 kg (118 lb)   11/05/18 55.6 kg (122 lb 9.6 oz)   08/06/18 53.5 kg (118 lb)     Chronic Pain, Insomnia   She has not been following with the chronic pain clinic, at least not for the last year or so. She is currently taking 45 MME and gets little benefit with Trazadone for her chronic severe insomnia. She also asks about something that will help her with her sleep. I recommended a consult with Davi Claire PsyD, LP. As I am in no hurry to provide long term benzodiazepine medications or zolpidem [ Ambien ] which seems a terrible idea in my opinion . I explained to patient all of this .     Patient Active Problem List   Diagnosis     Neuropathy     Fibromyalgia     Restless leg syndrome     Spasmodic torticollis     Insomnia     Hyperlipidemia with target LDL less than 100     History of hepatitis C     Ex-smoker     Recurrent cold sores     Osteopenia     Migraine headache     Positive occult stool blood test     Subclinical hyperthyroidism     Hypovitaminosis D     Cramp of limb     NSTEMI (non-ST elevated myocardial infarction) (H)     Chronic pain     Hypertension goal BP (blood pressure) < 140/90     Cervical facet joint  "syndrome     Primary insomnia     Past Surgical History:   Procedure Laterality Date     HERNIA REPAIR  2009    ventral     LITHOTRIPSY      2 x      TONSILLECTOMY       TUBAL LIGATION         Social History     Tobacco Use     Smoking status: Former Smoker     Packs/day: 0.20     Years: 15.00     Pack years: 3.00     Types: Cigarettes     Last attempt to quit: 3/31/2013     Years since quittin.3     Smokeless tobacco: Never Used   Substance Use Topics     Alcohol use: No     Family History   Problem Relation Age of Onset     Neurologic Disorder Mother 65        Parkinsons     Alcohol/Drug Mother      Cerebrovascular Disease Mother      Respiratory Father         COPD     Alcohol/Drug Father      Cancer Brother      Diabetes Sister 52     Cancer Maternal Aunt      Cancer Maternal Uncle      C.A.D. No family hx of          BP Readings from Last 3 Encounters:   19 (!) 158/68   19 128/68   19 120/80    Wt Readings from Last 3 Encounters:   19 54.4 kg (120 lb)   19 55.4 kg (122 lb 3.2 oz)   19 53.5 kg (118 lb)         Reviewed and updated as needed this visit by Provider       Review of Systems   ROS COMP: Constitutional, HEENT, cardiovascular, pulmonary, GI, , musculoskeletal, neuro, skin, endocrine and psych systems are negative, except as otherwise noted.    This document serves as a record of the services and decisions personally performed and made by Gerber Jain MD. It was created on his behalf by Edgar Vance, a trained medical scribe. The creation of this document is based on the provider's statements to the medical scribe.  Edgar Vance 10:56 AM 2019      Objective    Pulse 63   Temp 97.9  F (36.6  C) (Oral)   Resp 18   Ht 1.626 m (5' 4\")   Wt 54.4 kg (120 lb)   SpO2 98%   BMI 20.60 kg/m    Body mass index is 20.6 kg/m .  Physical Exam   GENERAL: healthy, alert and no distress, talkative and appropriate , answers all questions appropriately , " normal grooming and affect   EYES: Eyes grossly normal to inspection, PERRL and conjunctivae and sclerae normal  SKIN: no suspicious lesions or rashes to visible skin   NEURO: Normal strength and tone, mentation intact and speech normal  PSYCH: mentation appears normal, affect normal/bright    Diagnostic Test Results:  Labs reviewed in Epic  No results found for this or any previous visit (from the past 24 hour(s)).      Assessment & Plan     (G47.00) Persistent insomnia  (primary encounter diagnosis)  Comment: mentions several issues with this and I informed her that she may need more than just simply some medication for her sleep issues. Recommended a sleep specialist for this.   Plan: SLEEP PSYCHOLOGY REFERRAL            (G24.3) Spasmodic torticollis  Comment: recommended further follow-up with the pain clinic  Plan: HYDROcodone-acetaminophen (NORCO) 7.5-325 MG         per tablet, she is on multiple medications but these other symptomatic medications including muscle relaxers , et al, are already refilled            (M79.7) Fibromyalgia  Comment: as above   Plan: HYDROcodone-acetaminophen (NORCO) 7.5-325 MG         per tablet - she had asked today to know if we could increase her Norco 5/325 (Hydrocodone Bitartrate and Acetaminophen) which I am simply not willing to do            (I21.4) NSTEMI (non-ST elevated myocardial infarction) (H)  Comment: this is a quiescent issue at this point    Plan: continue current plan of care      (I10) Hypertension goal BP (blood pressure) < 140/90  Comment: elevated reading today which is likely due to acute grief   Plan: recheck at next appointment     (E78.5) Hyperlipidemia with target LDL less than 100  Comment: recheck labs today   Plan: rosuvastatin (CRESTOR) 10 MG tablet            (F43.21) Grief  Comment: grieving with the passing of her . Further follow-up with a mental health provider to be considered if not improving.  Plan: posture of observation , recheck 3  months      No follow-ups on file.    The information in this document, created by the medical scribe for me, accurately reflects the services I personally performed and the decisions made by me. I have reviewed and approved this document for accuracy prior to leaving the patient care area.  August 13, 2019 10:56 AM    Gerber Jain MD  [facility]

## 2019-08-29 ENCOUNTER — OFFICE VISIT (OUTPATIENT)
Dept: SLEEP MEDICINE | Facility: CLINIC | Age: 69
End: 2019-08-29
Payer: MEDICARE

## 2019-08-29 VITALS
DIASTOLIC BLOOD PRESSURE: 73 MMHG | HEIGHT: 64 IN | WEIGHT: 121 LBS | OXYGEN SATURATION: 98 % | HEART RATE: 51 BPM | SYSTOLIC BLOOD PRESSURE: 158 MMHG | BODY MASS INDEX: 20.66 KG/M2

## 2019-08-29 DIAGNOSIS — G47.52 RBD (REM BEHAVIORAL DISORDER): ICD-10-CM

## 2019-08-29 DIAGNOSIS — G25.81 RESTLESS LEGS SYNDROME (RLS): ICD-10-CM

## 2019-08-29 DIAGNOSIS — F51.04 CHRONIC INSOMNIA: Primary | ICD-10-CM

## 2019-08-29 PROCEDURE — 90791 PSYCH DIAGNOSTIC EVALUATION: CPT | Performed by: PSYCHOLOGIST

## 2019-08-29 ASSESSMENT — ANXIETY QUESTIONNAIRES
GAD7 TOTAL SCORE: 5
IF YOU CHECKED OFF ANY PROBLEMS ON THIS QUESTIONNAIRE, HOW DIFFICULT HAVE THESE PROBLEMS MADE IT FOR YOU TO DO YOUR WORK, TAKE CARE OF THINGS AT HOME, OR GET ALONG WITH OTHER PEOPLE: SOMEWHAT DIFFICULT
6. BECOMING EASILY ANNOYED OR IRRITABLE: NOT AT ALL
2. NOT BEING ABLE TO STOP OR CONTROL WORRYING: SEVERAL DAYS
7. FEELING AFRAID AS IF SOMETHING AWFUL MIGHT HAPPEN: MORE THAN HALF THE DAYS
1. FEELING NERVOUS, ANXIOUS, OR ON EDGE: NOT AT ALL
3. WORRYING TOO MUCH ABOUT DIFFERENT THINGS: SEVERAL DAYS
5. BEING SO RESTLESS THAT IT IS HARD TO SIT STILL: NOT AT ALL

## 2019-08-29 ASSESSMENT — PATIENT HEALTH QUESTIONNAIRE - PHQ9
SUM OF ALL RESPONSES TO PHQ QUESTIONS 1-9: 16
5. POOR APPETITE OR OVEREATING: SEVERAL DAYS

## 2019-08-29 ASSESSMENT — MIFFLIN-ST. JEOR: SCORE: 1068.85

## 2019-08-29 NOTE — NURSING NOTE
"Chief Complaint   Patient presents with     Sleep Problem     consult- possible insomnia       Initial BP (!) 158/73   Pulse 51   Ht 1.626 m (5' 4\")   Wt 54.9 kg (121 lb)   SpO2 98%   BMI 20.77 kg/m   Estimated body mass index is 20.77 kg/m  as calculated from the following:    Height as of this encounter: 1.626 m (5' 4\").    Weight as of this encounter: 54.9 kg (121 lb).    Medication Reconciliation: complete    Neck circumference: 13.5 inches / 34.5 centimeters.      "

## 2019-08-29 NOTE — Clinical Note
Gonzalo Aly,  I put in an order for in lab sleep study for you to co-sign and scheduled her for consult/results to follow.  Please amend the order if you'd like anything further.  I'm concerned about RBD with mother w/PD, also reports hx of RLS. Lots to work on behaviorally.

## 2019-08-29 NOTE — PATIENT INSTRUCTIONS
Avoid Caffeine after 3 PM.  No alcohol  Avoid smoking, and if you must, avoid cigarette after 5 PM.    You are a good candidate to do behavioral therapy for your insomnia.  However, we need to evaluate for possible REM behavior disorder and have you be seen by one of our sleep medicine physicians, Dr. Jermain Rossi MD.    In the meantime, you are spending excessive time in bed which is contributing to your sleep fragmentation at night.  It will be important for you to avoid daytime napping as much as possible to help you consolidate your sleep at night,.    Because of your chronic pain, rest in a chair or recliner in the livingromm and AVOID retreating to your bedroom where your daughter indicates you are taking multiple short naps through the day.    You are prescribed an 8 hour sleep window between 11:30 - 7:30 AM    Get up and get moving right away in the morning.  Bright morning sunlight is good for your sleep as well.    Make sure you bedroom is save, no glass lamp stands.

## 2019-08-29 NOTE — PROGRESS NOTES
SLEEP MEDICINE CONSULTATION  Sleep Psychology    Name: Carina Calvert MRN# [id number]   Age: 69 year old YOB: 1950     Date of Consultation: Aug 29, 2019  Consultation is requested by: Gerber Jain MD  [address] [facility]  [place] [id number]  Primary care provider: Gerber Jain    Reason for Sleep Consultation     Carina Calvert is a 69 year old female seen today for a behavioral sleep medicine consultation because of insomnia.      Assessment and Plan     Sleep Diagnoses/Recommendations:    1. Chronic insomnia    History of chronic insomnia is multi factored, associated with chronic depression, chronic pain, reported restless legs symptoms and concern for REM behavior disorder.  There are significant psychophysiologic factors also likely contributing to insomnia including markedly extended periods of time in bed, poor stimulus control and inadequate sleep hygiene.  Patient also retreats to the bed to cope with pain and is reported to have multiple inadvertent naps during the day.  Behavior sleep plan to focus on reducing the average time in bed from over 12 hours to approximately 8 hours.  Patient agreed to sleep window between 11:30 PM-7:30 AM.  We introduced strategies for chronic pain management to occur in the living room, separate from the sleep space.  Discussed strategies to maintain alertness and a level of activity during the day.  Follow-up in 4 weeks with the behavioral treatment to be conducted in combination with further sleep medicine evaluation and work-up of possible intrinsic sleep disorders.  Lastly, patient has a goal of gradually reducing or eliminating use of trazodone, currently at 400 mg.    -2. RBD (REM behavioral disorder) (suspected)    There is suspicion for  behavior disorder given long-standing history of dream enactment, thrashing, punching and hitting, all occurring primarily in the last third of the night.  Further there is a family history of Parkinson's  "disease (mother).  Chronic tricyclic use, not on serotonin specific reuptake inhibitor. Does not report snoring, witnessed apneas. Recommend in lab sleep study with follow-up consultation with sleep medicine.      - Comprehensive Sleep Study; Future  - SLEEP EVALUATION & MANAGEMENT REFERRAL - ADULT -[facility] [place]  [phone] (Age 15 and up) (Jermain Rossi MD); Future    3. Restless Legs Syndrome     Reported by history, associated with spasmodic torticollis, currently prescribed gabapentin by primary care provider.  Sleep Medicine Consultation to follow in-lab sleep study.    See patient instructions for initial treatment recommendations and behavioral sleep plan.    Summary Counseling:      Carina was provided information about the pathophysiology of insomnia and psychophysiological factors contributing to the onset and maintenance of insomnia associated with chronic pain, depression, suspected REM Behavior Disorder and restless legs..  Treatment option were discussed including component of cognitive-behavioral therapy for insomnia. The benefits and potential early side effects of treatment including increased daytime sleepiness were discussed. She was advised to seek medical advise and consultation around use of or changes to prescription sleep medication, Patient was counseled on the importance of avoiding driving if drowsy.        Follow-up: 4 weeks sleep psychology, in-lab sleep study, sleep medicine consultation following study.     History of Present Sleep Complaint     Carina Calvert is a 69 year old year old female who reports a long-standing history of chronic insomnia within the context of a prior medical history of long-term chronic pain associated with fibromyalgia and neck and back injury, depression, hypertension, myocardial infarction, stent placement, history of hepatitis C, and chronic narcotic use.    Patient reports that she has been on trazodone for \"decades\".  " "She was referred by her primary care physician over her concerns of high dose of trazodone taken for insomnia (currently on 400 mg).    Carina reports that she typically goes to bed at 11-11:45 PM.  Prior to going to bed she takes her medication that include wounds nortriptyline, 400 mg trazodone, melatonin, 300 mg gabapentin and baclofen.    With medications on board, she estimates sleep latency to be between 15-30 minutes.  She wakes up 2-3 times a night, usually to go to the bathroom or because of pain and states that she falls asleep again relatively easily.  She usually wakes up for the day by about 7 AM but does not get out of bed until about 9 AM or later.  During the day she returns back to the bed a number of times in response to pain symptoms.  Her daughter states that she inadvertently naps frequently during the day either in the bedroom or when reclined in a chair in the living room.  Average total time including naps is estimated to approach at least 12 hours a day.    Carina and her daughter (present during this evaluation) noted a primary concern of \"thrashing and moving at night\".  Daughter explained that her mother talks at night and acts out her dreams.  She provided several video clips where her mother moved and thrashed while talking at night.  Carina indicates that this is occurred for a number of years and that she has in the past it her bed partner (her , now  and more recently her daughter who currently sleeps in the same bed with her since he ).  Patient states that she has never fallen out of the bed or otherwise injured herself.  She does not associate the symptoms with initiation of any specific medication.  She states that her mother was diagnosed with Parkinson's disease.    Patient reports that she was diagnosed with restless legs which she thinks is associated with her fibromyalgia.  She was placed on gabapentin and states that her legs are less restless in the " "evening since starting this medication.  Her daughter reports that during the night she thrashes and vigorously moves her legs during sleep.  Patient states that she now only occasionally has uncomfortable motor restlessness in the later evening.    Patient does not report any history of hypnagogic or hypnopompic hallucinations, sudden sleep attacks or sleep paralysis.  She does not report recurrent nightmares.    Carina denies snoring, witnessed apnea, choking or gasping.  She reports daytime fatigue and tiredness but does not report excessive daytime sleepiness.  She denies any history of drowsy driving.  Carina does use narcotics for management of pain.    Patient reports that her bedroom is quite uncomfortable.  She retreats to it often and associates that with managing chronic and acute pain.  She is frustrated and concerned about her insomnia and her reliance on trazodone.  She does not report extensive sleep specific worry or rumination.    Previous Sleep Studies:  No prior sleep study or consultation    Substance Use:    Tobacco: Recently resumed smoking after , 4-5 cigarettes/day into the evening   Caffeine: 1-2 caffeinated beverages usually in the mornings to early afternoon.   Alcohol: None reported currently  Recreational Drugs: None reported, prior use of cannabis for chronic pain      Screening      STOP Bang 0- 3/8,  Intermediate risk, age, HTN, excessive tiredness    [place] Sleepiness Scale  Total score - [place]: 8 .    VALORIE Total Score: 22       PHQ-9 SCORE 8/29/2019   PHQ-9 Total Score -   PHQ-9 Total Score 16       TRUNG-7 SCORE 1/12/2017 5/10/2018 8/29/2019   Total Score 0 0 5       Patient Activation Score   No flowsheet data found.      Vitals     BP (!) 158/73   Pulse 51   Ht 1.626 m (5' 4\")   Wt 54.9 kg (121 lb)   SpO2 98%   BMI 20.77 kg/m       Medical History     Patient Active Problem List   Diagnosis     Neuropathy     Fibromyalgia     Restless leg syndrome     Spasmodic " torticollis     Insomnia     Hyperlipidemia with target LDL less than 100     History of hepatitis C     Ex-smoker     Recurrent cold sores     Osteopenia     Migraine headache     Positive occult stool blood test     Subclinical hyperthyroidism     Hypovitaminosis D     Cramp of limb     NSTEMI (non-ST elevated myocardial infarction) (H)     Chronic pain     Hypertension goal BP (blood pressure) < 140/90     Cervical facet joint syndrome     Primary insomnia        Current Outpatient Medications   Medication Sig Dispense Refill     aspirin 81 MG tablet Take 81 mg by mouth daily       baclofen (LIORESAL) 10 MG tablet Take 1 tablet (10 mg) by mouth 4 times daily 360 tablet 1     Cholecalciferol (VITAMIN D) 2000 UNITS tablet Take 1 tablet by mouth daily       gabapentin (NEURONTIN) 300 MG capsule 1 capsule am, 1 at noon 2 at 5pm and 2 at bedtime ( 6 per day ) Not to be refilled until patient calls 540 capsule 3     HYDROcodone-acetaminophen (NORCO) 7.5-325 MG per tablet Take 2 tablets by mouth 3 times daily 28 days or more between refills for controlled medications 180 tablet 0     Melatonin 10 MG CAPS Take 1 capsule by mouth 1 hour prior to bedtime       metoprolol tartrate (LOPRESSOR) 50 MG tablet Take 1 tablet (50 mg) by mouth 2 times daily 180 tablet 1     nitroGLYcerin (NITROSTAT) 0.4 MG sublingual tablet Place 1 tablet (0.4 mg) under the tongue every 5 minutes as needed for chest pain 25 tablet 3     nortriptyline (PAMELOR) 10 MG capsule Take 3 capsules (30 mg) by mouth At Bedtime 270 capsule 3     polyethylene glycol (MIRALAX/GLYCOLAX) powder Take 17 g by mouth daily Reported on 5/15/2017       rosuvastatin (CRESTOR) 10 MG tablet TAKE 1 TABLET (10 MG) BY MOUTH DAILY 90 tablet 3     traZODone (DESYREL) 100 MG tablet TAKE FOUR TABLETS NIGHTLY AT BEDTIME 360 tablet 1     VITAMIN B-12 1000 MCG OR TABS 1 tablet daily         Past Surgical History:   Procedure Laterality Date     HERNIA REPAIR  9/2009    ventral      LITHOTRIPSY      2 x      TONSILLECTOMY       TUBAL LIGATION            Allergies   Allergen Reactions     Diatrizoate Hives     Droperidol      Ivp Dye [Contrast Dye]          Psychiatric History     Prior psychiatric diagnosis Yes, Depressive disordert   Psychiatric Hospitalizations: none   Use of Psychotropics yes, Nortriptyline, trazodone, 400 mg.      Chemical Use     Prior Chemical Dependency Treatment: none         Family History     Family History   Problem Relation Age of Onset     Neurologic Disorder Mother 65        Parkinsons     Alcohol/Drug Mother      Cerebrovascular Disease Mother      Respiratory Father         COPD     Alcohol/Drug Father      Cancer Brother      Diabetes Sister 52     Cancer Maternal Aunt      Cancer Maternal Uncle      C.A.D. No family hx of      Mother with Parkinson's disease  Sleep Disorders: None reported    Social History     Social History     Socioeconomic History     Marital status:      Spouse name: None     Number of children: 4     Years of education: 11     Highest education level: None   Occupational History     Occupation: housecleaning     Employer: [employer],[address] [place] [address]   Social Needs     Financial resource strain: None     Food insecurity:     Worry: None     Inability: None     Transportation needs:     Medical: None     Non-medical: None   Tobacco Use     Smoking status: Current Some Day Smoker     Packs/day: 0.20     Years: 15.00     Pack years: 3.00     Types: Cigarettes     Last attempt to quit: 3/31/2013     Years since quittin.4     Smokeless tobacco: Never Used     Tobacco comment: 4 cigs a day, just lost    Substance and Sexual Activity     Alcohol use: No     Drug use: No     Sexual activity: Never     Partners: Male     Birth control/protection: Surgical, Post-menopausal   Lifestyle     Physical activity:     Days per week: None     Minutes per session: None     Stress: None   Relationships     Social  connections:     Talks on phone: None     Gets together: None     Attends Gnosticist service: None     Active member of club or organization: None     Attends meetings of clubs or organizations: None     Relationship status: None     Intimate partner violence:     Fear of current or ex partner: None     Emotionally abused: None     Physically abused: None     Forced sexual activity: None   Other Topics Concern     Parent/sibling w/ CABG, MI or angioplasty before 65F 55M? 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  The patient was cooperative throughout the evaluation with no signs of hallucinations, delusions, loosening of associations or other thought disturbance.  Mood was normal.  Affect was congruent with mood. Insight and judgement we intact.  Memory was intact for recent and remote elements.  There was no report of suicidal ideation, intention or plan. Attention and concentration were within normal.      Time Spent: 50 minutes    Copy:   Gerber Jain MD  [address] [facility]  [place] [id number]    Davi Claire PsyD, LP, DBSM  Diplomate, Behavioral Sleep Medicine  [facility]

## 2019-08-30 ASSESSMENT — ANXIETY QUESTIONNAIRES: GAD7 TOTAL SCORE: 5

## 2019-09-11 DIAGNOSIS — G24.3 SPASMODIC TORTICOLLIS: ICD-10-CM

## 2019-09-11 DIAGNOSIS — M79.7 FIBROMYALGIA: ICD-10-CM

## 2019-09-11 NOTE — TELEPHONE ENCOUNTER
Reason for call:  Medication     If this is a refill request, has the caller requested the refill from the pharmacy already? Yes     Will the patient be using a [facility]? No     Name of the pharmacy and phone number for the current request: [facility]    Name of the medication requested: HYDROcodone-acetaminophen (NORCO) 7.5-325 MG per tablet    Other request: Call patient to let her know when it's ready to     Phone number to reach patient:  Home number on file [phone] (home)    Best Time:  any    Can we leave a detailed message on this number?  YES

## 2019-09-12 NOTE — TELEPHONE ENCOUNTER
Controlled Substance Refill Request for HYDROcodone-acetaminophen (NORCO) 7.5-325 MG per tablet  Problem List Complete:  Yes  Overview  Edited:  Gerber Jain MD  4/19/2016  Patient is followed by GERBER JAIN for ongoing prescription of pain medication. All refills should be approved by this provider, or covering partner.     Medication(s): HYDROcodone (VICODIN) 7.5/325   .   Maximum quantity per month: 180  Clinic visit frequency required: Q 3 months      Controlled substance agreement on file: Yes  Date(s): 11/2015     Pain Clinic evaluation in the past: Yes  Date/Location: [facility]      DIRE Total Score(s):  No flowsheet data found.     Last [facility] website verification: done on January 2016  [email or web address]                    checked in past 3 months?  Yes Jan 2016

## 2019-09-13 RX ORDER — HYDROCODONE BITARTRATE AND ACETAMINOPHEN 7.5; 325 MG/1; MG/1
2 TABLET ORAL 3 TIMES DAILY
Qty: 180 TABLET | Refills: 0 | Status: SHIPPED | OUTPATIENT
Start: 2019-09-13 | End: 2019-10-10

## 2019-09-13 NOTE — TELEPHONE ENCOUNTER
[place] ([place] reviewed:  reviewed- no concerns    [place]  [email or web address]    Nolvia Castellanos RN - BC

## 2019-09-13 NOTE — TELEPHONE ENCOUNTER
Patient calling regarding prescription. Stated it can take up too 72 hours. She wants someone to call her. Please call to advise.

## 2019-09-13 NOTE — TELEPHONE ENCOUNTER
Patient called and informed that her request has been sent to Dr. Jain to review.  Advised her that message will be sent to him to see if he is able to E-prescribe it tonight.  Patient states she will be out soon.  Would like before weekend. Alice John,

## 2019-09-26 ENCOUNTER — THERAPY VISIT (OUTPATIENT)
Dept: SLEEP MEDICINE | Facility: CLINIC | Age: 69
End: 2019-09-26
Payer: MEDICARE

## 2019-09-26 DIAGNOSIS — F51.04 CHRONIC INSOMNIA: ICD-10-CM

## 2019-09-26 DIAGNOSIS — G47.52 RBD (REM BEHAVIORAL DISORDER): ICD-10-CM

## 2019-09-26 PROCEDURE — 95810 POLYSOM 6/> YRS 4/> PARAM: CPT | Performed by: INTERNAL MEDICINE

## 2019-10-03 LAB — SLPCOMP: NORMAL

## 2019-10-03 NOTE — PROCEDURES
" SLEEP STUDY INTERPRETATION  DIAGNOSTIC POLYSOMNOGRAPHY REPORT      Patient: MURRAY SAMUEL  YOB: 1950  Study Date: 9/26/2019  MRN: [id number]  Referring Provider: Gerber Jain MD  Ordering Provider: Davi Claire PsyD    Indications for Polysomnography: The patient is a 69 y old Female who is 5' 4\" and weighs 121.0 lbs. Her BMI is 20.9, [place] sleepiness scale 8.0 and neck circumference is 34.5 cm. A diagnostic polysomnogram was performed to evaluate for history of dream enactment, thrashing, punching and hitting, all occurring primarily in the last third of the night, family history of Parkinson's disease (mother), chronic tricyclic use, but not on serotonin specific reuptake inhibitor. Does not report snoring, witnessed apneas      Polysomnogram Data: A full night polysomnogram recorded the standard physiologic parameters including EEG, EOG, EMG, ECG, nasal and oral airflow. Respiratory parameters of chest and abdominal movements were recorded with respiratory inductance plethysmography. Oxygen saturation was recorded by pulse oximetry. Hypopnea scoring rule used: 1B 4%.      Sleep Architecture:   The total recording time of the polysomnogram was 388.5 minutes. The total sleep time was 335.5 minutes. Sleep latency was normal at 15.1 minutes with the use of a sleep aid (regular dose of 400mg trazodone). REM latency was 317.5 minutes. Arousal index was 26.6 arousals per hour. Sleep efficiency was normal at 86.4%. Wake after sleep onset was 36.5 minutes. The patient spent 13.7% of total sleep time in Stage N1, 34.3% in Stage N2, 40.8% in Stage N3, and 11.2% in REM. Time in REM supine was 0 minutes.      Respiration:     Events ? The polysomnogram revealed a presence of 17 obstructive, 20 central, and 10 mixed apneas resulting in an apnea index of 8.4 events per hour. There were 1 obstructive hypopneas and 0 central hypopneas resulting in an obstructive hypopnea index of 0.2 and central hypopnea " index of 0 events per hour. The combined apnea/hypopnea index was 8.6 events per hour (central apnea/hypopnea index was 3.6 events per hour). The REM AHI was 9.6 events per hour. The supine AHI was 51.1 events per hour. The RERA index was 12.9 events per hour.  The RDI was 21.5 events per hour.    Snoring - was reported as mild to moderate  and intermittent.    Respiratory rate and pattern - was notable for normal respiratory rate and pattern.    Sustained Sleep Associated Hypoventilation - Transcutaneous carbon dioxide monitoring was not used, however significant hypoventilation was not suggested by oximetry    Sleep Associated Hypoxemia - (Greater than 5 minutes O2 sat at or below 88%) was not present. Baseline oxygen saturation was 95.2%. Lowest oxygen saturation was 81.0%. Time spent less than or equal to 88% was 0 minutes. Time spent less than or equal to 89% was 0 minutes.    Movement Activity: 4-limb RBD montage was used    Periodic Limb Activity - There were 205 PLMs during the entire study. The PLM index was 36.7 movements per hour. The PLM Arousal Index was 1.3 per hour.    REM EMG Activity - Excessive transient/sustained muscle activity was present in >50% of REM epochss    Nocturnal Behavior - Abnormal sleep related behavior was noted during/arising out of  REM sleep. The behaviors appeared to be consistent with vocalizations during an arousal. No nicky RBD was seen    Bruxism - None apparent.      Cardiac Summary:   The average pulse rate was 49.4 bpm. The minimum pulse rate was 43.4 bpm while the maximum pulse rate was 70.1 bpm.  Arrhythmias were not noted.      Assessment:     Evidence for mild STANLEY predominantly supine positional    REM without atonia without nicky REM behavior disorders seen on this study    Periodic limb movements of sleep    Recommendations:    Consider repeat polysomnography with full night titration of positive airway pressure therapy for the control of sleep disordered  breathing.    Based on the presence of mild/moderate obstructive sleep apnea, treatment could be empirically initiated with Auto?titrating PAP therapy with a range of 5 to 15 cmH2O. Recommend clinical follow up with sleep management team.    Patient may be a candidate for dental appliance through referral to Sleep Dentistry for the treatment of obstructive sleep apnea and/or socially disruptive snoring.    If devices are not acceptable or effective, patient may benefit from evaluation of possible surgical options. If she is interested, would recommend referral to specialized ENT-Sleep provider.    Positional therapy could be considered    Advice regarding the risks of drowsy driving.    Suggest optimizing sleep schedule and avoiding sleep deprivation.    Weight management (if BMI > 30).    Pharmacologic therapy should be used for management of restless legs syndrome only if present and clinically indicated and not based on the presence of periodic limb movements alone.    Diagnostic Codes:   Obstructive Sleep Apnea G47.33  Parasomnia, Confusional Arousal G47.51       _____________________________________   Electronically Signed By: Kareem Parrish MD 10/3/19           Range(%) Time in range (min)   0.0 - 89.0 0.0   0.0 - 88.0 -         Stage Min(mm Hg) Max(mm Hg)   Wake - -   NREM(1+2+3) - -   REM - -       Range(mmHg) Time in range (min)   55.0 - 100.0 -   Excluded data <20.0 & >65.0 389.0

## 2019-10-08 ENCOUNTER — OFFICE VISIT (OUTPATIENT)
Dept: SLEEP MEDICINE | Facility: CLINIC | Age: 69
End: 2019-10-08
Payer: MEDICARE

## 2019-10-08 VITALS
SYSTOLIC BLOOD PRESSURE: 147 MMHG | DIASTOLIC BLOOD PRESSURE: 71 MMHG | BODY MASS INDEX: 21 KG/M2 | HEIGHT: 64 IN | WEIGHT: 123 LBS | OXYGEN SATURATION: 97 % | HEART RATE: 60 BPM

## 2019-10-08 DIAGNOSIS — G47.33 OSA (OBSTRUCTIVE SLEEP APNEA): ICD-10-CM

## 2019-10-08 DIAGNOSIS — Z72.0 TOBACCO USE: ICD-10-CM

## 2019-10-08 DIAGNOSIS — G47.52 RBD (REM BEHAVIORAL DISORDER): Primary | ICD-10-CM

## 2019-10-08 DIAGNOSIS — F51.04 CHRONIC INSOMNIA: ICD-10-CM

## 2019-10-08 PROCEDURE — 99204 OFFICE O/P NEW MOD 45 MIN: CPT | Performed by: INTERNAL MEDICINE

## 2019-10-08 ASSESSMENT — MIFFLIN-ST. JEOR: SCORE: 1077.92

## 2019-10-08 NOTE — NURSING NOTE
"Chief Complaint   Patient presents with     Sleep Problem     consult-I have been on 400 mg trazadone for years. I talk, karlie jenkins, use the bathroom through out he night and early morning.      Study Results       Initial BP (!) 147/71   Pulse 60   Ht 1.626 m (5' 4\")   Wt 55.8 kg (123 lb)   SpO2 97%   BMI 21.11 kg/m   Estimated body mass index is 21.11 kg/m  as calculated from the following:    Height as of this encounter: 1.626 m (5' 4\").    Weight as of this encounter: 55.8 kg (123 lb).    Medication Reconciliation: complete      "

## 2019-10-08 NOTE — Clinical Note
Thanks for the referral.  We didn't dive into Restless Legs Syndrome today due to running out of time. I want her to get off Nortriptyline and down on the Trazodone as we may have to add Clonazepam on.

## 2019-10-08 NOTE — PATIENT INSTRUCTIONS
1. Talk with your Neurology group and see if you can get an appointment with Dr. Powers about REM Sleep Behavior Disorder.  2. Start taking 2 of the 10 mg melatonin every night.  3. Drop down to 3 of the Trazodone (300 mg) instead of 4 every night; the melatonin should help with the insomnia.  4. Keep your appointment with Dr. Claire.  5. Talk to Dr. Jain about getting off the Nortriptyline and preferably stay off any tricyclic or serotonergic antidepressants.    6. Quit smoking.    We would expect that the REM sleep behavior would improve on the higher dose of the melatonin. If it persists and you are still punching and screaming every night, we may need to add Clonazepam at low doses.

## 2019-10-08 NOTE — PROGRESS NOTES
"Sleep Consultation:    Date on this visit: 10/8/2019    Carian Calvert is sent by Davi Claire for a sleep consultation regarding multiple sleep issues and sleep study.    Primary Physician: Gerber Jain     She has a history of insomnia, Restless Legs Syndrome, headaches, chronic pain syndrome, CAD, and HTN.  On 2019 patient underwent Polysomnography read by Dr. Kareem Parrish.  Dr. Parrish noted mild positional sleep apnea, PLMs, and REM sleep without atonia without report of nicky dream enactment, however, on my review of the study the events had complex speech with purposeful arm movement during REM sleep consistent with dream enactment.  Based on this finding this study demonstrates REM Sleep Behavior Disorder.  Per daughter, she's \"always been talking and moving in her sleep\" and for at least 20 years.  For the longest time behaviors happened in the early hours of the morning.  Patient reports she's been having dream recall upon waking and can place dream enactment behaviors in context in her dreams.  Patient doesn't know how long she's been on Nortriptyline but thinks it has been a \"long, long time.\"    Has been trying melatonin 10 mg lately but it hasn't been helping the dream enactment behavior.    Has not left the bed. Typically not terribly violent with her physical outbursts.    Met with Dr. Claire last month and was recommended to undergo sleep restriction to sleep window of 11:30 PM - 7:30 AM.      Regarding snoring and sleep apnea - patient is adamant she can't wear a CPAP.  Teeth are not in great shape and she is smoking since   in July.    Lives with daughter.  Has no pets.     Does have family history of Parkinson's in mother.    Allergies:    Allergies   Allergen Reactions     Diatrizoate Hives     Droperidol      Ivp Dye [Contrast Dye]        Medications:    Current Outpatient Medications   Medication Sig Dispense Refill     aspirin 81 MG tablet Take 81 mg by mouth daily       " baclofen (LIORESAL) 10 MG tablet Take 1 tablet (10 mg) by mouth 4 times daily 360 tablet 1     Cholecalciferol (VITAMIN D) 2000 UNITS tablet Take 1 tablet by mouth daily       gabapentin (NEURONTIN) 300 MG capsule 1 capsule am, 1 at noon 2 at 5pm and 2 at bedtime ( 6 per day ) Not to be refilled until patient calls 540 capsule 3     HYDROcodone-acetaminophen (NORCO) 7.5-325 MG per tablet Take 2 tablets by mouth 3 times daily 28 days or more between refills for controlled medications 180 tablet 0     Melatonin 10 MG CAPS Take 1 capsule by mouth 1 hour prior to bedtime       metoprolol tartrate (LOPRESSOR) 50 MG tablet Take 1 tablet (50 mg) by mouth 2 times daily 180 tablet 1     nitroGLYcerin (NITROSTAT) 0.4 MG sublingual tablet Place 1 tablet (0.4 mg) under the tongue every 5 minutes as needed for chest pain 25 tablet 3     nortriptyline (PAMELOR) 10 MG capsule Take 3 capsules (30 mg) by mouth At Bedtime 270 capsule 3     polyethylene glycol (MIRALAX/GLYCOLAX) powder Take 17 g by mouth daily Reported on 5/15/2017       rosuvastatin (CRESTOR) 10 MG tablet TAKE 1 TABLET (10 MG) BY MOUTH DAILY 90 tablet 3     traZODone (DESYREL) 100 MG tablet TAKE FOUR TABLETS NIGHTLY AT BEDTIME 360 tablet 1     VITAMIN B-12 1000 MCG OR TABS 1 tablet daily         Problem List:  Patient Active Problem List    Diagnosis Date Noted     Osteopenia      Priority: High     Dr. Rayo, [facility] endocrinology, had been getting Boniva (Ibandronate Sodium) infusions 4 times a year , see care everywhere        Migraine headache      Priority: High     [facility]       Hyperlipidemia with target LDL less than 100 10/31/2010     Priority: High     Primary insomnia 08/06/2018     Priority: Medium     Cervical facet joint syndrome 02/27/2018     Priority: Medium     Hypertension goal BP (blood pressure) < 140/90      Priority: Medium     Chronic pain 08/04/2015     Priority: Medium     Patient is followed by CHIQU[facility] LITTLE for ongoing  prescription of pain medication.  All refills should be approved by this provider, or covering partner.    Medication(s): HYDROcodone (VICODIN) 7.5/325   .   Maximum quantity per month: 180  Clinic visit frequency required: Q 3 months     Controlled substance agreement on file: Yes       Date(s):   11/2015    Pain Clinic evaluation in the past: Yes       Date/Location:   [facility]     DIRE Total Score(s):  No flowsheet data found.    Last [facility] website verification:  done on  January 2016   [email or web address]         NSTEMI (non-ST elevated myocardial infarction) (H) 06/06/2015     Priority: Medium     5/30/15 successful PCI to mid RCA with Promus 3.0x16mm MATHEW (post dilated with 3.5mm NC balloon)  6/2/15 successful PCI to proximal LCx with Promus 2.75g93ah MATHEW, proximal LAD with Promus 2.77d21oc MATHEW along with POBA of ostium of D1. 2016.  On prasurgel for a least one year interrupted until 6/2016.             Cramp of limb 02/16/2012     Priority: Medium     Subclinical hyperthyroidism      Priority: Medium     NONTOXIC MULTINODULAR GOITER        Hypovitaminosis D      Priority: Medium     Positive occult stool blood test 12/23/2010     Priority: Medium     Patient declines colonoscopy       Ex-smoker 12/07/2010     Priority: Medium     History of hepatitis C      Priority: Medium     Is cured, after a full year of alphainterferon and ribaviron in 2001       Recurrent cold sores      Priority: Medium     Insomnia 08/05/2010     Priority: Medium     Spasmodic torticollis      Priority: Medium     [facility]       Neuropathy      Priority: Medium     [facility]       Fibromyalgia      Priority: Medium     [facility]       Restless leg syndrome      Priority: Medium     [facility]          Past Medical/Surgical History:  Past Medical History:   Diagnosis Date     Chronic constipation      Chronic sciatica     Pain clinic      DDD (degenerative disc disease), cervical      Fibromyalgia      History of hepatitis C      Hypertension goal BP (blood pressure) < 140/90      Kidney stones      Migraine headaches      Neuropathy      Osteopenia     boniva infusions w Dr Rayo at [facility]     PUD (peptic ulcer disease)     h/o bleeding ulcer     Recurrent cold sores      Restless leg syndrome      Spasmodic torticollis      Subclinical hyperthyroidism     NONTOXIC MULTINODULAR GOITER      Vertigo      Vitamin B12 deficiency      Vitamin D deficiency      Past Surgical History:   Procedure Laterality Date     HERNIA REPAIR  2009    ventral     LITHOTRIPSY      2 x      TONSILLECTOMY       TUBAL LIGATION         Social History:  Social History     Socioeconomic History     Marital status:      Spouse name: Not on file     Number of children: 4     Years of education: 11     Highest education level: Not on file   Occupational History     Occupation: housecleaning     Employer: [employer],[address] [place] [address]   Social Needs     Financial resource strain: Not on file     Food insecurity:     Worry: Not on file     Inability: Not on file     Transportation needs:     Medical: Not on file     Non-medical: Not on file   Tobacco Use     Smoking status: Current Some Day Smoker     Packs/day: 0.20     Years: 15.00     Pack years: 3.00     Types: Cigarettes     Last attempt to quit: 3/31/2013     Years since quittin.5     Smokeless tobacco: Never Used     Tobacco comment: 4 cigs a day, just lost    Substance and Sexual Activity     Alcohol use: No     Drug use: No     Sexual activity: Never     Partners: Male     Birth control/protection: Surgical, Post-menopausal   Lifestyle     Physical activity:     Days per week: Not on file     Minutes per session: Not on file     Stress: Not on file   Relationships     Social connections:     Talks on phone: Not on file     Gets together: Not on file     Attends Jain service: Not on  "file     Active member of club or organization: Not on file     Attends meetings of clubs or organizations: Not on file     Relationship status: Not on file     Intimate partner violence:     Fear of current or ex partner: Not on file     Emotionally abused: Not on file     Physically abused: Not on file     Forced sexual activity: Not on file   Other Topics Concern     Parent/sibling w/ CABG, MI or angioplasty before 65F 55M? 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     Hobby Hazards No     Sleep Concern Yes     Comment: On medication     Stress Concern No     Weight Concern Yes     Comment: would like to gain weight     Special Diet No     Back Care No     Exercise No     Bike Helmet Not Asked     Seat Belt Yes     Self-Exams Yes   Social History Narrative     Not on file       Family History:  Family History   Problem Relation Age of Onset     Neurologic Disorder Mother 65        Parkinsons     Alcohol/Drug Mother      Cerebrovascular Disease Mother      Respiratory Father         COPD     Alcohol/Drug Father      Cancer Brother      Diabetes Sister 52     Cancer Maternal Aunt      Cancer Maternal Uncle      C.A.D. No family hx of      Physical Examination:  Vitals: BP (!) 147/71   Pulse 60   Ht 1.626 m (5' 4\")   Wt 55.8 kg (123 lb)   SpO2 97%   BMI 21.11 kg/m    BMI= Body mass index is 21.11 kg/m .    Jarbidge Total Score 10/8/2019   Total score - Jarbidge 9     VALORIE Total Score: 20 (10/08/19 1200)    Impression/Plan:  1. RBD (REM behavioral disorder)  Findings on study are fairly concerning. Long-term occurrence of dream enactment suggests some component of secondary/drug-related RBD with nortriptyline.  First priority is getting her off nortriptyline.    She also has mild Obstructive Sleep Apnea (non-central AHI was 5.0/hr) which might induce pseudo-RBD.  Study showed positional " Obstructive Sleep Apnea but RBD was noted in lateral position with relatively few REM-related respiratory events.  Based on positional nature I'm not that concerned about treating her Obstructive Sleep Apnea.    Once she is off nortriptyline it would be good to re-evaluate the behaviors.  However, for now I am going to have her increase the melatonin to 20 mg and decrease the trazodone to 300 mg.    She will keep her appt with Dr. Claire later this week.  I've recommended she request an appointment at her Neurology clinic with Dr. Bryce Powers (sleep and movement disorders expert at [facility]).   - SLEEP EVALUATION & MANAGEMENT REFERRAL - [facility]  [phone] (Age 15 and up) (Jermain Rossi MD)    2. Chronic insomnia  She needs to re-engage her behavioral insomnia management and I've reinforced these recommendations.  - SLEEP EVALUATION & MANAGEMENT REFERRAL - [facility]  [phone] (Age 15 and up) (Jermain Rossi MD)    3. STANLEY (obstructive sleep apnea)  As per above, treatment is not a priority at this time.  Highly claustrophobic and uncomfortable considering CPAP and not a candidate for OAT/MAD due to shape of dentition.    4. Tobacco use  We discussed the importance of tobacco cessation, both due to overall health consequences, and also as how it relates to exacerbation of STANLEY severity.  We discussed cessation strategies.  She was encouraged to discuss further strategies with her primary care provider.     Literature provided regarding REM sleep behavior disorder.      Total time of care was 45 minutes, with > 50% of time spent on counseling and coordination of care.      CC: Davi Claire

## 2019-10-10 ENCOUNTER — TELEPHONE (OUTPATIENT)
Dept: FAMILY MEDICINE | Facility: CLINIC | Age: 69
End: 2019-10-10

## 2019-10-10 DIAGNOSIS — M79.7 FIBROMYALGIA: ICD-10-CM

## 2019-10-10 DIAGNOSIS — G24.3 SPASMODIC TORTICOLLIS: ICD-10-CM

## 2019-10-10 RX ORDER — HYDROCODONE BITARTRATE AND ACETAMINOPHEN 7.5; 325 MG/1; MG/1
2 TABLET ORAL 3 TIMES DAILY
Qty: 180 TABLET | Refills: 0 | Status: SHIPPED | OUTPATIENT
Start: 2019-10-10 | End: 2019-11-01

## 2019-10-10 NOTE — TELEPHONE ENCOUNTER
Reason for Call:  Medication or medication refill:    Do you use a [facility]?  Name of the pharmacy and phone number for the current request:      [facility] -[phone]    Name of the medication requested:  Hydronecodone 7.5MG     Other request: n/a     Can we leave a detailed message on this number? YES    Phone number patient can be reached at: Home number on file [phone] (home)    Best Time: Any     Call taken on 10/10/2019 at 12:03 PM by Cassi Rodriguez

## 2019-10-10 NOTE — TELEPHONE ENCOUNTER
Controlled Substance Refill Request for HYDROcodone-acetaminophen (NORCO) 7.5-325 MG per tablet  Problem List Complete:  Yes  Overview  Edited:  Gerber Jain MD  4/19/2016  Patient is followed by GERBER JAIN for ongoing prescription of pain medication. All refills should be approved by this provider, or covering partner.     Medication(s): HYDROcodone (VICODIN) 7.5/325   .   Maximum quantity per month: 180  Clinic visit frequency required: Q 3 months      Controlled substance agreement on file: Yes  Date(s): 11/2015     Pain Clinic evaluation in the past: Yes  Date/Location: [facility]      DIRE Total Score(s):  No flowsheet data found.     Last [facility] website verification: done on January 2016  [email or web address]                  checked in past 3 months?  Yes 9-13-19

## 2019-10-11 ENCOUNTER — OFFICE VISIT (OUTPATIENT)
Dept: SLEEP MEDICINE | Facility: CLINIC | Age: 69
End: 2019-10-11
Payer: MEDICARE

## 2019-10-11 VITALS
OXYGEN SATURATION: 96 % | SYSTOLIC BLOOD PRESSURE: 136 MMHG | HEART RATE: 53 BPM | DIASTOLIC BLOOD PRESSURE: 78 MMHG | BODY MASS INDEX: 20.49 KG/M2 | WEIGHT: 120 LBS | HEIGHT: 64 IN

## 2019-10-11 DIAGNOSIS — F51.04 CHRONIC INSOMNIA: Primary | ICD-10-CM

## 2019-10-11 PROCEDURE — 90834 PSYTX W PT 45 MINUTES: CPT | Performed by: PSYCHOLOGIST

## 2019-10-11 ASSESSMENT — MIFFLIN-ST. JEOR: SCORE: 1064.32

## 2019-10-11 NOTE — PATIENT INSTRUCTIONS
Followup with me after you see your primary care doctor about nortriptyline  Continue with your sleep schedule improvement    Continue to restrict your naps and use the bed only for sleep and not to manage your pain.

## 2019-10-11 NOTE — PROGRESS NOTES
"SLEEP MEDICINE PROGRESS NOTE  Sleep Psychology    Patient Name: Carina Calvert  MRN:  [id number]  Date of Service: Oct 11, 2019       Subjective Report     Carina Calvert returns to the sleep clinic today to discuss progress in implementing behavioral strategies for the management of insomnia associated with restless legs syndrome symptoms and unspecified parasomnia involving unusual sleep movement, talking..  Carina reports completion of overnight sleep study which was again reviewed after initial consultation with Dr. Jermain Rossi.  Patient has been following recommendations to reduce time in bed.  She is states she is benefiting from sleep schedule between 11 PM-7 AM.  She is taking 2 naps during the day 1 at lunch and another about 6 PM.  She states that she does this to help manage migraines and that her hydrocodone does induce a bit of sleepiness as well.  She has not refrain from taking her naps in the bedroom but has significantly reduced time in bed during the day.  She states she feels better.     .     Sleep Data:     Source of Data: Verbal self-report    Sleep Latency: Less than 30 min.  Times Awakened: 1-2, usually to go the bathroom  Wake Time After Sleep Onset: She is not reporting less than 30 min.  Total Sleep Time: Approximately 7 hours 20 min.  Sleep Efficiency: Not reporting greater than 85 %    Total score - Seibert: 8 .    VALORIE Total Score: 10       Interventions     Strategies and recommendations including management of stimulus control, management of insomnia with chronic pain were discussed today.       Vital Signs     /78   Pulse 53   Ht 1.626 m (5' 4\")   Wt 54.4 kg (120 lb)   SpO2 96%   BMI 20.60 kg/m       Mental Status     Appearance:  Well kept  Orientation:  X3  Mood: Normal  Affect:  Congruent with mood  Speech/Language:  Normal  Thought Process: Intact  Associations:  Normal  Thought Content: Normal    Diagnostic Impressions and Plan       1. Chronic insomnia  Multi " factored insomnia with significant targets for behavioral treatment including excessive time in bed and excessive time spent in bed during the day.  Patient has made significant change in reducing time in bed.  She is advised to continue her sleep schedule of 11 PM-7 AM and continue to manage her afternoon naps keeping up to about 30minutes taken after lunch and around dinnertime.  She will follow-up with her physician around her goal of reducing and eliminating nortriptyline.  She will also follow-up with Dr. perez as a cause for further evaluation and treatment of restless leg syndrome times and other sleep associated behavior.        Follow-up: 3 weeks    Time Spent: 34 minutes      Davi Claire PsyD, JUSTIN, DBSM  Diplomate, Behavioral Sleep Medicine  [facility]

## 2019-10-11 NOTE — NURSING NOTE
"Chief Complaint   Patient presents with     Sleep Problem     follow up       Initial /78   Pulse 53   Ht 1.626 m (5' 4\")   Wt 54.4 kg (120 lb)   SpO2 96%   BMI 20.60 kg/m   Estimated body mass index is 20.6 kg/m  as calculated from the following:    Height as of this encounter: 1.626 m (5' 4\").    Weight as of this encounter: 54.4 kg (120 lb).    Medication Reconciliation: complete      "

## 2019-11-01 ENCOUNTER — OFFICE VISIT (OUTPATIENT)
Dept: INTERNAL MEDICINE | Facility: CLINIC | Age: 69
End: 2019-11-01
Payer: MEDICARE

## 2019-11-01 VITALS
TEMPERATURE: 98.6 F | HEIGHT: 64 IN | OXYGEN SATURATION: 96 % | RESPIRATION RATE: 20 BRPM | DIASTOLIC BLOOD PRESSURE: 54 MMHG | HEART RATE: 64 BPM | SYSTOLIC BLOOD PRESSURE: 100 MMHG | BODY MASS INDEX: 20.66 KG/M2 | WEIGHT: 121 LBS

## 2019-11-01 DIAGNOSIS — M79.7 FIBROMYALGIA: ICD-10-CM

## 2019-11-01 DIAGNOSIS — G24.3 SPASMODIC TORTICOLLIS: ICD-10-CM

## 2019-11-01 DIAGNOSIS — G25.81 RESTLESS LEG SYNDROME: ICD-10-CM

## 2019-11-01 PROCEDURE — 99214 OFFICE O/P EST MOD 30 MIN: CPT | Performed by: INTERNAL MEDICINE

## 2019-11-01 RX ORDER — GABAPENTIN 300 MG/1
CAPSULE ORAL
Qty: 540 CAPSULE | Refills: 3
Start: 2019-11-01 | End: 2020-03-16

## 2019-11-01 RX ORDER — HYDROCODONE BITARTRATE AND ACETAMINOPHEN 7.5; 325 MG/1; MG/1
2 TABLET ORAL 3 TIMES DAILY
Qty: 180 TABLET | Refills: 0 | Status: SHIPPED | OUTPATIENT
Start: 2019-11-01 | End: 2019-12-06

## 2019-11-01 ASSESSMENT — MIFFLIN-ST. JEOR: SCORE: 1068.85

## 2019-11-01 ASSESSMENT — PAIN SCALES - GENERAL: PAINLEVEL: SEVERE PAIN (6)

## 2019-11-01 NOTE — PATIENT INSTRUCTIONS
1) In order to wean off nortriptyline we recommended to start with: 2 pills at night for 1 month. Then 1 pill / night for a month. And finally 1 pill every other night for another month before stopping completely.     2) Today we discussed a potential follow-up with an MTM pharmacist to evaluate all your medications and see if any changes can be made. I'd like you to consider this as an option we could do at some point in the near future.

## 2019-11-01 NOTE — PROGRESS NOTES
Subjective     Carina Calvert is a 69 year old female who presents to clinic today for the following health issues:    HPI   Chronic Insomnia, Parasomnia  Has seen a few sleep specialists ([facility] and after their evaluation she says that they made the following recommendations:    Wean off nortriptyline, (currently on 30 mg dose), increase melatonin from 1 to 2 pills. Decrease trazodone to 3 pills. Has already made changes with trazodone and melatonin.     Had a sleep study and was recommended to follow-up with Dr. Powers with neurology regarding a REM sleep disorder if not improving with these changes. Daughter reports that she has noted an improvement in parasomnia condition since her most recent change in medication of 3 trazodone and 2 melatonin. Prior to this she has observed talking, yelling, crying in her sleep for hours at a time. She also says that she has been taking less naps during the day. Her sleep study also had some evidence of mild Sleep Apnea. She plans to follow-up with Dr. Claire, sleep psychologist .     Chronic pain   Uses opioid pain medication. Reports that she decided to reduce gabapentin from 6 pills /day to 5. She takes 1 pill 5 times / day now. Medication administration record is updated     Patient Active Problem List   Diagnosis     Neuropathy     Fibromyalgia     Restless leg syndrome     Spasmodic torticollis     Insomnia     Hyperlipidemia with target LDL less than 100     History of hepatitis C     Ex-smoker     Recurrent cold sores     Osteopenia     Migraine headache     Positive occult stool blood test     Subclinical hyperthyroidism     Hypovitaminosis D     Cramp of limb     NSTEMI (non-ST elevated myocardial infarction) (H)     Chronic pain     Hypertension goal BP (blood pressure) < 140/90     Cervical facet joint syndrome     Primary insomnia     Tobacco use     Past Surgical History:   Procedure Laterality Date     HERNIA REPAIR  9/2009    ventral     LITHOTRIPSY      2 x       "TONSILLECTOMY       TUBAL LIGATION         Social History     Tobacco Use     Smoking status: Current Some Day Smoker     Packs/day: 0.20     Years: 15.00     Pack years: 3.00     Types: Cigarettes     Last attempt to quit: 3/31/2013     Years since quittin.5     Smokeless tobacco: Never Used     Tobacco comment: 4 cigs a day, just lost    Substance Use Topics     Alcohol use: No     Family History   Problem Relation Age of Onset     Neurologic Disorder Mother 65        Parkinsons     Alcohol/Drug Mother      Cerebrovascular Disease Mother      Respiratory Father         COPD     Alcohol/Drug Father      Cancer Brother      Diabetes Sister 52     Cancer Maternal Aunt      Cancer Maternal Uncle      C.A.D. No family hx of          BP Readings from Last 3 Encounters:   19 100/54   10/11/19 136/78   10/08/19 (!) 147/71    Wt Readings from Last 3 Encounters:   19 54.9 kg (121 lb)   10/11/19 54.4 kg (120 lb)   10/08/19 55.8 kg (123 lb)         Reviewed and updated as needed this visit by Provider       Review of Systems   ROS COMP: Constitutional, HEENT, cardiovascular, pulmonary, GI, , musculoskeletal, neuro, skin, endocrine and psych systems are negative, except as otherwise noted.    This document serves as a record of the services and decisions personally performed and made by Gerber Jain MD. It was created on his behalf by Edgar Vance, a trained medical scribe. The creation of this document is based on the provider's statements to the medical scribe.  Edgar Vance 3:25 PM 2019      Objective    /54   Pulse 64   Temp 98.6  F (37  C) (Oral)   Resp 20   Ht 1.626 m (5' 4\")   Wt 54.9 kg (121 lb)   SpO2 96%   BMI 20.77 kg/m    Body mass index is 20.77 kg/m .  Physical Exam   GENERAL: healthy, alert and no distress, seems calmer and answers all questions appropriately, talkative and appropriate   EYES: Eyes grossly normal to inspection, PERRL and conjunctivae and " sclerae normal  SKIN: no suspicious lesions or rashes to visible skin   NEURO: Normal strength and tone, mentation intact and speech normal  PSYCH: mentation appears normal, affect normal/bright      Assessment & Plan     (G24.3) Spasmodic torticollis  Comment: refill request   Plan: HYDROcodone-acetaminophen (NORCO) 7.5-325 MG         per tablet, gabapentin (NEURONTIN) 300 MG         capsule         Recommended to see chronic pain clinic at last appointment with me.    (M79.7) Fibromyalgia  Comment: as above    Plan: HYDROcodone-acetaminophen (NORCO) 7.5-325 MG         per tablet, gabapentin (NEURONTIN) 300 MG         capsule       As above     (G25.81) Restless leg syndrome  Comment: patient had recent sleep evaluation and plans to follow-up with Davi Shore sleep psychologist for another appointment and possibly with neurology.   Plan: gabapentin (NEURONTIN) 300 MG capsule        Follow-up as planned. Detailed plan of care regarding gradual dose reduction with nortriptyline (PAMELOR) is outlined and put into patient after-visit summary        No follow-ups on file.    The information in this document, created by the medical scribe for me, accurately reflects the services I personally performed and the decisions made by me. I have reviewed and approved this document for accuracy prior to leaving the patient care area.  November 1, 2019 3:42 PM     Gerber Jain MD  [facility]

## 2019-11-15 ENCOUNTER — OFFICE VISIT (OUTPATIENT)
Dept: SLEEP MEDICINE | Facility: CLINIC | Age: 69
End: 2019-11-15
Payer: MEDICARE

## 2019-11-15 VITALS
BODY MASS INDEX: 20.66 KG/M2 | DIASTOLIC BLOOD PRESSURE: 72 MMHG | WEIGHT: 121 LBS | HEART RATE: 52 BPM | HEIGHT: 64 IN | OXYGEN SATURATION: 98 % | SYSTOLIC BLOOD PRESSURE: 162 MMHG

## 2019-11-15 DIAGNOSIS — F51.04 CHRONIC INSOMNIA: Primary | ICD-10-CM

## 2019-11-15 PROCEDURE — 90832 PSYTX W PT 30 MINUTES: CPT | Performed by: PSYCHOLOGIST

## 2019-11-15 ASSESSMENT — MIFFLIN-ST. JEOR: SCORE: 1068.85

## 2019-11-15 NOTE — NURSING NOTE
"Chief Complaint   Patient presents with     RECHECK       Initial BP (!) 162/72   Pulse 52   Ht 1.626 m (5' 4\")   Wt 54.9 kg (121 lb)   SpO2 98%   BMI 20.77 kg/m   Estimated body mass index is 20.77 kg/m  as calculated from the following:    Height as of this encounter: 1.626 m (5' 4\").    Weight as of this encounter: 54.9 kg (121 lb).    Medication Reconciliation: complete      "

## 2019-11-15 NOTE — PROGRESS NOTES
"SLEEP MEDICINE PROGRESS NOTE  Sleep Psychology    Patient Name: Carina Calvert  MRN:  [id number]  Date of Service: Nov 15, 2019       Subjective Report     Carina Calvert returns to the sleep clinic today to discuss progress in implementing behavioral strategies for the management of chronic insomnia.  Carina reports significant improvement in sleep quality with behavioral changes implemented.  She states she is now getting up at between 7-7:15 AM.  She is delaying time to bed to about 11 PM.  Sleep latency is less than 15 minutes.  She wakes up 1-2 times a night but is able to get back to sleep quickly.  She is napping during the day but earlier.  Naps vary between 30-60 minutes.  Counseled patient to limit naps to 30 minutes..     .     Sleep Data:     Source of Data: Verbal self-report    Sleep Latency: Less than 15 min.  Times Awakened: 1-2  Wake Time After Sleep Onset: Less than 30 min.  Total Sleep Time: 7 hours 50 min.  Sleep Efficiency: Greater than 85 %    Total score - Brimhall: 6 .    VALORIE Total Score: 5       Interventions     Strategies and recommendations including maintenance of stimulus control and a consistent Alexandr compress sleep schedule to approximately 8 hours with afternoon consistent short nap times were discussed today.       Vital Signs     BP (!) 162/72   Pulse 52   Ht 1.626 m (5' 4\")   Wt 54.9 kg (121 lb)   SpO2 98%   BMI 20.77 kg/m       Mental Status     Appearance:  Well kept  Orientation:  X3  Mood:  better  Affect:  Congruent with mood  Speech/Language:  Normal  Thought Process: Intact  Associations:  Normal  Thought Content: Normal    Diagnostic Impressions and Plan       1. Chronic insomnia  Insomnia appears to have resolved with sleep latency, wake after sleep onset and total sleep time within normal.  Sleep efficiency also is well within normal limits.  Patient took an continue to keep an 8-hour sleep window, consistent wake time of approximately 7 AM and avoid extended " napping.  She is likely benefit from a brief 30-minute nap taken early afternoon.        Follow-up: If needed if clinically significant symptoms recur    Time Spent: 20 minutes      Davi Claire PsyD, JUSTIN, ESMERM  Diplomate, Behavioral Sleep Medicine  [facility]

## 2019-11-15 NOTE — PATIENT INSTRUCTIONS
Continue with ayden 11 to 7 am sleep schedule.    If you need to review some of what you have been working on you can get the book Say Tushar to Insomnia.    You can also call our clinic or schedule an appointment if needed in the future.

## 2019-11-19 ENCOUNTER — TELEPHONE (OUTPATIENT)
Dept: FAMILY MEDICINE | Facility: CLINIC | Age: 69
End: 2019-11-19

## 2019-12-04 DIAGNOSIS — G24.3 SPASMODIC TORTICOLLIS: ICD-10-CM

## 2019-12-04 DIAGNOSIS — M79.7 FIBROMYALGIA: ICD-10-CM

## 2019-12-04 DIAGNOSIS — G89.29 CHRONIC PAIN: ICD-10-CM

## 2019-12-04 NOTE — TELEPHONE ENCOUNTER
Reason for call:  Medication     If this is a refill request, has the caller requested the refill from the pharmacy already? Yes     Will the patient be using a [facility]? No     Name of the pharmacy and phone number for the current request: [facility] in [place], [phone]    Name of the medication requested: HYDROcodone-acetaminophen (NORCO) 7.5-325 MG per tablet    Other request: NA    Phone number to reach patient:  Home number on file [phone] (home)    Best Time:  Any    Can we leave a detailed message on this number?  YES

## 2019-12-04 NOTE — TELEPHONE ENCOUNTER
Controlled Substance Refill Request for HYDROcodone-acetaminophen (NORCO) 7.5-325 MG per tablet  Problem List Complete:  Yes  Overview  Edited:  Gerber Jain MD  4/19/2016  Patient is followed by GERBER JAIN for ongoing prescription of pain medication. All refills should be approved by this provider, or covering partner.     Medication(s): HYDROcodone (VICODIN) 7.5/325   .   Maximum quantity per month: 180  Clinic visit frequency required: Q 3 months      Controlled substance agreement on file: Yes  Date(s): 11/2015     Pain Clinic evaluation in the past: Yes  Date/Location: [facility]      DIRE Total Score(s):  No flowsheet data found.     Last [facility] website verification: done on January 2016  [email or web address]                  checked in past 3 months?  No, route to RN

## 2019-12-04 NOTE — TELEPHONE ENCOUNTER
Patient is checking status of refill request and states she forgot that it needed to be requested 3 days ahead of time and is worried she will run out. Please call.

## 2019-12-06 RX ORDER — HYDROCODONE BITARTRATE AND ACETAMINOPHEN 7.5; 325 MG/1; MG/1
2 TABLET ORAL 3 TIMES DAILY
Qty: 180 TABLET | Refills: 0 | Status: SHIPPED | OUTPATIENT
Start: 2019-12-06 | End: 2020-01-02

## 2019-12-06 NOTE — TELEPHONE ENCOUNTER
Routing refill request to provider for review/approval because:  Drug not on the [facility] refill protocol    checked - no concerns noted.    Requested Prescriptions   Pending Prescriptions Disp Refills     HYDROcodone-acetaminophen (NORCO) 7.5-325 MG per tablet 180 tablet 0     Sig: Take 2 tablets by mouth 3 times daily 28 days or more between refills for controlled medications       There is no refill protocol information for this order        Yanet Gonzales RN

## 2020-01-02 ENCOUNTER — TELEPHONE (OUTPATIENT)
Dept: FAMILY MEDICINE | Facility: CLINIC | Age: 70
End: 2020-01-02

## 2020-01-02 DIAGNOSIS — G24.3 SPASMODIC TORTICOLLIS: ICD-10-CM

## 2020-01-02 DIAGNOSIS — M79.7 FIBROMYALGIA: ICD-10-CM

## 2020-01-02 RX ORDER — HYDROCODONE BITARTRATE AND ACETAMINOPHEN 7.5; 325 MG/1; MG/1
2 TABLET ORAL 3 TIMES DAILY
Qty: 180 TABLET | Refills: 0 | Status: SHIPPED | OUTPATIENT
Start: 2020-01-02 | End: 2020-02-04

## 2020-01-02 NOTE — TELEPHONE ENCOUNTER
Controlled Substance Refill Request for HYDROcodone-acetaminophen (NORCO) 7.5-325 MG per tablet  Problem List Complete:  Yes  Overview  Edited:  Yanet Gonzales RN  12/6/2019  Patient is followed by CHIQUIS LITTLE for ongoing prescription of pain medication. All refills should be approved by this provider, or covering partner.     Medication(s): HYDROcodone (VICODIN) 7.5/325   .   Maximum quantity per month: 180  Clinic visit frequency required: Q 3 months      Controlled substance agreement on file: Yes  Date(s): 11/2015     Pain Clinic evaluation in the past: Yes  Date/Location: [facility]      DIRE Total Score(s):  No flowsheet data found.     Last [facility] website verification: 12/6/2019  [email or web address]                  checked in past 3 months?  Yes 12-6-19

## 2020-01-02 NOTE — TELEPHONE ENCOUNTER
Reason for call:  Medication     If this is a refill request, has the caller requested the refill from the pharmacy already? No     Will the patient be using a [facility]? No     Name of the pharmacy and phone number for the current request: [facility] [phone]    Name of the medication requested: Hydrocodone     Other request: n/a     Phone number to reach patient:  Home number on file [phone] (home)    Best Time:  Any     Can we leave a detailed message on this number?  YES

## 2020-01-31 DIAGNOSIS — G24.3 SPASMODIC TORTICOLLIS: ICD-10-CM

## 2020-01-31 DIAGNOSIS — M79.7 FIBROMYALGIA: ICD-10-CM

## 2020-02-04 ENCOUNTER — OFFICE VISIT (OUTPATIENT)
Dept: INTERNAL MEDICINE | Facility: CLINIC | Age: 70
End: 2020-02-04
Payer: MEDICARE

## 2020-02-04 VITALS
SYSTOLIC BLOOD PRESSURE: 134 MMHG | HEART RATE: 52 BPM | RESPIRATION RATE: 18 BRPM | BODY MASS INDEX: 20.55 KG/M2 | TEMPERATURE: 98 F | OXYGEN SATURATION: 98 % | DIASTOLIC BLOOD PRESSURE: 76 MMHG | HEIGHT: 64 IN | WEIGHT: 120.4 LBS

## 2020-02-04 DIAGNOSIS — E55.9 HYPOVITAMINOSIS D: ICD-10-CM

## 2020-02-04 DIAGNOSIS — I10 HYPERTENSION GOAL BP (BLOOD PRESSURE) < 140/90: ICD-10-CM

## 2020-02-04 DIAGNOSIS — D69.6 THROMBOCYTOPENIA (H): ICD-10-CM

## 2020-02-04 DIAGNOSIS — I21.4 NSTEMI (NON-ST ELEVATED MYOCARDIAL INFARCTION) (H): ICD-10-CM

## 2020-02-04 DIAGNOSIS — G24.3 SPASMODIC TORTICOLLIS: ICD-10-CM

## 2020-02-04 DIAGNOSIS — E05.90 SUBCLINICAL HYPERTHYROIDISM: ICD-10-CM

## 2020-02-04 DIAGNOSIS — E78.5 HYPERLIPIDEMIA WITH TARGET LDL LESS THAN 100: ICD-10-CM

## 2020-02-04 DIAGNOSIS — M79.7 FIBROMYALGIA: ICD-10-CM

## 2020-02-04 DIAGNOSIS — M47.812 CERVICAL FACET JOINT SYNDROME: Primary | ICD-10-CM

## 2020-02-04 LAB
ANION GAP SERPL CALCULATED.3IONS-SCNC: 4 MMOL/L (ref 3–14)
BASOPHILS # BLD AUTO: 0 10E9/L (ref 0–0.2)
BASOPHILS NFR BLD AUTO: 0.1 %
BUN SERPL-MCNC: 13 MG/DL (ref 7–30)
CALCIUM SERPL-MCNC: 9.5 MG/DL (ref 8.5–10.1)
CHLORIDE SERPL-SCNC: 106 MMOL/L (ref 94–109)
CHOLEST SERPL-MCNC: 110 MG/DL
CO2 SERPL-SCNC: 30 MMOL/L (ref 20–32)
CREAT SERPL-MCNC: 0.67 MG/DL (ref 0.52–1.04)
DIFFERENTIAL METHOD BLD: ABNORMAL
EOSINOPHIL # BLD AUTO: 0 10E9/L (ref 0–0.7)
EOSINOPHIL NFR BLD AUTO: 0.6 %
ERYTHROCYTE [DISTWIDTH] IN BLOOD BY AUTOMATED COUNT: 14 % (ref 10–15)
GFR SERPL CREATININE-BSD FRML MDRD: >90 ML/MIN/{1.73_M2}
GLUCOSE SERPL-MCNC: 101 MG/DL (ref 70–99)
HCT VFR BLD AUTO: 44.7 % (ref 35–47)
HDLC SERPL-MCNC: 57 MG/DL
HGB BLD-MCNC: 14.4 G/DL (ref 11.7–15.7)
LDLC SERPL CALC-MCNC: 40 MG/DL
LYMPHOCYTES # BLD AUTO: 2.9 10E9/L (ref 0.8–5.3)
LYMPHOCYTES NFR BLD AUTO: 42.6 %
MCH RBC QN AUTO: 32.7 PG (ref 26.5–33)
MCHC RBC AUTO-ENTMCNC: 32.2 G/DL (ref 31.5–36.5)
MCV RBC AUTO: 102 FL (ref 78–100)
MONOCYTES # BLD AUTO: 0.5 10E9/L (ref 0–1.3)
MONOCYTES NFR BLD AUTO: 7.8 %
NEUTROPHILS # BLD AUTO: 3.3 10E9/L (ref 1.6–8.3)
NEUTROPHILS NFR BLD AUTO: 48.9 %
NONHDLC SERPL-MCNC: 53 MG/DL
PLATELET # BLD AUTO: 141 10E9/L (ref 150–450)
POTASSIUM SERPL-SCNC: 4.1 MMOL/L (ref 3.4–5.3)
RBC # BLD AUTO: 4.4 10E12/L (ref 3.8–5.2)
SODIUM SERPL-SCNC: 140 MMOL/L (ref 133–144)
TRIGL SERPL-MCNC: 63 MG/DL
TSH SERPL DL<=0.005 MIU/L-ACNC: 1.91 MU/L (ref 0.4–4)
WBC # BLD AUTO: 6.8 10E9/L (ref 4–11)

## 2020-02-04 PROCEDURE — 99214 OFFICE O/P EST MOD 30 MIN: CPT | Performed by: INTERNAL MEDICINE

## 2020-02-04 PROCEDURE — 80048 BASIC METABOLIC PNL TOTAL CA: CPT | Performed by: INTERNAL MEDICINE

## 2020-02-04 PROCEDURE — 82306 VITAMIN D 25 HYDROXY: CPT | Performed by: INTERNAL MEDICINE

## 2020-02-04 PROCEDURE — 84443 ASSAY THYROID STIM HORMONE: CPT | Performed by: INTERNAL MEDICINE

## 2020-02-04 PROCEDURE — 36415 COLL VENOUS BLD VENIPUNCTURE: CPT | Performed by: INTERNAL MEDICINE

## 2020-02-04 PROCEDURE — 80061 LIPID PANEL: CPT | Performed by: INTERNAL MEDICINE

## 2020-02-04 PROCEDURE — 85025 COMPLETE CBC W/AUTO DIFF WBC: CPT | Performed by: INTERNAL MEDICINE

## 2020-02-04 RX ORDER — METOPROLOL TARTRATE 50 MG
50 TABLET ORAL 2 TIMES DAILY
Qty: 180 TABLET | Refills: 1 | Status: SHIPPED | OUTPATIENT
Start: 2020-02-04 | End: 2020-10-15

## 2020-02-04 RX ORDER — HYDROCODONE BITARTRATE AND ACETAMINOPHEN 7.5; 325 MG/1; MG/1
2 TABLET ORAL 3 TIMES DAILY
Qty: 180 TABLET | Refills: 0 | Status: SHIPPED | OUTPATIENT
Start: 2020-02-04 | End: 2020-03-02

## 2020-02-04 RX ORDER — ROSUVASTATIN CALCIUM 10 MG/1
TABLET, COATED ORAL
Qty: 90 TABLET | Refills: 3 | Status: SHIPPED | OUTPATIENT
Start: 2020-02-04 | End: 2020-11-12

## 2020-02-04 RX ORDER — HYDROCODONE BITARTRATE AND ACETAMINOPHEN 7.5; 325 MG/1; MG/1
2 TABLET ORAL 3 TIMES DAILY
Qty: 180 TABLET | Refills: 0 | OUTPATIENT
Start: 2020-02-04

## 2020-02-04 ASSESSMENT — MIFFLIN-ST. JEOR: SCORE: 1066.13

## 2020-02-04 NOTE — PATIENT INSTRUCTIONS
I recommend that you try a follow-up appointment with the neurosurgery specialist to see what they might say about your condition and give a second opinion on some treatment options. Make sure to get the repeat MRI imaging of the neck before seeing them.

## 2020-02-04 NOTE — PROGRESS NOTES
Subjective     Carina Calvert is a 69 year old female who presents to clinic today for the following health issues:    HPI      Spasmodic torticollis  Fibromyalgia  Hyperlipidemia with target LDL less than 100  NSTEMI (non-ST elevated myocardial infarction) (H)  Hypertension goal BP (blood pressure) < 140/90  Cervical facet joint syndrome  Hypovitaminosis D  Subclinical hyperthyroidism  Thrombocytopenia (H)     Has been doing great she says, since off nortriptyline. She has migraine headaches on a regular basis. She mentions that she has a bulging disc with her neck. Has been getting Botox injections for this. She last had a cervical MRI about 3 years ago. She's had severely limited range of motion with the neck for years and has not noted improvement in this directly following the Botox injections. She has some weakness symptoms with the arms bilateral, left more than right from about the elbow to the wrist. She thinks these symptoms began around the time of her heart attack.     She feels that maybe her sleeping is a bit better. Continues with talking and significant movements in her sleep. She is sometimes aware of her symptoms given the fact that her daughter wakes her up on occasion. She's had symptoms since she was a child.       Patient Active Problem List   Diagnosis     Neuropathy     Fibromyalgia     Restless leg syndrome     Spasmodic torticollis     Insomnia     Hyperlipidemia with target LDL less than 100     History of hepatitis C     Ex-smoker     Recurrent cold sores     Osteopenia     Migraine headache     Positive occult stool blood test     Subclinical hyperthyroidism     Hypovitaminosis D     Cramp of limb     NSTEMI (non-ST elevated myocardial infarction) (H)     Chronic pain     Hypertension goal BP (blood pressure) < 140/90     Cervical facet joint syndrome     Primary insomnia     Tobacco use     Past Surgical History:   Procedure Laterality Date     HERNIA REPAIR  9/2009    ventral      "LITHOTRIPSY      2 x      TONSILLECTOMY       TUBAL LIGATION         Social History     Tobacco Use     Smoking status: Current Some Day Smoker     Packs/day: 0.20     Years: 15.00     Pack years: 3.00     Types: Cigarettes     Last attempt to quit: 3/31/2013     Years since quittin.8     Smokeless tobacco: Never Used     Tobacco comment: 4 cigs a day, just lost    Substance Use Topics     Alcohol use: No     Family History   Problem Relation Age of Onset     Neurologic Disorder Mother 65        Parkinsons     Alcohol/Drug Mother      Cerebrovascular Disease Mother      Respiratory Father         COPD     Alcohol/Drug Father      Cancer Brother      Diabetes Sister 52     Cancer Maternal Aunt      Cancer Maternal Uncle      C.A.D. No family hx of          BP Readings from Last 3 Encounters:   20 134/76   11/15/19 (!) 162/72   19 100/54    Wt Readings from Last 3 Encounters:   20 54.6 kg (120 lb 6.4 oz)   11/15/19 54.9 kg (121 lb)   19 54.9 kg (121 lb)         Reviewed and updated as needed this visit by Provider       Review of Systems   ROS COMP: Constitutional, HEENT, cardiovascular, pulmonary, GI, , musculoskeletal, neuro, skin, endocrine and psych systems are negative, except as otherwise noted.    This document serves as a record of the services and decisions personally performed and made by Gerber Jain MD. It was created on his behalf by Edgar Vance, a trained medical scribe. The creation of this document is based on the provider's statements to the medical scribe.  Edgar Vance 10:55 AM 2020      Objective    /76   Pulse 52   Temp 98  F (36.7  C) (Oral)   Resp 18   Ht 1.626 m (5' 4\")   Wt 54.6 kg (120 lb 6.4 oz)   SpO2 98%   BMI 20.67 kg/m    Body mass index is 20.67 kg/m .  Physical Exam   GENERAL: healthy, alert and no distress  EYES: Eyes grossly normal to inspection, PERRL and conjunctivae and sclerae normal  MS:  She has severely " limited range of motion with her neck in all directions.   SKIN: no suspicious lesions or rashes to visible skin   NEURO: Normal strength and tone, mentation intact and speech normal  PSYCH: mentation appears normal, affect normal/bright      Assessment & Plan     (M47.812) Cervical facet joint syndrome  (primary encounter diagnosis)  Comment: patient has gotten some relief with treatments over the years but not seen by a neck specialist and would like to explore more treatment options   Plan: MR Cervical Spine w/o Contrast, NEUROSURGERY         REFERRAL        Follow-up with neurosurgery for opinion on condition and treatments after repeat cervical MRI     (G24.3) Spasmodic torticollis  Comment: as above   Plan: HYDROcodone-acetaminophen (NORCO) 7.5-325 MG         per tablet, MR Cervical Spine w/o Contrast,         NEUROSURGERY REFERRAL        As above     (M79.7) Fibromyalgia  Comment: stable with current treatment   Plan: HYDROcodone-acetaminophen (NORCO) 7.5-325 MG         per tablet        Continue current treatment     (E78.5) Hyperlipidemia with target LDL less than 100  Comment: historically at goal, due for labs  Plan: rosuvastatin (CRESTOR) 10 MG tablet, Lipid         panel reflex to direct LDL Non-fasting        Continue current treatment     (I21.4) NSTEMI (non-ST elevated myocardial infarction) (H)  Comment: stable without recurrence.  Plan: metoprolol tartrate (LOPRESSOR) 50 MG tablet,         Basic metabolic panel        Continue current treatment plan     (I10) Hypertension goal BP (blood pressure) < 140/90  Comment: controlled within acceptable limits   Plan: metoprolol tartrate (LOPRESSOR) 50 MG tablet,         Basic metabolic panel        Continue current medications     (E55.9) Hypovitaminosis D  Comment: due for recheck   Plan: Vitamin D Deficiency        Follow up as indicated on results     (E05.90) Subclinical hyperthyroidism  Comment: patient does not have thyroid medication listed    TSH    Date Value Ref Range Status   11/08/2018 1.82 0.40 - 4.00 mU/L Final   Plan: Basic metabolic panel, TSH with free T4 reflex        Follow up as indicated on results     (D69.6) Thrombocytopenia (H)  Comment: historically stable due for recheck    Plan: CBC with platelets differential        Follow up as indicated on results      No follow-ups on file.    The information in this document, created by the medical scribe for me, accurately reflects the services I personally performed and the decisions made by me. I have reviewed and approved this document for accuracy prior to leaving the patient care area.  February 4, 2020 11:12 AM      Total time spent face to face with the patient today was 17 minutes.     Gerber Jain MD  [facility]

## 2020-02-04 NOTE — LETTER
[facility]  [address] [place] [place] [address]    February 6, 2020    Carina Calvert  [address]  [place] [address]          Dear Carina,    All of these tests are within acceptable limits , things look good !     Enclosed is a copy of your results.     Results for orders placed or performed in visit on 02/04/20   Lipid panel reflex to direct LDL Non-fasting     Status: None   Result Value Ref Range    Cholesterol 110 <200 mg/dL    Triglycerides 63 <150 mg/dL    HDL Cholesterol 57 >49 mg/dL    LDL Cholesterol Calculated 40 <100 mg/dL    Non HDL Cholesterol 53 <130 mg/dL   CBC with platelets differential     Status: Abnormal   Result Value Ref Range    WBC 6.8 4.0 - 11.0 10e9/L    RBC Count 4.40 3.8 - 5.2 10e12/L    Hemoglobin 14.4 11.7 - 15.7 g/dL    Hematocrit 44.7 35.0 - 47.0 %     (H) 78 - 100 fl    MCH 32.7 26.5 - 33.0 pg    MCHC 32.2 31.5 - 36.5 g/dL    RDW 14.0 10.0 - 15.0 %    Platelet Count 141 (L) 150 - 450 10e9/L    % Neutrophils 48.9 %    % Lymphocytes 42.6 %    % Monocytes 7.8 %    % Eosinophils 0.6 %    % Basophils 0.1 %    Absolute Neutrophil 3.3 1.6 - 8.3 10e9/L    Absolute Lymphocytes 2.9 0.8 - 5.3 10e9/L    Absolute Monocytes 0.5 0.0 - 1.3 10e9/L    Absolute Eosinophils 0.0 0.0 - 0.7 10e9/L    Absolute Basophils 0.0 0.0 - 0.2 10e9/L    Diff Method Automated Method    Vitamin D Deficiency     Status: None   Result Value Ref Range    Vitamin D Deficiency screening 44 20 - 75 ug/L   Basic metabolic panel     Status: Abnormal   Result Value Ref Range    Sodium 140 133 - 144 mmol/L    Potassium 4.1 3.4 - 5.3 mmol/L    Chloride 106 94 - 109 mmol/L    Carbon Dioxide 30 20 - 32 mmol/L    Anion Gap 4 3 - 14 mmol/L    Glucose 101 (H) 70 - 99 mg/dL    Urea Nitrogen 13 7 - 30 mg/dL    Creatinine 0.67 0.52 - 1.04 mg/dL    GFR Estimate >90 >60 mL/min/[1.73_m2]    GFR Estimate If Black >90 >60 mL/min/[1.73_m2]     Calcium 9.5 8.5 - 10.1 mg/dL   TSH with free T4 reflex     Status: None   Result Value Ref Range    TSH 1.91 0.40 - 4.00 mU/L       If you have any questions or concerns, please call myself or my nurse at [phone].      Sincerely,        Gerber Jain MD/ray

## 2020-02-04 NOTE — TELEPHONE ENCOUNTER
Controlled Substance Refill Request for HYDROcodone-acetaminophen (NORCO) 7.5-325 MG per tablet  Problem List Complete:  Yes  Patient is followed by CHIQUIS LITTLE for ongoing prescription of pain medication.  All refills should be approved by this provider, or covering partner.    Medication(s): HYDROcodone (VICODIN) 7.5/325   .   Maximum quantity per month: 180  Clinic visit frequency required: Q 3 months     Controlled substance agreement on file: Yes       Date(s):   11/2015    Pain Clinic evaluation in the past: Yes       Date/Location:   [facility]     DIRE Total Score(s):  No flowsheet data found.    Last [facility] website verification: 12/6/2019   [email or web address]     checked in past 3 months?  Yes 12/6/2019

## 2020-02-05 LAB — DEPRECATED CALCIDIOL+CALCIFEROL SERPL-MC: 44 UG/L (ref 20–75)

## 2020-02-18 ENCOUNTER — ANCILLARY PROCEDURE (OUTPATIENT)
Dept: MRI IMAGING | Facility: CLINIC | Age: 70
End: 2020-02-18
Attending: INTERNAL MEDICINE
Payer: MEDICARE

## 2020-02-18 DIAGNOSIS — G24.3 SPASMODIC TORTICOLLIS: ICD-10-CM

## 2020-02-18 DIAGNOSIS — M47.812 CERVICAL FACET JOINT SYNDROME: ICD-10-CM

## 2020-02-18 PROCEDURE — 72141 MRI NECK SPINE W/O DYE: CPT | Performed by: RADIOLOGY

## 2020-02-20 ENCOUNTER — OFFICE VISIT (OUTPATIENT)
Dept: NEUROSURGERY | Facility: CLINIC | Age: 70
End: 2020-02-20
Payer: MEDICARE

## 2020-02-20 VITALS
DIASTOLIC BLOOD PRESSURE: 81 MMHG | HEART RATE: 50 BPM | OXYGEN SATURATION: 97 % | BODY MASS INDEX: 20.14 KG/M2 | SYSTOLIC BLOOD PRESSURE: 149 MMHG | HEIGHT: 64 IN | WEIGHT: 118 LBS

## 2020-02-20 DIAGNOSIS — M47.812 FACET ARTHROPATHY, CERVICAL: Primary | ICD-10-CM

## 2020-02-20 PROCEDURE — 99203 OFFICE O/P NEW LOW 30 MIN: CPT | Performed by: PHYSICIAN ASSISTANT

## 2020-02-20 ASSESSMENT — PAIN SCALES - GENERAL: PAINLEVEL: EXTREME PAIN (9)

## 2020-02-20 ASSESSMENT — MIFFLIN-ST. JEOR: SCORE: 1055.24

## 2020-02-20 NOTE — PATIENT INSTRUCTIONS
1. Medial branch blocks ordered to [facility]. They will contact you to schedule.    2. If pain not alleviated with this, please contact clinic at [phone] and can order occipital nerve blocks.

## 2020-02-20 NOTE — PROGRESS NOTES
Dr. Moise Jauregui  [facility] Clinic Visit      CC: Neck pain    Primary care Provider: Gerber Jain      Reason For Visit:   I was asked by Gerber Jain MD to consult on the patient for   G24.3 (ICD-10-CM) - Spasmodic torticollis   M47.812 (ICD-10-CM) - Cervical facet joint syndrome       HPI: Carina Calvert is a 69 year old female who presents for evaluation of neck pain x years. Pain is located in bilateral neck and radiates toward bilateral shoulders and up to occiput bilaterally. Describes the pain as a constant ache. Pain is worsened with movement or bright light. Pain is alleviated with lying or sitting still. She is taking Norco for pain, but alleviates pain short term. She does get Botox injections, which are no longer providing relief. No recent physical therapy. She also describes what sounds like occipital neuralgia. She has not followed with a neurologist for migraines.    Current pain: 9/10    Past Medical History:   Diagnosis Date     Chronic constipation      Chronic sciatica     Pain clinic     DDD (degenerative disc disease), cervical      Fibromyalgia      History of hepatitis C      Hypertension goal BP (blood pressure) < 140/90      Kidney stones      Migraine headaches      Neuropathy      Osteopenia     boniva infusions w Dr Rayo at [facility]     PUD (peptic ulcer disease)     h/o bleeding ulcer     Recurrent cold sores      Restless leg syndrome      Spasmodic torticollis      Subclinical hyperthyroidism     NONTOXIC MULTINODULAR GOITER      Vertigo      Vitamin B12 deficiency      Vitamin D deficiency        Past Medical History reviewed with patient during visit.    Past Surgical History:   Procedure Laterality Date     HERNIA REPAIR  9/2009    ventral     LITHOTRIPSY      2 x      TONSILLECTOMY       TUBAL LIGATION       Past Surgical History reviewed with patient during visit.    Current Outpatient Medications   Medication     aspirin 81 MG tablet     baclofen  (LIORESAL) 10 MG tablet     Cholecalciferol (VITAMIN D) 2000 UNITS tablet     gabapentin (NEURONTIN) 300 MG capsule     HYDROcodone-acetaminophen (NORCO) 7.5-325 MG per tablet     Melatonin 10 MG CAPS     metoprolol tartrate (LOPRESSOR) 50 MG tablet     nitroGLYcerin (NITROSTAT) 0.4 MG sublingual tablet     polyethylene glycol (MIRALAX/GLYCOLAX) powder     rosuvastatin (CRESTOR) 10 MG tablet     traZODone (DESYREL) 100 MG tablet     VITAMIN B-12 1000 MCG OR TABS     nortriptyline (PAMELOR) 10 MG capsule     No current facility-administered medications for this visit.        Allergies   Allergen Reactions     [employer] Anaphylaxis and Hives     Unable to breathe     Diatrizoate Hives     Droperidol      Ivp Dye [Contrast Dye]        Social History     Socioeconomic History     Marital status:      Spouse name: None     Number of children: 4     Years of education: 11     Highest education level: None   Occupational History     Occupation: housecleaning     Employer: [employer],[address] [place] [address]   Social Needs     Financial resource strain: None     Food insecurity:     Worry: None     Inability: None     Transportation needs:     Medical: None     Non-medical: None   Tobacco Use     Smoking status: Current Some Day Smoker     Packs/day: 0.20     Years: 15.00     Pack years: 3.00     Types: Cigarettes     Smokeless tobacco: Never Used     Tobacco comment: 4 cigs a day, just lost    Substance and Sexual Activity     Alcohol use: No     Drug use: No     Sexual activity: Never     Partners: Male     Birth control/protection: Surgical, Post-menopausal   Lifestyle     Physical activity:     Days per week: None     Minutes per session: None     Stress: None   Relationships     Social connections:     Talks on phone: None     Gets together: None     Attends Catholic service: None     Active member of club or organization: None     Attends meetings of clubs or organizations:  "None     Relationship status: None     Intimate partner violence:     Fear of current or ex partner: None     Emotionally abused: None     Physically abused: None     Forced sexual activity: None   Other Topics Concern     Parent/sibling w/ CABG, MI or angioplasty before 65F 55M? 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     Hobby Hazards No     Sleep Concern Yes     Comment: On medication     Stress Concern No     Weight Concern Yes     Comment: would like to gain weight     Special Diet No     Back Care No     Exercise No     Bike Helmet Not Asked     Seat Belt Yes     Self-Exams Yes   Social History Narrative     None       Family History   Problem Relation Age of Onset     Neurologic Disorder Mother 65        Parkinsons     Alcohol/Drug Mother      Cerebrovascular Disease Mother      Respiratory Father         COPD     Alcohol/Drug Father      Cancer Brother      Diabetes Sister 52     Cancer Maternal Aunt      Cancer Maternal Uncle      C.A.D. No family hx of           ROS: 10 point ROS neg other than the symptoms noted above in the HPI.    Vital Signs: BP (!) 149/81   Pulse 50   Ht 5' 4\" (1.626 m)   Wt 118 lb (53.5 kg)   SpO2 97%   BMI 20.25 kg/m      Examination:  Constitutional:  Alert, well nourished, NAD.  HEENT: Normocephalic, atraumatic.   Pulmonary:  Without shortness of breath, normal effort.   Lymph: no lymphadenopathy to low back or LE.   Integumentary: Skin is free of rashes or lesions.   Cardiovascular:  No pitting edema of BLE.      Neurological:  Awake  Alert  Oriented x 3  Speech clear  Cranial nerves II - XII grossly intact  PERRL  EOMI  Face symmetric  Tongue midline  Motor exam   Shoulder Abduction:  Right:  5/5   Left:  5/5  Biceps:                      Right:  5/5   Left:  5/5  Triceps:                     Right:  5/5   Left:  5/5  Wrist Extensors:       Right:  " 5/5   Left:  5/5  Wrist Flexors:           Right:  5/5   Left:  5/5  Intrinsics:                   Right:  5/5   Left:  5/5  Sensation normal to bilateral upper extremities.    Reflexes are 2+ in the brachial radialis and triceps. Negative Fabiana sign bilaterally.  Musculoskeletal:  Gait: Able to stand from a seated position. Normal non-antalgic, non-myelopathic gait.  Able to heel walk without loss of balance  Cervical examination reveals decreased range of motion.  Tenderness to palpation of the cervical spine and trapezius/paraspinous muscles bilaterally.    Imaging:   MRI of the cervical spine from 2/18/20 was reviewed in the office today.     Assessment/Plan:   Carina Calvert is a 69 year old female ho presents for evaluation of neck pain x years. Pain is located in bilateral neck and radiates toward bilateral shoulders and up to occiput bilaterally. Describes the pain as a constant ache. Cervical MRI reviewed in office. She does get Botox injections, which are no longer providing relief. No recent physical therapy. She also describes what sounds like occipital neuralgia. She has not followed with a neurologist for migraines. Will refer for RFA workup. Discussed occipital neuralgia may be a component as well and if no relief with MBBs, will refer for bilateral ONB. Patient voiced understanding and agreement.            Tiffanie HOANG [facility] Neurosurgery  [address] [address]    Tel [phone]  Pager [phone]

## 2020-02-20 NOTE — NURSING NOTE
"Carina Calvert is a 69 year old female who presents for:  Chief Complaint   Patient presents with     Neurologic Problem     Cervical facet joint syndrome         Initial Vitals:  BP (!) 149/81   Pulse 50   Ht 5' 4\" (1.626 m)   Wt 118 lb (53.5 kg)   SpO2 97%   BMI 20.25 kg/m   Estimated body mass index is 20.25 kg/m  as calculated from the following:    Height as of this encounter: 5' 4\" (1.626 m).    Weight as of this encounter: 118 lb (53.5 kg).. Body surface area is 1.55 meters squared. BP completed using cuff size: regular  Extreme Pain (9)    Nursing Comments: Per patient she has head and neck pain current level 9    Kin Bloom MA  "

## 2020-02-20 NOTE — LETTER
2/20/2020         RE: Carina Calvert  [address]  [facility] [address]        Dear Colleague,    Thank you for referring your patient, Carina Calvert, to the [facility]. Please see a copy of my visit note below.    Dr. Moise HOANG [facility] Visit      CC: Neck pain    Primary care Provider: Gerber Jain      Reason For Visit:   I was asked by Gerber Jain MD to consult on the patient for   G24.3 (ICD-10-CM) - Spasmodic torticollis   M47.812 (ICD-10-CM) - Cervical facet joint syndrome       HPI: Carina Calvert is a 69 year old female who presents for evaluation of neck pain x years. Pain is located in bilateral neck and radiates toward bilateral shoulders and up to occiput bilaterally. Describes the pain as a constant ache. Pain is worsened with movement or bright light. Pain is alleviated with lying or sitting still. She is taking Norco for pain, but alleviates pain short term. She does get Botox injections, which are no longer providing relief. No recent physical therapy. She also describes what sounds like occipital neuralgia. She has not followed with a neurologist for migraines.    Current pain: 9/10    Past Medical History:   Diagnosis Date     Chronic constipation      Chronic sciatica     Pain clinic     DDD (degenerative disc disease), cervical      Fibromyalgia      History of hepatitis C      Hypertension goal BP (blood pressure) < 140/90      Kidney stones      Migraine headaches      Neuropathy      Osteopenia     boniva infusions w Dr Rayo at [facility]     PUD (peptic ulcer disease)     h/o bleeding ulcer     Recurrent cold sores      Restless leg syndrome      Spasmodic torticollis      Subclinical hyperthyroidism     NONTOXIC MULTINODULAR GOITER      Vertigo      Vitamin B12 deficiency      Vitamin D deficiency        Past Medical History reviewed with patient during visit.    Past Surgical History:   Procedure Laterality Date      HERNIA REPAIR  9/2009    ventral     LITHOTRIPSY      2 x      TONSILLECTOMY       TUBAL LIGATION       Past Surgical History reviewed with patient during visit.    Current Outpatient Medications   Medication     aspirin 81 MG tablet     baclofen (LIORESAL) 10 MG tablet     Cholecalciferol (VITAMIN D) 2000 UNITS tablet     gabapentin (NEURONTIN) 300 MG capsule     HYDROcodone-acetaminophen (NORCO) 7.5-325 MG per tablet     Melatonin 10 MG CAPS     metoprolol tartrate (LOPRESSOR) 50 MG tablet     nitroGLYcerin (NITROSTAT) 0.4 MG sublingual tablet     polyethylene glycol (MIRALAX/GLYCOLAX) powder     rosuvastatin (CRESTOR) 10 MG tablet     traZODone (DESYREL) 100 MG tablet     VITAMIN B-12 1000 MCG OR TABS     nortriptyline (PAMELOR) 10 MG capsule     No current facility-administered medications for this visit.        Allergies   Allergen Reactions     [employer] Anaphylaxis and Hives     Unable to breathe     Diatrizoate Hives     Droperidol      Ivp Dye [Contrast Dye]        Social History     Socioeconomic History     Marital status:      Spouse name: None     Number of children: 4     Years of education: 11     Highest education level: None   Occupational History     Occupation: housecleaning     Employer: [employer],[address] [place] [address]   Social Needs     Financial resource strain: None     Food insecurity:     Worry: None     Inability: None     Transportation needs:     Medical: None     Non-medical: None   Tobacco Use     Smoking status: Current Some Day Smoker     Packs/day: 0.20     Years: 15.00     Pack years: 3.00     Types: Cigarettes     Smokeless tobacco: Never Used     Tobacco comment: 4 cigs a day, just lost    Substance and Sexual Activity     Alcohol use: No     Drug use: No     Sexual activity: Never     Partners: Male     Birth control/protection: Surgical, Post-menopausal   Lifestyle     Physical activity:     Days per week: None     Minutes per  "session: None     Stress: None   Relationships     Social connections:     Talks on phone: None     Gets together: None     Attends Anglican service: None     Active member of club or organization: None     Attends meetings of clubs or organizations: None     Relationship status: None     Intimate partner violence:     Fear of current or ex partner: None     Emotionally abused: None     Physically abused: None     Forced sexual activity: None   Other Topics Concern     Parent/sibling w/ CABG, MI or angioplasty before 65F 55M? 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     Hobby Hazards No     Sleep Concern Yes     Comment: On medication     Stress Concern No     Weight Concern Yes     Comment: would like to gain weight     Special Diet No     Back Care No     Exercise No     Bike Helmet Not Asked     Seat Belt Yes     Self-Exams Yes   Social History Narrative     None       Family History   Problem Relation Age of Onset     Neurologic Disorder Mother 65        Parkinsons     Alcohol/Drug Mother      Cerebrovascular Disease Mother      Respiratory Father         COPD     Alcohol/Drug Father      Cancer Brother      Diabetes Sister 52     Cancer Maternal Aunt      Cancer Maternal Uncle      C.A.D. No family hx of           ROS: 10 point ROS neg other than the symptoms noted above in the HPI.    Vital Signs: BP (!) 149/81   Pulse 50   Ht 5' 4\" (1.626 m)   Wt 118 lb (53.5 kg)   SpO2 97%   BMI 20.25 kg/m       Examination:  Constitutional:  Alert, well nourished, NAD.  HEENT: Normocephalic, atraumatic.   Pulmonary:  Without shortness of breath, normal effort.   Lymph: no lymphadenopathy to low back or LE.   Integumentary: Skin is free of rashes or lesions.   Cardiovascular:  No pitting edema of BLE.      Neurological:  Awake  Alert  Oriented x 3  Speech clear  Cranial nerves II - XII grossly " intact  PERRL  EOMI  Face symmetric  Tongue midline  Motor exam   Shoulder Abduction:  Right:  5/5   Left:  5/5  Biceps:                      Right:  5/5   Left:  5/5  Triceps:                     Right:  5/5   Left:  5/5  Wrist Extensors:       Right:  5/5   Left:  5/5  Wrist Flexors:           Right:  5/5   Left:  5/5  Intrinsics:                   Right:  5/5   Left:  5/5  Sensation normal to bilateral upper extremities.    Reflexes are 2+ in the brachial radialis and triceps. Negative Fabiana sign bilaterally.  Musculoskeletal:  Gait: Able to stand from a seated position. Normal non-antalgic, non-myelopathic gait.  Able to heel walk without loss of balance  Cervical examination reveals decreased range of motion.  Tenderness to palpation of the cervical spine and trapezius/paraspinous muscles bilaterally.    Imaging:   MRI of the cervical spine from 2/18/20 was reviewed in the office today.     Assessment/Plan:   Carina Calvert is a 69 year old female ho presents for evaluation of neck pain x years. Pain is located in bilateral neck and radiates toward bilateral shoulders and up to occiput bilaterally. Describes the pain as a constant ache. Cervical MRI reviewed in office. She does get Botox injections, which are no longer providing relief. No recent physical therapy. She also describes what sounds like occipital neuralgia. She has not followed with a neurologist for migraines. Will refer for RFA workup. Discussed occipital neuralgia may be a component as well and if no relief with MBBs, will refer for bilateral ONB. Patient voiced understanding and agreement.            Tiffanie Hanley PA-C  [facility]  [address] [address]    Tel [phone]  Pager [phone]      Again, thank you for allowing me to participate in the care of your patient.        Sincerely,        Tiffanie Hanley PA-C

## 2020-02-21 ENCOUNTER — TELEPHONE (OUTPATIENT)
Dept: NEUROSURGERY | Facility: CLINIC | Age: 70
End: 2020-02-21

## 2020-02-21 ENCOUNTER — TELEPHONE (OUTPATIENT)
Dept: PALLIATIVE MEDICINE | Facility: CLINIC | Age: 70
End: 2020-02-21

## 2020-02-21 NOTE — TELEPHONE ENCOUNTER
Pre-screening questions for MBB Injections:    Injection to be done at which interventional clinic site? [facility]     Instruct patient to arrive as directed prior to the scheduled appointment time:    [place] and [place]: 30 minutes before      Procedure ordered by Dr. Hanley    Procedure ordered? Cervical Medial Branch Block    What insurance would patient like us to bill for this procedure? Medicare      Worker's comp- Any injection DO NOT SCHEDULE and route to Neelam Cruz.      [employer] insurance - If scheduling an SI joint injection DO NOT SCHEDULE and route to Neelam Cruz.       MBBs must be scheduled with elapsed time interval of at least 2 weeks and not more than 6 months between the First MBB and the Second MBB for insurance purposes       [employer] - Any injection besides hip/shoulder/knee joint DO NOT SCHEDULE and route to Neelam Cruz. She will obtain PA and call pt back to schedule procedure or notify pt of denial.       [employer]- PA required for all MBB's      **BCBS- ALL need to be routed to Neelam for review if a PA is needed**    Any chance of pregnancy? NO   If YES, do NOT schedule and route to RN pool    Is an  needed? No     Patient has a drive home? (mandatory) Yes     Is patient taking any blood thinners (plavix, coumadin, jantoven, warfarin, heparin, pradaxa or dabigatran )? No    If hold needed, do NOT schedule, route to RN pool     Is patient taking any aspirin products? YES 81mg Held for 6 days prior    If more than 325mg/day, OK to schedule; Instruct pt to decrease to less than 325 mg for 7 days AND route to RN pool    For CERVICAL procedures, hold all aspirin products for 6 days.     Tell pt that if aspirin product is not held for 6 days, the procedure WILL BE cancelled.      Does the patient have a bleeding or clotting disorder? No    If YES, okay to schedule AND route to RN nurse pool    **For any patients with platelet count <100, must be  forwarded to provider**    Is patient diabetic? no If YES, have them bring their glucometer.    Does patient have an active infection or treated for one within the past week? No    Is patient currently taking any antibiotics?  No    For patients on chronic, preventative, or prophylactic antibiotics, procedures may be scheduled.     For patients on antibiotics for active or recent infection:antibiotic course must have been completed for 4 days    Is patient currently taking any steroid medications? (i.e. Prednisone, Medrol)  No     For patients on steroid medications, course must have been completed for 4 days    Is patient actively being treated for cancer or immunocompromised? No   If YES, do NOT schedule and route to RN    Are you able to get on and off an exam table with minimal or no assistance? Yes   If NO, do NOT schedule and route to RN    Are you able to roll over and lay on your stomach with minimal or no assistance? Yes   If NO, do NOT schedule and route to RN    Any allergies to contrast dye, iodine, shellfish, or numbing and steroid medications? YES Contrast DYe  (If so, inform nursing and note in scheduling comments.)    Allergies: Diagnostic x-ray materials; Diatrizoate; Droperidol; and Ivp dye [contrast dye]     Has the patient had a flu shot or any other vaccinations within 7 days before or after the procedure.  No     Does patient have an MRI/CT?  Not Applicable  Check Procedure Scheduling Grid to see if required.      Was the MRI done within the last 3 years?  NA    If yes, where was the [facility], [facility], [place], [facility] etc?       If no, do not schedule and route to nursing    If MRI was not done at [place], [facility] or [facility] do NOT schedule and route to nursing.      If pt has an imaging disc, the injection MAY be scheduled but pt has to bring disc to appt.     If they show up without the disc the injection cannot be done      Medial Branch Block Pre-Procedure  Instructions    It is okay to take long acting pain medications (if you are on them) the day of the procedure but try not to take any short acting medications unless absolutely necessary.    YES: ok   Long acting meds would include: Gabapentin (Neurontin), MS Contin, Oxycontin        Short acting meds would include:  Percocet, Oxycodone, Vicodin, Ibuprofen     The day of the procedure, you should try to do things that provoke your pain, since the injection is being done to see if it will relieve your pain . YES: ok     If your pain level is a 4 out of 10 or less on the day of the procedu re, please call [phone] to reschedule.  YES: ok     Reminders:      If you are started on any steroids or antibiotics between now and your appointment, you must contact us because it may affect our ability to perform your procedure ok      Instructed pt to arrive 30 minutes early for IV start if required. (Check Procedure Scheduling Grid) IYES: ok       If this is for a cervical MBB aspirin needs to be held for 6 days.  YES: ok       Do not schedule procedures requiring IV placement in the first appointment of the day or first appointment after lunch. Do NOT schedule at 0745, 0815 or 1245.  ok      For patients 85 or older we recommend having an adult stay w/ them for the remainder of the day.      Does the patient have any questions? no

## 2020-02-21 NOTE — TELEPHONE ENCOUNTER
has not been set up for patient, patient has declined Mychart. Letter has been sent  for patient to call and schedule Procedure Order Facet Procedure:  RFA: Cervical - Nerve Blocks may be required            Neelam SMIHT    [facility]

## 2020-02-21 NOTE — TELEPHONE ENCOUNTER
Patient is calling with questions regarding the injecting Tiffanie ordered, please call the patient back @ [phone]

## 2020-02-21 NOTE — TELEPHONE ENCOUNTER
Returned call to patient. Answered her questions regarding the injection that was ordered. PAtient had no further questions and will await a call from Pain Mgmt to schedule.

## 2020-02-21 NOTE — LETTER
February 21, 2020    Carina Calvert  [address]  [place] [address]    Dear Carina,                                                                       You have been referred to [facility]. We have been unable to contact you by telephone to schedule your appointment. Please call our clinic at [phone] to schedule this appointment.       Sincerely,        [facility] [place]

## 2020-03-02 ENCOUNTER — TELEPHONE (OUTPATIENT)
Dept: FAMILY MEDICINE | Facility: CLINIC | Age: 70
End: 2020-03-02

## 2020-03-02 DIAGNOSIS — M79.7 FIBROMYALGIA: ICD-10-CM

## 2020-03-02 DIAGNOSIS — G24.3 SPASMODIC TORTICOLLIS: ICD-10-CM

## 2020-03-02 RX ORDER — HYDROCODONE BITARTRATE AND ACETAMINOPHEN 7.5; 325 MG/1; MG/1
2 TABLET ORAL 3 TIMES DAILY
Qty: 180 TABLET | Refills: 0 | Status: SHIPPED | OUTPATIENT
Start: 2020-03-02 | End: 2020-04-01

## 2020-03-02 NOTE — TELEPHONE ENCOUNTER
Controlled Substance Refill Request for HYDROcodone-acetaminophen (NORCO) 7.5-325 MG per tablet  Problem List Complete:  Yes  Overview Addendum 12/6/2019  7:43 AM by Yanet Gonzales RN   Patient is followed by CHIQUIS LITTLE for ongoing prescription of pain medication.  All refills should be approved by this provider, or covering partner.     Medication(s): HYDROcodone (VICODIN) 7.5/325   .   Maximum quantity per month: 180  Clinic visit frequency required: Q 3 months      Controlled substance agreement on file: Yes       Date(s):   11/2015     Pain Clinic evaluation in the past: Yes       Date/Location:   [facility]      DIRE Total Score(s):  No flowsheet data found.     Last [facility] website verification: 12/6/2019   [email or web address]      checked in past 3 months?  Yes 12-6-19        
Controlled substance E-prescribed via imprivata     Gerber Jain MD    
Patient called and informed. Alice John,     
Reason for Call:  Medication or medication refill:    Do you use a [facility]?  Name of the pharmacy and phone number for the current request:    [facility] [address] [place] [address]      Name of the medication requested: HYDROcodone-acetaminophen (NORCO) 7.5-325 MG per tablet      Other request:     Can we leave a detailed message on this number? YES    Phone number patient can be reached at: Home number on file [phone] (home)    Best Time: any    Call taken on 3/2/2020 at 2:17 PM by Steven Cherry      
Yes

## 2020-03-08 DIAGNOSIS — G24.3 SPASMODIC TORTICOLLIS: ICD-10-CM

## 2020-03-08 DIAGNOSIS — M79.7 FIBROMYALGIA: ICD-10-CM

## 2020-03-10 NOTE — TELEPHONE ENCOUNTER
Routing refill request to provider for review/approval because:  Drug not on the [facility] refill protocol     Requested Prescriptions   Pending Prescriptions Disp Refills     baclofen (LIORESAL) 10 MG tablet [Pharmacy Med Name: BACLOFEN 10 MG TABLET] 360 tablet 1     Sig: TAKE 1 TABLET (10 MG) BY MOUTH 4 TIMES DAILY       There is no refill protocol information for this order        Yanet Gonzales, RN

## 2020-03-11 RX ORDER — BACLOFEN 10 MG/1
10 TABLET ORAL 4 TIMES DAILY
Qty: 360 TABLET | Refills: 1 | Status: SHIPPED | OUTPATIENT
Start: 2020-03-11 | End: 2020-09-02

## 2020-03-14 DIAGNOSIS — G25.81 RESTLESS LEG SYNDROME: ICD-10-CM

## 2020-03-14 DIAGNOSIS — M79.7 FIBROMYALGIA: ICD-10-CM

## 2020-03-14 DIAGNOSIS — G24.3 SPASMODIC TORTICOLLIS: ICD-10-CM

## 2020-03-16 RX ORDER — GABAPENTIN 300 MG/1
CAPSULE ORAL
Qty: 540 CAPSULE | Refills: 3 | Status: SHIPPED | OUTPATIENT
Start: 2020-03-16 | End: 2020-07-06

## 2020-03-16 NOTE — TELEPHONE ENCOUNTER
Routing refill request to provider for review/approval because:  Drug not on the [facility] refill protocol   SIG's do not match    Requested Prescriptions   Pending Prescriptions Disp Refills     gabapentin (NEURONTIN) 300 MG capsule [Pharmacy Med Name: GABAPENTIN 300 MG CAPSULE] 540 capsule 3     Sig: TAKE 1 CAPSULE BY MOUTH IN THE MORNING, 1 AT NOON 2 AT 5PM AND 2 AT BEDTIME ( 6 PER DAY )       There is no refill protocol information for this order        Yanet Gonzales RN

## 2020-03-20 DIAGNOSIS — M79.7 FIBROMYALGIA: ICD-10-CM

## 2020-03-20 DIAGNOSIS — F51.01 PRIMARY INSOMNIA: ICD-10-CM

## 2020-03-22 RX ORDER — TRAZODONE HYDROCHLORIDE 100 MG/1
TABLET ORAL
Qty: 360 TABLET | Refills: 1 | Status: SHIPPED | OUTPATIENT
Start: 2020-03-22 | End: 2020-07-06

## 2020-03-30 DIAGNOSIS — G89.29 CHRONIC PAIN: ICD-10-CM

## 2020-03-30 DIAGNOSIS — G24.3 SPASMODIC TORTICOLLIS: ICD-10-CM

## 2020-03-30 DIAGNOSIS — M79.7 FIBROMYALGIA: ICD-10-CM

## 2020-03-30 NOTE — TELEPHONE ENCOUNTER
Controlled Substance Refill Request for HYDROcodone-acetaminophen (NORCO) 7.5-325 MG per tablet   Problem List Complete:  Yes  Priority:  Medium    Overview Addendum 12/6/2019  7:43 AM by Yanet Gonzales RN    Patient is followed by CHIQUIS LITTLE for ongoing prescription of pain medication.  All refills should be approved by this provider, or covering partner.     Medication(s): HYDROcodone (VICODIN) 7.5/325   .   Maximum quantity per month: 180  Clinic visit frequency required: Q 3 months      Controlled substance agreement on file: Yes       Date(s):   11/2015     Pain Clinic evaluation in the past: Yes       Date/Location:   [facility]      DIRE Total Score(s):  No flowsheet data found.     Last [facility] website verification: 12/6/2019   [email or web address]      checked in past 3 months?  No, route to RN

## 2020-03-30 NOTE — TELEPHONE ENCOUNTER
Reason for call:  Medication   If this is a refill request, has the caller requested the refill from the pharmacy already? Yes  Will the patient be using a [facility]? No  Name of the pharmacy and phone number for the current request: [facility] [address] [place] [address]   P: [phone]   F: [phone]    Name of the medication requested: HYDROcodone-acetaminophen (NORCO) 7.5-325 MG per tablet    Other request: na     Phone number to reach patient:  Home number on file [phone] (home)    Best Time:  any    Can we leave a detailed message on this number?  YES    Travel screening: Not Applicable

## 2020-04-01 ENCOUNTER — TELEPHONE (OUTPATIENT)
Dept: FAMILY MEDICINE | Facility: CLINIC | Age: 70
End: 2020-04-01

## 2020-04-01 DIAGNOSIS — M79.7 FIBROMYALGIA: ICD-10-CM

## 2020-04-01 DIAGNOSIS — G24.3 SPASMODIC TORTICOLLIS: ICD-10-CM

## 2020-04-01 RX ORDER — HYDROCODONE BITARTRATE AND ACETAMINOPHEN 7.5; 325 MG/1; MG/1
2 TABLET ORAL 3 TIMES DAILY
Qty: 180 TABLET | Refills: 0 | Status: SHIPPED | OUTPATIENT
Start: 2020-04-01 | End: 2020-04-02

## 2020-04-01 NOTE — TELEPHONE ENCOUNTER
She can just call the new pharmacy and ask them to have the prescription transferred over from the other one    Earline Watt RN

## 2020-04-01 NOTE — TELEPHONE ENCOUNTER
Reason for call:  Other   Patient called regarding (reason for call): prescription  Additional comments: Patient is calling regarding message below she said that the pharmacy can not have the prescription sent over to the new one and to write a new prescription. Please call back   [address]   Phone Number: [phone]   HYDROcodone-acetaminophen       Phone number to reach patient:  Home number on file [phone] (home)    Best Time:  any    Can we leave a detailed message on this number?  YES    Travel screening: Not Applicable

## 2020-04-01 NOTE — TELEPHONE ENCOUNTER
Routing refill request to provider for review/approval because:  Drug not on the [facility] refill protocol    checked - last dispensed on 3/2/2020    Requested Prescriptions   Pending Prescriptions Disp Refills     HYDROcodone-acetaminophen (NORCO) 7.5-325 MG per tablet 180 tablet 0     Sig: Take 2 tablets by mouth 3 times daily 28 days or more between refills for controlled medications       There is no refill protocol information for this order        Yanet Gonzales RN

## 2020-04-01 NOTE — TELEPHONE ENCOUNTER
Patient is returning call regarding previous message and is waiting for Rx that was sent to her local pharmacy today which does not have her medication to be changed to a different pharmacy [facility] [address] [facility] phone [phone]. Please call to advise when changed. Thank you.

## 2020-04-01 NOTE — TELEPHONE ENCOUNTER
Reason for call:  Other   Patient called regarding (reason for call): prescription  Additional comments: Patient said that her pharmacy said that they do not have her medication in stock and needs the refill request for    HYDROcodone-acetaminophen (NORCO) 7.5-325 MG per tablet   Routed to a different pharmacy. Please refill and rout to [facility] [phone]   Patient does not know if this if the correct pharmacy or the fax number.      Phone number to reach patient:  Home number on file [phone] (home)    Best Time:  any    Can we leave a detailed message on this number?  YES    Travel screening: Not Applicable

## 2020-04-02 RX ORDER — HYDROCODONE BITARTRATE AND ACETAMINOPHEN 7.5; 325 MG/1; MG/1
2 TABLET ORAL 3 TIMES DAILY
Qty: 180 TABLET | Refills: 0 | Status: SHIPPED | OUTPATIENT
Start: 2020-04-02 | End: 2020-04-24

## 2020-04-02 NOTE — TELEPHONE ENCOUNTER
Patient calling. She needs the [facility] sent to another pharmacy. [facility] is out.     Fax to [facility] in [facility]. See below.     Call patient when done.

## 2020-04-23 DIAGNOSIS — G89.4 CHRONIC PAIN SYNDROME: Primary | ICD-10-CM

## 2020-04-23 DIAGNOSIS — M79.7 FIBROMYALGIA: ICD-10-CM

## 2020-04-23 DIAGNOSIS — G24.3 SPASMODIC TORTICOLLIS: ICD-10-CM

## 2020-04-23 NOTE — TELEPHONE ENCOUNTER
Reason for call:  Medication   If this is a refill request, has the caller requested the refill from the pharmacy already? No  Will the patient be using a [facility]? No  Name of the pharmacy and phone number for the current request: [facility], [phone]    Name of the medication requested: hydrocodone     Other request: n/a     Phone number to reach patient:  Home number on file [phone] (home)    Best Time:  Any     Can we leave a detailed message on this number?  YES    Travel screening: Not Applicable

## 2020-04-24 RX ORDER — HYDROCODONE BITARTRATE AND ACETAMINOPHEN 7.5; 325 MG/1; MG/1
2 TABLET ORAL 3 TIMES DAILY
Qty: 180 TABLET | Refills: 0 | Status: SHIPPED | OUTPATIENT
Start: 2020-04-24 | End: 2020-06-24

## 2020-04-24 NOTE — TELEPHONE ENCOUNTER
Routing refill request to provider for review/approval because:  Drug not on the [facility] refill protocol    last checked 4/1/2020    Requested Prescriptions   Pending Prescriptions Disp Refills     HYDROcodone-acetaminophen (NORCO) 7.5-325 MG per tablet 180 tablet 0     Sig: Take 2 tablets by mouth 3 times daily 28 days or more between refills for controlled medications       There is no refill protocol information for this order        Yanet Gonzales RN

## 2020-04-24 NOTE — TELEPHONE ENCOUNTER
Controlled Substance Refill Request for HYDROcodone-acetaminophen (NORCO) 7.5-325 MG per tablet   Problem List Complete:  Yes  Patient is followed by CHIQUIS LITTLE for ongoing prescription of pain medication.  All refills should be approved by this provider, or covering partner.    Medication(s): HYDROcodone (VICODIN) 7.5/325   .   Maximum quantity per month: 180  Clinic visit frequency required: Q 3 months     Controlled substance agreement on file: Yes       Date(s):   11/2015    Pain Clinic evaluation in the past: Yes       Date/Location:   [facility]     DIRE Total Score(s):  No flowsheet data found.    Last [facility] website verification: 12/6/2019, 4/1/2020   [email or web address]       checked in past 3 months?  Yes 4/1/2020

## 2020-06-24 ENCOUNTER — TELEPHONE (OUTPATIENT)
Dept: FAMILY MEDICINE | Facility: CLINIC | Age: 70
End: 2020-06-24

## 2020-06-24 DIAGNOSIS — G89.4 CHRONIC PAIN SYNDROME: ICD-10-CM

## 2020-06-24 DIAGNOSIS — G24.3 SPASMODIC TORTICOLLIS: ICD-10-CM

## 2020-06-24 DIAGNOSIS — M79.7 FIBROMYALGIA: ICD-10-CM

## 2020-06-24 RX ORDER — HYDROCODONE BITARTRATE AND ACETAMINOPHEN 7.5; 325 MG/1; MG/1
2 TABLET ORAL 3 TIMES DAILY
Qty: 180 TABLET | Refills: 0 | Status: SHIPPED | OUTPATIENT
Start: 2020-06-24 | End: 2020-07-06

## 2020-06-24 NOTE — TELEPHONE ENCOUNTER
Spoke to patient and made appointment for 7/6/2020.  Elizabet POSEY CMA ([facility])

## 2020-06-24 NOTE — TELEPHONE ENCOUNTER
Reason for Call:  Medication or medication refill:    Do you use a [facility]?  Name of the pharmacy and phone number for the current request:  [facility] [address] [place] [address]    Name of the medication requested: HYDROcodone-acetaminophen (NORCO) 7.5-325 MG     Other request:     Can we leave a detailed message on this number? YES    Phone number patient can be reached at: Home number on file [phone] (home)    Best Time: any    Call taken on 6/24/2020 at 8:18 AM by Steven Cherry

## 2020-06-24 NOTE — TELEPHONE ENCOUNTER
Controlled Substance Refill Request for HYDROcodone-acetaminophen (NORCO) 7.5-325 MG per tablet   Problem List Complete:  Yes  Overview  Edited:  Yanet Gonzales, RN  4/1/2020  Patient is followed by [facility] for ongoing prescription of pain medication. All refills should be approved by this provider, or covering partner.     Medication(s): HYDROcodone (VICODIN) 7.5/325   .   Maximum quantity per month: 180  Clinic visit frequency required: Q 3 months      Controlled substance agreement on file: Yes  Date(s): 11/2015     Pain Clinic evaluation in the past: Yes  Date/Location: [facility]      DIRE Total Score(s):  No flowsheet data found.     Last [facility] website verification: 12/6/2019, 4/1/2020  [email or web address]                  checked in past 3 months?  Yes 4-1-2020

## 2020-07-03 NOTE — PROGRESS NOTES
"Carina Calvert is a 70 year old female who is being evaluated via a billable telephone visit.      The patient has been notified of following:     \"This telephone visit will be conducted via a call between you and your physician/provider. We have found that certain health care needs can be provided without the need for a physical exam.  This service lets us provide the care you need with a short phone conversation.  If a prescription is necessary we can send it directly to your pharmacy.  If lab work is needed we can place an order for that and you can then stop by our lab to have the test done at a later time.    Telephone visits are billed at different rates depending on your insurance coverage. During this emergency period, for some insurers they may be billed the same as an in-person visit.  Please reach out to your insurance provider with any questions.    If during the course of the call the physician/provider feels a telephone visit is not appropriate, you will not be charged for this service.\"    Patient has given verbal consent for Telephone visit?  Yes    What phone number would you like to be contacted at? [phone]    How would you like to obtain your AVS? Mail a copy    Subjective     Carina Calvert is a 70 year old female who presents via phone visit today for the following health issues:    HPI  Chief Complaint   Patient presents with     Recheck Medication        Spasmodic torticollis  Fibromyalgia  Restless leg syndrome  Chronic pain syndrome  NSTEMI (non-ST elevated myocardial infarction) (H)  Primary insomnia     Patient Active Problem List   Diagnosis     Neuropathy     Fibromyalgia     Restless leg syndrome     Spasmodic torticollis     Insomnia     Hyperlipidemia with target LDL less than 100     History of hepatitis C     Ex-smoker     Recurrent cold sores     Osteopenia     Migraine headache     Positive occult stool blood test     Subclinical hyperthyroidism     Hypovitaminosis D     " Cramp of limb     NSTEMI (non-ST elevated myocardial infarction) (H)     Chronic pain     Hypertension goal BP (blood pressure) < 140/90     Cervical facet joint syndrome     Primary insomnia     Tobacco use     Past Surgical History:   Procedure Laterality Date     HERNIA REPAIR  9/2009    ventral     LITHOTRIPSY      2 x      TONSILLECTOMY       TUBAL LIGATION         Social History     Tobacco Use     Smoking status: Current Some Day Smoker     Packs/day: 0.20     Years: 15.00     Pack years: 3.00     Types: Cigarettes     Smokeless tobacco: Never Used     Tobacco comment: 4 cigs a day, just lost    Substance Use Topics     Alcohol use: No     Family History   Problem Relation Age of Onset     Neurologic Disorder Mother 65        Parkinsons     Alcohol/Drug Mother      Cerebrovascular Disease Mother      Respiratory Father         COPD     Alcohol/Drug Father      Cancer Brother      Diabetes Sister 52     Cancer Maternal Aunt      Cancer Maternal Uncle      C.A.D. No family hx of          Current Outpatient Medications   Medication Sig Dispense Refill     aspirin 81 MG tablet Take 81 mg by mouth daily       baclofen (LIORESAL) 10 MG tablet TAKE 1 TABLET (10 MG) BY MOUTH 4 TIMES DAILY 360 tablet 1     Cholecalciferol (VITAMIN D) 2000 UNITS tablet Take 1 tablet by mouth daily       gabapentin (NEURONTIN) 300 MG capsule TAKE 1 CAPSULE BY MOUTH IN THE MORNING, 1 AT NOON 1 AT 5PM AND 2 AT BEDTIME ( 5 PER DAY ) 450 capsule 1     HYDROcodone-acetaminophen (NORCO) 7.5-325 MG per tablet Take 2 tablets by mouth 3 times daily 28 days or more between refills for controlled medications 180 tablet 0     Melatonin 10 MG CAPS Take 2 capsules by mouth 1 hour prior to bedtime       metoprolol tartrate (LOPRESSOR) 50 MG tablet Take 1 tablet (50 mg) by mouth 2 times daily 180 tablet 1     nitroGLYcerin (NITROSTAT) 0.4 MG sublingual tablet Place 1 tablet (0.4 mg) under the tongue every 5 minutes as needed for chest pain 25  tablet 3     polyethylene glycol (MIRALAX/GLYCOLAX) powder Take 17 g by mouth daily Reported on 5/15/2017       rosuvastatin (CRESTOR) 10 MG tablet TAKE 1 TABLET (10 MG) BY MOUTH DAILY 90 tablet 3     traZODone (DESYREL) 100 MG tablet TAKE THREE TABLETS NIGHTLY AT BEDTIME 270 tablet 1     VITAMIN B-12 1000 MCG OR TABS 1 tablet daily       Allergies   Allergen Reactions     Diagnostic X-Ray Materials Anaphylaxis and Hives     Unable to breathe     Diatrizoate Hives     Droperidol      Ivp Dye [Contrast Dye]      Recent Labs   Lab Test 02/04/20  1117 11/08/18  1308 11/14/17  1450 10/18/16  1532  07/15/13   LDL 40 43 30 50  --   --    HDL 57 54 57 42*   < >  --    TRIG 63 64 103  --   --   --    ALT  --   --  23 29  --  12   CR 0.67 0.72 0.67 0.69  --  0.75   GFRESTIMATED >90 80 88 85  --  >60   GFRESTBLACK >90 >90 >90 >90  African American GFR Calc    --  >60   POTASSIUM 4.1 4.0 4.3 4.7  --  4.0   TSH 1.91 1.82 1.03 1.84   < > 1.39    < > = values in this interval not displayed.      BP Readings from Last 3 Encounters:   02/20/20 (!) 149/81   02/04/20 134/76   11/15/19 (!) 162/72    Wt Readings from Last 3 Encounters:   02/20/20 53.5 kg (118 lb)   02/04/20 54.6 kg (120 lb 6.4 oz)   11/15/19 54.9 kg (121 lb)                    Reviewed and updated as needed this visit by Provider         Review of Systems   Constitutional, HEENT, cardiovascular, pulmonary, gi and gu systems are negative, except as otherwise noted.       Objective   Reported vitals:  There were no vitals taken for this visit.   healthy, alert and no distress  PSYCH: Alert and oriented times 3; coherent speech, normal   rate and volume, able to articulate logical thoughts, able   to abstract reason, no tangential thoughts, no hallucinations   or delusions  Her affect is normal  RESP: No cough, no audible wheezing, able to talk in full sentences  Remainder of exam unable to be completed due to telephone visits    Diagnostic Test Results:  Labs reviewed in  Epic        Assessment/Plan:  1. Spasmodic torticollis  Patient with very deeply ingrained chronic treatment. Suffers with muscle spasms and has been on chronic treatment with botox injections for years and years . Modifications with medications entered, medication list corrected  - gabapentin (NEURONTIN) 300 MG capsule; TAKE 1 CAPSULE BY MOUTH IN THE MORNING, 1 AT NOON 1 AT 5PM AND 2 AT BEDTIME ( 5 PER DAY )  Dispense: 450 capsule; Refill: 1  - HYDROcodone-acetaminophen (NORCO) 7.5-325 MG per tablet; Take 2 tablets by mouth 3 times daily 28 days or more between refills for controlled medications  Dispense: 180 tablet; Refill: 0    2. Fibromyalgia    - gabapentin (NEURONTIN) 300 MG capsule; TAKE 1 CAPSULE BY MOUTH IN THE MORNING, 1 AT NOON 1 AT 5PM AND 2 AT BEDTIME ( 5 PER DAY )  Dispense: 450 capsule; Refill: 1  - HYDROcodone-acetaminophen (NORCO) 7.5-325 MG per tablet; Take 2 tablets by mouth 3 times daily 28 days or more between refills for controlled medications  Dispense: 180 tablet; Refill: 0  - traZODone (DESYREL) 100 MG tablet; TAKE THREE TABLETS NIGHTLY AT BEDTIME  Dispense: 270 tablet; Refill: 1    3. Restless leg syndrome    - gabapentin (NEURONTIN) 300 MG capsule; TAKE 1 CAPSULE BY MOUTH IN THE MORNING, 1 AT NOON 1 AT 5PM AND 2 AT BEDTIME ( 5 PER DAY )  Dispense: 450 capsule; Refill: 1    4. Chronic pain syndrome  Refills provided   - HYDROcodone-acetaminophen (NORCO) 7.5-325 MG per tablet; Take 2 tablets by mouth 3 times daily 28 days or more between refills for controlled medications  Dispense: 180 tablet; Refill: 0    5. NSTEMI (non-ST elevated myocardial infarction) (H)    - nitroGLYcerin (NITROSTAT) 0.4 MG sublingual tablet; Place 1 tablet (0.4 mg) under the tongue every 5 minutes as needed for chest pain  Dispense: 25 tablet; Refill: 3    6. Primary insomnia    - traZODone (DESYREL) 100 MG tablet; TAKE THREE TABLETS NIGHTLY AT BEDTIME  Dispense: 270 tablet; Refill: 1    No follow-ups on  file.    3:09 PM   Patient is good and doing well.  She continues to do ok. She's getting botox injections every third month.  Sleep study - off of nortriptyline (PAMELOR), now just on 3 Desyrel (Trazodone) and 5 Gabapentin [Neurontin] instead of 6. Also met with sleep psychologist ?  3:17 PM      Phone call duration:  8 minutes    Gerber Jain MD

## 2020-07-06 ENCOUNTER — VIRTUAL VISIT (OUTPATIENT)
Dept: INTERNAL MEDICINE | Facility: CLINIC | Age: 70
End: 2020-07-06
Payer: MEDICARE

## 2020-07-06 DIAGNOSIS — I21.4 NSTEMI (NON-ST ELEVATED MYOCARDIAL INFARCTION) (H): ICD-10-CM

## 2020-07-06 DIAGNOSIS — G24.3 SPASMODIC TORTICOLLIS: ICD-10-CM

## 2020-07-06 DIAGNOSIS — G89.4 CHRONIC PAIN SYNDROME: ICD-10-CM

## 2020-07-06 DIAGNOSIS — G25.81 RESTLESS LEG SYNDROME: ICD-10-CM

## 2020-07-06 DIAGNOSIS — F51.01 PRIMARY INSOMNIA: ICD-10-CM

## 2020-07-06 DIAGNOSIS — M79.7 FIBROMYALGIA: ICD-10-CM

## 2020-07-06 PROCEDURE — 99441 ZZC PHYSICIAN TELEPHONE EVALUATION 5-10 MIN: CPT | Performed by: INTERNAL MEDICINE

## 2020-07-06 RX ORDER — NITROGLYCERIN 0.4 MG/1
0.4 TABLET SUBLINGUAL EVERY 5 MIN PRN
Qty: 25 TABLET | Refills: 3 | Status: SHIPPED | OUTPATIENT
Start: 2020-07-06 | End: 2022-02-24

## 2020-07-06 RX ORDER — HYDROCODONE BITARTRATE AND ACETAMINOPHEN 7.5; 325 MG/1; MG/1
2 TABLET ORAL 3 TIMES DAILY
Qty: 180 TABLET | Refills: 0 | Status: SHIPPED | OUTPATIENT
Start: 2020-07-06 | End: 2020-08-19

## 2020-07-06 RX ORDER — GABAPENTIN 300 MG/1
CAPSULE ORAL
Qty: 450 CAPSULE | Refills: 1
Start: 2020-07-06 | End: 2020-10-15

## 2020-07-06 RX ORDER — TRAZODONE HYDROCHLORIDE 100 MG/1
TABLET ORAL
Qty: 270 TABLET | Refills: 1
Start: 2020-07-06 | End: 2020-09-14

## 2020-08-19 ENCOUNTER — TELEPHONE (OUTPATIENT)
Dept: FAMILY MEDICINE | Facility: CLINIC | Age: 70
End: 2020-08-19

## 2020-08-19 DIAGNOSIS — G89.4 CHRONIC PAIN SYNDROME: ICD-10-CM

## 2020-08-19 DIAGNOSIS — G89.29 CHRONIC PAIN: ICD-10-CM

## 2020-08-19 DIAGNOSIS — G24.3 SPASMODIC TORTICOLLIS: ICD-10-CM

## 2020-08-19 DIAGNOSIS — M79.7 FIBROMYALGIA: ICD-10-CM

## 2020-08-19 RX ORDER — HYDROCODONE BITARTRATE AND ACETAMINOPHEN 7.5; 325 MG/1; MG/1
2 TABLET ORAL 3 TIMES DAILY
Qty: 180 TABLET | Refills: 0 | Status: SHIPPED | OUTPATIENT
Start: 2020-08-19 | End: 2020-09-16

## 2020-08-19 NOTE — TELEPHONE ENCOUNTER
Reason for call:  Medication     If this is a refill request, has the caller requested the refill from the pharmacy already? Yes     Will the patient be using a [facility]? No     Name of the pharmacy and phone number for the current request: [facility] [phone]    Name of the medication requested: HYDROcodone-acetaminophen (NORCO) 7.5-325 MG per tablet    Other request: na    Phone number to reach patient:  Home number on file [phone] (home)    Best Time:  any    Can we leave a detailed message on this number?  YES    Travel screening: Not Applicable

## 2020-08-19 NOTE — TELEPHONE ENCOUNTER
Controlled Substance Refill Request for Norco  Problem List Complete:  Yes   checked in past 3 months?  Yes 8/19/2020   Had virtual visit with PCP on 7/6/2020    Patient is followed by [facility] for ongoing prescription of pain medication.  All refills should be approved by this provider, or covering partner.    Medication(s): HYDROcodone (VICODIN) 7.5/325   .   Maximum quantity per month: 180  Clinic visit frequency required: Q 3 months     Controlled substance agreement on file: Yes       Date(s):   11/2015    Pain Clinic evaluation in the past: Yes       Date/Location:   [facility]     DIRE Total Score(s):  No flowsheet data found.    Last [facility] website verification: 8/19/2020, no issues, last filled on 7/21/2020 for 30 day supply     [email or web address]    Earline Emery RN

## 2020-09-02 DIAGNOSIS — G24.3 SPASMODIC TORTICOLLIS: ICD-10-CM

## 2020-09-02 DIAGNOSIS — M79.7 FIBROMYALGIA: ICD-10-CM

## 2020-09-02 RX ORDER — BACLOFEN 10 MG/1
10 TABLET ORAL 4 TIMES DAILY
Qty: 360 TABLET | Refills: 1 | Status: SHIPPED | OUTPATIENT
Start: 2020-09-02 | End: 2021-01-28

## 2020-09-02 NOTE — TELEPHONE ENCOUNTER
baclofen (LIORESAL) 10 MG tablet       Last Written Prescription Date:  03/11/2020  Last Fill Quantity: 360,   # refills: 1  Last Office Visit: Virtual Visit with Dr. Jain on 07/06/2020  Future Office visit:       Routing refill request to provider for review/approval because:  Drug not on the [facility], [facility] or [facility] refill protocol or controlled substance  An Collins, CMA

## 2020-09-13 DIAGNOSIS — F51.01 PRIMARY INSOMNIA: ICD-10-CM

## 2020-09-13 DIAGNOSIS — M79.7 FIBROMYALGIA: ICD-10-CM

## 2020-09-13 NOTE — TELEPHONE ENCOUNTER
Routing refill request to provider for review/approval because:  Please clarify correct dose.  Angle Tariq RN

## 2020-09-14 RX ORDER — TRAZODONE HYDROCHLORIDE 100 MG/1
TABLET ORAL
Qty: 270 TABLET | Refills: 1 | Status: SHIPPED | OUTPATIENT
Start: 2020-09-14 | End: 2021-01-28

## 2020-09-16 ENCOUNTER — TELEPHONE (OUTPATIENT)
Dept: FAMILY MEDICINE | Facility: CLINIC | Age: 70
End: 2020-09-16

## 2020-09-16 DIAGNOSIS — G24.3 SPASMODIC TORTICOLLIS: ICD-10-CM

## 2020-09-16 DIAGNOSIS — M79.7 FIBROMYALGIA: ICD-10-CM

## 2020-09-16 DIAGNOSIS — G89.4 CHRONIC PAIN SYNDROME: ICD-10-CM

## 2020-09-16 RX ORDER — HYDROCODONE BITARTRATE AND ACETAMINOPHEN 7.5; 325 MG/1; MG/1
2 TABLET ORAL 3 TIMES DAILY
Qty: 180 TABLET | Refills: 0 | Status: SHIPPED | OUTPATIENT
Start: 2020-09-16 | End: 2020-09-17

## 2020-09-16 NOTE — TELEPHONE ENCOUNTER
Controlled Substance Refill Request for HYDROcodone-acetaminophen (NORCO) 7.5-325 MG per tablet   Problem List Complete:  Yes  Overview  Edited:  Earline Emery RN  8/19/2020  Patient is followed by [facility] for ongoing prescription of pain medication. All refills should be approved by this provider, or covering partner.     Medication(s): HYDROcodone (VICODIN) 7.5/325   .   Maximum quantity per month: 180  Clinic visit frequency required: Q 3 months      Controlled substance agreement on file: Yes  Date(s): 11/2015     Pain Clinic evaluation in the past: Yes  Date/Location: [facility]      DIRE Total Score(s):  No flowsheet data found.     Last [facility] website verification: 8/19/2020  [email or web address]                  checked in past 3 months?  Yes 8-

## 2020-09-16 NOTE — TELEPHONE ENCOUNTER
Reason for Call:  Medication or medication refill:    Do you use a [facility]?  Name of the pharmacy and phone number for the current request:    [facility] [address] [place] [address]    Name of the medication requested: HYDROcodone (VICODIN) 7.5/325     Other request:     Can we leave a detailed message on this number? YES    Phone number patient can be reached at: Home number on file [phone] (home)    Best Time: any    Call taken on 9/16/2020 at 8:04 AM by Steven Cherry

## 2020-09-17 ENCOUNTER — TELEPHONE (OUTPATIENT)
Dept: FAMILY MEDICINE | Facility: CLINIC | Age: 70
End: 2020-09-17

## 2020-09-17 DIAGNOSIS — G89.4 CHRONIC PAIN SYNDROME: ICD-10-CM

## 2020-09-17 DIAGNOSIS — G24.3 SPASMODIC TORTICOLLIS: ICD-10-CM

## 2020-09-17 DIAGNOSIS — M79.7 FIBROMYALGIA: ICD-10-CM

## 2020-09-17 RX ORDER — HYDROCODONE BITARTRATE AND ACETAMINOPHEN 7.5; 325 MG/1; MG/1
2 TABLET ORAL 3 TIMES DAILY
Qty: 180 TABLET | Refills: 0 | Status: SHIPPED | OUTPATIENT
Start: 2020-09-17 | End: 2020-10-15

## 2020-09-17 NOTE — TELEPHONE ENCOUNTER
The refills we received for Trazodone and Norco were approved and sent  However, transmission to pharmacy for NOrco prescription failed     Can another provider resend this for Dr. Jain as his new phone is currently having trouble sending controlled substances           Earline Emery RN

## 2020-09-17 NOTE — TELEPHONE ENCOUNTER
Reason for Call:  Other call back    Detailed comments: patient is calling for status on refill requested, please see encounter dated: 09/13 and 09/16. Please call patient for update. Thank you.    Phone Number Patient can be reached at: Home number on file [phone] (home)    Best Time:     Can we leave a detailed message on this number? YES    Call taken on 9/17/2020 at 11:03 AM by Xiomara Collins

## 2020-10-14 ENCOUNTER — TELEPHONE (OUTPATIENT)
Dept: FAMILY MEDICINE | Facility: CLINIC | Age: 70
End: 2020-10-14

## 2020-10-14 DIAGNOSIS — G24.3 SPASMODIC TORTICOLLIS: ICD-10-CM

## 2020-10-14 DIAGNOSIS — G89.4 CHRONIC PAIN SYNDROME: ICD-10-CM

## 2020-10-14 DIAGNOSIS — M79.7 FIBROMYALGIA: ICD-10-CM

## 2020-10-14 RX ORDER — HYDROCODONE BITARTRATE AND ACETAMINOPHEN 7.5; 325 MG/1; MG/1
2 TABLET ORAL 3 TIMES DAILY
Qty: 180 TABLET | Refills: 0 | Status: CANCELLED | OUTPATIENT
Start: 2020-10-14

## 2020-10-14 NOTE — TELEPHONE ENCOUNTER
Patient called and informed.  She will be out of medication within the next 2 days.    Telephone visit scheduled with Dr. Jain for tomorrow, October 15th at 5:10 p.m. Alice John,

## 2020-10-14 NOTE — TELEPHONE ENCOUNTER
Reason for Call:  Medication or medication refill:    Do you use a [facility]?  Name of the pharmacy and phone number for the current request:    [facility] [id number] [place] [address]    Name of the medication requested: HYDROcodone (VICODIN) 7.5/325    Other request:     Can we leave a detailed message on this number? YES    Phone number patient can be reached at: Home number on file [phone] (home)    Best Time: any    Call taken on 10/14/2020 at 9:04 AM by Steven Cherry

## 2020-10-14 NOTE — TELEPHONE ENCOUNTER
Controlled Substance Refill Request for HYDROcodone-acetaminophen (NORCO) 7.5-325 MG per tablet  Problem List Complete:  Yes  Overview  Edited:  Earline Emery RN  8/19/2020  Patient is followed by [facility] for ongoing prescription of pain medication. All refills should be approved by this provider, or covering partner.     Medication(s): HYDROcodone (VICODIN) 7.5/325   .   Maximum quantity per month: 180  Clinic visit frequency required: Q 3 months      Controlled substance agreement on file: Yes  Date(s): 11/2015     Pain Clinic evaluation in the past: Yes  Date/Location: [facility]      DIRE Total Score(s):  No flowsheet data found.     Last [facility] website verification: 8/19/2020  [email or web address]                  checked in past 3 months?  Yes 8-

## 2020-10-14 NOTE — TELEPHONE ENCOUNTER
Needs office, video, or telephone visit for ongoing monitoring  Of chronic oral opioid therapy . And after this time, in 3 more months we need pre-visit laboratory studies and preferred is a face to face encounter although we could still get by with telephone MD visit as long as laboratory studies are done    Gerber Jain MD

## 2020-10-15 ENCOUNTER — VIRTUAL VISIT (OUTPATIENT)
Dept: INTERNAL MEDICINE | Facility: CLINIC | Age: 70
End: 2020-10-15
Payer: MEDICARE

## 2020-10-15 DIAGNOSIS — I10 HYPERTENSION GOAL BP (BLOOD PRESSURE) < 140/90: ICD-10-CM

## 2020-10-15 DIAGNOSIS — G24.3 SPASMODIC TORTICOLLIS: ICD-10-CM

## 2020-10-15 DIAGNOSIS — G89.4 CHRONIC PAIN SYNDROME: ICD-10-CM

## 2020-10-15 DIAGNOSIS — I21.4 NSTEMI (NON-ST ELEVATED MYOCARDIAL INFARCTION) (H): ICD-10-CM

## 2020-10-15 DIAGNOSIS — G25.81 RESTLESS LEG SYNDROME: ICD-10-CM

## 2020-10-15 DIAGNOSIS — M79.7 FIBROMYALGIA: ICD-10-CM

## 2020-10-15 PROCEDURE — 99442 PR PHYSICIAN TELEPHONE EVALUATION 11-20 MIN: CPT | Mod: 95 | Performed by: INTERNAL MEDICINE

## 2020-10-15 RX ORDER — HYDROCODONE BITARTRATE AND ACETAMINOPHEN 7.5; 325 MG/1; MG/1
2 TABLET ORAL 3 TIMES DAILY
Qty: 180 TABLET | Refills: 0 | Status: SHIPPED | OUTPATIENT
Start: 2020-10-15 | End: 2020-11-11

## 2020-10-15 RX ORDER — METOPROLOL TARTRATE 50 MG
50 TABLET ORAL 2 TIMES DAILY
Qty: 180 TABLET | Refills: 1 | Status: SHIPPED | OUTPATIENT
Start: 2020-10-15 | End: 2021-01-28

## 2020-10-15 RX ORDER — GABAPENTIN 300 MG/1
CAPSULE ORAL
Qty: 450 CAPSULE | Refills: 1 | Status: SHIPPED | OUTPATIENT
Start: 2020-10-15 | End: 2021-01-28

## 2020-10-15 NOTE — PROGRESS NOTES
"Carina Calvert is a 70 year old female who is being evaluated via a billable telephone visit.      The patient has been notified of following:     \"This telephone visit will be conducted via a call between you and your physician/provider. We have found that certain health care needs can be provided without the need for a physical exam.  This service lets us provide the care you need with a short phone conversation.  If a prescription is necessary we can send it directly to your pharmacy.  If lab work is needed we can place an order for that and you can then stop by our lab to have the test done at a later time.    Telephone visits are billed at different rates depending on your insurance coverage. During this emergency period, for some insurers they may be billed the same as an in-person visit.  Please reach out to your insurance provider with any questions.    If during the course of the call the physician/provider feels a telephone visit is not appropriate, you will not be charged for this service.\"    Patient has given verbal consent for Telephone visit?  Yes    What phone number would you like to be contacted at? [phone]    How would you like to obtain your AVS? Mail a copy    Subjective     Carina Calvert is a 70 year old female who presents via phone visit today for the following health issues:    HPI      Chief Complaint   Patient presents with     Recheck Medication     Norco refill      Recheck telephone MD visit for ongoing monitoring of ongoing monitoring  Of chronic oral opioid therapy. I have following this patient for primary care for a very many years .    There are no new issues to discuss today. See assessment and plan section      Review of Systems   Constitutional, HEENT, cardiovascular, pulmonary, gi and gu systems are negative, except as otherwise noted.       Objective          Vitals:  No vitals were obtained today due to virtual visit.    healthy, alert and no distress  PSYCH: Alert " and oriented times 3; coherent speech, normal   rate and volume, able to articulate logical thoughts, able   to abstract reason, no tangential thoughts, no hallucinations   or delusions  Her affect is normal  RESP: No cough, no audible wheezing, able to talk in full sentences  Remainder of exam unable to be completed due to telephone visits          Assessment/Plan:    Assessment & Plan     Spasmodic torticollis  Refills provided , see patient after-visit summary - we agreed that we will refill all medications until February when she will have a face to face encounter and do all the laboratory studies to complete the annual follow up  - gabapentin (NEURONTIN) 300 MG capsule; TAKE 1 CAPSULE BY MOUTH IN THE MORNING, 1 AT NOON 1 AT 5PM AND 2 AT BEDTIME ( 5 PER DAY )  - HYDROcodone-acetaminophen (NORCO) 7.5-325 MG per tablet; Take 2 tablets by mouth 3 times daily 28 days or more between refills for controlled medications    Fibromyalgia  Continue current plan of care    - gabapentin (NEURONTIN) 300 MG capsule; TAKE 1 CAPSULE BY MOUTH IN THE MORNING, 1 AT NOON 1 AT 5PM AND 2 AT BEDTIME ( 5 PER DAY )  - HYDROcodone-acetaminophen (NORCO) 7.5-325 MG per tablet; Take 2 tablets by mouth 3 times daily 28 days or more between refills for controlled medications    Restless leg syndrome  Continue current plan of care    - gabapentin (NEURONTIN) 300 MG capsule; TAKE 1 CAPSULE BY MOUTH IN THE MORNING, 1 AT NOON 1 AT 5PM AND 2 AT BEDTIME ( 5 PER DAY )    Chronic pain syndrome  Continue current plan of care  , refills provided   - HYDROcodone-acetaminophen (NORCO) 7.5-325 MG per tablet; Take 2 tablets by mouth 3 times daily 28 days or more between refills for controlled medications    NSTEMI (non-ST elevated myocardial infarction) (H)  Continue current plan of care    - metoprolol tartrate (LOPRESSOR) 50 MG tablet; Take 1 tablet (50 mg) by mouth 2 times daily    Hypertension goal BP (blood pressure) < 140/90  Continue current plan of  care    - metoprolol tartrate (LOPRESSOR) 50 MG tablet; Take 1 tablet (50 mg) by mouth 2 times daily     Tobacco Cessation:   reports that she has been smoking cigarettes. She has a 3.00 pack-year smoking history. She has never used smokeless tobacco.  Tobacco Cessation Action Plan: Information offered: Patient not interested at this time         Return in about 4 months (around 2/15/2021).    Gerber Jain MD  [facility]    Phone call duration:  14 minutes    Telephone MD visit     Call began at 5:15 PM   Call ended at 5:29 PM

## 2020-11-06 DIAGNOSIS — E78.5 HYPERLIPIDEMIA WITH TARGET LDL LESS THAN 100: ICD-10-CM

## 2020-11-08 RX ORDER — ROSUVASTATIN CALCIUM 10 MG/1
TABLET, COATED ORAL
Qty: 90 TABLET | Refills: 3 | OUTPATIENT
Start: 2020-11-08

## 2020-11-11 ENCOUNTER — TELEPHONE (OUTPATIENT)
Dept: FAMILY MEDICINE | Facility: CLINIC | Age: 70
End: 2020-11-11

## 2020-11-11 DIAGNOSIS — M79.7 FIBROMYALGIA: ICD-10-CM

## 2020-11-11 DIAGNOSIS — G89.4 CHRONIC PAIN SYNDROME: ICD-10-CM

## 2020-11-11 DIAGNOSIS — G24.3 SPASMODIC TORTICOLLIS: ICD-10-CM

## 2020-11-11 RX ORDER — HYDROCODONE BITARTRATE AND ACETAMINOPHEN 7.5; 325 MG/1; MG/1
2 TABLET ORAL 3 TIMES DAILY
Qty: 180 TABLET | Refills: 0 | Status: SHIPPED | OUTPATIENT
Start: 2020-11-11 | End: 2020-12-11

## 2020-11-11 NOTE — TELEPHONE ENCOUNTER
Controlled Substance Refill Request for HYDROcodone-acetaminophen (NORCO) 7.5-325 MG per tablet  Problem List Complete:  Yes  Overview  Edited:  Earline Emery RN  8/19/2020  Patient is followed by [facility] for ongoing prescription of pain medication. All refills should be approved by this provider, or covering partner.     Medication(s): HYDROcodone (VICODIN) 7.5/325   .   Maximum quantity per month: 180  Clinic visit frequency required: Q 3 months      Controlled substance agreement on file: Yes  Date(s): 11/2015     Pain Clinic evaluation in the past: Yes  Date/Location: [facility]      DIRE Total Score(s):  No flowsheet data found.     Last [facility] website verification: 8/19/2020  [email or web address]                  checked in past 3 months?  Yes 8-

## 2020-11-12 DIAGNOSIS — E78.5 HYPERLIPIDEMIA WITH TARGET LDL LESS THAN 100: ICD-10-CM

## 2020-11-12 RX ORDER — ROSUVASTATIN CALCIUM 10 MG/1
TABLET, COATED ORAL
Qty: 90 TABLET | Refills: 0 | Status: SHIPPED | OUTPATIENT
Start: 2020-11-12 | End: 2021-01-28

## 2020-12-09 DIAGNOSIS — G89.4 CHRONIC PAIN SYNDROME: ICD-10-CM

## 2020-12-09 DIAGNOSIS — G24.3 SPASMODIC TORTICOLLIS: ICD-10-CM

## 2020-12-09 DIAGNOSIS — M79.7 FIBROMYALGIA: ICD-10-CM

## 2020-12-09 NOTE — TELEPHONE ENCOUNTER
Controlled Substance Refill Request for HYDROcodone-acetaminophen (NORCO) 7.5-325 MG per tablet  Problem List Complete:  Yes  Overview  Edited:  Earline Emery RN  8/19/2020  Patient is followed by [facility] for ongoing prescription of pain medication. All refills should be approved by this provider, or covering partner.     Medication(s): HYDROcodone (VICODIN) 7.5/325   .   Maximum quantity per month: 180  Clinic visit frequency required: Q 3 months      Controlled substance agreement on file: Yes  Date(s): 11/2015     Pain Clinic evaluation in the past: Yes  Date/Location: [facility]      DIRE Total Score(s):  No flowsheet data found.     Last [facility] website verification: 8/19/2020  [email or web address]                  checked in past 3 months?  No, route to RN

## 2020-12-11 RX ORDER — HYDROCODONE BITARTRATE AND ACETAMINOPHEN 7.5; 325 MG/1; MG/1
2 TABLET ORAL 3 TIMES DAILY
Qty: 180 TABLET | Refills: 0 | Status: SHIPPED | OUTPATIENT
Start: 2020-12-11 | End: 2021-01-07

## 2020-12-11 NOTE — TELEPHONE ENCOUNTER
[place] reviewed:  reviewed- no concerns  Last filled-11/12/2020  [place]:  [email or web address] ([email or web address])/  Angle Tariq RN

## 2021-01-07 DIAGNOSIS — G24.3 SPASMODIC TORTICOLLIS: ICD-10-CM

## 2021-01-07 DIAGNOSIS — M79.7 FIBROMYALGIA: ICD-10-CM

## 2021-01-07 DIAGNOSIS — G89.4 CHRONIC PAIN SYNDROME: ICD-10-CM

## 2021-01-07 RX ORDER — HYDROCODONE BITARTRATE AND ACETAMINOPHEN 7.5; 325 MG/1; MG/1
2 TABLET ORAL 3 TIMES DAILY
Qty: 180 TABLET | Refills: 0 | Status: SHIPPED | OUTPATIENT
Start: 2021-01-07 | End: 2021-01-28

## 2021-01-07 NOTE — TELEPHONE ENCOUNTER
Controlled Substance Refill Request for   Problem List Complete:  Yes  Overview Addendum 8/19/2020  8:52 AM by Earline Emery RN   Patient is followed by [facility] for ongoing prescription of pain medication.  All refills should be approved by this provider, or covering partner.     Medication(s): HYDROcodone (VICODIN) 7.5/325   .   Maximum quantity per month: 180  Clinic visit frequency required: Q 3 months      Controlled substance agreement on file: Yes       Date(s):   11/2015     Pain Clinic evaluation in the past: Yes       Date/Location:   [facility]      DIRE Total Score(s):  No flowsheet data found.     Last [facility] website verification: 8/19/2020   [email or web address]        checked in past 3 months?  No, route to RN

## 2021-01-07 NOTE — TELEPHONE ENCOUNTER
Routing refill request to provider for review/approval because:  Drug not on the [facility] refill protocol     Elizabet Rose RN  [facility]

## 2021-01-07 NOTE — TELEPHONE ENCOUNTER
Reason for Call:  Medication or medication refill:    Do you use a [facility]?  Name of the pharmacy and phone number for the current request:   [facility] [address] [place] [address]    Name of the medication requested: HYDROcodone-acetaminophen (NORCO) 7.5-325 MG per tablet    Other request: Patient is calling in today in regards to needing a refill. Patient is almost out of medication at this time. Thank you     Can we leave a detailed message on this number? YES    Phone number patient can be reached at: Cell number on file:    Telephone Information:   Mobile [phone]       Best Time: anytime    Call taken on 1/7/2021 at 9:37 AM by Elizabet Grace

## 2021-01-08 NOTE — TELEPHONE ENCOUNTER
Patient called to find out if her medication was sent to her pharmacy.    This was sent over on 1/7/2021    This was confirmed with the pharmacy they received this.    Patient will contact the pharmacy.    Camilla Andres,

## 2021-01-28 ENCOUNTER — OFFICE VISIT (OUTPATIENT)
Dept: INTERNAL MEDICINE | Facility: CLINIC | Age: 71
End: 2021-01-28
Payer: MEDICARE

## 2021-01-28 VITALS
OXYGEN SATURATION: 98 % | TEMPERATURE: 97.9 F | HEART RATE: 57 BPM | BODY MASS INDEX: 20.94 KG/M2 | RESPIRATION RATE: 14 BRPM | WEIGHT: 122 LBS | SYSTOLIC BLOOD PRESSURE: 126 MMHG | DIASTOLIC BLOOD PRESSURE: 71 MMHG

## 2021-01-28 DIAGNOSIS — D69.6 THROMBOCYTOPENIA (H): ICD-10-CM

## 2021-01-28 DIAGNOSIS — G89.4 CHRONIC PAIN SYNDROME: ICD-10-CM

## 2021-01-28 DIAGNOSIS — I21.4 NSTEMI (NON-ST ELEVATED MYOCARDIAL INFARCTION) (H): ICD-10-CM

## 2021-01-28 DIAGNOSIS — G24.3 SPASMODIC TORTICOLLIS: ICD-10-CM

## 2021-01-28 DIAGNOSIS — F51.01 PRIMARY INSOMNIA: ICD-10-CM

## 2021-01-28 DIAGNOSIS — G25.81 RESTLESS LEG SYNDROME: ICD-10-CM

## 2021-01-28 DIAGNOSIS — I10 HYPERTENSION GOAL BP (BLOOD PRESSURE) < 140/90: ICD-10-CM

## 2021-01-28 DIAGNOSIS — E78.5 HYPERLIPIDEMIA WITH TARGET LDL LESS THAN 100: ICD-10-CM

## 2021-01-28 DIAGNOSIS — M79.7 FIBROMYALGIA: ICD-10-CM

## 2021-01-28 PROCEDURE — 99214 OFFICE O/P EST MOD 30 MIN: CPT | Performed by: INTERNAL MEDICINE

## 2021-01-28 RX ORDER — BACLOFEN 10 MG/1
10 TABLET ORAL 4 TIMES DAILY
Qty: 360 TABLET | Refills: 1 | Status: SHIPPED | OUTPATIENT
Start: 2021-01-28 | End: 2021-06-05

## 2021-01-28 RX ORDER — GABAPENTIN 300 MG/1
CAPSULE ORAL
Qty: 450 CAPSULE | Refills: 1 | Status: SHIPPED | OUTPATIENT
Start: 2021-01-28 | End: 2021-11-04

## 2021-01-28 RX ORDER — ROSUVASTATIN CALCIUM 10 MG/1
TABLET, COATED ORAL
Qty: 90 TABLET | Refills: 1 | Status: SHIPPED | OUTPATIENT
Start: 2021-01-28 | End: 2021-08-27

## 2021-01-28 RX ORDER — HYDROCODONE BITARTRATE AND ACETAMINOPHEN 7.5; 325 MG/1; MG/1
2 TABLET ORAL 3 TIMES DAILY
Qty: 180 TABLET | Refills: 0 | Status: SHIPPED | OUTPATIENT
Start: 2021-01-28 | End: 2021-03-03

## 2021-01-28 RX ORDER — METOPROLOL TARTRATE 50 MG
50 TABLET ORAL 2 TIMES DAILY
Qty: 180 TABLET | Refills: 1 | Status: SHIPPED | OUTPATIENT
Start: 2021-01-28 | End: 2021-10-25

## 2021-01-28 RX ORDER — TRAZODONE HYDROCHLORIDE 100 MG/1
TABLET ORAL
Qty: 270 TABLET | Refills: 1 | Status: SHIPPED | OUTPATIENT
Start: 2021-01-28 | End: 2021-04-08

## 2021-01-28 NOTE — PROGRESS NOTES
Assessment & Plan     Spasmodic torticollis  Patient I have followed for a very very long time. She has a lot of somatic pathology especially with a deep seated and chronic spasmodic torticollis . She's been through a lot of botox injections and neurological consultation and many different medications including chronic oral opioid therapy and others with a complex sleep pathology and somewhat chronic pain situation. At this point we are recommending she see medication therapy management consult and she agrees   - baclofen (LIORESAL) 10 MG tablet; Take 1 tablet (10 mg) by mouth 4 times daily  - gabapentin (NEURONTIN) 300 MG capsule; TAKE 1 CAPSULE BY MOUTH IN THE MORNING, 1 AT NOON 1 AT 5PM AND 2 AT BEDTIME ( 5 PER DAY )  - HYDROcodone-acetaminophen (NORCO) 7.5-325 MG per tablet; Take 2 tablets by mouth 3 times daily 28 days or more between refills for controlled medications  - MED THERAPY MANAGE REFERRAL    Fibromyalgia  Refills provided ., recheck face to face encounter in 4 months   - baclofen (LIORESAL) 10 MG tablet; Take 1 tablet (10 mg) by mouth 4 times daily  - gabapentin (NEURONTIN) 300 MG capsule; TAKE 1 CAPSULE BY MOUTH IN THE MORNING, 1 AT NOON 1 AT 5PM AND 2 AT BEDTIME ( 5 PER DAY )  - HYDROcodone-acetaminophen (NORCO) 7.5-325 MG per tablet; Take 2 tablets by mouth 3 times daily 28 days or more between refills for controlled medications  - traZODone (DESYREL) 100 MG tablet; TAKE THREE TABLETS NIGHTLY AT BEDTIME  - MED THERAPY MANAGE REFERRAL    Restless leg syndrome  Refills provided   - gabapentin (NEURONTIN) 300 MG capsule; TAKE 1 CAPSULE BY MOUTH IN THE MORNING, 1 AT NOON 1 AT 5PM AND 2 AT BEDTIME ( 5 PER DAY )  - MED THERAPY MANAGE REFERRAL    Chronic pain syndrome  As above   - HYDROcodone-acetaminophen (NORCO) 7.5-325 MG per tablet; Take 2 tablets by mouth 3 times daily 28 days or more between refills for controlled medications  - MED THERAPY MANAGE REFERRAL    NSTEMI (non-ST elevated myocardial  infarction) (H)  Stable phase of chronic illness   - metoprolol tartrate (LOPRESSOR) 50 MG tablet; Take 1 tablet (50 mg) by mouth 2 times daily  - MED THERAPY MANAGE REFERRAL    Hypertension goal BP (blood pressure) < 140/90  Controlled within acceptable limits   - metoprolol tartrate (LOPRESSOR) 50 MG tablet; Take 1 tablet (50 mg) by mouth 2 times daily  - MED THERAPY MANAGE REFERRAL    Hyperlipidemia with target LDL less than 100  Continue current plan of care    - rosuvastatin (CRESTOR) 10 MG tablet; TAKE 1 TABLET BY MOUTH EVERY DAY  - MED THERAPY MANAGE REFERRAL    Primary insomnia  Patient has middle insomnia and is asking what medication could she change ? I am looking at her current medication list and I have zero enthusiasm for adding medication and instead would wish to remove things. Medication therapy management consult  ordered  - traZODone (DESYREL) 100 MG tablet; TAKE THREE TABLETS NIGHTLY AT BEDTIME  - MED THERAPY MANAGE REFERRAL    Thrombocytopenia (H)  Problem is stable and ongoing monitoring        25 minutes spent on the date of the encounter doing chart review, history and exam, documentation and further activities as noted above  0956}    Return in about 4 months (around 5/28/2021).    Gerber Jain MD  [facility] NEHEMIAS Lim is a 70 year old who presents to clinic today for the following health issues  accompanied by her daughter :    HPI     Chief Complaint   Patient presents with     Recheck Medication         Review of Systems   Constitutional, HEENT, cardiovascular, pulmonary, gi and gu systems are negative, except as otherwise noted.      Objective    /71   Pulse 57   Temp 97.9  F (36.6  C) (Oral)   Resp 14   Wt 55.3 kg (122 lb)   SpO2 98%   BMI 20.94 kg/m    Body mass index is 20.94 kg/m .  Physical Exam   GENERAL: healthy, alert and no distress  EYES: Eyes grossly normal to inspection, PERRL and conjunctivae and sclerae normal  MS: no gross  musculoskeletal defects noted, no edema  SKIN: no suspicious lesions or rashes  NEURO: Normal strength and tone, mentation intact and speech normal  PSYCH: mentation appears normal, affect normal/bright    Orders Placed This Encounter   Procedures     MED THERAPY MANAGE REFERRAL         I spent a total of 25 minutes face-to-face with Carina Calvert during today's office visit.  Over 50% of this time was spent counseling the patient and/or coordinating care regarding   Encounter Diagnoses   Name Primary?     Spasmodic torticollis      Fibromyalgia      Restless leg syndrome      Chronic pain syndrome      NSTEMI (non-ST elevated myocardial infarction) (H)      Hypertension goal BP (blood pressure) < 140/90      Hyperlipidemia with target LDL less than 100      Primary insomnia      Thrombocytopenia (H)     .  See note for details.

## 2021-01-29 ENCOUNTER — TELEPHONE (OUTPATIENT)
Dept: FAMILY MEDICINE | Facility: CLINIC | Age: 71
End: 2021-01-29

## 2021-01-29 NOTE — TELEPHONE ENCOUNTER
MTM referral from: [facility] visit (referral by provider)    MTM referral outreach attempt #1 on January 29, 2021 at 1:18 PM      Outcome: Patient is not interested at this time. She wants to try light therapy first and then will think about making an appt with MTM.  will route to MTM Pharmacist/Provider as an FYI. Thank you for the referral.     Mike Ramírez, MTM coordinator

## 2021-02-04 ENCOUNTER — TELEPHONE (OUTPATIENT)
Dept: FAMILY MEDICINE | Facility: CLINIC | Age: 71
End: 2021-02-04

## 2021-02-04 DIAGNOSIS — G24.3 SPASMODIC TORTICOLLIS: ICD-10-CM

## 2021-02-04 DIAGNOSIS — M79.7 FIBROMYALGIA: ICD-10-CM

## 2021-02-04 DIAGNOSIS — G89.4 CHRONIC PAIN SYNDROME: ICD-10-CM

## 2021-02-04 NOTE — TELEPHONE ENCOUNTER
Called pharmacy and medication was filled on 01/08/2021, and now other script is in waiting bin until 01/07/2021. Nothing needed this month.       Yadira HARE CMA ([facility])

## 2021-02-04 NOTE — TELEPHONE ENCOUNTER
Please notify pt she should have enough medication at this time and no new script is needed.     Elizabet Rose RN  [facility]

## 2021-02-04 NOTE — TELEPHONE ENCOUNTER
Please contact pharmacy. Was script from 1/28/21 received and filled? Too early for refill.    Elizabet Rose RN  [facility]

## 2021-02-04 NOTE — TELEPHONE ENCOUNTER
Reason for Call:  Medication or medication refill:     Do you use a [facility]?  Name of the pharmacy and phone number for the current request:   [facility] [address] [place] [address]     Name of the medication requested: HYDROcodone-acetaminophen (NORCO) 7.5-325 MG per tablet     Can we leave a detailed message on this number? YES     Phone number patient can be reached at: Cell number on file:        Telephone Information:   Mobile [phone]         Best Time: anytime

## 2021-03-03 DIAGNOSIS — G89.4 CHRONIC PAIN SYNDROME: ICD-10-CM

## 2021-03-03 DIAGNOSIS — G24.3 SPASMODIC TORTICOLLIS: ICD-10-CM

## 2021-03-03 DIAGNOSIS — M79.7 FIBROMYALGIA: ICD-10-CM

## 2021-03-03 RX ORDER — HYDROCODONE BITARTRATE AND ACETAMINOPHEN 7.5; 325 MG/1; MG/1
2 TABLET ORAL 3 TIMES DAILY
Qty: 180 TABLET | Refills: 0 | Status: SHIPPED | OUTPATIENT
Start: 2021-03-03 | End: 2021-03-05

## 2021-03-03 NOTE — TELEPHONE ENCOUNTER
Patient called needs her HYDROcodone-acetaminophen (NORCO) 7.5-325 MG per tablet uses [facility].  Columba Musa  Team Stacey,

## 2021-03-03 NOTE — TELEPHONE ENCOUNTER
Controlled Substance Refill Request for HYDROcodone-acetaminophen (NORCO) 7.5-325 MG per tablet  Problem List Complete:  Yes    Problem Detail    Noted:  8/4/2015   Priority:  Medium   Overview Addendum 8/19/2020  8:52 AM by Earline Emery RN   Patient is followed by CHIQUIS LITTLE for ongoing prescription of pain medication.  All refills should be approved by this provider, or covering partner.     Medication(s): HYDROcodone (VICODIN) 7.5/325   .   Maximum quantity per month: 180  Clinic visit frequency required: Q 3 months      Controlled substance agreement on file: Yes       Date(s):   11/2015     Pain Clinic evaluation in the past: Yes       Date/Location:   [facility]      DIRE Total Score(s):  No flowsheet data found.     Last [facility] website verification: 8/19/2020   [email or web address]        checked in past 3 months?  No, route to RN

## 2021-03-05 RX ORDER — HYDROCODONE BITARTRATE AND ACETAMINOPHEN 7.5; 325 MG/1; MG/1
2 TABLET ORAL 3 TIMES DAILY
Qty: 180 TABLET | Refills: 0 | Status: SHIPPED | OUTPATIENT
Start: 2021-03-05 | End: 2021-04-02

## 2021-03-05 NOTE — TELEPHONE ENCOUNTER
Reason for call:  Medication   If this is a refill request, has the caller requested the refill from the pharmacy already? Yes  Will the patient be using a [facility]? No  Name of the pharmacy and phone number for the current request:  [id number] - [place], [place] - [address] AT [facility]    Name of the medication requested: HYDROcodone-acetaminophen (NORCO) 7.5-325 MG per tablet    [facility] [address] [place] [address]    Per patient her pharmacy above called her they do not have enough of the medication instock to fill her prescription and suggested that she have the script re-sent to a new pharmacy.    Please re-send ASAP Thank you    [id number] - [place], [place] - [address] AT [facility]    Other request: Confirmed with with pharmacy that they do not have enough. The script was cancelled at [facility] [address] [place] [address] .    Phone number to reach patient:  Cell number on file:    Telephone Information:   Mobile [phone]     Best Time:      Can we leave a detailed message on this number?  YES    Travel screening: Not Applicable

## 2021-04-02 ENCOUNTER — TELEPHONE (OUTPATIENT)
Dept: FAMILY MEDICINE | Facility: CLINIC | Age: 71
End: 2021-04-02

## 2021-04-02 DIAGNOSIS — G89.4 CHRONIC PAIN SYNDROME: ICD-10-CM

## 2021-04-02 DIAGNOSIS — M79.7 FIBROMYALGIA: ICD-10-CM

## 2021-04-02 DIAGNOSIS — G24.3 SPASMODIC TORTICOLLIS: ICD-10-CM

## 2021-04-02 RX ORDER — HYDROCODONE BITARTRATE AND ACETAMINOPHEN 7.5; 325 MG/1; MG/1
2 TABLET ORAL 3 TIMES DAILY
Qty: 180 TABLET | Refills: 0 | Status: SHIPPED | OUTPATIENT
Start: 2021-04-02 | End: 2021-04-30

## 2021-04-02 NOTE — TELEPHONE ENCOUNTER
Patient needs a refill on her HYDROcodone-acetaminophen (NORCO) 7.5-325 MG per tablet sent to her pharmacy on file.  Columba Ibarra,

## 2021-04-02 NOTE — TELEPHONE ENCOUNTER
Controlled Substance Refill Request for Hydrocodone  Problem List Complete:  Yes   checked in past 3 months?  No, route to RN      Medication(s): HYDROcodone (VICODIN) 7.5/325   .   Maximum quantity per month: 180  Clinic visit frequency required: Q 3 months      Controlled substance agreement on file: Yes       Date(s):   11/2015     Pain Clinic evaluation in the past: Yes       Date/Location:   [facility]      DIRE Total Score(s):  No flowsheet data found.     Last [facility] website verification: 8/19/2020

## 2021-04-08 ENCOUNTER — OFFICE VISIT (OUTPATIENT)
Dept: INTERNAL MEDICINE | Facility: CLINIC | Age: 71
End: 2021-04-08
Payer: MEDICARE

## 2021-04-08 VITALS
HEART RATE: 53 BPM | TEMPERATURE: 98.8 F | RESPIRATION RATE: 14 BRPM | DIASTOLIC BLOOD PRESSURE: 82 MMHG | HEIGHT: 64 IN | WEIGHT: 125 LBS | OXYGEN SATURATION: 98 % | BODY MASS INDEX: 21.34 KG/M2 | SYSTOLIC BLOOD PRESSURE: 130 MMHG

## 2021-04-08 DIAGNOSIS — E78.5 HYPERLIPIDEMIA WITH TARGET LDL LESS THAN 100: Primary | ICD-10-CM

## 2021-04-08 DIAGNOSIS — D69.6 THROMBOCYTOPENIA (H): ICD-10-CM

## 2021-04-08 DIAGNOSIS — I21.4 NSTEMI (NON-ST ELEVATED MYOCARDIAL INFARCTION) (H): ICD-10-CM

## 2021-04-08 DIAGNOSIS — M79.7 FIBROMYALGIA: ICD-10-CM

## 2021-04-08 DIAGNOSIS — E55.9 HYPOVITAMINOSIS D: ICD-10-CM

## 2021-04-08 DIAGNOSIS — F51.01 PRIMARY INSOMNIA: ICD-10-CM

## 2021-04-08 DIAGNOSIS — E05.90 SUBCLINICAL HYPERTHYROIDISM: ICD-10-CM

## 2021-04-08 DIAGNOSIS — I10 HYPERTENSION GOAL BP (BLOOD PRESSURE) < 140/90: ICD-10-CM

## 2021-04-08 LAB
BASOPHILS # BLD AUTO: 0 10E9/L (ref 0–0.2)
BASOPHILS NFR BLD AUTO: 0.4 %
DIFFERENTIAL METHOD BLD: ABNORMAL
EOSINOPHIL # BLD AUTO: 0.2 10E9/L (ref 0–0.7)
EOSINOPHIL NFR BLD AUTO: 3.6 %
ERYTHROCYTE [DISTWIDTH] IN BLOOD BY AUTOMATED COUNT: 13.7 % (ref 10–15)
HCT VFR BLD AUTO: 40 % (ref 35–47)
HGB BLD-MCNC: 12.9 G/DL (ref 11.7–15.7)
LYMPHOCYTES # BLD AUTO: 2.2 10E9/L (ref 0.8–5.3)
LYMPHOCYTES NFR BLD AUTO: 42.8 %
MCH RBC QN AUTO: 31.6 PG (ref 26.5–33)
MCHC RBC AUTO-ENTMCNC: 32.3 G/DL (ref 31.5–36.5)
MCV RBC AUTO: 98 FL (ref 78–100)
MONOCYTES # BLD AUTO: 0.6 10E9/L (ref 0–1.3)
MONOCYTES NFR BLD AUTO: 12.4 %
NEUTROPHILS # BLD AUTO: 2.1 10E9/L (ref 1.6–8.3)
NEUTROPHILS NFR BLD AUTO: 40.8 %
PLATELET # BLD AUTO: 123 10E9/L (ref 150–450)
RBC # BLD AUTO: 4.08 10E12/L (ref 3.8–5.2)
WBC # BLD AUTO: 5 10E9/L (ref 4–11)

## 2021-04-08 PROCEDURE — 85025 COMPLETE CBC W/AUTO DIFF WBC: CPT | Performed by: INTERNAL MEDICINE

## 2021-04-08 PROCEDURE — 99214 OFFICE O/P EST MOD 30 MIN: CPT | Performed by: INTERNAL MEDICINE

## 2021-04-08 PROCEDURE — 80061 LIPID PANEL: CPT | Performed by: INTERNAL MEDICINE

## 2021-04-08 PROCEDURE — 82306 VITAMIN D 25 HYDROXY: CPT | Performed by: INTERNAL MEDICINE

## 2021-04-08 PROCEDURE — 80048 BASIC METABOLIC PNL TOTAL CA: CPT | Performed by: INTERNAL MEDICINE

## 2021-04-08 PROCEDURE — 36415 COLL VENOUS BLD VENIPUNCTURE: CPT | Performed by: INTERNAL MEDICINE

## 2021-04-08 PROCEDURE — 84443 ASSAY THYROID STIM HORMONE: CPT | Performed by: INTERNAL MEDICINE

## 2021-04-08 RX ORDER — TRAZODONE HYDROCHLORIDE 100 MG/1
400 TABLET ORAL AT BEDTIME
Qty: 360 TABLET | Refills: 1 | Status: SHIPPED | OUTPATIENT
Start: 2021-04-08 | End: 2021-09-29

## 2021-04-08 ASSESSMENT — MIFFLIN-ST. JEOR: SCORE: 1077

## 2021-04-08 NOTE — PROGRESS NOTES
Assessment & Plan     Hyperlipidemia with target LDL less than 100  Patient due for follow up  With fasting lipid panel today, we intend to continue current plan of care    - Lipid panel reflex to direct LDL Non-fasting  - MED THERAPY MANAGE REFERRAL    Subclinical hyperthyroidism  Due for recheck   - Basic metabolic panel  - TSH with free T4 reflex  - CBC with platelets differential  - MED THERAPY MANAGE REFERRAL    Hypovitaminosis D  Due for recheck   - Vitamin D Deficiency  - MED THERAPY MANAGE REFERRAL    NSTEMI (non-ST elevated myocardial infarction) (H)  Due for recheck, apparently quiescent heart disease  at this point   - CBC with platelets differential  - MED THERAPY MANAGE REFERRAL    Hypertension goal BP (blood pressure) < 140/90  Controlled within acceptable limits , continue current plan of care    - MED THERAPY MANAGE REFERRAL    Primary insomnia  This patient is a difficult patient in terms of polypharmacy issues. She has had a relatively high dosage of medication for a very many years . We had successfully reduced her dosage of Desyrel (Trazodone) from 400 milligrams [ probably excessive] down to 300 milligrams but now that her botox injections for chronic spasmodic torticollis / neck pain symptoms has returned in full force she is pushing me to increase her dose of Desyrel (Trazodone) again back to 400. I agreed to do this but only if she agreed to see Santa Gomez [facility], the [facility] pharmacist .  - traZODone (DESYREL) 100 MG tablet; Take 4 tablets (400 mg) by mouth At Bedtime TAKE THREE TABLETS NIGHTLY AT BEDTIME  - MED THERAPY MANAGE REFERRAL    Thrombocytopenia (H)  Due for recheck  - CBC with platelets differential  - MED THERAPY MANAGE REFERRAL    Fibromyalgia  This patient has a brutal and chronic fibromyalgia syndrome , absolutely unremitting symptoms and quite symptomatic. I do not know what the answer is, would consider different ideas such as opinion from rheumatology or other >? Will start  with medication therapy management pharmacist   - traZODone (DESYREL) 100 MG tablet; Take 4 tablets (400 mg) by mouth At Bedtime TAKE THREE TABLETS NIGHTLY AT BEDTIME  - MED THERAPY MANAGE REFERRAL    Review of the result(s) of each unique test - prior labs from last 6 months  25 minutes spent on the date of the encounter doing chart review, history and exam, documentation and further activities per the note  [id number]}      Return in about 3 months (around 2021).    Gerber Jain MD  [facility] NEHEMIAS Lim is a 71 year old who presents for the following health issues     HPI   Chief Complaint   Patient presents with     Mass     lump on Rt side of neck, no pain, would like to get this checked out.      She went without any botox injections for almost a year but finally got restarted recently and her injections have just stopped working and so patient is not willing to be paying out of pocket for this treatment if it's not working. . She tried the injections 3-4 times but has stopped now.    By the way patient has stated she is not going to receive the vaccine for Coronavirus (COVID-19) because she doesn't wish to.    She has an unremitting history of head and neck achy type pain . Patient is just not feeling well. Also having persistent ira , this has been for a vlt , and low back pain has also been a chronic problem.    Patient has really bad chronic insomnia     Now she's wanting to go back to 4 Desyrel (Trazodone) ?      and now patient lives with daughter, this was 2019.  We also dsic neck asymmetry     Wt Readings from Last 5 Encounters:   21 56.7 kg (125 lb)   21 55.3 kg (122 lb)   20 53.5 kg (118 lb)   20 54.6 kg (120 lb 6.4 oz)   11/15/19 54.9 kg (121 lb)     BP Readings from Last 3 Encounters:   21 130/82   21 126/71   20 (!) 149/81       Neck asymmetry was discussed with me.  A careful neck exam was just not  "noteworthy today.; I can find no pathological process , reassurance , wait and see approach, we discussed what to be on the watch for  , update me if significant changes have taken place     Review of Systems   Constitutional, HEENT, cardiovascular, pulmonary, gi and gu systems are negative, except as otherwise noted.      Objective    /82   Pulse 53   Temp 98.8  F (37.1  C) (Oral)   Resp 14   Ht 1.626 m (5' 4\")   Wt 56.7 kg (125 lb)   SpO2 98%   BMI 21.46 kg/m    Body mass index is 21.46 kg/m .  Physical Exam   GENERAL: healthy, alert and no distress  RESP: lungs clear to auscultation - no rales, rhonchi or wheezes  CV: regular rate and rhythm, normal S1 S2, no S3 or S4, no murmur, click or rub, no peripheral edema and peripheral pulses strong  MS: no gross musculoskeletal defects noted, no edema  NEURO: Normal strength and tone, mentation intact and speech normal  PSYCH: mentation appears normal, affect normal/bright    Orders Placed This Encounter   Procedures     Basic metabolic panel     Lipid panel reflex to direct LDL Non-fasting     TSH with free T4 reflex     Vitamin D Deficiency     CBC with platelets differential     MED THERAPY MANAGE REFERRAL         "

## 2021-04-08 NOTE — LETTER
April 12, 2021      Carina Calvert  [address]  [facility] [address]    Dear ,    We are writing to inform you of your test results.    All of these tests are within acceptable limits , things look good !     Resulted Orders   Basic metabolic panel   Result Value Ref Range    Sodium 138 133 - 144 mmol/L    Potassium 4.5 3.4 - 5.3 mmol/L    Chloride 106 94 - 109 mmol/L    Carbon Dioxide 32 20 - 32 mmol/L    Anion Gap <1 (L) 3 - 14 mmol/L    Glucose 85 70 - 99 mg/dL      Comment:      Non Fasting    Urea Nitrogen 17 7 - 30 mg/dL    Creatinine 0.93 0.52 - 1.04 mg/dL    GFR Estimate 63 >60 mL/min/[1.73_m2]      Comment:      Non  GFR Calc  Starting 12/18/2018, serum creatinine based estimated GFR (eGFR) will be   calculated using the Chronic Kidney Disease Epidemiology Collaboration   (CKD-EPI) equation.      GFR Estimate If Black 73 >60 mL/min/[1.73_m2]      Comment:       GFR Calc  Starting 12/18/2018, serum creatinine based estimated GFR (eGFR) will be   calculated using the Chronic Kidney Disease Epidemiology Collaboration   (CKD-EPI) equation.      Calcium 8.9 8.5 - 10.1 mg/dL   Lipid panel reflex to direct LDL Non-fasting   Result Value Ref Range    Cholesterol 106 <200 mg/dL    Triglycerides 77 <150 mg/dL      Comment:      Non Fasting    HDL Cholesterol 51 >49 mg/dL    LDL Cholesterol Calculated 40 <100 mg/dL      Comment:      Desirable:       <100 mg/dl    Non HDL Cholesterol 55 <130 mg/dL   TSH with free T4 reflex   Result Value Ref Range    TSH 2.36 0.40 - 4.00 mU/L   Vitamin D Deficiency   Result Value Ref Range    Vitamin D Deficiency screening 44 20 - 75 ug/L      Comment:      Season, race, dietary intake, and treatment affect the concentration of   25-hydroxy-Vitamin D. Values may decrease during winter months and increase   during summer months. Values 20-29 ug/L may indicate Vitamin D insufficiency   and values <20 ug/L may indicate Vitamin D  deficiency.  Vitamin D determination is routinely performed by an immunoassay specific for   25 hydroxyvitamin D3.  If an individual is on vitamin D2 (ergocalciferol)   supplementation, please specify 25 OH vitamin D2 and D3 level determination by   LCMSMS test VITD23.     CBC with platelets differential   Result Value Ref Range    WBC 5.0 4.0 - 11.0 10e9/L    RBC Count 4.08 3.8 - 5.2 10e12/L    Hemoglobin 12.9 11.7 - 15.7 g/dL    Hematocrit 40.0 35.0 - 47.0 %    MCV 98 78 - 100 fl    MCH 31.6 26.5 - 33.0 pg    MCHC 32.3 31.5 - 36.5 g/dL    RDW 13.7 10.0 - 15.0 %    Platelet Count 123 (L) 150 - 450 10e9/L    % Neutrophils 40.8 %    % Lymphocytes 42.8 %    % Monocytes 12.4 %    % Eosinophils 3.6 %    % Basophils 0.4 %    Absolute Neutrophil 2.1 1.6 - 8.3 10e9/L    Absolute Lymphocytes 2.2 0.8 - 5.3 10e9/L    Absolute Monocytes 0.6 0.0 - 1.3 10e9/L    Absolute Eosinophils 0.2 0.0 - 0.7 10e9/L    Absolute Basophils 0.0 0.0 - 0.2 10e9/L    Diff Method Automated Method    If you have any questions or concerns, please call the clinic at the number listed above.     Sincerely,    Gerber Jain MD/ray

## 2021-04-08 NOTE — PATIENT INSTRUCTIONS
Today we discussed different things    1. See Santa Gomez [facility], the [facility] pharmacist , we will help you schedule this ? Sorry this didn't happen last January as I had hoped    2. We ran a complete set of tests with you today, we will follow up with you with all results over the next few days. If good, we don't need these to be repeated typically for a year     3. Desyrel (Trazodone) dose was increased to 4 tabs at bedtime but I do want this medication change to be reviewed by the medication therapy management pharmacist     4. With your chronic neck pain and headache , it's difficult to know what to do. Lets see if the medication therapy management pharmacist has any other suggestions      Come back and see me in 90 days

## 2021-04-09 LAB
ANION GAP SERPL CALCULATED.3IONS-SCNC: <1 MMOL/L (ref 3–14)
BUN SERPL-MCNC: 17 MG/DL (ref 7–30)
CALCIUM SERPL-MCNC: 8.9 MG/DL (ref 8.5–10.1)
CHLORIDE SERPL-SCNC: 106 MMOL/L (ref 94–109)
CHOLEST SERPL-MCNC: 106 MG/DL
CO2 SERPL-SCNC: 32 MMOL/L (ref 20–32)
CREAT SERPL-MCNC: 0.93 MG/DL (ref 0.52–1.04)
DEPRECATED CALCIDIOL+CALCIFEROL SERPL-MC: 44 UG/L (ref 20–75)
GFR SERPL CREATININE-BSD FRML MDRD: 63 ML/MIN/{1.73_M2}
GLUCOSE SERPL-MCNC: 85 MG/DL (ref 70–99)
HDLC SERPL-MCNC: 51 MG/DL
LDLC SERPL CALC-MCNC: 40 MG/DL
NONHDLC SERPL-MCNC: 55 MG/DL
POTASSIUM SERPL-SCNC: 4.5 MMOL/L (ref 3.4–5.3)
SODIUM SERPL-SCNC: 138 MMOL/L (ref 133–144)
TRIGL SERPL-MCNC: 77 MG/DL
TSH SERPL DL<=0.005 MIU/L-ACNC: 2.36 MU/L (ref 0.4–4)

## 2021-04-09 ASSESSMENT — PATIENT HEALTH QUESTIONNAIRE - PHQ9: SUM OF ALL RESPONSES TO PHQ QUESTIONS 1-9: 5

## 2021-04-12 ENCOUNTER — TELEPHONE (OUTPATIENT)
Dept: FAMILY MEDICINE | Facility: CLINIC | Age: 71
End: 2021-04-12

## 2021-04-12 NOTE — TELEPHONE ENCOUNTER
MTM referral from: [facility] visit (referral by provider)    MTM referral outreach attempt #2 on April 12, 2021 at 3:42 PM      Outcome: Patient not reachable after several attempts, will route to MTM Pharmacist/Provider as an FYI. Thank you for the referral.    Mike Ramírez, MTM coordinator

## 2021-04-30 DIAGNOSIS — M79.7 FIBROMYALGIA: ICD-10-CM

## 2021-04-30 DIAGNOSIS — G24.3 SPASMODIC TORTICOLLIS: ICD-10-CM

## 2021-04-30 DIAGNOSIS — G89.4 CHRONIC PAIN SYNDROME: ICD-10-CM

## 2021-04-30 RX ORDER — HYDROCODONE BITARTRATE AND ACETAMINOPHEN 7.5; 325 MG/1; MG/1
2 TABLET ORAL 3 TIMES DAILY
Qty: 180 TABLET | Refills: 0 | Status: SHIPPED | OUTPATIENT
Start: 2021-04-30 | End: 2021-05-24

## 2021-04-30 NOTE — TELEPHONE ENCOUNTER
Patient needs her HYDROcodone-acetaminophen (NORCO) 7.5-325 MG per tablet uses [facility] [address] [place] [address] [place]ORT[place] Ibarra,

## 2021-04-30 NOTE — TELEPHONE ENCOUNTER
Controlled Substance Refill Request for HYDROcodone-acetaminophen (NORCO) 7.5-325 MG per tablet  Problem List Complete:  Yes  Overview Addendum 8/19/2020  8:52 AM by Earline Emery RN   Patient is followed by [facility] for ongoing prescription of pain medication.  All refills should be approved by this provider, or covering partner.     Medication(s): HYDROcodone (VICODIN) 7.5/325   .   Maximum quantity per month: 180  Clinic visit frequency required: Q 3 months      Controlled substance agreement on file: Yes       Date(s):   11/2015     Pain Clinic evaluation in the past: Yes       Date/Location:   [facility]      DIRE Total Score(s):  No flowsheet data found.     Last [facility] website verification: 8/19/2020   [email or web address]        checked in past 3 months?  No, route to RN

## 2021-05-24 DIAGNOSIS — F11.90 CHRONIC, CONTINUOUS USE OF OPIOIDS: Primary | ICD-10-CM

## 2021-05-24 DIAGNOSIS — G24.3 SPASMODIC TORTICOLLIS: ICD-10-CM

## 2021-05-24 DIAGNOSIS — G89.4 CHRONIC PAIN SYNDROME: ICD-10-CM

## 2021-05-24 DIAGNOSIS — M79.7 FIBROMYALGIA: ICD-10-CM

## 2021-05-24 RX ORDER — HYDROCODONE BITARTRATE AND ACETAMINOPHEN 7.5; 325 MG/1; MG/1
2 TABLET ORAL 3 TIMES DAILY
Qty: 26 TABLET | Refills: 0 | Status: SHIPPED | OUTPATIENT
Start: 2021-05-24 | End: 2021-05-31

## 2021-05-24 NOTE — TELEPHONE ENCOUNTER
Spoke to patient and informed her refill was sent.  Elizabet POSEY CMA ([facility])

## 2021-05-24 NOTE — TELEPHONE ENCOUNTER
Spoke with patient. She is requesting a prescription to be sent to her pharmacy for 26 tablets of HYDROcodone-acetaminophen (NORCO) 7.5-325 MG per tablet.    Pt states that when the pharmacy dispensed her last Rx (from 04/30) they were only able to dispense 154 tablets instead of the full 180. She spoke with the pharmacist and they told her that her provider would need to send a new prescription in order for them now dispense the remainder of 26 tabs.    Routing refill request to provider for review/approval because:  Drug not on the G refill protocol

## 2021-05-29 ENCOUNTER — NURSE TRIAGE (OUTPATIENT)
Dept: NURSING | Facility: CLINIC | Age: 71
End: 2021-05-29

## 2021-05-29 DIAGNOSIS — G89.4 CHRONIC PAIN SYNDROME: ICD-10-CM

## 2021-05-29 DIAGNOSIS — M79.7 FIBROMYALGIA: ICD-10-CM

## 2021-05-29 DIAGNOSIS — F11.90 CHRONIC, CONTINUOUS USE OF OPIOIDS: ICD-10-CM

## 2021-05-29 DIAGNOSIS — G24.3 SPASMODIC TORTICOLLIS: ICD-10-CM

## 2021-05-29 NOTE — TELEPHONE ENCOUNTER
Hydrocodone 7.5mg with acetaminophen   She needs a faxed refill.   Pharmacy is [facility] in [address] in [place].   Take 2 tablets by mouth 3 times daily 28 days or more between refills for controlled medications, Disp-26 tablet, R-0, E-Prescribe   Dispense: 26 tablet   Refills: 0 ordered   Pharmacy: [facility] [id number] [place] [facility] - [place], [place] - [address] (Ph: [phone])   Order Details  Ordered on: 05/24/21   Associated Dx: Spasmodic torticollis; Fibromyalgia; Chronic pain syndrome; Chronic, continuous use of opioids   Authorizing provider: Gerber Jain MD   History    She stated that she will run out prior to the weekend.   Bradley paged at [phone]  Call back at [phone]  NO to refill. Per Dr. Donell Gomez RN on 5/29/2021 at 12:49 PM      Reason for Disposition    [1] Caller requesting a NON-URGENT new prescription or refill AND [2] triager unable to refill per unit policy    Additional Information    Negative: MORE THAN A DOUBLE DOSE of a prescription or over-the-counter (OTC) drug    Negative: [1] DOUBLE DOSE (an extra dose or lesser amount) of over-the-counter (OTC) drug AND [2] any symptoms (e.g., dizziness, nausea, pain, sleepiness)    Negative: [1] DOUBLE DOSE (an extra dose or lesser amount) of prescription drug AND [2] any symptoms (e.g., dizziness, nausea, pain, sleepiness)    Negative: Took another person's prescription drug    Negative: Drug overdose and triager unable to answer question    Negative: Caller requesting information unrelated to medicine    Negative: Caller requesting a prescription for Strep throat and has a positive culture result    Negative: Rash while taking a medication or within 3 days of stopping it    Negative: Immunization reaction suspected    Negative: [1] Asthma and [2] having symptoms of asthma (cough, wheezing, etc.)    Negative: [1] Influenza symptoms AND [2] anti-viral med prescription request, such as Tamiflu    Negative: [1] Symptom of illness (e.g.,  "headache, abdominal pain, earache, vomiting) AND [2] more than mild    Negative: [1] DOUBLE DOSE (an extra dose or lesser amount) of prescription drug AND [2] NO symptoms (Exception: a double dose of antibiotics)    Negative: Diabetes drug error or overdose (e.g., took wrong type of insulin or took extra dose)    Negative: [1] Request for URGENT new prescription or refill of \"essential\" medication (i.e., likelihood of harm to patient if not taken) AND [2] triager unable to fill per unit policy    Negative: [1] Prescription not at pharmacy AND [2] was prescribed by PCP recently    Negative: [1] Pharmacy calling with prescription questions AND [2] triager unable to answer question    Negative: [1] Caller has URGENT medication question about med that PCP or specialist prescribed AND [2] triager unable to answer question    Negative: [1] Caller has NON-URGENT medication question about med that PCP prescribed AND [2] triager unable to answer question    Protocols used: MEDICATION QUESTION CALL-A-AH      "

## 2021-05-31 RX ORDER — HYDROCODONE BITARTRATE AND ACETAMINOPHEN 7.5; 325 MG/1; MG/1
2 TABLET ORAL 3 TIMES DAILY
Qty: 180 TABLET | Refills: 0 | Status: SHIPPED | OUTPATIENT
Start: 2021-05-31 | End: 2021-06-30

## 2021-06-02 DIAGNOSIS — M79.7 FIBROMYALGIA: ICD-10-CM

## 2021-06-02 DIAGNOSIS — G24.3 SPASMODIC TORTICOLLIS: ICD-10-CM

## 2021-06-04 NOTE — TELEPHONE ENCOUNTER
Routing refill request to provider for review/approval because:  Drug not on the [facility] refill protocol     Requested Prescriptions   Pending Prescriptions Disp Refills     baclofen (LIORESAL) 10 MG tablet [Pharmacy Med Name: BACLOFEN 10 MG TABLET] 360 tablet 1     Sig: TAKE 1 TABLET (10 MG) BY MOUTH 4 TIMES DAILY       There is no refill protocol information for this order

## 2021-06-05 RX ORDER — BACLOFEN 10 MG/1
10 TABLET ORAL 4 TIMES DAILY
Qty: 360 TABLET | Refills: 1 | Status: SHIPPED | OUTPATIENT
Start: 2021-06-05 | End: 2022-01-04

## 2021-06-30 DIAGNOSIS — M79.7 FIBROMYALGIA: ICD-10-CM

## 2021-06-30 DIAGNOSIS — F11.90 CHRONIC, CONTINUOUS USE OF OPIOIDS: ICD-10-CM

## 2021-06-30 DIAGNOSIS — G89.4 CHRONIC PAIN SYNDROME: ICD-10-CM

## 2021-06-30 DIAGNOSIS — G24.3 SPASMODIC TORTICOLLIS: ICD-10-CM

## 2021-06-30 RX ORDER — HYDROCODONE BITARTRATE AND ACETAMINOPHEN 7.5; 325 MG/1; MG/1
2 TABLET ORAL 3 TIMES DAILY
Qty: 180 TABLET | Refills: 0 | Status: SHIPPED | OUTPATIENT
Start: 2021-06-30 | End: 2021-07-23

## 2021-06-30 NOTE — TELEPHONE ENCOUNTER
Reason for Call:  Medication or medication refill:    Do you use a [facility]?  Name of the pharmacy and phone number for the current request: [facility] [id number] [place] [address]    Name of the medication requested: HYDROcodone-acetaminophen (NORCO) 7.5-325 MG per tablet     Other request:    Can we leave a detailed message on this number? YES    Phone number patient can be reached at: Home number on file [phone] (home)    Best Time: any    Call taken on 6/30/2021 at 10:12 AM by Juan Luis Voss

## 2021-06-30 NOTE — TELEPHONE ENCOUNTER
Controlled Substance Refill Request for HYDROcodone-acetaminophen (NORCO) 7.5-325 MG per tablet  Problem List Complete:  Yes  Overview  Edited:  Earline Emery RN  8/19/2020  Patient is followed by [facility] for ongoing prescription of pain medication. All refills should be approved by this provider, or covering partner.     Medication(s): HYDROcodone (VICODIN) 7.5/325   .   Maximum quantity per month: 180  Clinic visit frequency required: Q 3 months      Controlled substance agreement on file: Yes  Date(s): 11/2015     Pain Clinic evaluation in the past: Yes  Date/Location: [facility]      DIRE Total Score(s):  No flowsheet data found.     Last [facility] website verification: 8/19/2020  [email or web address]                  checked in past 3 months?  No, route to RN

## 2021-07-23 ENCOUNTER — OFFICE VISIT (OUTPATIENT)
Dept: INTERNAL MEDICINE | Facility: CLINIC | Age: 71
End: 2021-07-23
Payer: MEDICARE

## 2021-07-23 VITALS
WEIGHT: 124 LBS | RESPIRATION RATE: 14 BRPM | OXYGEN SATURATION: 98 % | SYSTOLIC BLOOD PRESSURE: 117 MMHG | TEMPERATURE: 98 F | HEART RATE: 55 BPM | BODY MASS INDEX: 21.28 KG/M2 | DIASTOLIC BLOOD PRESSURE: 71 MMHG

## 2021-07-23 DIAGNOSIS — F11.90 CHRONIC, CONTINUOUS USE OF OPIOIDS: ICD-10-CM

## 2021-07-23 DIAGNOSIS — F51.01 PRIMARY INSOMNIA: Primary | ICD-10-CM

## 2021-07-23 DIAGNOSIS — M79.7 FIBROMYALGIA: ICD-10-CM

## 2021-07-23 DIAGNOSIS — G24.3 SPASMODIC TORTICOLLIS: ICD-10-CM

## 2021-07-23 DIAGNOSIS — G89.4 CHRONIC PAIN SYNDROME: ICD-10-CM

## 2021-07-23 PROCEDURE — 99214 OFFICE O/P EST MOD 30 MIN: CPT | Performed by: INTERNAL MEDICINE

## 2021-07-23 RX ORDER — HYDROCODONE BITARTRATE AND ACETAMINOPHEN 7.5; 325 MG/1; MG/1
2 TABLET ORAL 3 TIMES DAILY
Qty: 180 TABLET | Refills: 0 | Status: SHIPPED | OUTPATIENT
Start: 2021-07-23 | End: 2021-08-30

## 2021-07-23 RX ORDER — MIRTAZAPINE 7.5 MG/1
7.5 TABLET, FILM COATED ORAL AT BEDTIME
Qty: 30 TABLET | Refills: 2 | Status: SHIPPED | OUTPATIENT
Start: 2021-07-23 | End: 2021-08-03

## 2021-07-23 NOTE — LETTER
Opioid / Opioid Plus Controlled Substance Agreement    This is an agreement between you and your provider about the safe and appropriate use of controlled substance/opioids prescribed by your care team. Controlled substances are medicines that can cause physical and mental dependence (abuse).    There are strict laws about having and using these medicines. We here at [facility] are committing to working with you in your efforts to get better. To support you in this work, we ll help you schedule regular office appointments for medicine refills. If we must cancel or change your appointment for any reason, we ll make sure you have enough medicine to last until your next appointment.     As a Provider, I will:    Listen carefully to your concerns and treat you with respect.     Recommend a treatment plan that I believe is in your best interest. This plan may involve therapies other than opioid pain medication.     Talk with you often about the possible benefits, and the risk of harm of any medicine that we prescribe for you.     Provide a plan on how to taper (discontinue or go off) using this medicine if the decision is made to stop its use.    As a Patient, I understand that opioid(s):     Are a controlled substance prescribed by my care team to help me function or work and manage my condition(s).     Are strong medicines and can cause serious side effects such as:    Drowsiness, which can seriously affect my driving ability    A lower breathing rate, enough to cause death    Harm to my thinking ability     Depression     Abuse of and addiction to this medicine    Need to be taken exactly as prescribed. Combining opioids with certain medicines or chemicals (such as illegal drugs, sedatives, sleeping pills, and benzodiazepines) can be dangerous or even fatal. If I stop opioids suddenly, I may have severe withdrawal symptoms.    Do not work for all types of pain nor for all patients. If they re not helpful, I may  be asked to stop them.        The risks, benefits and side effects of these medicine(s) were explained to me. I agree that:  1. I will take part in other treatments as advised by my care team. This may be psychiatry or counseling, physical therapy, behavioral therapy, group treatment or a referral to a specialist.     2. I will keep all my appointments. I understand that this is part of the monitoring of opioids. My care team may require an office visit for EVERY opioid/controlled substance refill. If I miss appointments or don t follow instructions, my care team may stop my medicine.    3. I will take my medicines as prescribed. I will not change the dose or schedule unless my care team tells me to. There will be no refills if I run out early.     4. I may be asked to come to the clinic and complete a urine drug test or complete a pill count at any time. If I don t give a urine sample or participate in a pill count, the care team may stop my medicine.    5. I will only receive prescriptions from this clinic for chronic pain. If I am treated by another provider for acute pain issues, I will tell them that I am taking opioid pain medication for chronic pain and that I have a treatment agreement with this provider. I will inform my [facility] care team within one business day if I am given a prescription for any pain medication by another healthcare provider. My [facility] care team can contact other providers and pharmacists about my use of any medicines.    6. It is up to me to make sure that I don t run out of my medicines on weekends or holidays. If my care team is willing to refill my opioid prescription without a visit, I must request refills only during office hours. Refills may take up to 3 business days to process. I will use one pharmacy to fill all my opioid and other controlled substance prescriptions. I will notify the clinic about any changes to my insurance or medication  availability.    7. I am responsible for my prescriptions. If the medicine/prescription is lost, stolen or destroyed, it will not be replaced. I also agree not to share controlled substance medicines with anyone.    8. I am aware I should not use any illegal or recreational drugs. I agree not to drink alcohol unless my care team says I can.       9. If I enroll in the [place] [facility] Cann[facility] program, I will tell my care team prior to my next refill.     10. I will tell my care team right away if I become pregnant, have a new medical problem treated outside of my regular clinic, or have a change in my medications.    11. I understand that this medicine can affect my thinking, judgment and reaction time. Alcohol and drugs affect the brain and body, which can affect the safety of my driving. Being under the influence of alcohol or drugs can affect my decision-making, behaviors, personal safety, and the safety of others. Driving while impaired (DWI) can occur if a person is driving, operating, or in physical control of a car, motorcycle, boat, snowmobile, ATV, motorbike, off-road vehicle, or any other motor vehicle ([place] Statute 169A.20). I understand the risk if I choose to drive or operate any vehicle or machinery.    I understand that if I do not follow any of the conditions above, my prescriptions or treatment may be stopped or changed.          Opioids  What You Need to Know    What are opioids?   Opioids are pain medicines that must be prescribed by a doctor. They are also known as narcotics.     Examples are:   1. morphine (MS Contin, Мария)  2. oxycodone (Oxycontin)  3. oxycodone and acetaminophen (Percocet)  4. hydrocodone and acetaminophen (Vicodin, Norco)   5. fentanyl patch (Duragesic)   6. hydromorphone (Dilaudid)   7. methadone  8. codeine (Tylenol #3)     What do opioids do well?   Opioids are best for severe short-term pain such as after a surgery or injury. They may work well for cancer pain. They may  help some people with long-lasting (chronic) pain.     What do opioids NOT do well?   Opioids never get rid of pain entirely, and they don t work well for most patients with chronic pain. Opioids don t reduce swelling, one of the causes of pain.                                    Other ways to manage chronic pain and improve function include:       Treat the health problem that may be causing pain    Anti-inflammation medicines, which reduce swelling and tenderness, such as ibuprofen (Advil, Motrin) or naproxen (Aleve)    Acetaminophen (Tylenol)    Antidepressants and anti-seizure medicines, especially for nerve pain    Topical treatments such as patches or creams    Injections or nerve blocks    Chiropractic or osteopathic treatment    Acupuncture, massage, deep breathing, meditation, visual imagery, aromatherapy    Use heat or ice at the pain site    Physical therapy     Exercise    Stop smoking    Take part in therapy       Risks and side effects     Talk to your doctor before you start or decide to keep taking opioids. Possible side effects include:      Lowering your breathing rate enough to cause death    Overdose, including death, especially if taking higher than prescribed doses    Worse depression symptoms; less pleasure in things you usually enjoy    Feeling tired or sluggish    Slower thoughts or cloudy thinking    Being more sensitive to pain over time; pain is harder to control    Trouble sleeping or restless sleep    Changes in hormone levels (for example, less testosterone)    Changes in sex drive or ability to have sex    Constipation    Unsafe driving    Itching and sweating    Dizziness    Nausea, throwing up and dry mouth    What else should I know about opioids?    Opioids may lead to dependence, tolerance, or addiction.      Dependence means that if you stop or reduce the medicine too quickly, you will have withdrawal symptoms. These include loose poop (diarrhea), jitters, flu-like symptoms,  nervousness and tremors. Dependence is not the same as addiction.                       Tolerance means needing higher doses over time to get the same effect. This may increase the chance of serious side effects.      Addiction is when people improperly use a substance that harms their body, their mind or their relations with others. Use of opiates can cause a relapse of addiction if you have a history of drug or alcohol abuse.      People who have used opioids for a long time may have a lower quality of life, worse depression, higher levels of pain and more visits to doctors.    You can overdose on opioids. Take these steps to lower your risk of overdose:    1. Recognize the signs:  Signs of overdose include decrease or loss of consciousness (blackout), slowed breathing, trouble waking up and blue lips. If someone is worried about overdose, they should call 911.    2. Talk to your doctor about Narcan (naloxone).   If you are at risk for overdose, you may be given a prescription for Narcan. This medicine very quickly reverses the effects of opioids.   If you overdose, a friend or family member can give you Narcan while waiting for the ambulance. They need to know the signs of overdose and how to give Narcan.     3. Don't use alcohol or street drugs.   Taking them with opioids can cause death.    4. Do not take any of these medicines unless your doctor says it s OK. Taking these with opioids can cause death:    Benzodiazepines, such as lorazepam (Ativan), alprazolam (Xanax) or diazepam (Valium)    Muscle relaxers, such as cyclobenzaprine (Flexeril)    Sleeping pills like zolpidem (Ambien)     Other opioids      How to keep you and other people safe while taking opioids:    1. Never share your opioids with others.  Opioid medicines are regulated by the Drug Enforcement Agency (MICHELLE). Selling or sharing medications is a criminal act.    2. Be sure to store opioids in a secure place, locked up if possible. Young children  can easily swallow them and overdose.    3. When you are traveling with your medicines, keep them in the original bottles. If you use a pill box, be sure you also carry a copy of your medicine list from your clinic or pharmacy.    4. Safe disposal of opioids    Most pharmacies have places to get rid of medicine, called disposal kiosks. Medicine disposal options are also available in every [facility]. Search your county and  medication disposal  to find more options. You can find more details at:  [email or web address]-[email or web address]-medications     I agree that my provider, clinic care team, and pharmacy may work with any city, state or federal law enforcement agency that investigates the misuse, sale, or other diversion of my controlled medicine. I will allow my provider to discuss my care with, or share a copy of, this agreement with any other treating provider, pharmacy or emergency room where I receive care.    I have read this agreement and have asked questions about anything I did not understand.    _______________________________________________________  Patient Signature - Carina Calvert _____________________                   Date     _______________________________________________________  Provider Signature - Gerber Jain MD   _____________________                   Date     _______________________________________________________  Witness Signature (required if provider not present while patient signing)   _____________________                   Date

## 2021-07-23 NOTE — PATIENT INSTRUCTIONS
We reviewed your medications and your chronic sleep problems/ we decided to try adding Remeron / Mirtazapine (tetracyclic antidepressant)  7.5 milligrams at bedtime and I want you to call the office with an update in about a month to see if this has been helping you. Possibly we may increase this dose.    Gerber Jain MD

## 2021-07-23 NOTE — Clinical Note
Just wanted your help to do a quick review of her medication list and tell me if you see any real problems with adding Remeron / Mirtazapine (tetracyclic antidepressant)

## 2021-07-23 NOTE — PROGRESS NOTES
"    Assessment & Plan     Primary insomnia  Carina Calvert is an interesting individual . I have followed her case for primary care for approximately 20 years . Throughout this time she has been remarkably consistent in her suffering with a brutal chronic insomnia which has been blamed on neck pain but also on idiopathic / uncertain. She has tended to be a person treated with nearly a polypharmacy of medications and through the years I have repeatedly tried to cut back on these with generally limited success. Patient has a conviction that medications improve her life and yet she is explaining to me today how much her symptoms continue and couldn't we \" try something else\". I recommended she be scheduled for an medication therapy management consult  with the pharmacist but she is not interested and feels I should be able to do this. After some careful thought I am going to add a trial of Remeron / Mirtazapine (tetracyclic antidepressant) at night, low dosage with an eye towards eventually increasing this. I will review her case informally with the medication therapy management pharmacist   - mirtazapine (REMERON) 7.5 MG tablet; Take 1 tablet (7.5 mg) by mouth At Bedtime    Spasmodic torticollis  Refills provided   - HYDROcodone-acetaminophen (NORCO) 7.5-325 MG per tablet; Take 2 tablets by mouth 3 times daily 28 days or more between refills for controlled medications    Fibromyalgia  Refills provided   - HYDROcodone-acetaminophen (NORCO) 7.5-325 MG per tablet; Take 2 tablets by mouth 3 times daily 28 days or more between refills for controlled medications    Chronic pain syndrome  Refills provided   - HYDROcodone-acetaminophen (NORCO) 7.5-325 MG per tablet; Take 2 tablets by mouth 3 times daily 28 days or more between refills for controlled medications    Chronic, continuous use of opioids  Refills provided   - HYDROcodone-acetaminophen (NORCO) 7.5-325 MG per tablet; Take 2 tablets by mouth 3 times daily 28 days or " more between refills for controlled medications    Review of the result(s) of each unique test - labs over the last 6 months. annual labs are adequate  Prescription drug management  20 minutes spent on the date of the encounter doing chart review, history and exam, documentation and further activities per the note      Return in about 3 months (around 10/23/2021).    Gerber Jain MD  [facility] [place] is a 71 year old who presents for the following health issues   Encounter Diagnoses   Name Primary?     Primary insomnia Yes     Spasmodic torticollis      Fibromyalgia      Chronic pain syndrome      Chronic, continuous use of opioids        HPI   Chief Complaint   Patient presents with     Recheck Medication     pain med renewal      Insomnia     patient can not sleep, dreaming, hallucinating       We discussed this patients symptoms which are really challenging patient to come up with an effective treatment strategy. Has had a sleep study ?  Now she's at 3 Desyrel (Trazodone)   Still screaming and yelling at night per daughter     I suggested medication therapy management consult  with the pharmacist but patient not interested   Patient cannot sleep through the night . Falls asleep ok.  Middle insomnia is most of it, lots of dreams and yelling and screaming then left lower extremity  Moves       Review of Systems   Constitutional, HEENT, cardiovascular, pulmonary, gi and gu systems are negative, except as otherwise noted.      Objective    /71   Pulse 55   Temp 98  F (36.7  C) (Oral)   Resp 14   Wt 56.2 kg (124 lb)   SpO2 98%   BMI 21.28 kg/m    Body mass index is 21.28 kg/m .  Physical Exam   GENERAL: healthy, alert and no distress  EYES: Eyes grossly normal to inspection, PERRL and conjunctivae and sclerae normal  RESP: lungs clear to auscultation - no rales, rhonchi or wheezes  CV: regular rate and rhythm, normal S1 S2, no S3 or S4, no murmur, click or rub, no  peripheral edema and peripheral pulses strong  MS: no gross musculoskeletal defects noted, no edema  NEURO: Normal strength and tone, mentation intact and speech normal  PSYCH: mentation appears normal, affect normal/bright    No orders of the defined types were placed in this encounter.

## 2021-07-23 NOTE — LETTER
[facility]  [address]  [place] [address]  [phone]          July 26, 2021    Carina Calvert                                                                                                                 [address]   [place] [place] [facility] [address]        Dear Carina,    I reviewed your chart with the medication therapy management pharmacist and it is felt that your Desyrel (Trazodone) dose in conjunction with the addition of the new Remeron/Mirtazapine (tetracyclic antidepressant) should be cut back to nothing more than 300 milligrams.     Otherwise, please let me know if you have any questions for me.    Sincerely,         Gerber Jain MD/patt

## 2021-08-02 ENCOUNTER — TELEPHONE (OUTPATIENT)
Dept: FAMILY MEDICINE | Facility: CLINIC | Age: 71
End: 2021-08-02

## 2021-08-02 NOTE — TELEPHONE ENCOUNTER
Patient states Mirtazapine tabs aren't good for her. They are too strong. Trazodone is fine for her. Dr Jain wanted to know how new med woreked.   Ok to leave a detailed message.

## 2021-08-02 NOTE — TELEPHONE ENCOUNTER
Ok    1. Discontinue Remeron / Mirtazapine (tetracyclic antidepressant)    2. Continue current plan of care otherwise   3. Reroute if additional input requested from leslie Jain MD

## 2021-08-03 NOTE — TELEPHONE ENCOUNTER
Called patient and notified her of message as written by Dr. Jain. She verbalized understanding and was in agreement with plan. States that she had only taken 2 tablets of the mirtazapine. She has already stopped this.

## 2021-08-04 NOTE — TELEPHONE ENCOUNTER
Panel Management Review      Patient has the following on her problem list:       IVD   ASA: Passed    Last LDL:    Lab Results   Component Value Date    CHOL 110 11/08/2018     Lab Results   Component Value Date    HDL 54 11/08/2018     Lab Results   Component Value Date    LDL 43 11/08/2018     Lab Results   Component Value Date    TRIG 64 11/08/2018        Lab Results   Component Value Date    CHOLHDLRATIO 2.9 03/16/2009        Is the patient on a Statin? YES   Is the patient on Aspirin? YES                  Medications     HMG CoA Reductase Inhibitors     rosuvastatin (CRESTOR) 10 MG tablet       Salicylates     aspirin 81 MG tablet             Last three blood pressure readings:  BP Readings from Last 3 Encounters:   11/15/19 (!) 162/72   11/01/19 100/54   10/11/19 136/78        Tobacco History:     History   Smoking Status     Current Some Day Smoker     Packs/day: 0.20     Years: 15.00     Types: Cigarettes     Last attempt to quit: 3/31/2013   Smokeless Tobacco     Never Used     Comment: 4 cigs a day, just lost        Hypertension   Last three blood pressure readings:  BP Readings from Last 3 Encounters:   11/15/19 (!) 162/72   11/01/19 100/54   10/11/19 136/78     Blood pressure: FAILED    HTN Guidelines:  Less than 140/90      Composite cancer screening  Chart review shows that this patient is due/due soon for the following Mammogram and Colonoscopy  Summary:    Patient is due/failing the following:   Urine drug, wellness, adv directive, flu, fall risk     Action needed:   None patient to follow up on 02/01/2020    Type of outreach:    none    Questions for provider review:    None                                                                                                                                    Yadira HARE CMA ([facility])        Chart routed to none .          
Fall Risk

## 2021-08-26 NOTE — LETTER
[facility]  [address] [address]  [phone]        September 22, 2017          Carina Calvert,  [address]  [place] [address]        Dear Carina Calvert,      Monitoring and managing your preventative and chronic health conditions are very important to us. Our records indicate that you have not scheduled for a Fecal Colorectal test (FIT) for colon cancer screening which was recommended by Dr. Jain.      If you have received your health care elsewhere, please call the clinic so the information can be documented in your chart.    Please call [phone] or message us through your [employer] account to schedule an appointment or provide information for your chart.     Feel free to contact us if you have any questions or concerns!    I look forward to seeing you and working with you on your health care needs.     Sincerely,         Gerber Jain / Violette Mccullough, CMA     no

## 2021-08-27 DIAGNOSIS — E78.5 HYPERLIPIDEMIA WITH TARGET LDL LESS THAN 100: ICD-10-CM

## 2021-08-27 RX ORDER — ROSUVASTATIN CALCIUM 10 MG/1
TABLET, COATED ORAL
Qty: 90 TABLET | Refills: 3 | Status: SHIPPED | OUTPATIENT
Start: 2021-08-27 | End: 2022-02-24

## 2021-08-30 DIAGNOSIS — G89.4 CHRONIC PAIN SYNDROME: ICD-10-CM

## 2021-08-30 DIAGNOSIS — F11.90 CHRONIC, CONTINUOUS USE OF OPIOIDS: ICD-10-CM

## 2021-08-30 DIAGNOSIS — G24.3 SPASMODIC TORTICOLLIS: ICD-10-CM

## 2021-08-30 DIAGNOSIS — M79.7 FIBROMYALGIA: ICD-10-CM

## 2021-08-30 RX ORDER — HYDROCODONE BITARTRATE AND ACETAMINOPHEN 7.5; 325 MG/1; MG/1
2 TABLET ORAL 3 TIMES DAILY
Qty: 180 TABLET | Refills: 0 | Status: SHIPPED | OUTPATIENT
Start: 2021-08-30 | End: 2021-09-27

## 2021-08-30 NOTE — TELEPHONE ENCOUNTER
Routing refill request to provider for review/approval because:  Drug not on the G refill protocol   HYDROcodone-acetaminophen (NORCO) 7.5-325 MG per tablet 180 tablet 0 7/23/2021  No   Sig - Route: Take 2 tablets by mouth 3 times daily 28 days or more between refills for controlled medications - Oral   Sent to pharmacy as: HYDROcodone-Acetaminophen 7.5-325 MG Oral Tablet (NORCO)   Class: E-Prescribe

## 2021-09-01 ENCOUNTER — TELEPHONE (OUTPATIENT)
Dept: INTERNAL MEDICINE | Facility: CLINIC | Age: 71
End: 2021-09-01

## 2021-09-01 ENCOUNTER — OFFICE VISIT (OUTPATIENT)
Dept: FAMILY MEDICINE | Facility: CLINIC | Age: 71
End: 2021-09-01
Payer: MEDICARE

## 2021-09-01 VITALS
HEART RATE: 54 BPM | BODY MASS INDEX: 21.51 KG/M2 | OXYGEN SATURATION: 100 % | HEIGHT: 64 IN | SYSTOLIC BLOOD PRESSURE: 119 MMHG | WEIGHT: 126 LBS | DIASTOLIC BLOOD PRESSURE: 68 MMHG

## 2021-09-01 DIAGNOSIS — J18.9 PNEUMONIA OF RIGHT LOWER LOBE DUE TO INFECTIOUS ORGANISM: Primary | ICD-10-CM

## 2021-09-01 DIAGNOSIS — J18.9 PNEUMONIA DUE TO INFECTIOUS ORGANISM, UNSPECIFIED LATERALITY, UNSPECIFIED PART OF LUNG: Primary | ICD-10-CM

## 2021-09-01 DIAGNOSIS — D69.6 THROMBOCYTOPENIA (H): ICD-10-CM

## 2021-09-01 LAB
ERYTHROCYTE [DISTWIDTH] IN BLOOD BY AUTOMATED COUNT: 13.5 % (ref 10–15)
HCT VFR BLD AUTO: 39.5 % (ref 35–47)
HGB BLD-MCNC: 13 G/DL (ref 11.7–15.7)
MCH RBC QN AUTO: 31.6 PG (ref 26.5–33)
MCHC RBC AUTO-ENTMCNC: 32.9 G/DL (ref 31.5–36.5)
MCV RBC AUTO: 96 FL (ref 78–100)
PLATELET # BLD AUTO: 126 10E3/UL (ref 150–450)
RBC # BLD AUTO: 4.12 10E6/UL (ref 3.8–5.2)
WBC # BLD AUTO: 5 10E3/UL (ref 4–11)

## 2021-09-01 PROCEDURE — 36415 COLL VENOUS BLD VENIPUNCTURE: CPT | Performed by: NURSE PRACTITIONER

## 2021-09-01 PROCEDURE — 85027 COMPLETE CBC AUTOMATED: CPT | Performed by: NURSE PRACTITIONER

## 2021-09-01 PROCEDURE — 99214 OFFICE O/P EST MOD 30 MIN: CPT | Performed by: NURSE PRACTITIONER

## 2021-09-01 RX ORDER — LACTOBACILLUS RHAMNOSUS GG 10B CELL
1 CAPSULE ORAL
COMMUNITY
End: 2023-06-28

## 2021-09-01 ASSESSMENT — MIFFLIN-ST. JEOR: SCORE: 1081.53

## 2021-09-01 ASSESSMENT — PAIN SCALES - GENERAL: PAINLEVEL: EXTREME PAIN (8)

## 2021-09-01 NOTE — TELEPHONE ENCOUNTER
Reason for call:  Home Healthcare Reason for Call:  Home Health Care    Ayah with [facility] called regarding (reason for call): [phone]    Can we leave a detailed message on this number? YES    They are unable to take the patient, they were able to find [facility]    Their phone number is [phone]    [facility]  Phone: [phone] Fax: [phone]    Please fax to above    Additional comments:

## 2021-09-01 NOTE — PROGRESS NOTES
"    Assessment & Plan     Pneumonia of right lower lobe due to infectious organism  Patient to undergo physical therapy for continued weakness and for mobility evaluation.  Repeat CXR in 1 month.  - XR Chest 2 Views; Future  - HOME CARE NURSING REFERRAL    Thrombocytopenia (H)  Recheck.  - CBC with platelets; Future  - CBC with platelets    Ordering of each unique test             Return in about 8 weeks (around 10/27/2021) for Medication Recheck, with PCP..    VINOD Malloy CNP  [facility] NEHEMIAS Lim is a 71 year old who presents for the following health issues     HPI     ED/UC Followup:    Facility:  [facility]   Date of visit: 8/17/21  Reason for visit:   Community acquired pneumonia of right lower lobe of lung (Primary Dx);   Diarrhea of presumed infectious origin;   Weakness;   Abdominal pain, unspecified abdominal location;   Fibromyalgia;   Disease of cardiovascular system (HRC);   Stented coronary artery;   COVID-19 ruled out;   Former smoker       Current Status: improving     Patient notes that cough, shortness of breath have resolved.  She denies fever.  She is eating, drinking per normal.  Bowels and bladder have returned to normal.  Patient notes ongoing weakness.  She is using a walker at home, but her daughter is afraid she might fall.  She is mostly sedentary and is only getting up to go to the bathroom and get food.        Review of Systems   Constitutional, HEENT, cardiovascular, pulmonary, gi and gu systems are negative, except as otherwise noted.      Objective    /68   Pulse 54   Ht 1.626 m (5' 4\")   Wt 57.2 kg (126 lb)   SpO2 100%   BMI 21.63 kg/m    Body mass index is 21.63 kg/m .  Physical Exam   GENERAL: healthy, alert and no distress  RESP: lungs clear to auscultation - no rales, rhonchi or wheezes  CV: regular rate and rhythm, normal S1 S2, no S3 or S4, no murmur, click or rub, no peripheral edema and peripheral pulses strong  MS: no " gross musculoskeletal defects noted, no edema

## 2021-09-01 NOTE — LETTER
September 2, 2021    Carina Calvert  [address]   [place] [address]          Dear ,    We are writing to inform you of your test results.    Your platelets have returned to normal for you.       Resulted Orders   CBC with platelets   Result Value Ref Range    WBC Count 5.0 4.0 - 11.0 10e3/uL    RBC Count 4.12 3.80 - 5.20 10e6/uL    Hemoglobin 13.0 11.7 - 15.7 g/dL    Hematocrit 39.5 35.0 - 47.0 %    MCV 96 78 - 100 fL    MCH 31.6 26.5 - 33.0 pg    MCHC 32.9 31.5 - 36.5 g/dL    RDW 13.5 10.0 - 15.0 %    Platelet Count 126 (L) 150 - 450 10e3/uL       If you have any questions or concerns, please call the clinic at the number listed above.       Sincerely,      VINOD Murrieta CNP

## 2021-09-01 NOTE — RESULT ENCOUNTER NOTE
Dear Carina,    Your recent test results are attached.      Your platelets have returned to normal for you.    If you have any questions please feel free to contact [phone] or myself via [employer].    Sincerely,  Dot Ignacio, CNP

## 2021-09-02 ENCOUNTER — MEDICAL CORRESPONDENCE (OUTPATIENT)
Dept: HEALTH INFORMATION MANAGEMENT | Facility: CLINIC | Age: 71
End: 2021-09-02

## 2021-09-02 NOTE — TELEPHONE ENCOUNTER
Referral placed to [facility] yesterday for home care services.     Per note below, they are unable to take patient.     Will need external referral sent to : [facility]  Phone: [phone] Fax: [phone]    Routing to PCP to review and sign referral for external home care.     Cathie Ko, RN, BSN, PHN  [facility]: [place]

## 2021-09-02 NOTE — TELEPHONE ENCOUNTER
Home care referral printed and faxed to [facility] to fax number [phone].  Alice John,

## 2021-09-10 NOTE — TELEPHONE ENCOUNTER
Tonia RN with [facility] called the clinic requesting verbal orders for home care skilled nursin time X 4 weeks for medication education and pain management. Physical therapy: Evaluate and treat.    Gave okay for verbal orders for Home care Skilled nursing and Physical therapy as requested.    Per [facility] protocol/Policies: Abby Loera, RN for Dr. Jain  Patient who has bee seen by [facility] primary care provider within the past 2 years (21)

## 2021-09-15 NOTE — TELEPHONE ENCOUNTER
[facility] with [facility] called the clinic requesting verbal orders for Physical therapy: 2 times a week X 5 week, 1 time a week X 3 weeks for balance, upper and lower body strengthening and pain management.     Gave okay for verbal orders for Physical therapy as requested.     Per [facility] protocol/Policies: Abby Loera, RN for Dr. Jain  Patient who has bee seen by [facility] primary care provider within the past 2 years (9/1/21)

## 2021-09-17 ENCOUNTER — TELEPHONE (OUTPATIENT)
Dept: INTERNAL MEDICINE | Facility: CLINIC | Age: 71
End: 2021-09-17

## 2021-09-17 NOTE — TELEPHONE ENCOUNTER
Reason for Call:  Form, our goal is to have forms completed with 72 hours, however, some forms may require a visit or additional information.    Type of letter, form or note:  Home Health Certification    Who is the form from?: Home care    Where did the form come from: form was faxed in    What clinic location was the form placed at?: [facility]    Where the form was placed: Given to physician    What number is listed as a contact on the form?: [phone]    Call taken on 9/17/2021 at 11:56 AM by Alice John

## 2021-09-26 ENCOUNTER — TELEPHONE (OUTPATIENT)
Dept: INTERNAL MEDICINE | Facility: CLINIC | Age: 71
End: 2021-09-26

## 2021-09-26 DIAGNOSIS — M79.7 FIBROMYALGIA: ICD-10-CM

## 2021-09-26 DIAGNOSIS — F51.01 PRIMARY INSOMNIA: ICD-10-CM

## 2021-09-27 ENCOUNTER — TELEPHONE (OUTPATIENT)
Dept: FAMILY MEDICINE | Facility: CLINIC | Age: 71
End: 2021-09-27

## 2021-09-27 DIAGNOSIS — M79.7 FIBROMYALGIA: ICD-10-CM

## 2021-09-27 DIAGNOSIS — G24.3 SPASMODIC TORTICOLLIS: ICD-10-CM

## 2021-09-27 DIAGNOSIS — F11.90 CHRONIC, CONTINUOUS USE OF OPIOIDS: ICD-10-CM

## 2021-09-27 DIAGNOSIS — G89.4 CHRONIC PAIN SYNDROME: ICD-10-CM

## 2021-09-27 RX ORDER — HYDROCODONE BITARTRATE AND ACETAMINOPHEN 7.5; 325 MG/1; MG/1
2 TABLET ORAL 3 TIMES DAILY
Qty: 180 TABLET | Refills: 0 | Status: SHIPPED | OUTPATIENT
Start: 2021-09-27 | End: 2021-10-21

## 2021-09-29 RX ORDER — TRAZODONE HYDROCHLORIDE 100 MG/1
300 TABLET ORAL AT BEDTIME
Qty: 270 TABLET | Refills: 5 | Status: SHIPPED | OUTPATIENT
Start: 2021-09-29 | End: 2022-02-24

## 2021-09-29 NOTE — TELEPHONE ENCOUNTER
Routing refill request to provider for review/approval because:  Last order was for 4 tablets at night and sig also had orders for 3 times daily, patient taking 3 tabs at night okay to send prescription with directions to take 3 tabs?  Enriqueta Wetzel RN

## 2021-09-29 NOTE — TELEPHONE ENCOUNTER
I reviewed the prescription sig line , it looks correct to me and I signed for 6 months of medication     Reroute if additional input requested from me , otherwise you can close this encounter      Gerber Jain MD

## 2021-10-21 ENCOUNTER — ANCILLARY PROCEDURE (OUTPATIENT)
Dept: GENERAL RADIOLOGY | Facility: CLINIC | Age: 71
End: 2021-10-21
Attending: INTERNAL MEDICINE
Payer: MEDICARE

## 2021-10-21 ENCOUNTER — OFFICE VISIT (OUTPATIENT)
Dept: INTERNAL MEDICINE | Facility: CLINIC | Age: 71
End: 2021-10-21
Payer: MEDICARE

## 2021-10-21 VITALS
DIASTOLIC BLOOD PRESSURE: 67 MMHG | BODY MASS INDEX: 21.87 KG/M2 | WEIGHT: 127.4 LBS | TEMPERATURE: 97.8 F | SYSTOLIC BLOOD PRESSURE: 126 MMHG | HEART RATE: 70 BPM | OXYGEN SATURATION: 97 %

## 2021-10-21 DIAGNOSIS — G24.3 SPASMODIC TORTICOLLIS: ICD-10-CM

## 2021-10-21 DIAGNOSIS — M85.80 OSTEOPENIA, UNSPECIFIED LOCATION: Primary | ICD-10-CM

## 2021-10-21 DIAGNOSIS — F11.90 CHRONIC, CONTINUOUS USE OF OPIOIDS: ICD-10-CM

## 2021-10-21 DIAGNOSIS — M79.7 FIBROMYALGIA: ICD-10-CM

## 2021-10-21 DIAGNOSIS — Z78.0 ASYMPTOMATIC MENOPAUSAL STATE: ICD-10-CM

## 2021-10-21 DIAGNOSIS — M53.3 PAIN IN THE COCCYX: ICD-10-CM

## 2021-10-21 DIAGNOSIS — G89.4 CHRONIC PAIN SYNDROME: ICD-10-CM

## 2021-10-21 LAB — CREAT UR-MCNC: 52 MG/DL

## 2021-10-21 PROCEDURE — 80307 DRUG TEST PRSMV CHEM ANLYZR: CPT | Performed by: INTERNAL MEDICINE

## 2021-10-21 PROCEDURE — 82570 ASSAY OF URINE CREATININE: CPT | Performed by: INTERNAL MEDICINE

## 2021-10-21 PROCEDURE — 99214 OFFICE O/P EST MOD 30 MIN: CPT | Performed by: INTERNAL MEDICINE

## 2021-10-21 PROCEDURE — 72220 X-RAY EXAM SACRUM TAILBONE: CPT | Performed by: RADIOLOGY

## 2021-10-21 RX ORDER — HYDROCODONE BITARTRATE AND ACETAMINOPHEN 7.5; 325 MG/1; MG/1
2 TABLET ORAL 3 TIMES DAILY
Qty: 180 TABLET | Refills: 0 | Status: SHIPPED | OUTPATIENT
Start: 2021-10-21 | End: 2021-11-26

## 2021-10-21 NOTE — PROGRESS NOTES
Assessment & Plan     Osteopenia, unspecified location  Discussed need for DEXA scan , patient agrees, will schedule  - REVIEW OF HEALTH MAINTENANCE PROTOCOL ORDERS  - DX Hip/Pelvis/Spine; Future    Pain in the coccyx  This patient has had a troublesome pain with coccygodynia for well past a year. No clear cut onset of symptoms nor a history of an injury . We agreed to check a cocyx xray today with further follow up as indicated. I showed patient a picture of a doughnut pillow and recommended this  - XR Sacrum and Coccyx 2 Views; Future    Chronic pain syndrome  Refills provided   - Drug Confirmation Panel Urine with Creatinine; Future  - HYDROcodone-acetaminophen (NORCO) 7.5-325 MG per tablet; Take 2 tablets by mouth 3 times daily 28 days or more between refills for controlled medications  - Drug Confirmation Panel Urine with Creatinine    Asymptomatic menopausal state  DEXA scan   - DX Hip/Pelvis/Spine; Future    Spasmodic torticollis  Refills provided   - HYDROcodone-acetaminophen (NORCO) 7.5-325 MG per tablet; Take 2 tablets by mouth 3 times daily 28 days or more between refills for controlled medications    Fibromyalgia    - HYDROcodone-acetaminophen (NORCO) 7.5-325 MG per tablet; Take 2 tablets by mouth 3 times daily 28 days or more between refills for controlled medications    Chronic, continuous use of opioids    - HYDROcodone-acetaminophen (NORCO) 7.5-325 MG per tablet; Take 2 tablets by mouth 3 times daily 28 days or more between refills for controlled medications    Review of the result(s) of each unique test - dexa scan upcoming  Prescription drug management  20 minutes spent on the date of the encounter doing chart review, history and exam, documentation and further activities per the note      Return in about 3 months (around 1/21/2022).    Gerber Jain MD  [facility] NEHEMIAS Lim is a 71 year old who presents for the following health issues  accompanied by her  herself.    HPI     Chronic Pain Follow-Up    Where in your body do you have pain? Entire body  How has your pain affected your ability to work? Unable to work due to pain   What type of work do you or did you do? retired  Which of these pain treatments have you tried since your last clinic visit? Heat, Physical Therapy and Stretching  How well are you sleeping? Poor  How has your mood been since your last visit? About the same  Have you had a significant life event? No  Other aggravating factors: prolonged sitting, prolonged standing and poor posture  Taking medication as directed? Yes    PHQ-9 SCORE 5/10/2018 8/29/2019 4/9/2021   PHQ-9 Total Score - - -   PHQ-9 Total Score 3 16 5     TRUNG-7 SCORE 1/12/2017 5/10/2018 8/29/2019   Total Score 0 0 5     No flowsheet data found.  Encounter-Level CSA - 10/27/2015:    Controlled Substance Agreement - Scan on 11/4/2015  2:32 PM: CONTROLLED SUBSTANCE AGREEMENT, 10/27/2015     Patient-Level CSA:    Controlled Substance Agreement - Opioid - Scan on 7/27/2021  7:53 AM         How many servings of fruits and vegetables do you eat daily?  2-3    On average, how many sweetened beverages do you drink each day (Examples: soda, juice, sweet tea, etc.  Do NOT count diet or artificially sweetened beverages)?   0    How many days per week do you exercise enough to make your heart beat faster? 7    How many minutes a day do you exercise enough to make your heart beat faster? 20 - 29    How many days per week do you miss taking your medication? 0      Review of Systems   Constitutional, HEENT, cardiovascular, pulmonary, gi and gu systems are negative, except as otherwise noted.      Objective    /67 (BP Location: Right arm, Patient Position: Chair, Cuff Size: Adult Small)   Pulse 70   Temp 97.8  F (36.6  C) (Oral)   Wt 57.8 kg (127 lb 6.4 oz)   SpO2 97%   BMI 21.87 kg/m    Body mass index is 21.87 kg/m .  Physical Exam   GENERAL: healthy, alert and no distress  RESP: lungs  clear to auscultation - no rales, rhonchi or wheezes  CV: regular rate and rhythm, normal S1 S2, no S3 or S4, no murmur, click or rub, no peripheral edema and peripheral pulses strong  MS: no gross musculoskeletal defects noted, no edema    Orders Placed This Encounter   Procedures     REVIEW OF HEALTH MAINTENANCE PROTOCOL ORDERS     DX Hip/Pelvis/Spine     XR Sacrum and Coccyx 2 Views     Urine Drug Confirmation Panel     Urine Creatinine for Drug Screen Panel     Chronic Pain Contract completed as well today

## 2021-10-21 NOTE — LETTER
October 25, 2021      Carina Calvert  [address]   [place] [address]        Dear ,    We are writing to inform you of your test results.    All of these tests are within acceptable limits , things look good ! The urine test shows only exactly what we would expect to see.        Resulted Orders   Urine Drug Confirmation Panel   Result Value Ref Range    Dihydrocodeine ng/mL 140 (H) <50 ng/mL    Dihydrocodeine 269 Absent ng/mg [creat]      Comment:      Hydromorphone and dihydrocodeine are expected metabolites of hydrocodone. Hydromorphone and dihydrocodeine are also available as scheduled prescription medications.    Hydrocodone ng/mL 3,220 (H) <50 ng/mL    Hydrocodone 6,192 Absent ng/mg [creat]      Comment:      Sources of hydrocodone include scheduled prescription medications.  Hydromorphone and dihydrocodeine are expected metabolites of hydrocodone. Hydromorphone and dihydrocodeine are also available as scheduled prescription medications.    Hydromorphone ng/mL 520 (H) <50 ng/mL    Hydromorphone 1,000 Absent ng/mg [creat]      Comment:      Hydromorphone may be present as a metabolite of morphine.  Concentrations of hydromorphone rarely exceed 5% of the morphine concentration when this is the source of hydromorphone.  Hydromorphone and dihydrocodeine are expected metabolites of hydrocodone. Hydromorphone and dihydrocodeine are also available as scheduled prescription medications.    Gabapentin (Neurontin) Present (A) Absent      Comment:      Sources of gabapentin are prescription medications.    Narrative    This test was developed and its performance characteristics determined by the [facility],  Special Chemistry Laboratory. It has not been cleared or approved by the FDA. The laboratory is regulated under CLIA as qualified to perform high-complexity testing. This test is used for clinical purposes. It should not be regarded as investigational or for  research.   Urine Creatinine for Drug Screen Panel   Result Value Ref Range    Creatinine Urine for Drug Screen 52 mg/dL      Comment:      The reference range has not been established for creatinine in random urines. The results should be integrated into the clinical context for interpretation.       If you have any questions or concerns, please call the clinic at the number listed above.       Sincerely,      Gerber Jain MD/ray

## 2021-10-23 DIAGNOSIS — I21.4 NSTEMI (NON-ST ELEVATED MYOCARDIAL INFARCTION) (H): ICD-10-CM

## 2021-10-23 DIAGNOSIS — I10 HYPERTENSION GOAL BP (BLOOD PRESSURE) < 140/90: ICD-10-CM

## 2021-10-23 LAB
DHC UR CFM-MCNC: 140 NG/ML
DHC/CREAT UR: 269 NG/MG {CREAT}
GABAPENTIN UR QL CFM: PRESENT
HYDROCODONE UR CFM-MCNC: 3220 NG/ML
HYDROCODONE/CREAT UR: 6192 NG/MG {CREAT}
HYDROMORPHONE UR CFM-MCNC: 520 NG/ML
HYDROMORPHONE/CREAT UR: 1000 NG/MG {CREAT}

## 2021-10-25 ENCOUNTER — TELEPHONE (OUTPATIENT)
Dept: FAMILY MEDICINE | Facility: CLINIC | Age: 71
End: 2021-10-25
Payer: MEDICARE

## 2021-10-25 RX ORDER — METOPROLOL TARTRATE 50 MG
TABLET ORAL
Qty: 180 TABLET | Refills: 2 | Status: SHIPPED | OUTPATIENT
Start: 2021-10-25 | End: 2022-02-24

## 2021-10-25 NOTE — TELEPHONE ENCOUNTER
Prescription approved per [facility] Refill Protocol.    Enriqueta Wetzel RN

## 2021-10-27 ENCOUNTER — TELEPHONE (OUTPATIENT)
Dept: FAMILY MEDICINE | Facility: CLINIC | Age: 71
End: 2021-10-27

## 2021-10-27 NOTE — TELEPHONE ENCOUNTER
Patient calling for a refill of Hydrocodone. RN notified patient that order was sent on 10/21/2021. Patient said that it was too early. RN explained that pharmacy should be able to dispense medication and to call back if any problems with pharmacy.  Agueda Lucas RN on 10/27/2021 at 10:07 AM

## 2021-11-02 ENCOUNTER — MEDICAL CORRESPONDENCE (OUTPATIENT)
Dept: HEALTH INFORMATION MANAGEMENT | Facility: CLINIC | Age: 71
End: 2021-11-02
Payer: MEDICARE

## 2021-11-03 DIAGNOSIS — G24.3 SPASMODIC TORTICOLLIS: ICD-10-CM

## 2021-11-03 DIAGNOSIS — G25.81 RESTLESS LEG SYNDROME: ICD-10-CM

## 2021-11-03 DIAGNOSIS — M79.7 FIBROMYALGIA: ICD-10-CM

## 2021-11-03 NOTE — TELEPHONE ENCOUNTER
Requested Prescriptions   Pending Prescriptions Disp Refills     gabapentin (NEURONTIN) 300 MG capsule [Pharmacy Med Name: GABAPENTIN 300 MG CAPSULE] 450 capsule 1     Sig: TAKE 1 CAPSULE BY MOUTH IN THE MORNING, 1 AT NOON 1 AT 5PM AND 2 AT BEDTIME ( 5 PER DAY )       There is no refill protocol information for this order        Last ov 10/21/21    Selina Hammond RN

## 2021-11-04 RX ORDER — GABAPENTIN 300 MG/1
CAPSULE ORAL
Qty: 450 CAPSULE | Refills: 1 | Status: SHIPPED | OUTPATIENT
Start: 2021-11-04 | End: 2022-04-29

## 2021-11-16 ENCOUNTER — OFFICE VISIT (OUTPATIENT)
Dept: FAMILY MEDICINE | Facility: CLINIC | Age: 71
End: 2021-11-16
Payer: MEDICARE

## 2021-11-16 VITALS
HEART RATE: 53 BPM | WEIGHT: 128 LBS | HEIGHT: 64 IN | OXYGEN SATURATION: 94 % | SYSTOLIC BLOOD PRESSURE: 109 MMHG | BODY MASS INDEX: 21.85 KG/M2 | RESPIRATION RATE: 18 BRPM | DIASTOLIC BLOOD PRESSURE: 59 MMHG | TEMPERATURE: 98.6 F

## 2021-11-16 DIAGNOSIS — Z20.822 PERSON UNDER INVESTIGATION FOR COVID-19: ICD-10-CM

## 2021-11-16 DIAGNOSIS — R05.9 COUGH: ICD-10-CM

## 2021-11-16 DIAGNOSIS — J18.9 PNEUMONIA OF RIGHT LUNG DUE TO INFECTIOUS ORGANISM, UNSPECIFIED PART OF LUNG: Primary | ICD-10-CM

## 2021-11-16 PROCEDURE — U0005 INFEC AGEN DETEC AMPLI PROBE: HCPCS | Performed by: FAMILY MEDICINE

## 2021-11-16 PROCEDURE — 99213 OFFICE O/P EST LOW 20 MIN: CPT | Performed by: FAMILY MEDICINE

## 2021-11-16 PROCEDURE — U0003 INFECTIOUS AGENT DETECTION BY NUCLEIC ACID (DNA OR RNA); SEVERE ACUTE RESPIRATORY SYNDROME CORONAVIRUS 2 (SARS-COV-2) (CORONAVIRUS DISEASE [COVID-19]), AMPLIFIED PROBE TECHNIQUE, MAKING USE OF HIGH THROUGHPUT TECHNOLOGIES AS DESCRIBED BY CMS-2020-01-R: HCPCS | Performed by: FAMILY MEDICINE

## 2021-11-16 ASSESSMENT — MIFFLIN-ST. JEOR: SCORE: 1090.6

## 2021-11-16 NOTE — PROGRESS NOTES
"  Assessment & Plan     Cough  Advised  Rest/fluid/quarantine  - Symptomatic COVID-19 Virus (Coronavirus) by PCR; Future    Person under investigation for COVID-19  Pending   If sob or worse to follow up   - Symptomatic COVID-19 Virus (Coronavirus) by PCR; Future    Pneumonia of right lung due to infectious organism, unspecified part of lung  Had Pneumonia in August-needs a follow up CXR  Pt will follow up in 10 days for a repeat Chest Xray   - XR Chest 2 Views; Future    Return in about 1 week (around 11/23/2021), or if symptoms worsen or fail to improve, for recheck/Please schedule appointment.    Viki Chance MD  [facility] NEHEMIAS Lim is a 71 year old who presents for the following health issues     HPI   Chief Complaint   Patient presents with     Throat Problem     no sore throat, has to cough only in the morning spitting green and yellow mucus. Since 4-5 days     Fatigue     Cough green sputum  No fever  No sore throat  No sob  No wheezing  No loss of taste or smell  Had Pneumonia in August  Has Not had Back Up Chest Xray      Review of Systems   CONSTITUTIONAL: NEGATIVE for fever, chills, change in weight  ENT/MOUTH: NEGATIVE for ear, mouth and throat problems  RESP:cough   CV: NEGATIVE for chest pain, palpitations or peripheral edema  GI: NEGATIVE for nausea, abdominal pain, heartburn, or change in bowel habits    Rest of the ROS is Negative except see above and Problem list [stable]        Objective    /59   Pulse 53   Temp 98.6  F (37  C) (Oral)   Resp 18   Ht 1.626 m (5' 4\")   Wt 58.1 kg (128 lb)   SpO2 94%   BMI 21.97 kg/m    Body mass index is 21.97 kg/m .  Physical Exam   GENERAL: healthy, alert and no distress  EYES: Eyes grossly normal to inspection, PERRL and conjunctivae and sclerae normal  HENT: ear canals and TM's normal, nose and mouth without ulcers or lesions  NECK: no adenopathy, no asymmetry, masses, or scars and thyroid normal to " palpation  RESP: lungs clear to auscultation - no rales, rhonchi or wheezes  CV: regular rate and rhythm, normal S1 S2, no S3 or S4, no murmur, click or rub, no peripheral edema and peripheral pulses strong  ABDOMEN: soft, nontender, no hepatosplenomegaly, no masses and bowel sounds normal  MS: no gross musculoskeletal defects noted, no edema    Pending

## 2021-11-16 NOTE — LETTER
November 19, 2021      Carina Calvert  [address]   [place] [address]        Dear ,    We are writing to inform you of your test results.    Your COVID-19 testing was negative        Resulted Orders   Symptomatic COVID-19 Virus (Coronavirus) by PCR Nose   Result Value Ref Range    SARS CoV2 PCR Negative Negative, Testing sent to reference lab. Results will be returned via unsolicited result      Comment:      NEGATIVE: SARS-CoV-2 (COVID-19) RNA not detected, presumed negative.    Narrative    Testing was performed using the lexi SARS-CoV-2 assay on the lexi  Flux Power0 System. This test should be ordered for the detection of  SARS-CoV-2 in individuals who meet SARS-CoV-2 clinical and/or  epidemiological criteria. Test performance is unknown in asymptomatic  patients. This test is for in vitro diagnostic use under the FDA EUA  for laboratories certified under CLIA to perform high and/or moderate  complexity testing. This test has not been FDA cleared or approved. A  negative result does not rule out the presence of PCR inhibitors in  the specimen or target RNA in concentration below the limit of  detection for the assay. The possibility of a false negative should  be considered if the patient's recent exposure or clinical  presentation suggests COVID-19. This test was validated by the [facility]. This  laboratory is certified under the Clinical Laboratory Improvement  Amendments of 1988 (CLIA-88) as qualified to perform high and/or  moderate complexity laboratory testing.       If you have any questions or concerns, please call the clinic at the number listed above.       Sincerely,      Viki Chance MD/ray

## 2021-11-16 NOTE — PATIENT INSTRUCTIONS
"Discharge Instructions for COVID-19 Patients  You have--or may have--COVID-19. Please follow the instructions listed below.   If you have a weakened immune system, discuss with your doctor any other actions you need to take.  How can I protect others?  If you have symptoms (fever, cough, body aches or trouble breathing):    Stay home and away from others (self-isolate) until:  ? Your other symptoms have resolved (gotten better). And   ? You've had no fever--and no medicine that reduces fever--for 1 full day (24 hours). And   ? At least 10 days have passed since your symptoms started. (You may need to wait 20 days. Follow the advice of your care team.)  If you don't show symptoms, but testing showed that you have COVID-19:    Stay home and away from others (self-isolate) until at least 10 days have passed since the date of your first positive COVID-19 test.  During this time    Stay in your own room, even for meals. Use your own bathroom if you can.    Stay away from others in your home. No hugging, kissing or shaking hands. No visitors.    Don't go to work, school or anywhere else.    Clean \"high touch\" surfaces often (doorknobs, counters, handles). Use household cleaning spray or wipes.    You'll find a full list of  on the EPA website: www.epa.gov/pesticide-registration/list-n-disinfectants-use-against-sars-cov-2.    Cover your mouth and nose with a mask or other face covering to avoid spreading germs.    Wash your hands and face often. Use soap and water.    Caregivers in these groups are at risk for severe illness due to COVID-19:  ? People 65 years and older  ? People who live in a nursing home or long-term care facility  ? People with chronic disease (lung, heart, cancer, diabetes, kidney, liver, immunologic)  ? People who have a weakened immune system, including those who:    Are in cancer treatment    Take medicine that weakens the immune system, such as corticosteroids    Had a bone marrow or organ " transplant    Have an immune deficiency    Have poorly controlled HIV or AIDS    Are obese (body mass index of 40 or higher)    Smoke regularly    Caregivers should wear gloves while washing dishes, handling laundry and cleaning bedrooms and bathrooms.    Use caution when washing and drying laundry: Don't shake dirty laundry and use the warmest water setting that you can.    For more tips on managing your health at home, go to www.cdc.gov/coronavirus/2019-ncov/downloads/10Things.pdf.  How can I take care of myself at home?  1. Get lots of rest. Drink extra fluids (unless a doctor has told you not to).  2. Take Tylenol (acetaminophen) for fever or pain. If you have liver or kidney problems, ask your family doctor if it's okay to take Tylenol.   Adults can take either:   ? 650 mg (two 325 mg pills) every 4 to 6 hours, or   ? 1,000 mg (two 500 mg pills) every 8 hours as needed.  ? Note: Don't take more than 3,000 mg in one day. Acetaminophen is found in many medicines (both prescribed and over-the-counter medicines). Read all labels to be sure you don't take too much.   For children, check the Tylenol bottle for the right dose. The dose is based on the child's age or weight.  3. If you have other health problems (like cancer, heart failure, an organ transplant or severe kidney disease): Call your specialty clinic if you don't feel better in the next 2 days.  4. Know when to call 911. Emergency warning signs include:  ? Trouble breathing or shortness of breath  ? Pain or pressure in the chest that doesn't go away  ? Feeling confused like you haven't felt before, or not being able to wake up  ? Bluish-colored lips or face  5. Your doctor may have prescribed a blood thinner medicine. Follow their instructions.  Where can I get more information?     [facility] - About COVID-19:   [email or web address]    CDC - What to Do If You're Sick:  www.cdc.gov/coronavirus/2019-ncov/about/steps-when-sick.html    CDC - Ending Home Isolation: www.cdc.gov/coronavirus/2019-ncov/hcp/disposition-in-home-patients.html    CDC - Caring for Someone: www.cdc.gov/coronavirus/2019-ncov/if-you-are-sick/care-for-someone.html    [facility] - Interim Guidance for Hospital Discharge to Home: www.health.[facility].mn./diseases/coronavirus/hcp/hospdischarge.pdf    Below are the COVID-19 hotlines at the [place] Department of Health ([facility]). Interpreters are available.  ? For health questions: Call 225-041-8141 or 1-966.910.4152 (7 a.m. to 7 p.m.)  ? For questions about schools and childcare: Call 800-004-0408 or 1-212.104.5194 (7 a.m. to 7 p.m.)    For informational purposes only. Not to replace the advice of your health care provider. Clinically reviewed by Dr. Guero Amaral.   Copyright   2020 [facility]. All rights reserved. FAD ? [id number] - REV 01/05/21.

## 2021-11-18 LAB — SARS-COV-2 RNA RESP QL NAA+PROBE: NEGATIVE

## 2021-11-26 ENCOUNTER — ANCILLARY PROCEDURE (OUTPATIENT)
Dept: GENERAL RADIOLOGY | Facility: CLINIC | Age: 71
End: 2021-11-26
Attending: FAMILY MEDICINE
Payer: MEDICARE

## 2021-11-26 DIAGNOSIS — G89.4 CHRONIC PAIN SYNDROME: ICD-10-CM

## 2021-11-26 DIAGNOSIS — J18.9 PNEUMONIA OF RIGHT LUNG DUE TO INFECTIOUS ORGANISM, UNSPECIFIED PART OF LUNG: ICD-10-CM

## 2021-11-26 DIAGNOSIS — G24.3 SPASMODIC TORTICOLLIS: ICD-10-CM

## 2021-11-26 DIAGNOSIS — M79.7 FIBROMYALGIA: ICD-10-CM

## 2021-11-26 DIAGNOSIS — F11.90 CHRONIC, CONTINUOUS USE OF OPIOIDS: ICD-10-CM

## 2021-11-26 PROCEDURE — 71046 X-RAY EXAM CHEST 2 VIEWS: CPT | Performed by: RADIOLOGY

## 2021-11-26 RX ORDER — HYDROCODONE BITARTRATE AND ACETAMINOPHEN 7.5; 325 MG/1; MG/1
2 TABLET ORAL 3 TIMES DAILY
Qty: 180 TABLET | Refills: 0 | Status: SHIPPED | OUTPATIENT
Start: 2021-11-26 | End: 2021-12-27

## 2021-11-26 NOTE — TELEPHONE ENCOUNTER
Routing refill request to provider for review/approval because:  Drug not on the [facility] refill protocol     Requested Prescriptions   Pending Prescriptions Disp Refills     HYDROcodone-acetaminophen (NORCO) 7.5-325 MG per tablet 180 tablet 0     Sig: Take 2 tablets by mouth 3 times daily 28 days or more between refills for controlled medications       There is no refill protocol information for this order

## 2021-11-26 NOTE — LETTER
November 29, 2021      Carina Calvert  [address]   [place] [address]        Dear ,    We are writing to inform you of your test results.    Your chest x-ray was read by radiology and normal.         Resulted Orders   XR Chest 2 Views    Narrative    CHEST TWO VIEWS 11/26/2021 4:29 PM     HISTORY: Pneumonia of right lung due to infectious organism,  unspecified part of lung.    COMPARISON: None.    FINDINGS: Heart size and pulmonary vascularity are within normal  limits. The lungs are clear. No pneumothorax or pleural effusion.       Impression    IMPRESSION: No radiographic evidence of acute chest abnormality.     ELIER DIAZ MD         SYSTEM ID:  [id number]   If you have any questions or concerns, please call the clinic at the number listed above.       Sincerely,      Viki Chance MD/bell

## 2021-11-29 ENCOUNTER — TELEPHONE (OUTPATIENT)
Dept: FAMILY MEDICINE | Facility: CLINIC | Age: 71
End: 2021-11-29
Payer: MEDICARE

## 2021-11-29 NOTE — TELEPHONE ENCOUNTER
Reason for Call:  Request for results:    Name of test or procedure: Xray    Date of test of procedure: 11-26    Location of the test or procedure: [place]    OK to leave the result message on voice mail or with a family member? NO    Phone number Patient can be reached at:  [phone]    Additional comments:     Call taken on 11/29/2021 at 2:21 PM by Latisha Saleh

## 2021-11-30 NOTE — TELEPHONE ENCOUNTER
Pt notified of negative/normal result per result note 11/29/21.    Elizabet Rose RN  [facility]

## 2021-12-27 DIAGNOSIS — G89.4 CHRONIC PAIN SYNDROME: ICD-10-CM

## 2021-12-27 DIAGNOSIS — G24.3 SPASMODIC TORTICOLLIS: ICD-10-CM

## 2021-12-27 DIAGNOSIS — M79.7 FIBROMYALGIA: ICD-10-CM

## 2021-12-27 DIAGNOSIS — F11.90 CHRONIC, CONTINUOUS USE OF OPIOIDS: ICD-10-CM

## 2021-12-27 RX ORDER — HYDROCODONE BITARTRATE AND ACETAMINOPHEN 7.5; 325 MG/1; MG/1
2 TABLET ORAL 3 TIMES DAILY
Qty: 180 TABLET | Refills: 0 | Status: SHIPPED | OUTPATIENT
Start: 2021-12-27 | End: 2022-01-25

## 2021-12-27 NOTE — TELEPHONE ENCOUNTER
Routing to PCP-     Pt calling in for a refill of her Hydrocodone.    Rx pending approval if appropriate    Maria M Koenig RN

## 2022-01-04 DIAGNOSIS — G24.3 SPASMODIC TORTICOLLIS: ICD-10-CM

## 2022-01-04 DIAGNOSIS — M79.7 FIBROMYALGIA: ICD-10-CM

## 2022-01-04 RX ORDER — BACLOFEN 10 MG/1
10 TABLET ORAL 4 TIMES DAILY
Qty: 360 TABLET | Refills: 1 | Status: SHIPPED | OUTPATIENT
Start: 2022-01-04 | End: 2022-02-24

## 2022-01-25 ENCOUNTER — TELEPHONE (OUTPATIENT)
Dept: FAMILY MEDICINE | Facility: CLINIC | Age: 72
End: 2022-01-25
Payer: MEDICARE

## 2022-01-25 DIAGNOSIS — M79.7 FIBROMYALGIA: ICD-10-CM

## 2022-01-25 DIAGNOSIS — F11.90 CHRONIC, CONTINUOUS USE OF OPIOIDS: ICD-10-CM

## 2022-01-25 DIAGNOSIS — G89.4 CHRONIC PAIN SYNDROME: ICD-10-CM

## 2022-01-25 DIAGNOSIS — G24.3 SPASMODIC TORTICOLLIS: ICD-10-CM

## 2022-01-25 RX ORDER — HYDROCODONE BITARTRATE AND ACETAMINOPHEN 7.5; 325 MG/1; MG/1
2 TABLET ORAL 3 TIMES DAILY
Qty: 180 TABLET | Refills: 0 | Status: SHIPPED | OUTPATIENT
Start: 2022-01-25 | End: 2022-02-22

## 2022-01-25 NOTE — TELEPHONE ENCOUNTER
Controlled Substance Refill Request for HYDROcodone-acetaminophen (NORCO) 7.5-325 MG per tablet  Problem List Complete:  Yes  Overview    Edited:    Earline Emery RN    8/19/2020    Patient is followed by [facility] for ongoing prescription of pain medication. All refills should be approved by this provider, or covering partner.     Medication(s): HYDROcodone (VICODIN) 7.5/325   .   Maximum quantity per month: 180  Clinic visit frequency required: Q 3 months      Controlled substance agreement on file: Yes  Date(s): 11/2015     Pain Clinic evaluation in the past: Yes  Date/Location: [facility]      DIRE Total Score(s):  No flowsheet data found.     Last [facility] website verification: 8/19/2020  [email or web address]                  checked in past 3 months?  No, route to RN

## 2022-01-25 NOTE — TELEPHONE ENCOUNTER
Patient called needs HYDROcodone-acetaminophen (NORCO) 7.5-325 MG per tablet sent to [place] [phone] [place] [address] [place]  Team Stacey,

## 2022-02-22 ENCOUNTER — TELEPHONE (OUTPATIENT)
Dept: FAMILY MEDICINE | Facility: CLINIC | Age: 72
End: 2022-02-22
Payer: MEDICARE

## 2022-02-22 DIAGNOSIS — F11.90 CHRONIC, CONTINUOUS USE OF OPIOIDS: ICD-10-CM

## 2022-02-22 DIAGNOSIS — M79.7 FIBROMYALGIA: ICD-10-CM

## 2022-02-22 DIAGNOSIS — G24.3 SPASMODIC TORTICOLLIS: ICD-10-CM

## 2022-02-22 DIAGNOSIS — G89.4 CHRONIC PAIN SYNDROME: ICD-10-CM

## 2022-02-22 RX ORDER — HYDROCODONE BITARTRATE AND ACETAMINOPHEN 7.5; 325 MG/1; MG/1
2 TABLET ORAL 3 TIMES DAILY
Qty: 180 TABLET | Refills: 0 | Status: SHIPPED | OUTPATIENT
Start: 2022-02-22 | End: 2022-02-24

## 2022-02-22 NOTE — TELEPHONE ENCOUNTER
Controlled Substance Refill Request for HYDROcodone-acetaminophen (NORCO) 7.5-325 MG per tablet  Problem List Complete:  Yes  Overview    Edited:    Earline Emery RN    8/19/2020    Patient is followed by [facility] for ongoing prescription of pain medication. All refills should be approved by this provider, or covering partner.     Medication(s): HYDROcodone (VICODIN) 7.5/325   .   Maximum quantity per month: 180  Clinic visit frequency required: Q 3 months      Controlled substance agreement on file: Yes  Date(s): 11/2015     Pain Clinic evaluation in the past: Yes  Date/Location: [facility]      DIRE Total Score(s):  No flowsheet data found.     Last [facility] website verification: 8/19/2020  [email or web address]                  checked in past 3 months?  No, route to RN

## 2022-02-22 NOTE — TELEPHONE ENCOUNTER
Patient called needs her HYDROcodone-acetaminophen (NORCO) 7.5-325 MG per tablet sent to [place] [phone] [place] [address] [place],

## 2022-02-24 ENCOUNTER — VIRTUAL VISIT (OUTPATIENT)
Dept: INTERNAL MEDICINE | Facility: CLINIC | Age: 72
End: 2022-02-24
Payer: MEDICARE

## 2022-02-24 DIAGNOSIS — M79.7 FIBROMYALGIA: ICD-10-CM

## 2022-02-24 DIAGNOSIS — F11.90 CHRONIC, CONTINUOUS USE OF OPIOIDS: ICD-10-CM

## 2022-02-24 DIAGNOSIS — Z71.89 ADVANCED DIRECTIVES, COUNSELING/DISCUSSION: ICD-10-CM

## 2022-02-24 DIAGNOSIS — I21.4 NSTEMI (NON-ST ELEVATED MYOCARDIAL INFARCTION) (H): ICD-10-CM

## 2022-02-24 DIAGNOSIS — G25.81 RESTLESS LEG SYNDROME: ICD-10-CM

## 2022-02-24 DIAGNOSIS — Z12.11 SCREEN FOR COLON CANCER: ICD-10-CM

## 2022-02-24 DIAGNOSIS — Z12.2 SCREENING FOR LUNG CANCER: ICD-10-CM

## 2022-02-24 DIAGNOSIS — Z12.31 VISIT FOR SCREENING MAMMOGRAM: ICD-10-CM

## 2022-02-24 DIAGNOSIS — G89.4 CHRONIC PAIN SYNDROME: ICD-10-CM

## 2022-02-24 DIAGNOSIS — F51.01 PRIMARY INSOMNIA: ICD-10-CM

## 2022-02-24 DIAGNOSIS — G24.3 SPASMODIC TORTICOLLIS: Primary | ICD-10-CM

## 2022-02-24 DIAGNOSIS — D69.6 THROMBOCYTOPENIA (H): ICD-10-CM

## 2022-02-24 DIAGNOSIS — E78.5 HYPERLIPIDEMIA WITH TARGET LDL LESS THAN 100: ICD-10-CM

## 2022-02-24 DIAGNOSIS — I10 HYPERTENSION GOAL BP (BLOOD PRESSURE) < 140/90: ICD-10-CM

## 2022-02-24 PROCEDURE — 99442 PR PHYSICIAN TELEPHONE EVALUATION 11-20 MIN: CPT | Performed by: INTERNAL MEDICINE

## 2022-02-24 RX ORDER — BACLOFEN 10 MG/1
10 TABLET ORAL 4 TIMES DAILY
Qty: 360 TABLET | Refills: 1 | Status: SHIPPED | OUTPATIENT
Start: 2022-02-24 | End: 2022-10-06

## 2022-02-24 RX ORDER — TRAZODONE HYDROCHLORIDE 100 MG/1
300 TABLET ORAL AT BEDTIME
Qty: 270 TABLET | Refills: 5 | Status: SHIPPED | OUTPATIENT
Start: 2022-02-24 | End: 2023-03-15

## 2022-02-24 RX ORDER — NITROGLYCERIN 0.4 MG/1
0.4 TABLET SUBLINGUAL EVERY 5 MIN PRN
Qty: 25 TABLET | Refills: 2 | Status: SHIPPED | OUTPATIENT
Start: 2022-02-24

## 2022-02-24 RX ORDER — GABAPENTIN 300 MG/1
CAPSULE ORAL
Qty: 450 CAPSULE | Refills: 1 | Status: CANCELLED | OUTPATIENT
Start: 2022-02-24

## 2022-02-24 RX ORDER — HYDROCODONE BITARTRATE AND ACETAMINOPHEN 7.5; 325 MG/1; MG/1
2 TABLET ORAL 3 TIMES DAILY
Qty: 180 TABLET | Refills: 0 | Status: SHIPPED | OUTPATIENT
Start: 2022-02-24 | End: 2022-04-18

## 2022-02-24 RX ORDER — ROSUVASTATIN CALCIUM 10 MG/1
10 TABLET, COATED ORAL DAILY
Qty: 90 TABLET | Refills: 3 | Status: SHIPPED | OUTPATIENT
Start: 2022-02-24 | End: 2023-05-15

## 2022-02-24 RX ORDER — METOPROLOL TARTRATE 50 MG
TABLET ORAL
Qty: 180 TABLET | Refills: 1 | Status: SHIPPED | OUTPATIENT
Start: 2022-02-24 | End: 2022-10-14

## 2022-02-24 NOTE — PROGRESS NOTES
Carina is a 71 year old who is being evaluated via a billable telephone visit.      What phone number would you like to be contacted at? [phone]  How would you like to obtain your AVS? Mail a copy    Assessment & Plan     Spasmodic torticollis  This is a longterm patient with me. She is dependent upon a good deal of symptomatic medications for a horrible spasmodic torticollis type of condition and she also has an underlying coronary artery disease and is in a stable phase of chronic illness at this point. She is going to be due for a bunch of laboratory studies including fasting lipid panel and others but these can wait for 3 more months    - HYDROcodone-acetaminophen (NORCO) 7.5-325 MG per tablet; Take 2 tablets by mouth 3 times daily 28 days or more between refills for controlled medications, no further refills until appointment  - baclofen (LIORESAL) 10 MG tablet; Take 1 tablet (10 mg) by mouth 4 times daily    Fibromyalgia  Problem is stable and ongoing monitoring    - HYDROcodone-acetaminophen (NORCO) 7.5-325 MG per tablet; Take 2 tablets by mouth 3 times daily 28 days or more between refills for controlled medications, no further refills until appointment  - traZODone (DESYREL) 100 MG tablet; Take 3 tablets (300 mg) by mouth At Bedtime  - baclofen (LIORESAL) 10 MG tablet; Take 1 tablet (10 mg) by mouth 4 times daily    Restless leg syndrome  Problem is stable and ongoing monitoring      Chronic pain syndrome  Problem is stable and ongoing monitoring    - HYDROcodone-acetaminophen (NORCO) 7.5-325 MG per tablet; Take 2 tablets by mouth 3 times daily 28 days or more between refills for controlled medications, no further refills until appointment    Chronic, continuous use of opioids  Problem is stable and ongoing monitoring    - HYDROcodone-acetaminophen (NORCO) 7.5-325 MG per tablet; Take 2 tablets by mouth 3 times daily 28 days or more between refills for controlled medications, no further refills until  appointment    NSTEMI (non-ST elevated myocardial infarction) (H)  Problem is stable and ongoing monitoring    - metoprolol tartrate (LOPRESSOR) 50 MG tablet; TAKE 1 TABLET BY MOUTH TWICE A DAY  - nitroGLYcerin (NITROSTAT) 0.4 MG sublingual tablet; Place 1 tablet (0.4 mg) under the tongue every 5 minutes as needed for chest pain    Hypertension goal BP (blood pressure) < 140/90  Problem is stable and ongoing monitoring    - metoprolol tartrate (LOPRESSOR) 50 MG tablet; TAKE 1 TABLET BY MOUTH TWICE A DAY    Hyperlipidemia with target LDL less than 100  Problem is stable and ongoing monitoring    - rosuvastatin (CRESTOR) 10 MG tablet; Take 1 tablet (10 mg) by mouth daily    Primary insomnia  Problem is stable and ongoing monitoring    - traZODone (DESYREL) 100 MG tablet; Take 3 tablets (300 mg) by mouth At Bedtime    Screen for colon cancer  Declines anything , Cologuard DNA cancer stool screening test, FIT test , or colonoscopy     Visit for screening mammogram  Declines     Advanced directives, counseling/discussion  Declines     Thrombocytopenia (H)  Problem is stable and ongoing monitoring      Screening for lung cancer  Declines annual low dose CT for scanning high risk pt for lung cancer or any and essentially al healthcare maintenance including any vaccinations      Review of the result(s) of each unique test - last labs done  Prescription drug management  19 minutes spent on the date of the encounter doing chart review, history and exam, documentation and further activities per the note      Return in about 3 months (around 5/24/2022).    Gerber Jain MD  [facility] NEHEMIAS Lim is a 71 year old who presents for the following health issues   Encounter Diagnoses   Name Primary?     Spasmodic torticollis      Fibromyalgia      Restless leg syndrome      Chronic pain syndrome      Chronic, continuous use of opioids      NSTEMI (non-ST elevated myocardial infarction) (H)       Hypertension goal BP (blood pressure) < 140/90      Hyperlipidemia with target LDL less than 100      Primary insomnia      Screen for colon cancer      Visit for screening mammogram      Advanced directives, counseling/discussion Yes     Thrombocytopenia (H)      Screening for lung cancer           HPI   Chief Complaint   Patient presents with     Recheck Medication     5:57 PM   6:16 PM       Review of Systems   Constitutional, HEENT, cardiovascular, pulmonary, gi and gu systems are negative, except as otherwise noted.      Objective           Vitals:  No vitals were obtained today due to virtual visit.    Physical Exam   healthy, alert and no distress  PSYCH: Alert and oriented times 3; coherent speech, normal   rate and volume, able to articulate logical thoughts, able   to abstract reason, no tangential thoughts, no hallucinations   or delusions  Her affect is normal  RESP: No cough, no audible wheezing, able to talk in full sentences  Remainder of exam unable to be completed due to telephone visits          Phone call duration: 19  minutes

## 2022-04-18 DIAGNOSIS — M79.7 FIBROMYALGIA: ICD-10-CM

## 2022-04-18 DIAGNOSIS — F11.90 CHRONIC, CONTINUOUS USE OF OPIOIDS: ICD-10-CM

## 2022-04-18 DIAGNOSIS — G24.3 SPASMODIC TORTICOLLIS: ICD-10-CM

## 2022-04-18 DIAGNOSIS — G89.4 CHRONIC PAIN SYNDROME: ICD-10-CM

## 2022-04-18 RX ORDER — HYDROCODONE BITARTRATE AND ACETAMINOPHEN 7.5; 325 MG/1; MG/1
2 TABLET ORAL 3 TIMES DAILY
Qty: 180 TABLET | Refills: 0 | Status: SHIPPED | OUTPATIENT
Start: 2022-04-18 | End: 2022-05-23

## 2022-04-18 NOTE — TELEPHONE ENCOUNTER
Routing refill request for [place] to PCP. Patient will be out of medication on Friday; has upcoming appointment scheduled for 4/25.  RX pended if agreeable.    Then RN to call patient at [phone].    Jonathan Luna RN

## 2022-04-25 ENCOUNTER — VIRTUAL VISIT (OUTPATIENT)
Dept: INTERNAL MEDICINE | Facility: CLINIC | Age: 72
End: 2022-04-25
Payer: MEDICARE

## 2022-04-25 DIAGNOSIS — Z12.31 VISIT FOR SCREENING MAMMOGRAM: ICD-10-CM

## 2022-04-25 DIAGNOSIS — R09.81 NASAL CONGESTION: Primary | ICD-10-CM

## 2022-04-25 DIAGNOSIS — Z12.2 ENCOUNTER FOR SCREENING FOR LUNG CANCER: ICD-10-CM

## 2022-04-25 DIAGNOSIS — F11.90 CHRONIC, CONTINUOUS USE OF OPIOIDS: ICD-10-CM

## 2022-04-25 DIAGNOSIS — Z12.11 SCREEN FOR COLON CANCER: ICD-10-CM

## 2022-04-25 PROCEDURE — 99441 PR PHYSICIAN TELEPHONE EVALUATION 5-10 MIN: CPT | Performed by: INTERNAL MEDICINE

## 2022-04-25 NOTE — PROGRESS NOTES
Carina is a 72 year old who is being evaluated via a billable telephone visit.      What phone number would you like to be contacted at? [phone]  How would you like to obtain your AVS? Mail a copy    Assessment & Plan     Nasal congestion  Carina Calvert is a patient well known to me.. she's not due for the continuous chronic use of opioid pain medication chronic pain follow up. That isn't due for another month. I recommended she see me for a face to face encounter in one month. Her chief complaint today is that she has had 3 days of a noteworthy nasal congestion. It's making it hard to breathe at night. So now she has secondarily developed a dry mouth. A complete review of systems is otherwise unremarkable. I mainly recommended supportive measures but especially I am optimistic that using Afrin [ Oxymetazoline hydrochloride ]  At bedtime will provide temporizing measures to help her symptoms. I am asking for an RN status report with this patient on Thursday [ in 3 days ]. If there's any concerns I am here at that time and can respond to any speific nurse questions to me    Chronic, continuous use of opioids  Postponed one month     Encounter for screening for lung cancer  Will revisit issue in one month     Screen for colon cancer  She has declined preventative healthcare  Over and over     Visit for screening mammogram  As above      reports that she has been smoking cigarettes. She has a 3.00 pack-year smoking history. She has never used smokeless tobacco.      Return in about 1 month (around 5/25/2022).    Gerber Jain MD  [facility]    Demarcus Lim is a 72 year old who presents for the following health issues   Encounter Diagnoses   Name Primary?     Nasal congestion Yes     Chronic, continuous use of opioids      Encounter for screening for lung cancer      Screen for colon cancer      Visit for screening mammogram      accompanied by her daughter.    HPI   Chief  Complaint   Patient presents with     Recheck Medication     Pain medications     5:55 PM - 6:02 PM     Last appointment with me was in February 22 - too soon for a chronic pain follow up appointment   Has a bad cold   Can't breathe at night   Few days of symptoms   A review of other symptoms negative   A further review of systems for Coronavirus (COVID-19) is negative. Dry mouth symptoms , overall  3 days . Patient feels this is not Coronavirus (COVID-19) , no sore throat, just a dry mouth.  Recommended to try Afrin [ Oxymetazoline hydrochloride ]  .'  Status report , RN call on Thursday for a check-in [ in 3 days ]       Uninterested in preventative health measures / healthcare maintenance and I am not going to review the centimeters item by item  Health Maintenance Due   Topic Date Due     COVID-19 Vaccine (1) Never done     ZOSTER IMMUNIZATION (1 of 2) Never done     LUNG CANCER SCREENING  Never done     DTAP/TDAP/TD IMMUNIZATION (2 - Td or Tdap) 12/08/2014     MEDICARE ANNUAL WELLNESS VISIT  03/14/2017     Pneumococcal Vaccine: 65+ Years (1 of 1 - PPSV23) Never done     COLORECTAL CANCER SCREENING  05/10/2019     MAMMO SCREENING  02/15/2020     TRUNG ASSESSMENT  08/29/2020     DEXA  02/15/2021     INFLUENZA VACCINE (1) 09/01/2021     PHQ-2 (once per calendar year)  01/01/2022     FALL RISK ASSESSMENT  04/08/2022     PHQ-9  04/08/2022          Review of Systems   Constitutional, HEENT, cardiovascular, pulmonary, gi and gu systems are negative, except as otherwise noted.      Objective           Vitals:  No vitals were obtained today due to virtual visit.    Physical Exam   healthy, alert and no distress  PSYCH: Alert and oriented times 3; coherent speech, normal   rate and volume, able to articulate logical thoughts, able   to abstract reason, no tangential thoughts, no hallucinations   or delusions  Her affect is normal  RESP: No cough, no audible wheezing, able to talk in full sentences  Remainder of exam unable  to be completed due to telephone visits          Phone call duration: 7 minutes

## 2022-04-25 NOTE — PROGRESS NOTES
Last appointment with me was in February 22    Uninterested in preventative health measures / healthcare maintenance and I am not going to review the centimeters item by item  Health Maintenance Due   Topic Date Due     COVID-19 Vaccine (1) Never done     ZOSTER IMMUNIZATION (1 of 2) Never done     LUNG CANCER SCREENING  Never done     DTAP/TDAP/TD IMMUNIZATION (2 - Td or Tdap) 12/08/2014     MEDICARE ANNUAL WELLNESS VISIT  03/14/2017     Pneumococcal Vaccine: 65+ Years (1 of 1 - PPSV23) Never done     COLORECTAL CANCER SCREENING  05/10/2019     MAMMO SCREENING  02/15/2020     TRUNG ASSESSMENT  08/29/2020     DEXA  02/15/2021     INFLUENZA VACCINE (1) 09/01/2021     PHQ-2 (once per calendar year)  01/01/2022     FALL RISK ASSESSMENT  04/08/2022     PHQ-9  04/08/2022

## 2022-04-25 NOTE — Clinical Note
See office visit notes. Asking you to call patient and conduct a status report in 3 days and reroute to me if concerns.

## 2022-04-27 ENCOUNTER — TELEPHONE (OUTPATIENT)
Dept: FAMILY MEDICINE | Facility: CLINIC | Age: 72
End: 2022-04-27
Payer: MEDICARE

## 2022-04-27 NOTE — TELEPHONE ENCOUNTER
Pt had a virtual visit on 4/25/22. Please call pt on 4/28/22 for an update then route to Dr. Jain.    Gerber Jain MD  P [facility]z Rn Triage Pool  See office visit notes. Asking you to call patient and conduct a status report in 3 days and reroute to me if concerns.       Kerline PERRY RN, BSN  [facility]

## 2022-04-28 NOTE — TELEPHONE ENCOUNTER
Patient states that symptoms have pretty much resolved. She has no further questions or concerns at this time.     Patrica Godoy RN   [facility]

## 2022-04-29 DIAGNOSIS — M79.7 FIBROMYALGIA: ICD-10-CM

## 2022-04-29 DIAGNOSIS — G24.3 SPASMODIC TORTICOLLIS: ICD-10-CM

## 2022-04-29 DIAGNOSIS — G25.81 RESTLESS LEG SYNDROME: ICD-10-CM

## 2022-04-29 RX ORDER — GABAPENTIN 300 MG/1
CAPSULE ORAL
Qty: 450 CAPSULE | Refills: 1 | Status: SHIPPED | OUTPATIENT
Start: 2022-04-29 | End: 2022-10-18

## 2022-04-29 NOTE — TELEPHONE ENCOUNTER
Routing refill request to provider for review/approval because:  Drug not on the [facility] refill protocol     Requested Prescriptions   Pending Prescriptions Disp Refills    gabapentin (NEURONTIN) 300 MG capsule [Pharmacy Med Name: GABAPENTIN 300 MG CAPSULE] 450 capsule 1     Sig: TAKE 1 CAPSULE BY MOUTH IN THE MORNING, 1 AT NOON 1 AT 5PM AND 2 AT BEDTIME ( 5 PER DAY )        There is no refill protocol information for this order            Patrica Godoy RN   [facility]

## 2022-05-10 ENCOUNTER — OFFICE VISIT (OUTPATIENT)
Dept: INTERNAL MEDICINE | Facility: CLINIC | Age: 72
End: 2022-05-10
Payer: MEDICARE

## 2022-05-10 ENCOUNTER — TRANSFERRED RECORDS (OUTPATIENT)
Dept: HEALTH INFORMATION MANAGEMENT | Facility: CLINIC | Age: 72
End: 2022-05-10

## 2022-05-10 VITALS
HEIGHT: 64 IN | BODY MASS INDEX: 21.85 KG/M2 | HEART RATE: 54 BPM | DIASTOLIC BLOOD PRESSURE: 72 MMHG | OXYGEN SATURATION: 98 % | SYSTOLIC BLOOD PRESSURE: 108 MMHG | WEIGHT: 128 LBS

## 2022-05-10 DIAGNOSIS — M25.512 LEFT SHOULDER PAIN, UNSPECIFIED CHRONICITY: Primary | ICD-10-CM

## 2022-05-10 PROCEDURE — 99207 PR NO BILLABLE SERVICE THIS VISIT: CPT | Performed by: NURSE PRACTITIONER

## 2022-05-11 NOTE — PROGRESS NOTES
"Patient came to the clinic today with some nonspecific vague complaints of left shoulder pain described as a \"burning.\"  This pain has been present for the last 2 months and seems like it is getting worse.    Admits that she does not do much in the way of physical activity.  Says that the pain is quite severe.    Advised that there is orthopedic urgent care at [facility] on the corner [address]  If pain is severe and they are looking for orthopedic consult quickly, this is a viable option for the patient and her daughter.    Patient expressed interest in moving forward with orthopedic urgent care.  They are going to leave the clinic today and head toward the orthopedic urgent care clinic for consultation right now.    No charge visit  "

## 2022-05-23 DIAGNOSIS — F11.90 CHRONIC, CONTINUOUS USE OF OPIOIDS: ICD-10-CM

## 2022-05-23 DIAGNOSIS — G24.3 SPASMODIC TORTICOLLIS: ICD-10-CM

## 2022-05-23 DIAGNOSIS — G89.4 CHRONIC PAIN SYNDROME: ICD-10-CM

## 2022-05-23 DIAGNOSIS — M79.7 FIBROMYALGIA: ICD-10-CM

## 2022-05-23 RX ORDER — HYDROCODONE BITARTRATE AND ACETAMINOPHEN 7.5; 325 MG/1; MG/1
2 TABLET ORAL 3 TIMES DAILY
Qty: 180 TABLET | Refills: 0 | Status: SHIPPED | OUTPATIENT
Start: 2022-05-23 | End: 2022-05-25

## 2022-05-23 NOTE — TELEPHONE ENCOUNTER
Routing refill request to provider for review/approval because:  Drug not on the [facility] refill protocol     Elizabet Rose RN  [facility]

## 2022-05-24 ENCOUNTER — TELEPHONE (OUTPATIENT)
Dept: FAMILY MEDICINE | Facility: CLINIC | Age: 72
End: 2022-05-24
Payer: MEDICARE

## 2022-05-24 DIAGNOSIS — G89.4 CHRONIC PAIN SYNDROME: ICD-10-CM

## 2022-05-24 DIAGNOSIS — M79.7 FIBROMYALGIA: ICD-10-CM

## 2022-05-24 DIAGNOSIS — G24.3 SPASMODIC TORTICOLLIS: ICD-10-CM

## 2022-05-24 DIAGNOSIS — F11.90 CHRONIC, CONTINUOUS USE OF OPIOIDS: ICD-10-CM

## 2022-05-24 NOTE — TELEPHONE ENCOUNTER
Carina called yesterday to get a refill on her hydrocodone and it hasn't been done yet, she is calling today to check up on that request. Please call her back.    Reason for Call:  Other call back    Detailed comments: Carina called yesterday to get a refill on her hydrocodone and it hasn't been done yet, she is calling today to check up on that request. Please call her back    Phone Number Patient can be reached at: Home number on file [phone] (home)    Best Time: any    Can we leave a detailed message on this number? YES    Call taken on 5/24/2022 at 12:26 PM by Reyna Zee

## 2022-05-25 RX ORDER — HYDROCODONE BITARTRATE AND ACETAMINOPHEN 7.5; 325 MG/1; MG/1
2 TABLET ORAL 3 TIMES DAILY
Qty: 180 TABLET | Refills: 0 | Status: SHIPPED | OUTPATIENT
Start: 2022-05-25 | End: 2022-06-22

## 2022-05-25 NOTE — TELEPHONE ENCOUNTER
According to the Epic electronic medical records documentation this was already sent. I can send this twice now but concerns that there is a problem with the pharmacy     Reroute if additional input requested from me , otherwise you can close this encounter      Gerber Jain MD

## 2022-05-25 NOTE — TELEPHONE ENCOUNTER
CAlled pharmacy to confirm that refill is ready, left message to patient that pharmacy has medication is ready for her.       Yadira HARE CMA ([facility])

## 2022-05-25 NOTE — PROGRESS NOTES
Last appointment with me was a virtual office visit from 2-24-22  Health Maintenance Due   Topic Date Due     COVID-19 Vaccine (1) Never done     Pneumococcal Vaccine: 65+ Years (1 - PCV) Never done     ZOSTER IMMUNIZATION (1 of 2) Never done     LUNG CANCER SCREENING  Never done     DTAP/TDAP/TD IMMUNIZATION (2 - Td or Tdap) 12/08/2014     MEDICARE ANNUAL WELLNESS VISIT  03/14/2017     COLORECTAL CANCER SCREENING  05/10/2019     MAMMO SCREENING  02/15/2020     TRUNG ASSESSMENT  08/29/2020     DEXA  02/15/2021     PHQ-2 (once per calendar year)  01/01/2022     FALL RISK ASSESSMENT  04/08/2022     PHQ-9  04/08/2022    Rather then order Health Maintenance Care Gaps . I will review Healthcare maintenance section / Healthcare Gaps individually with patient / family and see what if anything they are interested to do. She has expressed no interest multiple times  Last laboratory studies were done 13 months ago so she is due today for things like fasting lipid panel and others, see orders section of this encounter   Office Visit on 11/16/2021   Component Date Value Ref Range Status     SARS CoV2 PCR 11/16/2021 Negative  Negative, Testing sent to reference lab. Results will be returned via unsolicited result Final    NEGATIVE: SARS-CoV-2 (COVID-19) RNA not detected, presumed negative.     Medication refills all good for now, opioid pain medication is monthly

## 2022-05-26 ENCOUNTER — OFFICE VISIT (OUTPATIENT)
Dept: INTERNAL MEDICINE | Facility: CLINIC | Age: 72
End: 2022-05-26
Payer: MEDICARE

## 2022-05-26 VITALS
RESPIRATION RATE: 14 BRPM | OXYGEN SATURATION: 96 % | BODY MASS INDEX: 23.34 KG/M2 | WEIGHT: 136 LBS | SYSTOLIC BLOOD PRESSURE: 136 MMHG | TEMPERATURE: 97.3 F | DIASTOLIC BLOOD PRESSURE: 64 MMHG | HEART RATE: 51 BPM

## 2022-05-26 DIAGNOSIS — E78.5 HYPERLIPIDEMIA WITH TARGET LDL LESS THAN 100: ICD-10-CM

## 2022-05-26 DIAGNOSIS — F11.90 CHRONIC, CONTINUOUS USE OF OPIOIDS: ICD-10-CM

## 2022-05-26 DIAGNOSIS — R20.2 ARM PARESTHESIA, LEFT: Primary | ICD-10-CM

## 2022-05-26 DIAGNOSIS — Z12.31 VISIT FOR SCREENING MAMMOGRAM: ICD-10-CM

## 2022-05-26 DIAGNOSIS — R60.0 BILATERAL LOWER EXTREMITY EDEMA: ICD-10-CM

## 2022-05-26 DIAGNOSIS — E55.9 HYPOVITAMINOSIS D: ICD-10-CM

## 2022-05-26 DIAGNOSIS — Z12.11 SCREEN FOR COLON CANCER: ICD-10-CM

## 2022-05-26 DIAGNOSIS — D69.6 THROMBOCYTOPENIA (H): ICD-10-CM

## 2022-05-26 DIAGNOSIS — I10 HYPERTENSION GOAL BP (BLOOD PRESSURE) < 140/90: ICD-10-CM

## 2022-05-26 DIAGNOSIS — Z87.891 EX-SMOKER FOR MORE THAN 1 YEAR: ICD-10-CM

## 2022-05-26 DIAGNOSIS — E05.90 SUBCLINICAL HYPERTHYROIDISM: ICD-10-CM

## 2022-05-26 LAB
ANION GAP SERPL CALCULATED.3IONS-SCNC: 1 MMOL/L (ref 3–14)
BASOPHILS # BLD AUTO: 0 10E3/UL (ref 0–0.2)
BASOPHILS NFR BLD AUTO: 1 %
BUN SERPL-MCNC: 14 MG/DL (ref 7–30)
CALCIUM SERPL-MCNC: 8.7 MG/DL (ref 8.5–10.1)
CHLORIDE BLD-SCNC: 108 MMOL/L (ref 94–109)
CHOLEST SERPL-MCNC: 96 MG/DL
CO2 SERPL-SCNC: 34 MMOL/L (ref 20–32)
CREAT SERPL-MCNC: 0.79 MG/DL (ref 0.52–1.04)
EOSINOPHIL # BLD AUTO: 0.2 10E3/UL (ref 0–0.7)
EOSINOPHIL NFR BLD AUTO: 2 %
ERYTHROCYTE [DISTWIDTH] IN BLOOD BY AUTOMATED COUNT: 13.8 % (ref 10–15)
FASTING STATUS PATIENT QL REPORTED: NO
GFR SERPL CREATININE-BSD FRML MDRD: 80 ML/MIN/1.73M2
GLUCOSE BLD-MCNC: 90 MG/DL (ref 70–99)
HCT VFR BLD AUTO: 39.9 % (ref 35–47)
HDLC SERPL-MCNC: 43 MG/DL
HGB BLD-MCNC: 12.1 G/DL (ref 11.7–15.7)
IMM GRANULOCYTES # BLD: 0 10E3/UL
IMM GRANULOCYTES NFR BLD: 0 %
LDLC SERPL CALC-MCNC: 39 MG/DL
LYMPHOCYTES # BLD AUTO: 2.2 10E3/UL (ref 0.8–5.3)
LYMPHOCYTES NFR BLD AUTO: 28 %
MCH RBC QN AUTO: 30.3 PG (ref 26.5–33)
MCHC RBC AUTO-ENTMCNC: 30.3 G/DL (ref 31.5–36.5)
MCV RBC AUTO: 100 FL (ref 78–100)
MONOCYTES # BLD AUTO: 0.9 10E3/UL (ref 0–1.3)
MONOCYTES NFR BLD AUTO: 11 %
NEUTROPHILS # BLD AUTO: 4.6 10E3/UL (ref 1.6–8.3)
NEUTROPHILS NFR BLD AUTO: 58 %
NONHDLC SERPL-MCNC: 53 MG/DL
NRBC # BLD AUTO: 0 10E3/UL
NRBC BLD AUTO-RTO: 0 /100
PLATELET # BLD AUTO: 98 10E3/UL (ref 150–450)
POTASSIUM BLD-SCNC: 4.4 MMOL/L (ref 3.4–5.3)
RBC # BLD AUTO: 3.99 10E6/UL (ref 3.8–5.2)
SODIUM SERPL-SCNC: 143 MMOL/L (ref 133–144)
TRIGL SERPL-MCNC: 72 MG/DL
TSH SERPL DL<=0.005 MIU/L-ACNC: 1.2 MU/L (ref 0.4–4)
WBC # BLD AUTO: 7.8 10E3/UL (ref 4–11)

## 2022-05-26 PROCEDURE — 36415 COLL VENOUS BLD VENIPUNCTURE: CPT | Performed by: INTERNAL MEDICINE

## 2022-05-26 PROCEDURE — 84443 ASSAY THYROID STIM HORMONE: CPT | Performed by: INTERNAL MEDICINE

## 2022-05-26 PROCEDURE — 99214 OFFICE O/P EST MOD 30 MIN: CPT | Performed by: INTERNAL MEDICINE

## 2022-05-26 PROCEDURE — 80048 BASIC METABOLIC PNL TOTAL CA: CPT | Performed by: INTERNAL MEDICINE

## 2022-05-26 PROCEDURE — 85025 COMPLETE CBC W/AUTO DIFF WBC: CPT | Performed by: INTERNAL MEDICINE

## 2022-05-26 PROCEDURE — 82306 VITAMIN D 25 HYDROXY: CPT | Performed by: INTERNAL MEDICINE

## 2022-05-26 PROCEDURE — 80061 LIPID PANEL: CPT | Performed by: INTERNAL MEDICINE

## 2022-05-26 ASSESSMENT — ANXIETY QUESTIONNAIRES
2. NOT BEING ABLE TO STOP OR CONTROL WORRYING: NOT AT ALL
GAD7 TOTAL SCORE: 1
7. FEELING AFRAID AS IF SOMETHING AWFUL MIGHT HAPPEN: NOT AT ALL
5. BEING SO RESTLESS THAT IT IS HARD TO SIT STILL: NOT AT ALL
1. FEELING NERVOUS, ANXIOUS, OR ON EDGE: SEVERAL DAYS
6. BECOMING EASILY ANNOYED OR IRRITABLE: NOT AT ALL
IF YOU CHECKED OFF ANY PROBLEMS ON THIS QUESTIONNAIRE, HOW DIFFICULT HAVE THESE PROBLEMS MADE IT FOR YOU TO DO YOUR WORK, TAKE CARE OF THINGS AT HOME, OR GET ALONG WITH OTHER PEOPLE: NOT DIFFICULT AT ALL
GAD7 TOTAL SCORE: 1
3. WORRYING TOO MUCH ABOUT DIFFERENT THINGS: NOT AT ALL

## 2022-05-26 ASSESSMENT — PATIENT HEALTH QUESTIONNAIRE - PHQ9
SUM OF ALL RESPONSES TO PHQ QUESTIONS 1-9: 2
5. POOR APPETITE OR OVEREATING: NOT AT ALL

## 2022-05-26 NOTE — PROGRESS NOTES
Assessment & Plan     Arm paresthesia, left  I got into a discussed with this patient about symptoms of left arm paresthesia , no generalized weakness or evidence definitely of cervical radiculopathy although this diagnosis is certainly being entertained . I am catching patient right in the middle of her evaluation and workup with [facility] and so I did not need to redo everything. Patient was here today with her eldest daughter and we agreed that eventually if they feel they aren't making progress I would consider referral of patient for a second opinion with neurosurgery     Bilateral lower extremity edema  Over last few weeks patient has noted some increased generalized puffiness . She has gained about 8 pounds and for this woman who is slender and normal to slight weight [ Body mass index is 23.34 kg/m . ]  This increased weight was noted with some increased lower extremity edema although this is mild to moderate. Patient completely denies any paroxsysmal nocturnal dyspnea or orthopnea or chest tightness or chest heaviness symptoms. It would appear she has greatly dropped off with her level of physical activity. Her lungs are clear, further follow up evaluation we agree should be pending review of today's laboratory results    Wt Readings from Last 5 Encounters:   05/26/22 61.7 kg (136 lb)   05/10/22 58.1 kg (128 lb)   11/16/21 58.1 kg (128 lb)   10/21/21 57.8 kg (127 lb 6.4 oz)   09/01/21 57.2 kg (126 lb)         Hyperlipidemia with target LDL less than 100  Due for recheck, problem is stable and ongoing monitoring    - Lipid panel reflex to direct LDL Non-fasting    Ex-smoker for more than 1 year  Noted as a point of historical importance , I asked the questions about annual low dose CT for scanning high risk pt for lung cancer and she does not meet criteria    Subclinical hyperthyroidism  Due for recheck   - TSH with free T4 reflex    Hypertension goal BP (blood pressure) < 140/90  Controlled  within acceptable limits   - Basic metabolic panel  (Ca, Cl, CO2, Creat, Gluc, K, Na, BUN)  - CBC with platelets and differential    Hypovitaminosis D  Due for recheck   - Vitamin D Deficiency    Thrombocytopenia (H)  Due for recheck, problem is stable and ongoing monitoring    - Basic metabolic panel  (Ca, Cl, CO2, Creat, Gluc, K, Na, BUN)  - CBC with platelets and differential    Chronic, continuous use of opioids  Refills provided   - Basic metabolic panel  (Ca, Cl, CO2, Creat, Gluc, K, Na, BUN)  - CBC with platelets and differential    Screen for colon cancer  Declines     Visit for screening mammogram  Declines       Review of the result(s) of each unique test - todays tests  Prescription drug management  32 minutes spent on the date of the encounter doing chart review, history and exam, documentation and further activities per the note        Return in about 3 months (around 8/26/2022).    Greber Jain MD  [facility] NEHEMIAS Lim is a 72 year old who presents for the following health issues   Encounter Diagnoses   Name Primary?     Arm paresthesia, left Yes     Bilateral lower extremity edema      Hyperlipidemia with target LDL less than 100      Ex-smoker for more than 1 year      Subclinical hyperthyroidism      Hypertension goal BP (blood pressure) < 140/90      Hypovitaminosis D      Thrombocytopenia (H)      Chronic, continuous use of opioids      Screen for colon cancer      Visit for screening mammogram       accompanied by her daughter.    HPI   Chief Complaint   Patient presents with     Recheck Medication     Shoulder Pain     Shoulder pain follow up from [facility], taking methelprednisone, and diclofenac, swelling increasing     Shoulder paresthesia / burning symptoms , worsened with activity , symptoms have worsened   Approximately 3 months ago it started. Her symptoms are gone with complete rest. It is movement that gets it going. The pain disappears by  about 5 minutes of rest.    Last appointment with me was a virtual office visit from 2-24-22  Health Maintenance Due   Topic Date Due     COVID-19 Vaccine (1) Never done     Pneumococcal Vaccine: 65+ Years (1 - PCV) Never done     ZOSTER IMMUNIZATION (1 of 2) Never done     LUNG CANCER SCREENING  Never done     DTAP/TDAP/TD IMMUNIZATION (2 - Td or Tdap) 12/08/2014     MEDICARE ANNUAL WELLNESS VISIT  03/14/2017     COLORECTAL CANCER SCREENING  05/10/2019     MAMMO SCREENING  02/15/2020     TRUNG ASSESSMENT  08/29/2020     DEXA  02/15/2021     PHQ-2 (once per calendar year)  01/01/2022     FALL RISK ASSESSMENT  04/08/2022     PHQ-9  04/08/2022    Rather then order Health Maintenance Care Gaps . I will review Healthcare maintenance section / Healthcare Gaps individually with patient / family and see what if anything they are interested to do. She has expressed no interest multiple times  Last laboratory studies were done 13 months ago so she is due today for things like fasting lipid panel and others, see orders section of this encounter   Office Visit on 11/16/2021   Component Date Value Ref Range Status     SARS CoV2 PCR 11/16/2021 Negative  Negative, Testing sent to reference lab. Results will be returned via unsolicited result Final    NEGATIVE: SARS-CoV-2 (COVID-19) RNA not detected, presumed negative.     Medication refills all good for now, opioid pain medication is monthly     Wt Readings from Last 5 Encounters:   05/26/22 61.7 kg (136 lb)   05/10/22 58.1 kg (128 lb)   11/16/21 58.1 kg (128 lb)   10/21/21 57.8 kg (127 lb 6.4 oz)   09/01/21 57.2 kg (126 lb)       Review of Systems   Constitutional, HEENT, cardiovascular, pulmonary, gi and gu systems are negative, except as otherwise noted.      Objective    /64   Pulse 51   Temp 97.3  F (36.3  C) (Tympanic)   Resp 14   Wt 61.7 kg (136 lb)   SpO2 96%   BMI 23.34 kg/m    Body mass index is 23.34 kg/m .  Physical Exam   GENERAL: healthy, alert and no  distress  RESP: lungs clear to auscultation - no rales, rhonchi or wheezes  CV: regular rate and rhythm, normal S1 S2, no S3 or S4, no murmur, click or rub, no peripheral edema and peripheral pulses strong  ABDOMEN: soft, nontender, no hepatosplenomegaly, no masses and bowel sounds normal  MS: no gross musculoskeletal defects noted, mild lowgrade bilateral lower extremity edema     Orders Placed This Encounter   Procedures     Lipid panel reflex to direct LDL Non-fasting     Basic metabolic panel  (Ca, Cl, CO2, Creat, Gluc, K, Na, BUN)     Vitamin D Deficiency     TSH with free T4 reflex     CBC with platelets and differential     CBC with platelets and differential

## 2022-05-26 NOTE — LETTER
May 31, 2022      Carina Calvert  [address]   [place] [address]        Dear Carina:    We are writing to inform you of your test results.    Persistent thrombocytopenia.  Follow up per Dr. Jain's recommendations.     Component      Latest Ref Rng & Units 5/26/2022   WBC      4.0 - 11.0 10e3/uL 7.8   RBC Count      3.80 - 5.20 10e6/uL 3.99   Hemoglobin      11.7 - 15.7 g/dL 12.1   Hematocrit      35.0 - 47.0 % 39.9   MCV      78 - 100 fL 100   MCH      26.5 - 33.0 pg 30.3   MCHC      31.5 - 36.5 g/dL 30.3 (L)   RDW      10.0 - 15.0 % 13.8   Platelet Count      150 - 450 10e3/uL 98 (L)   % Neutrophils      % 58   % Lymphocytes      % 28   % Monocytes      % 11   % Eosinophils      % 2   % Basophils      % 1   % Immature Granulocytes      % 0   NRBCs per 100 WBC      <1 /100 0   Absolute Neutrophils      1.6 - 8.3 10e3/uL 4.6   Absolute Lymphocytes      0.8 - 5.3 10e3/uL 2.2   Absolute Monocytes      0.0 - 1.3 10e3/uL 0.9   Absolute Eosinophils      0.0 - 0.7 10e3/uL 0.2   Absolute Basophils      0.0 - 0.2 10e3/uL 0.0   Absolute Immature Granulocytes      <=0.4 10e3/uL 0.0   Absolute NRBCs      10e3/uL 0.0   Sodium      133 - 144 mmol/L 143   Potassium      3.4 - 5.3 mmol/L 4.4   Chloride      94 - 109 mmol/L 108   Carbon Dioxide      20 - 32 mmol/L 34 (H)   Anion Gap      3 - 14 mmol/L 1 (L)   Urea Nitrogen      7 - 30 mg/dL 14   Creatinine      0.52 - 1.04 mg/dL 0.79   Calcium      8.5 - 10.1 mg/dL 8.7   Glucose      70 - 99 mg/dL 90   GFR Estimate      >60 mL/min/1.73m2 80   Cholesterol      <200 mg/dL 96   Triglycerides      <150 mg/dL 72   HDL Cholesterol      >=50 mg/dL 43 (L)   LDL Cholesterol Calculated      <=100 mg/dL 39   Non HDL Cholesterol      <130 mg/dL 53   Patient Fasting > 8hrs?       No   Vitamin D Deficiency screening      20 - 75 ug/L 57   TSH      0.40 - 4.00 mU/L 1.20     If you have any questions or concerns, please call the clinic at the number listed above.      Sincerely,      Vishal Chavez PA-C for  Gerber Jain MD/patt

## 2022-05-27 LAB — DEPRECATED CALCIDIOL+CALCIFEROL SERPL-MC: 57 UG/L (ref 20–75)

## 2022-06-22 ENCOUNTER — TELEPHONE (OUTPATIENT)
Dept: FAMILY MEDICINE | Facility: CLINIC | Age: 72
End: 2022-06-22

## 2022-06-22 DIAGNOSIS — G24.3 SPASMODIC TORTICOLLIS: ICD-10-CM

## 2022-06-22 DIAGNOSIS — M79.7 FIBROMYALGIA: ICD-10-CM

## 2022-06-22 DIAGNOSIS — F11.90 CHRONIC, CONTINUOUS USE OF OPIOIDS: ICD-10-CM

## 2022-06-22 DIAGNOSIS — G89.4 CHRONIC PAIN SYNDROME: ICD-10-CM

## 2022-06-22 RX ORDER — HYDROCODONE BITARTRATE AND ACETAMINOPHEN 7.5; 325 MG/1; MG/1
2 TABLET ORAL 3 TIMES DAILY
Qty: 180 TABLET | Refills: 0 | Status: SHIPPED | OUTPATIENT
Start: 2022-06-22 | End: 2022-07-22

## 2022-06-22 NOTE — TELEPHONE ENCOUNTER
Reason for Call:  Medication or medication refill:    Do you use a [facility]?  Name of the pharmacy and phone number for the current request:  [facility] [id number] [place] [address]    Name of the medication requested: HYDROcodone    Other request: pt has a graduation party on Sat. Would like to make sure she has refill before the weekend    Can we leave a detailed message on this number? YES    Phone number patient can be reached at: Home number on file [phone] (home)    Best Time:     Call taken on 6/22/2022 at 8:04 AM by Fide Guillermo

## 2022-06-23 NOTE — TELEPHONE ENCOUNTER
I am assuming this is a refill request regarding HYDROcodone (VICODIN) . She takes several other prescriptions also. I am sending the HYDROcodone (VICODIN) , controlled substance E-prescribed via [employer]. Reroute if additional input requested from me otherwise you can close this encounter      Gerber Jain MD

## 2022-06-28 ENCOUNTER — TRANSFERRED RECORDS (OUTPATIENT)
Dept: HEALTH INFORMATION MANAGEMENT | Facility: CLINIC | Age: 72
End: 2022-06-28

## 2022-06-30 ENCOUNTER — TRANSFERRED RECORDS (OUTPATIENT)
Dept: HEALTH INFORMATION MANAGEMENT | Facility: CLINIC | Age: 72
End: 2022-06-30

## 2022-06-30 LAB — PHQ9 SCORE: 8

## 2022-07-20 ENCOUNTER — HOSPITAL ENCOUNTER (EMERGENCY)
Facility: CLINIC | Age: 72
Discharge: HOME | End: 2022-07-20
Attending: EMERGENCY MEDICINE | Admitting: EMERGENCY MEDICINE
Payer: MEDICARE

## 2022-07-20 ENCOUNTER — NURSE TRIAGE (OUTPATIENT)
Dept: FAMILY MEDICINE | Facility: CLINIC | Age: 72
End: 2022-07-20

## 2022-07-20 ENCOUNTER — APPOINTMENT (OUTPATIENT)
Dept: RADIOLOGY | Facility: CLINIC | Age: 72
End: 2022-07-20
Attending: EMERGENCY MEDICINE
Payer: MEDICARE

## 2022-07-20 VITALS
TEMPERATURE: 97.1 F | DIASTOLIC BLOOD PRESSURE: 61 MMHG | SYSTOLIC BLOOD PRESSURE: 130 MMHG | RESPIRATION RATE: 24 BRPM | OXYGEN SATURATION: 94 % | WEIGHT: 125 LBS | BODY MASS INDEX: 21.46 KG/M2 | HEART RATE: 48 BPM

## 2022-07-20 DIAGNOSIS — I10 HYPERTENSION, UNSPECIFIED TYPE: ICD-10-CM

## 2022-07-20 LAB
ANION GAP SERPL CALCULATED.3IONS-SCNC: 6 MMOL/L (ref 5–18)
ATRIAL RATE - MUSE: 56 BPM
BASOPHILS # BLD AUTO: 0 10E3/UL (ref 0–0.2)
BASOPHILS NFR BLD AUTO: 0 %
BUN SERPL-MCNC: 16 MG/DL (ref 8–28)
CALCIUM SERPL-MCNC: 9 MG/DL (ref 8.5–10.5)
CHLORIDE BLD-SCNC: 106 MMOL/L (ref 98–107)
CO2 SERPL-SCNC: 28 MMOL/L (ref 22–31)
CREAT SERPL-MCNC: 0.8 MG/DL (ref 0.6–1.1)
DIASTOLIC BLOOD PRESSURE - MUSE: NORMAL MMHG
EOSINOPHIL # BLD AUTO: 0.1 10E3/UL (ref 0–0.7)
EOSINOPHIL NFR BLD AUTO: 1 %
ERYTHROCYTE [DISTWIDTH] IN BLOOD BY AUTOMATED COUNT: 13.9 % (ref 10–15)
GFR SERPL CREATININE-BSD FRML MDRD: 79 ML/MIN/1.73M2
GLUCOSE BLD-MCNC: 125 MG/DL (ref 70–125)
HCT VFR BLD AUTO: 37.1 % (ref 35–47)
HGB BLD-MCNC: 11.9 G/DL (ref 11.7–15.7)
IMM GRANULOCYTES # BLD: 0 10E3/UL
IMM GRANULOCYTES NFR BLD: 0 %
INTERPRETATION ECG - MUSE: NORMAL
LYMPHOCYTES # BLD AUTO: 2.8 10E3/UL (ref 0.8–5.3)
LYMPHOCYTES NFR BLD AUTO: 60 %
MCH RBC QN AUTO: 30.1 PG (ref 26.5–33)
MCHC RBC AUTO-ENTMCNC: 32.1 G/DL (ref 31.5–36.5)
MCV RBC AUTO: 94 FL (ref 78–100)
MONOCYTES # BLD AUTO: 0.4 10E3/UL (ref 0–1.3)
MONOCYTES NFR BLD AUTO: 8 %
NEUTROPHILS # BLD AUTO: 1.5 10E3/UL (ref 1.6–8.3)
NEUTROPHILS NFR BLD AUTO: 31 %
NRBC # BLD AUTO: 0 10E3/UL
NRBC BLD AUTO-RTO: 0 /100
P AXIS - MUSE: 57 DEGREES
PLATELET # BLD AUTO: 106 10E3/UL (ref 150–450)
POTASSIUM BLD-SCNC: 4.2 MMOL/L (ref 3.5–5)
PR INTERVAL - MUSE: 160 MS
QRS DURATION - MUSE: 80 MS
QT - MUSE: 450 MS
QTC - MUSE: 434 MS
R AXIS - MUSE: 48 DEGREES
RBC # BLD AUTO: 3.96 10E6/UL (ref 3.8–5.2)
SODIUM SERPL-SCNC: 140 MMOL/L (ref 136–145)
SYSTOLIC BLOOD PRESSURE - MUSE: NORMAL MMHG
T AXIS - MUSE: 41 DEGREES
TROPONIN I SERPL-MCNC: <0.01 NG/ML (ref 0–0.29)
TROPONIN I SERPL-MCNC: <0.01 NG/ML (ref 0–0.29)
VENTRICULAR RATE- MUSE: 56 BPM
WBC # BLD AUTO: 4.7 10E3/UL (ref 4–11)

## 2022-07-20 PROCEDURE — 82310 ASSAY OF CALCIUM: CPT | Performed by: EMERGENCY MEDICINE

## 2022-07-20 PROCEDURE — 71046 X-RAY EXAM CHEST 2 VIEWS: CPT

## 2022-07-20 PROCEDURE — 85025 COMPLETE CBC W/AUTO DIFF WBC: CPT | Performed by: EMERGENCY MEDICINE

## 2022-07-20 PROCEDURE — 93005 ELECTROCARDIOGRAM TRACING: CPT | Performed by: EMERGENCY MEDICINE

## 2022-07-20 PROCEDURE — 99285 EMERGENCY DEPT VISIT HI MDM: CPT | Mod: 25

## 2022-07-20 PROCEDURE — 36415 COLL VENOUS BLD VENIPUNCTURE: CPT | Performed by: EMERGENCY MEDICINE

## 2022-07-20 PROCEDURE — 84484 ASSAY OF TROPONIN QUANT: CPT | Performed by: EMERGENCY MEDICINE

## 2022-07-20 RX ORDER — IBUPROFEN 200 MG
200 TABLET ORAL 3 TIMES DAILY
COMMUNITY
End: 2023-06-28

## 2022-07-20 NOTE — ED TRIAGE NOTES
Patient is here with mid chest pain that started on and off for three days. She also complains of lower leg swelling. BP at home was 202/104. She does take metoprolol 50 mg in the morning.  Hx of MI in 2015. She does complain of a headache. She does have chronic left shoulder pain that she was suppose to have an injection for.

## 2022-07-20 NOTE — TELEPHONE ENCOUNTER
"Patient's daughter (Isra) called the clinic. No consent to communicate on file.    She reports that patient called and spoke with the Triage RN earlier this morning in regards to high blood pressure readings of 202/104.    She states that patient's blood pressure reading as of 10.30am is down to 134/65.  She reports that patient's pain medication has \"kicked in\" and thus lowered her pain and blood pressure.    Patient's daughter is intending to take patient to Urgent care as recommended sometime this evening and will provide BP readings taken at home X 7 days.    Assisted with scheduling a follow-up appointment with PCP on 7/28/22.    Patient was advised to call the clinic [phone] or seek medical assistance at the nearest ER or UC, if the symptoms persist or worsen before scheduled appointment.    Patient's daughter (Isra) verbalized understanding and has no further questions or concerns at this time.      "

## 2022-07-20 NOTE — ED PROVIDER NOTES
EMERGENCY DEPARTMENT ENCOUNTER      NAME: Carina Calvert  AGE: 72 year old female  YOB: 1950  MRN: [id number]  EVALUATION DATE & TIME: 2022  5:52 PM    PCP: Gerber Jain    ED PROVIDER: Boubacar Snyder D.O.      Chief Complaint   Patient presents with     Chest Pain     Hypertension       FINAL IMPRESSION:  1. Hypertension, unspecified type        ED COURSE & MEDICAL DECISION MAKIN:39 PM I met with the patient to gather history and to perform my initial exam. I discussed the plan for care while in the Emergency Department.    ED Course as of 22 Patient reports that she is chest pain free without intervention       Pertinent Labs & Imaging studies reviewed. (See chart for details)  72 year old female presents to the Emergency Department for evaluation of hypertension.  Patient was noted to have high blood pressures in the 200s, though this did improve without intervention while in the emergency department.  She did complain of some chest pain symptoms, which are nonexertional without any associated symptoms such as lightheadedness or shortness of breath.  EKG did not show any evidence of ischemia arrhythmia, 2 troponins 3 hours apart were both negative.  Her chest pain also resolved in the emergency department without intervention.  At this time I do not believe this chest pain likely represents ACS, or hypertensive emergency, however based on her previous medical history I do believe follow-up with cardiology would be prudent for her.  Therefore I have referred her for follow-up with [facility].  There is no additional evidence of hypertensive emergency as she had a normal neuro exam, no significant headache, and renal function was within normal limits.  Therefore at this time I do not believe further intervention is indicated and believe she can be safely discharged home with outpatient follow-up with primary care and cardiology.  Return precautions  "discussed.    At the conclusion of the encounter I discussed the results of all of the tests and the disposition. The questions were answered. The patient or family acknowledged understanding and was agreeable with the care plan.        HPI    Patient information was obtained from: patient      Use of : N/A      Carina Calvert is a 72 year old female with pertinent medical history of NSTEMI, HTN, HLD, s/p coronary artery stent placement, migraines, cervical DDD,  osteoporosis, fibromyalgia, chronic pain, chronic opioid use, who presents for evaluation of chest pain.    Patient reports she went to [facility] on 7/13 (7 days ago) for injection for increased neck pain caused by bulging disc. While there her blood pressure was elevated, 188 systolic. They attempted relaxation techniques without improvement, so procedure was rescheduled.  They told her to monitor blood pressure at home. For the past 3 days patient has noticed increased left sided chest pain. She experiences this pain intermittently, but now more severe and \"deep.\" Pain is sharp. Unable to identify provoking factors. In the past she has taken Vicodin and Tylenol with relief, but lately this has not provided improvement. Most recently dose of pain medications at  5pm (1 hour ago).  Here in ED, patient does not have significant chest pain. Rates most severe pain 9/10.  Pain is dissimilar to pain associated with NSTEMI in 2015. At that time she had 3 stents placed. No leg swelling. Endorses some lower leg pain and left arm pain which has been ongoing intermittently for a while. She attributes leg pain to neuropathy. No personal or family history of VTEs. No recent long car/airplane travel. No current hormone therapy. No recent trauma.     Patient also endorses a headache consistent with her chronic migraines with associated photophobia. She wakes up daily at 4am with migraine. Patient has chronic pain which she suffers from daily. She " has noticed that dilated pupils accompany severe pain, which improves after pain medications. No recent illness. Denies nausea, diaphoresis, dizziness, shortness of breath, palpitations, cough.     REVIEW OF SYSTEMS  Constitutional:  Denies diaphoresis, fever, chills, weight loss or weakness  Eyes:  Positive for photophobia.  No pain, discharge, redness  HENT:  Denies sore throat, ear pain, congestion  Respiratory: No SOB, wheeze or cough  Cardiovascular:  Positive for chest pain. No palpitations, leg swelling  GI:  Denies abdominal pain, nausea, vomiting, diarrhea  : Denies dysuria, hematuria  Musculoskeletal:  Positive for leg pain, left arm pain, neck pain (chronic).  Denies any new muscle/joint pain, swelling or loss of function.  Skin:  Denies rash, pallor  Neurologic:  Positive for headache. Denies dizziness, focal weakness or sensory changes  Lymph: Denies swollen nodes    All other systems negative unless noted in HPI.    PAST MEDICAL HISTORY:  Past Medical History:   Diagnosis Date     Chronic constipation      Chronic sciatica     Pain clinic     DDD (degenerative disc disease), cervical      Fibromyalgia      History of hepatitis C      Hypertension goal BP (blood pressure) < 140/90      Kidney stones      Migraine headaches      Neuropathy      Osteopenia     boniva infusions w Dr Rayo at [facility]     PUD (peptic ulcer disease)     h/o bleeding ulcer     Recurrent cold sores      Restless leg syndrome      Spasmodic torticollis      Subclinical hyperthyroidism     NONTOXIC MULTINODULAR GOITER      Vertigo      Vitamin B12 deficiency      Vitamin D deficiency        PAST SURGICAL HISTORY:  Past Surgical History:   Procedure Laterality Date     HERNIA REPAIR  9/2009    ventral     LITHOTRIPSY      2 x      TONSILLECTOMY       TUBAL LIGATION           CURRENT MEDICATIONS:    No current facility-administered medications for this encounter.     Current Outpatient Medications   Medication     aspirin 81 MG  tablet     baclofen (LIORESAL) 10 MG tablet     Cholecalciferol (VITAMIN D) 2000 UNITS tablet     gabapentin (NEURONTIN) 300 MG capsule     HYDROcodone-acetaminophen (NORCO) 7.5-325 MG per tablet     ibuprofen (ADVIL/MOTRIN) 200 MG tablet     lactobacillus rhamnosus, GG, (CULTURELL) capsule     Melatonin 10 MG CAPS     metoprolol tartrate (LOPRESSOR) 50 MG tablet     nitroGLYcerin (NITROSTAT) 0.4 MG sublingual tablet     polyethylene glycol (MIRALAX/GLYCOLAX) powder     rosuvastatin (CRESTOR) 10 MG tablet     traZODone (DESYREL) 100 MG tablet     VITAMIN B-12 1000 MCG OR TABS         ALLERGIES:  Allergies   Allergen Reactions     [employer] Anaphylaxis and Hives     Unable to breathe     Diatrizoate Hives     Hypaque     Droperidol      Ivp Dye [Contrast Dye]        FAMILY HISTORY:  Family History   Problem Relation Age of Onset     Neurologic Disorder Mother 65        Parkinsons     Alcohol/Drug Mother      Cerebrovascular Disease Mother      Respiratory Father         COPD     Alcohol/Drug Father      Cancer Brother      Diabetes Sister 52     Cancer Maternal Aunt      Cancer Maternal Uncle      C.A.D. No family hx of        SOCIAL HISTORY:  Social History     Socioeconomic History     Marital status:      Number of children: 4     Years of education: 11   Occupational History     Occupation: [employer]     Employer: [employer],[address] [place] [address]   Tobacco Use     Smoking status: Current Some Day Smoker     Packs/day: 0.20     Years: 15.00     Pack years: 3.00     Types: Cigarettes     Smokeless tobacco: Never Used     Tobacco comment: 4 cigs a day, just lost    Substance and Sexual Activity     Alcohol use: No     Drug use: No     Sexual activity: Never     Partners: Male     Birth control/protection: Surgical, Post-menopausal   Other Topics Concern      Service No     Blood Transfusions No     Caffeine Concern No     Occupational Exposure Yes     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     Hobby Hazards No     Sleep Concern Yes     Comment: On medication     Stress Concern No     Weight Concern Yes     Comment: would like to gain weight     Special Diet No     Back Care No     Exercise No     Seat Belt Yes     Self-Exams Yes       VITALS:  Patient Vitals for the past 24 hrs:   BP Temp Temp src Pulse Resp SpO2 Weight   07/20/22 2034 -- -- -- (!) 48 -- -- --   07/20/22 2030 130/61 -- -- (!) 49 -- -- --   07/20/22 2004 -- -- -- (!) 49 -- -- --   07/20/22 2000 (!) 146/68 -- -- -- -- -- --   07/20/22 1954 -- -- -- 50 -- -- --   07/20/22 1933 (!) 158/69 -- -- 52 -- -- --   07/20/22 1930 -- -- -- 53 -- 94 % --   07/20/22 1825 (!) 142/90 97.1  F (36.2  C) Oral 55 24 97 % --   07/20/22 1750 (!) 148/77 97.8  F (36.6  C) Oral 56 16 96 % 56.7 kg (125 lb)       PHYSICAL EXAM    VITAL SIGNS: /61   Pulse (!) 48   Temp 97.1  F (36.2  C) (Oral)   Resp 24   Wt 56.7 kg (125 lb)   SpO2 94%   BMI 21.46 kg/m      General Appearance: Well-appearing, well-nourished, no acute distress   Head:  Normocephalic, without obvious abnormality, atraumatic  Eyes:  PERRL, conjunctiva/corneas clear, EOM's intact,  ENT:  Lips, mucosa, and tongue normal, membranes are moist without pallor  Neck:  Normal ROM, symmetrical, trachea midline    Cardio:  Regular rate and rhythm, no murmur, rub or gallop, 2+ pulses symmetric in all extremities  Pulm:  Clear to auscultation bilaterally, respirations unlabored,  Abdomen:  Soft, non-tender, no rebound or guarding.  Musculoskeletal: Full ROM, no edema, no cyanosis, good ROM of major joints  Integument:  Warm, Dry, No erythema, No rash.    Neurologic:  Alert & oriented.  No focal deficits appreciated.  Ambulatory.  Psychiatric:  Affect normal, Judgment normal, Mood normal.      LABS  Results for orders placed or performed during the hospital encounter of 07/20/22 (from the past 24 hour(s))   ECG 12-LEAD WITH MUSE (E)   Result Value Ref Range     Systolic Blood Pressure  mmHg    Diastolic Blood Pressure  mmHg    Ventricular Rate 56 BPM    Atrial Rate 56 BPM    OH Interval 160 ms    QRS Duration 80 ms     ms    QTc 434 ms    P Axis 57 degrees    R AXIS 48 degrees    T Axis 41 degrees    Interpretation ECG       Sinus bradycardia  Possible Left atrial enlargement  Borderline ECG  No previous ECGs available  Confirmed by SEE ED PROVIDER NOTE FOR, ECG INTERPRETATION ([id number]),  JOB BAIN ([id number]) on 7/20/2022 6:11:14 PM     Troponin I (now)   Result Value Ref Range    Troponin I <0.01 0.00 - 0.29 ng/mL   CBC with platelets + differential    Narrative    The following orders were created for panel order CBC with platelets + differential.  Procedure                               Abnormality         Status                     ---------                               -----------         ------                     CBC with platelets and d...[[id number]]  Abnormal            Final result                 Please view results for these tests on the individual orders.   Basic metabolic panel   Result Value Ref Range    Sodium 140 136 - 145 mmol/L    Potassium 4.2 3.5 - 5.0 mmol/L    Chloride 106 98 - 107 mmol/L    Carbon Dioxide (CO2) 28 22 - 31 mmol/L    Anion Gap 6 5 - 18 mmol/L    Urea Nitrogen 16 8 - 28 mg/dL    Creatinine 0.80 0.60 - 1.10 mg/dL    Calcium 9.0 8.5 - 10.5 mg/dL    Glucose 125 70 - 125 mg/dL    GFR Estimate 79 >60 mL/min/1.73m2   CBC with platelets and differential   Result Value Ref Range    WBC Count 4.7 4.0 - 11.0 10e3/uL    RBC Count 3.96 3.80 - 5.20 10e6/uL    Hemoglobin 11.9 11.7 - 15.7 g/dL    Hematocrit 37.1 35.0 - 47.0 %    MCV 94 78 - 100 fL    MCH 30.1 26.5 - 33.0 pg    MCHC 32.1 31.5 - 36.5 g/dL    RDW 13.9 10.0 - 15.0 %    Platelet Count 106 (L) 150 - 450 10e3/uL    % Neutrophils 31 %    % Lymphocytes 60 %    % Monocytes 8 %    % Eosinophils 1 %    % Basophils 0 %    % Immature Granulocytes 0 %    NRBCs per 100 WBC 0 <1 /100     Absolute Neutrophils 1.5 (L) 1.6 - 8.3 10e3/uL    Absolute Lymphocytes 2.8 0.8 - 5.3 10e3/uL    Absolute Monocytes 0.4 0.0 - 1.3 10e3/uL    Absolute Eosinophils 0.1 0.0 - 0.7 10e3/uL    Absolute Basophils 0.0 0.0 - 0.2 10e3/uL    Absolute Immature Granulocytes 0.0 <=0.4 10e3/uL    Absolute NRBCs 0.0 10e3/uL   Chest XR,  PA & LAT    Narrative    EXAM: XR CHEST 2 VIEWS  LOCATION: [facility]  DATE/TIME: 7/20/2022 7:17 PM    INDICATION: Hypertension  COMPARISON: 11/26/2021       Impression    IMPRESSION: No pleural fluid or pneumothorax. No airspace disease or edema. Normal size of the heart.   Troponin I (now)   Result Value Ref Range    Troponin I <0.01 0.00 - 0.29 ng/mL         RADIOLOGY  Chest XR,  PA & LAT   Final Result   IMPRESSION: No pleural fluid or pneumothorax. No airspace disease or edema. Normal size of the heart.             EKG:    Rate: 56 bpm  Rhythm: Normal Sinus Rhythm  Axis: Normal  Interval: Normal  Conduction: Normal  QRS: Normal  ST: Normal  T-wave: Normal  QT: Not prolonged  Comparison EKG: No previous available for comparison  Impression:  No acute ischemic change   I have independently reviewed and interpreted today's EKG, pending Cardiologist read    PROCEDURES:  None        MEDICATIONS GIVEN IN THE EMERGENCY:  Medications - No data to display    NEW PRESCRIPTIONS STARTED AT TODAY'S ER VISIT  New Prescriptions    No medications on file     I, Michelle Mcleod, am serving as a scribe to document services personally performed by Boubacar Snyder DO, based on my observations and the provider's statements to me.  I, Boubacar Snyder DO, attest that Michelle Mcleod is acting in a scribe capacity, has observed my performance of the services and has documented them in accordance with my direction.       Boubacar Snyder D.O.  Emergency Medicine  [facility] EMERGENCY ROOM  [address] [facility] [address] [facility] [address] [phone]  Dept:  [phone]     Boubacar Snyder,   07/20/22 5200

## 2022-07-20 NOTE — TELEPHONE ENCOUNTER
"Patient calling and states her bp is high.  Was at [facility] and bp was 188/?,  this am bp was 202/104.  Not having any symptoms.  Has to have daughter drive her.  Gave information for [facility].  Patient will go to  when daughter can bring her today.  Mague Starr RN    Reason for Disposition    Systolic BP >= 180 OR Diastolic >= 110    Additional Information    Negative: Pregnant > 20 weeks or postpartum (< 6 weeks after delivery) and new hand or face swelling    Negative: Pregnant > 20 weeks and BP > 140/90    Negative: Systolic BP >= 160 OR Diastolic >= 100, and any cardiac or neurologic symptoms (e.g., chest pain, difficulty breathing, unsteady gait, blurred vision)    Negative: Patient sounds very sick or weak to the triager    Negative: BP Systolic BP >= 140 OR Diastolic >= 90 and postpartum (from 0 to 6 weeks after delivery)    Negative: Systolic BP >= 180 OR Diastolic >= 110, and missed most recent dose of blood pressure medication    Answer Assessment - Initial Assessment Questions  1. BLOOD PRESSURE: \"What is the blood pressure?\" \"Did you take at least two measurements 5 minutes apart?\"      202/104 this am  2. ONSET: \"When did you take your blood pressure?\"      This am  3. HOW: \"How did you obtain the blood pressure?\" (e.g., visiting nurse, automatic home BP monitor)      Arm cuff  4. HISTORY: \"Do you have a history of high blood pressure?\"      yes  5. MEDICATIONS: \"Are you taking any medications for blood pressure?\" \"Have you missed any doses recently?\"      Metoprolol, no missed doses  6. OTHER SYMPTOMS: \"Do you have any symptoms?\" (e.g., headache, chest pain, blurred vision, difficulty breathing, weakness)      fatigue  7. PREGNANCY: \"Is there any chance you are pregnant?\" \"When was your last menstrual period?\"      n/a    Protocols used: HIGH BLOOD PRESSURE-A-OH      "

## 2022-07-21 NOTE — PHARMACY-ADMISSION MEDICATION HISTORY
Pharmacy Note - Admission Medication History    Pertinent Provider Information: none     ______________________________________________________________________    Prior To Admission (PTA) med list completed and updated in EMR.       PTA Med List   Medication Sig Note Last Dose     aspirin 81 MG tablet Take 81 mg by mouth daily  7/20/2022 at Unknown time     baclofen (LIORESAL) 10 MG tablet Take 1 tablet (10 mg) by mouth 4 times daily  7/20/2022 at 5pm     Cholecalciferol (VITAMIN D) 2000 UNITS tablet Take 1 tablet by mouth daily  7/20/2022 at Unknown time     gabapentin (NEURONTIN) 300 MG capsule TAKE 1 CAPSULE BY MOUTH IN THE MORNING, 1 AT NOON 1 AT 5PM AND 2 AT BEDTIME ( 5 PER DAY )  7/20/2022 at 300 mg AM, Noon, 5pm, and 600 mg PM (has taken thru 5pm)     HYDROcodone-acetaminophen (NORCO) 7.5-325 MG per tablet Take 2 tablets by mouth 3 times daily 28 days or more between refills for controlled medications 7/20/2022: Takes 7am, Noon, and 5pm daily. 7/20/2022 at 7am, Noon, 5pm, has taken thru 5pm     ibuprofen (ADVIL/MOTRIN) 200 MG tablet Take 200 mg by mouth 3 times daily Takes with hydrocodone-acetaminophen  7/20/2022 at 7am, Noon, 5pm took thru 5pm today     lactobacillus rhamnosus, GG, (CULTURELL) capsule Take 1 capsule by mouth 2 times daily (before meals) Lunch and supper  Past Week at Unknown time     Melatonin 10 MG CAPS Take 20 mg by mouth At Bedtime 1 hour prior to bedtime  7/19/2022 at Unknown time     metoprolol tartrate (LOPRESSOR) 50 MG tablet TAKE 1 TABLET BY MOUTH TWICE A DAY  7/20/2022 at am     nitroGLYcerin (NITROSTAT) 0.4 MG sublingual tablet Place 1 tablet (0.4 mg) under the tongue every 5 minutes as needed for chest pain  Unknown at Unknown time     polyethylene glycol (MIRALAX/GLYCOLAX) powder Take 17 g by mouth daily Reported on 5/15/2017  7/20/2022 at Unknown time     rosuvastatin (CRESTOR) 10 MG tablet Take 1 tablet (10 mg) by mouth daily  7/20/2022 at 5pm     traZODone (DESYREL) 100 MG  tablet Take 3 tablets (300 mg) by mouth At Bedtime  7/19/2022 at Unknown time     VITAMIN B-12 1000 MCG OR TABS 1 tablet daily  7/20/2022 at Unknown time       Information source(s): Patient, Family member and CareEverywhere/SureScripts  Method of interview communication: in-person    Summary of Changes to PTA Med List  New: ibuprofen  Discontinued: none  Changed: Miralax daily    Patient was asked about OTC/herbal products specifically.  PTA med list reflects this.    In the past week, patient estimated taking medication this percent of the time:  greater than 90%.    Allergies were reviewed, assessed, and updated with the patient.      Patient does not use any multi-dose medications prior to admission.    The information provided in this note is only as accurate as the sources available at the time of the update(s).    Thank you for the opportunity to participate in the care of this patient.    Pricilla Wilkinson [facility]  7/20/2022 10:20 PM

## 2022-07-22 ENCOUNTER — OFFICE VISIT (OUTPATIENT)
Dept: CARDIOLOGY | Facility: CLINIC | Age: 72
End: 2022-07-22
Attending: EMERGENCY MEDICINE
Payer: MEDICARE

## 2022-07-22 VITALS
WEIGHT: 127.5 LBS | SYSTOLIC BLOOD PRESSURE: 105 MMHG | DIASTOLIC BLOOD PRESSURE: 62 MMHG | HEART RATE: 51 BPM | RESPIRATION RATE: 16 BRPM | BODY MASS INDEX: 21.89 KG/M2

## 2022-07-22 DIAGNOSIS — I10 HYPERTENSION, UNSPECIFIED TYPE: ICD-10-CM

## 2022-07-22 DIAGNOSIS — F11.90 CHRONIC, CONTINUOUS USE OF OPIOIDS: ICD-10-CM

## 2022-07-22 DIAGNOSIS — G24.3 SPASMODIC TORTICOLLIS: ICD-10-CM

## 2022-07-22 DIAGNOSIS — R07.9 CHEST PAIN, UNSPECIFIED TYPE: ICD-10-CM

## 2022-07-22 DIAGNOSIS — G89.4 CHRONIC PAIN SYNDROME: ICD-10-CM

## 2022-07-22 DIAGNOSIS — R60.9 EDEMA, UNSPECIFIED TYPE: ICD-10-CM

## 2022-07-22 DIAGNOSIS — I25.10 CORONARY ARTERY DISEASE INVOLVING NATIVE CORONARY ARTERY OF NATIVE HEART WITHOUT ANGINA PECTORIS: Primary | ICD-10-CM

## 2022-07-22 DIAGNOSIS — M79.7 FIBROMYALGIA: ICD-10-CM

## 2022-07-22 PROCEDURE — 99204 OFFICE O/P NEW MOD 45 MIN: CPT | Performed by: INTERNAL MEDICINE

## 2022-07-22 RX ORDER — HYDROCODONE BITARTRATE AND ACETAMINOPHEN 7.5; 325 MG/1; MG/1
2 TABLET ORAL 3 TIMES DAILY
Qty: 180 TABLET | Refills: 0 | Status: SHIPPED | OUTPATIENT
Start: 2022-07-22 | End: 2022-08-23

## 2022-07-22 RX ORDER — OMEPRAZOLE 20 MG/1
20 TABLET, DELAYED RELEASE ORAL 2 TIMES DAILY
Status: ON HOLD | COMMUNITY
End: 2023-05-25

## 2022-07-22 NOTE — PROGRESS NOTES
HEART CARE ENCOUNTER CONSULTATON NOTE      [facility]  [phone]      Assessment/Recommendations   Assessment:  1.  Intermittent hypertensive episodes.  Patient started taking ibuprofen 3 times a day approximately 1 month ago.  This is likely cause of her fluctuations in blood pressure.    2.  Atypical chest pain.  Patient's been noticing intermittent episodes of sharp pain in her chest.  Lasting a few minutes.  Pain is nonexertional related.  Again could be related to ibuprofen worsening her GERD symptoms.  If persistent symptoms would recommend stress testing  3.  Intermittent lower extremity edema, concern for cardiac cause or ibuprofen use resulting hypertensive issues.  Patient likely has underlying diastolic dysfunction.  4.  Coronary artery disease s/p PCI of RCA (May 2015) with MATHEW, and PCI to LCx with DESx1, LAD with DESx1, and POBA to D1 (6/2/15).  Unclear why patient was not offered bypass surgery during initial revascularization.  5. Former Tobacco use- quit in 2014.  Resume smoking in 8569-4907.  Has quit since 2019  6. Fibromyalgia with chronic pain  7. Depression  8.  Hyperlipidemia controlled.  LDL < 70 goal.     Plan:   1.  Recommend discontinuation of ibuprofen.  Patient weaned down on ibuprofen to once daily.  We will stop after 1 week.  Cardiovascular history of strongly recommend against NSAIDs.  2.  Continue to monitor home blood pressure readings.  Her blood pressure reading today is normal  3.  Headaches likely a result of fluctuation in blood pressure resulting migraine headaches.  4.  Complete echocardiogram to assess left ventricular function and diastolic function given intermittent edema.  5.  On conversation about her atypical chest pain.  Twelve-lead EKG was personally viewed from the ED.  Demonstrated no ST or T wave abnormalities.  Would recommend stopping ibuprofen.  Continue with her Nexium.  If pain continues then recommend stress testing with  pharmacologic stress test.    Will follow up with patient after echo.          History of Present Illness/Subjective    HPI: Carina Calvert is a 72 year old female coronary artery disease, hypertension, hyperlipidemia, fibromyalgia with chronic pain who presents to cardiology clinic in consultation for atypical chest pain and hypertension.    For the past few weeks patient is noticing significant elevation in her blood pressure.  Associated with this is migraine headaches.  Is also been noticing very brief episodes of sharp pain in her epigastric area and mid chest.  The pain is nonexertional.  She has no chest pain with walking.  She is functionally limited due to her fibromyalgia.  Over the past month she has started taking ibuprofen 3 times daily.  This is also correlates to when she began noticing elevation in her blood pressure and intermittent episodes of sharp chest pain.  She does have a history of gastroesophageal reflux as well.  She is on Nexium which she is compliant.     Reviewed twelve-lead EKG from emergency department.  Reviewed emergency department ED provider notes.    1 conversation with the patient and her daughter.  Likely ibuprofen is causing a rise in her blood pressure.  We will initially stop ibuprofen as it is contraindicated with her cardiac conditions.  She will continue with her Nexium.  If she continues to note chest pain we will proceed with a pharmacologic stress test as she is unable to exercise.  Would recommend echocardiogram due to intermittent lower extremity edema.        Physical Examination  Review of Systems   Vitals: /62 (BP Location: Right arm, Patient Position: Sitting, Cuff Size: Adult Regular)   Pulse 51   Resp 16   Wt 57.8 kg (127 lb 8 oz)   BMI 21.89 kg/m    BMI= Body mass index is 21.89 kg/m .  Wt Readings from Last 3 Encounters:   07/22/22 57.8 kg (127 lb 8 oz)   07/20/22 56.7 kg (125 lb)   05/26/22 61.7 kg (136 lb)        Pleasant   ENT/Mouth: membranes  moist, no oral lesions or bleeding gums.      EYES:  no scleral icterus, normal conjunctivae       Chest/Lungs:   lungs are clear to auscultation, no rales or wheezing, no sternal scar, equal chest wall expansion    Cardiovascular:   Regular. Normal first and second heart sounds with no murmurs, rubs, or gallops; the carotid, radial and posterior tibial pulses are intact, Jugular venous pressure normal, no pitting edema bilaterally    Abdomen:  no tenderness; bowel sounds are present   Extremities: no cyanosis or clubbing   Skin: no xanthelasma, warm.    Neurologic: normal  bilateral, no tremors     Psychiatric: alert and oriented x3, calm        Please refer above for cardiac ROS details.        Medical History  Surgical History Family History Social History   Past Medical History:   Diagnosis Date     Chronic constipation      Chronic sciatica     Pain clinic     DDD (degenerative disc disease), cervical      Fibromyalgia      History of hepatitis C      Hypertension goal BP (blood pressure) < 140/90      Kidney stones      Migraine headaches      Neuropathy      Osteopenia     boniva infusions w Dr Rayo at [facility]     PUD (peptic ulcer disease)     h/o bleeding ulcer     Recurrent cold sores      Restless leg syndrome      Spasmodic torticollis      Subclinical hyperthyroidism     NONTOXIC MULTINODULAR GOITER      Vertigo      Vitamin B12 deficiency      Vitamin D deficiency      Past Surgical History:   Procedure Laterality Date     HERNIA REPAIR  9/2009    ventral     LITHOTRIPSY      2 x      TONSILLECTOMY       TUBAL LIGATION       Family History   Problem Relation Age of Onset     Neurologic Disorder Mother 65        Parkinsons     Alcohol/Drug Mother      Cerebrovascular Disease Mother      Respiratory Father         COPD     Alcohol/Drug Father      Cancer Brother      Diabetes Sister 52     Cancer Maternal Aunt      Cancer Maternal Uncle      C.A.D. No family hx of         Social History      Socioeconomic History     Marital status:      Spouse name: Not on file     Number of children: 4     Years of education: 11     Highest education level: Not on file   Occupational History     Occupation: housecleaning     Employer: [employer],[address] [place] [address]   Tobacco Use     Smoking status: Former Smoker     Packs/day: 0.20     Years: 15.00     Pack years: 3.00     Types: Cigarettes     Smokeless tobacco: Never Used     Tobacco comment: 4 cigs a day, just lost    Substance and Sexual Activity     Alcohol use: No     Drug use: No     Sexual activity: Never     Partners: Male     Birth control/protection: Surgical, Post-menopausal   Other Topics Concern     Parent/sibling w/ CABG, MI or angioplasty before 65F 55M? 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     Hobby Hazards No     Sleep Concern Yes     Comment: On medication     Stress Concern No     Weight Concern Yes     Comment: would like to gain weight     Special Diet No     Back Care No     Exercise No     Bike Helmet Not Asked     Seat Belt Yes     Self-Exams Yes   Social History Narrative     Not on file     Social Determinants of Health     Financial Resource Strain: Not on file   Food Insecurity: Not on file   Transportation Needs: Not on file   Physical Activity: Not on file   Stress: Not on file   Social Connections: Not on file   Intimate Partner Violence: Not on file   Housing Stability: Not on file           Medications  Allergies   Current Outpatient Medications   Medication Sig Dispense Refill     aspirin 81 MG tablet Take 81 mg by mouth daily       baclofen (LIORESAL) 10 MG tablet Take 1 tablet (10 mg) by mouth 4 times daily 360 tablet 1     Cholecalciferol (VITAMIN D) 2000 UNITS tablet Take 1 tablet by mouth daily       gabapentin (NEURONTIN) 300 MG capsule TAKE 1 CAPSULE BY MOUTH IN THE  MORNING, 1 AT NOON 1 AT 5PM AND 2 AT BEDTIME ( 5 PER DAY ) 450 capsule 1     HYDROcodone-acetaminophen (NORCO) 7.5-325 MG per tablet Take 2 tablets by mouth 3 times daily 28 days or more between refills for controlled medications 180 tablet 0     ibuprofen (ADVIL/MOTRIN) 200 MG tablet Take 200 mg by mouth 3 times daily Takes with hydrocodone-acetaminophen       lactobacillus rhamnosus, GG, (CULTURELL) capsule Take 1 capsule by mouth 2 times daily (before meals) Lunch and supper       Melatonin 10 MG CAPS Take 20 mg by mouth At Bedtime 1 hour prior to bedtime       metoprolol tartrate (LOPRESSOR) 50 MG tablet TAKE 1 TABLET BY MOUTH TWICE A  tablet 1     omeprazole (PRILOSEC OTC) 20 MG EC tablet Take 20 mg by mouth 2 times daily       polyethylene glycol (MIRALAX/GLYCOLAX) powder Take 17 g by mouth daily Reported on 5/15/2017       rosuvastatin (CRESTOR) 10 MG tablet Take 1 tablet (10 mg) by mouth daily 90 tablet 3     traZODone (DESYREL) 100 MG tablet Take 3 tablets (300 mg) by mouth At Bedtime 270 tablet 5     VITAMIN B-12 1000 MCG OR TABS 1 tablet daily       nitroGLYcerin (NITROSTAT) 0.4 MG sublingual tablet Place 1 tablet (0.4 mg) under the tongue every 5 minutes as needed for chest pain (Patient not taking: Reported on 7/22/2022) 25 tablet 2       Allergies   Allergen Reactions     Diagnostic X-Ray Materials Anaphylaxis and Hives     Unable to breathe     Diatrizoate Hives     Hypaque     Droperidol      Ivp Dye [Contrast Dye]           Lab Results    Chemistry/lipid CBC Cardiac Enzymes/BNP/TSH/INR   Recent Labs   Lab Test 05/26/22  1505   CHOL 96   HDL 43*   LDL 39   TRIG 72     Recent Labs   Lab Test 05/26/22  1505 04/08/21  1711 02/04/20  1117   LDL 39 40 40     Recent Labs   Lab Test 07/20/22  1859      POTASSIUM 4.2   CHLORIDE 106   CO2 28      BUN 16   CR 0.80   GFRESTIMATED 79   CARMINA 9.0     Recent Labs   Lab Test 07/20/22  1859 05/26/22  1505 04/08/21  1711   CR 0.80 0.79 0.93     No  results for input(s): A1C in the last 90873 hours.       Recent Labs   Lab Test 07/20/22  1859   WBC 4.7   HGB 11.9   HCT 37.1   MCV 94   *     Recent Labs   Lab Test 07/20/22  1859 05/26/22  1505 09/01/21  1153   HGB 11.9 12.1 13.0    Recent Labs   Lab Test 07/20/22  2142 07/20/22  1842   TROPONINI <0.01 <0.01     No results for input(s): BNP, NTBNPI, NTBNP in the last 89300 hours.  Recent Labs   Lab Test 05/26/22  1505   TSH 1.20     No results for input(s): INR in the last 89734 hours.     Luis E Echevarria,     Today's clinic visit entailed:  56 minutes spent on the date of the encounter doing chart review, history and exam, documentation and further activities per the note

## 2022-07-22 NOTE — TELEPHONE ENCOUNTER
Requested Prescriptions   Pending Prescriptions Disp Refills     HYDROcodone-acetaminophen (NORCO) 7.5-325 MG per tablet 180 tablet 0     Sig: Take 2 tablets by mouth 3 times daily 28 days or more between refills for controlled medications       There is no refill protocol information for this order        Routing refill request to provider for review/approval because:  Drug not on [place] [facility] [place]     Annabelle Smith RN  [facility]

## 2022-07-22 NOTE — LETTER
7/22/2022    Gerber Jain MD  [address] [place] [address]    RE: Carina Calvert       Dear Colleague,     I had the pleasure of seeing Carina Calvert in the [facility].    HEART CARE ENCOUNTER CONSULTATON NOTE      [facility]  [phone]      Assessment/Recommendations   Assessment:  1.  Intermittent hypertensive episodes.  Patient started taking ibuprofen 3 times a day approximately 1 month ago.  This is likely cause of her fluctuations in blood pressure.    2.  Atypical chest pain.  Patient's been noticing intermittent episodes of sharp pain in her chest.  Lasting a few minutes.  Pain is nonexertional related.  Again could be related to ibuprofen worsening her GERD symptoms.  If persistent symptoms would recommend stress testing  3.  Intermittent lower extremity edema, concern for cardiac cause or ibuprofen use resulting hypertensive issues.  Patient likely has underlying diastolic dysfunction.  4.  Coronary artery disease s/p PCI of RCA (May 2015) with MATHEW, and PCI to LCx with DESx1, LAD with DESx1, and POBA to D1 (6/2/15).  Unclear why patient was not offered bypass surgery during initial revascularization.  5. Former Tobacco use- quit in 2014.  Resume smoking in 4335-2679.  Has quit since 2019  6. Fibromyalgia with chronic pain  7. Depression  8.  Hyperlipidemia controlled.  LDL < 70 goal.     Plan:   1.  Recommend discontinuation of ibuprofen.  Patient weaned down on ibuprofen to once daily.  We will stop after 1 week.  Cardiovascular history of strongly recommend against NSAIDs.  2.  Continue to monitor home blood pressure readings.  Her blood pressure reading today is normal  3.  Headaches likely a result of fluctuation in blood pressure resulting migraine headaches.  4.  Complete echocardiogram to assess left ventricular function and diastolic function given intermittent edema.  5.  On conversation about her atypical chest pain.  Twelve-lead EKG was  personally viewed from the ED.  Demonstrated no ST or T wave abnormalities.  Would recommend stopping ibuprofen.  Continue with her Nexium.  If pain continues then recommend stress testing with pharmacologic stress test.    Will follow up with patient after echo.          History of Present Illness/Subjective    HPI: Carina Calvert is a 72 year old female coronary artery disease, hypertension, hyperlipidemia, fibromyalgia with chronic pain who presents to cardiology clinic in consultation for atypical chest pain and hypertension.    For the past few weeks patient is noticing significant elevation in her blood pressure.  Associated with this is migraine headaches.  Is also been noticing very brief episodes of sharp pain in her epigastric area and mid chest.  The pain is nonexertional.  She has no chest pain with walking.  She is functionally limited due to her fibromyalgia.  Over the past month she has started taking ibuprofen 3 times daily.  This is also correlates to when she began noticing elevation in her blood pressure and intermittent episodes of sharp chest pain.  She does have a history of gastroesophageal reflux as well.  She is on Nexium which she is compliant.     Reviewed twelve-lead EKG from emergency department.  Reviewed emergency department ED provider notes.    1 conversation with the patient and her daughter.  Likely ibuprofen is causing a rise in her blood pressure.  We will initially stop ibuprofen as it is contraindicated with her cardiac conditions.  She will continue with her Nexium.  If she continues to note chest pain we will proceed with a pharmacologic stress test as she is unable to exercise.  Would recommend echocardiogram due to intermittent lower extremity edema.        Physical Examination  Review of Systems   Vitals: /62 (BP Location: Right arm, Patient Position: Sitting, Cuff Size: Adult Regular)   Pulse 51   Resp 16   Wt 57.8 kg (127 lb 8 oz)   BMI 21.89 kg/m    BMI=  Body mass index is 21.89 kg/m .  Wt Readings from Last 3 Encounters:   07/22/22 57.8 kg (127 lb 8 oz)   07/20/22 56.7 kg (125 lb)   05/26/22 61.7 kg (136 lb)        Pleasant   ENT/Mouth: membranes moist, no oral lesions or bleeding gums.      EYES:  no scleral icterus, normal conjunctivae       Chest/Lungs:   lungs are clear to auscultation, no rales or wheezing, no sternal scar, equal chest wall expansion    Cardiovascular:   Regular. Normal first and second heart sounds with no murmurs, rubs, or gallops; the carotid, radial and posterior tibial pulses are intact, Jugular venous pressure normal, no pitting edema bilaterally    Abdomen:  no tenderness; bowel sounds are present   Extremities: no cyanosis or clubbing   Skin: no xanthelasma, warm.    Neurologic: normal  bilateral, no tremors     Psychiatric: alert and oriented x3, calm        Please refer above for cardiac ROS details.        Medical History  Surgical History Family History Social History   Past Medical History:   Diagnosis Date     Chronic constipation      Chronic sciatica     Pain clinic     DDD (degenerative disc disease), cervical      Fibromyalgia      History of hepatitis C      Hypertension goal BP (blood pressure) < 140/90      Kidney stones      Migraine headaches      Neuropathy      Osteopenia     boniva infusions w Dr Rayo at [facility]     PUD (peptic ulcer disease)     h/o bleeding ulcer     Recurrent cold sores      Restless leg syndrome      Spasmodic torticollis      Subclinical hyperthyroidism     NONTOXIC MULTINODULAR GOITER      Vertigo      Vitamin B12 deficiency      Vitamin D deficiency      Past Surgical History:   Procedure Laterality Date     HERNIA REPAIR  9/2009    ventral     LITHOTRIPSY      2 x      TONSILLECTOMY       TUBAL LIGATION       Family History   Problem Relation Age of Onset     Neurologic Disorder Mother 65        Parkinsons     Alcohol/Drug Mother      Cerebrovascular Disease Mother      Respiratory Father          COPD     Alcohol/Drug Father      Cancer Brother      Diabetes Sister 52     Cancer Maternal Aunt      Cancer Maternal Uncle      C.A.D. No family hx of         Social History     Socioeconomic History     Marital status:      Spouse name: Not on file     Number of children: 4     Years of education: 11     Highest education level: Not on file   Occupational History     Occupation: housecleaning     Employer: [employer],[address] [place] [address]   Tobacco Use     Smoking status: Former Smoker     Packs/day: 0.20     Years: 15.00     Pack years: 3.00     Types: Cigarettes     Smokeless tobacco: Never Used     Tobacco comment: 4 cigs a day, just lost    Substance and Sexual Activity     Alcohol use: No     Drug use: No     Sexual activity: Never     Partners: Male     Birth control/protection: Surgical, Post-menopausal   Other Topics Concern     Parent/sibling w/ CABG, MI or angioplasty before 65F 55M? 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     Hobby Hazards No     Sleep Concern Yes     Comment: On medication     Stress Concern No     Weight Concern Yes     Comment: would like to gain weight     Special Diet No     Back Care No     Exercise No     Bike Helmet Not Asked     Seat Belt Yes     Self-Exams Yes   Social History Narrative     Not on file     Social Determinants of Health     Financial Resource Strain: Not on file   Food Insecurity: Not on file   Transportation Needs: Not on file   Physical Activity: Not on file   Stress: Not on file   Social Connections: Not on file   Intimate Partner Violence: Not on file   Housing Stability: Not on file           Medications  Allergies   Current Outpatient Medications   Medication Sig Dispense Refill     aspirin 81 MG tablet Take 81 mg by mouth daily       baclofen (LIORESAL) 10 MG tablet Take 1 tablet (10 mg) by mouth  4 times daily 360 tablet 1     Cholecalciferol (VITAMIN D) 2000 UNITS tablet Take 1 tablet by mouth daily       gabapentin (NEURONTIN) 300 MG capsule TAKE 1 CAPSULE BY MOUTH IN THE MORNING, 1 AT NOON 1 AT 5PM AND 2 AT BEDTIME ( 5 PER DAY ) 450 capsule 1     HYDROcodone-acetaminophen (NORCO) 7.5-325 MG per tablet Take 2 tablets by mouth 3 times daily 28 days or more between refills for controlled medications 180 tablet 0     ibuprofen (ADVIL/MOTRIN) 200 MG tablet Take 200 mg by mouth 3 times daily Takes with hydrocodone-acetaminophen       lactobacillus rhamnosus, GG, (CULTURELL) capsule Take 1 capsule by mouth 2 times daily (before meals) Lunch and supper       Melatonin 10 MG CAPS Take 20 mg by mouth At Bedtime 1 hour prior to bedtime       metoprolol tartrate (LOPRESSOR) 50 MG tablet TAKE 1 TABLET BY MOUTH TWICE A  tablet 1     omeprazole (PRILOSEC OTC) 20 MG EC tablet Take 20 mg by mouth 2 times daily       polyethylene glycol (MIRALAX/GLYCOLAX) powder Take 17 g by mouth daily Reported on 5/15/2017       rosuvastatin (CRESTOR) 10 MG tablet Take 1 tablet (10 mg) by mouth daily 90 tablet 3     traZODone (DESYREL) 100 MG tablet Take 3 tablets (300 mg) by mouth At Bedtime 270 tablet 5     VITAMIN B-12 1000 MCG OR TABS 1 tablet daily       nitroGLYcerin (NITROSTAT) 0.4 MG sublingual tablet Place 1 tablet (0.4 mg) under the tongue every 5 minutes as needed for chest pain (Patient not taking: Reported on 7/22/2022) 25 tablet 2       Allergies   Allergen Reactions     Diagnostic X-Ray Materials Anaphylaxis and Hives     Unable to breathe     Diatrizoate Hives     Hypaque     Droperidol      Ivp Dye [Contrast Dye]           Lab Results    Chemistry/lipid CBC Cardiac Enzymes/BNP/TSH/INR   Recent Labs   Lab Test 05/26/22  1505   CHOL 96   HDL 43*   LDL 39   TRIG 72     Recent Labs   Lab Test 05/26/22  1505 04/08/21  1711 02/04/20  1117   LDL 39 40 40     Recent Labs   Lab Test 07/20/22  1859      POTASSIUM 4.2    CHLORIDE 106   CO2 28      BUN 16   CR 0.80   GFRESTIMATED 79   CARMINA 9.0     Recent Labs   Lab Test 07/20/22  1859 05/26/22  1505 04/08/21  1711   CR 0.80 0.79 0.93     No results for input(s): A1C in the last 91767 hours.       Recent Labs   Lab Test 07/20/22  1859   WBC 4.7   HGB 11.9   HCT 37.1   MCV 94   *     Recent Labs   Lab Test 07/20/22  1859 05/26/22  1505 09/01/21  1153   HGB 11.9 12.1 13.0    Recent Labs   Lab Test 07/20/22  2142 07/20/22  1842   TROPONINI <0.01 <0.01     No results for input(s): BNP, NTBNPI, NTBNP in the last 47871 hours.  Recent Labs   Lab Test 05/26/22  1505   TSH 1.20     No results for input(s): INR in the last 13558 hours.     Luis E Echevarria DO    Today's clinic visit entailed:  56 minutes spent on the date of the encounter doing chart review, history and exam, documentation and further activities per the note              Thank you for allowing me to participate in the care of your patient.      Sincerely,     Luis E Echevarria DO     [facility]  cc:   Boubacar Snyder DO  [address] [place] [id number]

## 2022-07-22 NOTE — PATIENT INSTRUCTIONS
It was a pleasure to meet with you today in clinic.  Please do not hesitate to call the [facility] clinic with any questions or concerns at [phone].    Additional Provider Instructions:   Reduce ibuprofen with discontinuation in 1 week.   Transthoracic echocardiogram   If ongoing pain please call clinic and will obtain stress testing.   Can take up to 4000 mg Tylenol total per day.      Please contact direct nurses line Monday through Friday 8 AM to 5 PM @ [phone]    After-hours contact cardiology office at [phone].

## 2022-07-27 PROBLEM — I25.10 CORONARY ARTERY DISEASE DUE TO LIPID RICH PLAQUE: Status: ACTIVE | Noted: 2022-07-27

## 2022-07-27 PROBLEM — I25.83 CORONARY ARTERY DISEASE DUE TO LIPID RICH PLAQUE: Status: ACTIVE | Noted: 2022-07-27

## 2022-07-28 ENCOUNTER — HOSPITAL ENCOUNTER (OUTPATIENT)
Dept: CARDIOLOGY | Facility: CLINIC | Age: 72
Discharge: HOME | End: 2022-07-28
Attending: INTERNAL MEDICINE | Admitting: INTERNAL MEDICINE
Payer: MEDICARE

## 2022-07-28 ENCOUNTER — OFFICE VISIT (OUTPATIENT)
Dept: INTERNAL MEDICINE | Facility: CLINIC | Age: 72
End: 2022-07-28
Payer: MEDICARE

## 2022-07-28 VITALS
HEART RATE: 46 BPM | DIASTOLIC BLOOD PRESSURE: 64 MMHG | WEIGHT: 129 LBS | TEMPERATURE: 98.2 F | RESPIRATION RATE: 14 BRPM | BODY MASS INDEX: 22.14 KG/M2 | OXYGEN SATURATION: 97 % | SYSTOLIC BLOOD PRESSURE: 126 MMHG

## 2022-07-28 DIAGNOSIS — I25.83 CORONARY ARTERY DISEASE DUE TO LIPID RICH PLAQUE: ICD-10-CM

## 2022-07-28 DIAGNOSIS — R60.9 EDEMA, UNSPECIFIED TYPE: ICD-10-CM

## 2022-07-28 DIAGNOSIS — I10 HYPERTENSION GOAL BP (BLOOD PRESSURE) < 140/90: Primary | ICD-10-CM

## 2022-07-28 DIAGNOSIS — G89.4 CHRONIC PAIN SYNDROME: ICD-10-CM

## 2022-07-28 DIAGNOSIS — F11.90 CHRONIC, CONTINUOUS USE OF OPIOIDS: ICD-10-CM

## 2022-07-28 DIAGNOSIS — I25.10 CORONARY ARTERY DISEASE INVOLVING NATIVE CORONARY ARTERY OF NATIVE HEART WITHOUT ANGINA PECTORIS: ICD-10-CM

## 2022-07-28 DIAGNOSIS — Z12.11 SCREEN FOR COLON CANCER: ICD-10-CM

## 2022-07-28 DIAGNOSIS — Z12.31 VISIT FOR SCREENING MAMMOGRAM: ICD-10-CM

## 2022-07-28 DIAGNOSIS — I10 HYPERTENSION, UNSPECIFIED TYPE: ICD-10-CM

## 2022-07-28 DIAGNOSIS — I25.10 CORONARY ARTERY DISEASE DUE TO LIPID RICH PLAQUE: ICD-10-CM

## 2022-07-28 DIAGNOSIS — G24.3 SPASMODIC TORTICOLLIS: ICD-10-CM

## 2022-07-28 DIAGNOSIS — R07.9 CHEST PAIN, UNSPECIFIED TYPE: ICD-10-CM

## 2022-07-28 LAB — LVEF ECHO: NORMAL

## 2022-07-28 PROCEDURE — 93306 TTE W/DOPPLER COMPLETE: CPT | Mod: 26 | Performed by: INTERNAL MEDICINE

## 2022-07-28 PROCEDURE — 93306 TTE W/DOPPLER COMPLETE: CPT

## 2022-07-28 PROCEDURE — 99214 OFFICE O/P EST MOD 30 MIN: CPT | Performed by: INTERNAL MEDICINE

## 2022-07-28 NOTE — LETTER
Opioid / Opioid Plus Controlled Substance Agreement    This is an agreement between you and your provider about the safe and appropriate use of controlled substance/opioids prescribed by your care team. Controlled substances are medicines that can cause physical and mental dependence (abuse).    There are strict laws about having and using these medicines. We here at [facility] are committing to working with you in your efforts to get better. To support you in this work, we ll help you schedule regular office appointments for medicine refills. If we must cancel or change your appointment for any reason, we ll make sure you have enough medicine to last until your next appointment.     As a Provider, I will:    Listen carefully to your concerns and treat you with respect.     Recommend a treatment plan that I believe is in your best interest. This plan may involve therapies other than opioid pain medication.     Talk with you often about the possible benefits, and the risk of harm of any medicine that we prescribe for you.     Provide a plan on how to taper (discontinue or go off) using this medicine if the decision is made to stop its use.    As a Patient, I understand that opioid(s):     Are a controlled substance prescribed by my care team to help me function or work and manage my condition(s).     Are strong medicines and can cause serious side effects such as:    Drowsiness, which can seriously affect my driving ability    A lower breathing rate, enough to cause death    Harm to my thinking ability     Depression     Abuse of and addiction to this medicine    Need to be taken exactly as prescribed. Combining opioids with certain medicines or chemicals (such as illegal drugs, sedatives, sleeping pills, and benzodiazepines) can be dangerous or even fatal. If I stop opioids suddenly, I may have severe withdrawal symptoms.    Do not work for all types of pain nor for all patients. If they re not helpful, I may  be asked to stop them.        The risks, benefits and side effects of these medicine(s) were explained to me. I agree that:  1. I will take part in other treatments as advised by my care team. This may be psychiatry or counseling, physical therapy, behavioral therapy, group treatment or a referral to a specialist.     2. I will keep all my appointments. I understand that this is part of the monitoring of opioids. My care team may require an office visit for EVERY opioid/controlled substance refill. If I miss appointments or don t follow instructions, my care team may stop my medicine.    3. I will take my medicines as prescribed. I will not change the dose or schedule unless my care team tells me to. There will be no refills if I run out early.     4. I may be asked to come to the clinic and complete a urine drug test or complete a pill count at any time. If I don t give a urine sample or participate in a pill count, the care team may stop my medicine.    5. I will only receive prescriptions from this clinic for chronic pain. If I am treated by another provider for acute pain issues, I will tell them that I am taking opioid pain medication for chronic pain and that I have a treatment agreement with this provider. I will inform my [facility] care team within one business day if I am given a prescription for any pain medication by another healthcare provider. My [facility] care team can contact other providers and pharmacists about my use of any medicines.    6. It is up to me to make sure that I don t run out of my medicines on weekends or holidays. If my care team is willing to refill my opioid prescription without a visit, I must request refills only during office hours. Refills may take up to 3 business days to process. I will use one pharmacy to fill all my opioid and other controlled substance prescriptions. I will notify the clinic about any changes to my insurance or medication  availability.    7. I am responsible for my prescriptions. If the medicine/prescription is lost, stolen or destroyed, it will not be replaced. I also agree not to share controlled substance medicines with anyone.    8. I am aware I should not use any illegal or recreational drugs. I agree not to drink alcohol unless my care team says I can.       9. If I enroll in the [place] [facility] program, I will tell my care team prior to my next refill.     10. I will tell my care team right away if I become pregnant, have a new medical problem treated outside of my regular clinic, or have a change in my medications.    11. I understand that this medicine can affect my thinking, judgment and reaction time. Alcohol and drugs affect the brain and body, which can affect the safety of my driving. Being under the influence of alcohol or drugs can affect my decision-making, behaviors, personal safety, and the safety of others. Driving while impaired (DWI) can occur if a person is driving, operating, or in physical control of a car, motorcycle, boat, snowmobile, ATV, motorbike, off-road vehicle, or any other motor vehicle ([place] Statute 169A.20). I understand the risk if I choose to drive or operate any vehicle or machinery.    I understand that if I do not follow any of the conditions above, my prescriptions or treatment may be stopped or changed.          Opioids  What You Need to Know    What are opioids?   Opioids are pain medicines that must be prescribed by a doctor. They are also known as narcotics.     Examples are:   1. morphine (MS Contin, Мария)  2. oxycodone (Oxycontin)  3. oxycodone and acetaminophen (Percocet)  4. hydrocodone and acetaminophen (Vicodin, Norco)   5. fentanyl patch (Duragesic)   6. hydromorphone (Dilaudid)   7. methadone  8. codeine (Tylenol #3)     What do opioids do well?   Opioids are best for severe short-term pain such as after a surgery or injury. They may work well for cancer pain. They may  help some people with long-lasting (chronic) pain.     What do opioids NOT do well?   Opioids never get rid of pain entirely, and they don t work well for most patients with chronic pain. Opioids don t reduce swelling, one of the causes of pain.                                    Other ways to manage chronic pain and improve function include:       Treat the health problem that may be causing pain    Anti-inflammation medicines, which reduce swelling and tenderness, such as ibuprofen (Advil, Motrin) or naproxen (Aleve)    Acetaminophen (Tylenol)    Antidepressants and anti-seizure medicines, especially for nerve pain    Topical treatments such as patches or creams    Injections or nerve blocks    Chiropractic or osteopathic treatment    Acupuncture, massage, deep breathing, meditation, visual imagery, aromatherapy    Use heat or ice at the pain site    Physical therapy     Exercise    Stop smoking    Take part in therapy       Risks and side effects     Talk to your doctor before you start or decide to keep taking opioids. Possible side effects include:      Lowering your breathing rate enough to cause death    Overdose, including death, especially if taking higher than prescribed doses    Worse depression symptoms; less pleasure in things you usually enjoy    Feeling tired or sluggish    Slower thoughts or cloudy thinking    Being more sensitive to pain over time; pain is harder to control    Trouble sleeping or restless sleep    Changes in hormone levels (for example, less testosterone)    Changes in sex drive or ability to have sex    Constipation    Unsafe driving    Itching and sweating    Dizziness    Nausea, throwing up and dry mouth    What else should I know about opioids?    Opioids may lead to dependence, tolerance, or addiction.      Dependence means that if you stop or reduce the medicine too quickly, you will have withdrawal symptoms. These include loose poop (diarrhea), jitters, flu-like symptoms,  nervousness and tremors. Dependence is not the same as addiction.                       Tolerance means needing higher doses over time to get the same effect. This may increase the chance of serious side effects.      Addiction is when people improperly use a substance that harms their body, their mind or their relations with others. Use of opiates can cause a relapse of addiction if you have a history of drug or alcohol abuse.      People who have used opioids for a long time may have a lower quality of life, worse depression, higher levels of pain and more visits to doctors.    You can overdose on opioids. Take these steps to lower your risk of overdose:    1. Recognize the signs:  Signs of overdose include decrease or loss of consciousness (blackout), slowed breathing, trouble waking up and blue lips. If someone is worried about overdose, they should call 911.    2. Talk to your doctor about Narcan (naloxone).   If you are at risk for overdose, you may be given a prescription for Narcan. This medicine very quickly reverses the effects of opioids.   If you overdose, a friend or family member can give you Narcan while waiting for the ambulance. They need to know the signs of overdose and how to give Narcan.     3. Don't use alcohol or street drugs.   Taking them with opioids can cause death.    4. Do not take any of these medicines unless your doctor says it s OK. Taking these with opioids can cause death:    Benzodiazepines, such as lorazepam (Ativan), alprazolam (Xanax) or diazepam (Valium)    Muscle relaxers, such as cyclobenzaprine (Flexeril)    Sleeping pills like zolpidem (Ambien)     Other opioids      How to keep you and other people safe while taking opioids:    1. Never share your opioids with others.  Opioid medicines are regulated by the Drug Enforcement Agency (MICHELLE). Selling or sharing medications is a criminal act.    2. Be sure to store opioids in a secure place, locked up if possible. Young children  can easily swallow them and overdose.    3. When you are traveling with your medicines, keep them in the original bottles. If you use a pill box, be sure you also carry a copy of your medicine list from your clinic or pharmacy.    4. Safe disposal of opioids    Most pharmacies have places to get rid of medicine, called disposal kiosks. Medicine disposal options are also available in every [facility]. Search your county and  medication disposal  to find more options. You can find more details at:  [email or web address]     I agree that my provider, clinic care team, and pharmacy may work with any city, state or federal law enforcement agency that investigates the misuse, sale, or other diversion of my controlled medicine. I will allow my provider to discuss my care with, or share a copy of, this agreement with any other treating provider, pharmacy or emergency room where I receive care.    I have read this agreement and have asked questions about anything I did not understand.    _______________________________________________________  Patient Signature - Carina Calvert _____________________                   Date     _______________________________________________________  Provider Signature - Gerber Jain MD   _____________________                   Date     _______________________________________________________  Witness Signature (required if provider not present while patient signing)   _____________________                   Date

## 2022-07-28 NOTE — PROGRESS NOTES
Assessment & Plan     Hypertension goal BP (blood pressure) < 140/90  I am seeing this patient on the heels of her emergency room evaluation which was due to very high blood pressure reading and some chest pain issues. This was ultimately determined to be non-cardiac chest pain. For complete details please see care everywhere . In the end I spent a great deal of time in go over a lot of questions she and her daughter had. I think at this point we are in a stable phase of chronic illness . See medication list . The main conclusion from the cardiologist was that she had high numbers secondary to her great uptick in taking Naproxen [Aleve] and since stopping this at the behest of her cardiologist her blood pressure reading have in point of fact dropped back to normal     Chronic, continuous use of opioids  We continue current plan of care      Chronic pain syndrome  The main problem we really got into here is that this patient has a truly awful and refractory chronic pain condition which arises from the state of the severe muscle spasms  Muscle tautness and pain she has which is a lot like spasmodic torticollis . She has had this condition through the years and I have followed her case really for quite a long time something like 20 years and I can attest to the fact that nothing ever worked so well for her then botox injections. Unfortunately these were stopped due to prohibitive cost reasons and I agreed the explore this at the very least with our medication therapy management pharmacist     Coronary artery disease due to lipid rich plaque  Stable phase of chronic illness     Chest pain, unspecified type  Non-cardiac chest pain , we discussed wtwf, update me if significant changes have taken place    Screen for colon cancer  She declines     Visit for screening mammogram  She declines       Review of the result(s) of each unique test - ER evaluation  Prescription drug management  31 minutes spent on the date of the  encounter doing chart review, history and exam, documentation and further activities per the note      Return in about 3 months (around 10/28/2022).    Gerber Jain MD   [facility] NE[facility]MI[facility] Lim is a 72 year old accompanied by her daughter, presenting for the following health issues:  Results (Results of echo ), Hypertension (High blood pressure /), and Hospital F/U    Botox injections are absolutely necessary beyond acupuncture      ED/UC Followup:    Facility:  [facility]  Date of visit: 07/20/2022  Reason for visit: Chest Pain  Current Status: still having high blood pressure     And also leg and feet swelling and this was reviewed with emergency room physician , this was a new development   History of 2015 had a myocardial infarction following coronary angiogram with placement from 3 intraluminal coronary stents and a angioplasty . Last  Cardiology consultation was with [facility] from a few days ago, otherwise had missed for a few years   Last appointment with me was 5-26-22  Health Maintenance Due   Topic Date Due     COVID-19 Vaccine (1) Never done     Pneumococcal Vaccine: 65+ Years (1 - PCV) Never done     ZOSTER IMMUNIZATION (1 of 2) Never done     LUNG CANCER SCREENING  Never done     COLORECTAL CANCER SCREENING  12/23/2011     DTAP/TDAP/TD IMMUNIZATION (2 - Td or Tdap) 12/08/2014     MEDICARE ANNUAL WELLNESS VISIT  03/14/2017     MAMMO SCREENING  02/15/2020     DEXA  02/15/2021     FALL RISK ASSESSMENT  04/08/2022     TREATMENT AGREEMENT FOR CHRONIC PAIN MANAGEMENT  07/27/2022     Body mass index is 22.14 kg/m .       Patient is disinterested in healthcare maintenance issues Rather then order any Health Maintenance Care Gaps . I will review Healthcare maintenance section / Healthcare Gaps individually with patient / family and see what if anything they are interested to do.   Today is a post emergency room evaluation follow up office visit . She was seen for blood  pressure readings high as well as she had been having some complaints of chest discomfort . She was treated and released but further cardiology  Opinion was recommended , and echocardiogram is scheduled for tomorrow     Here with daughter Isra      Review of Systems   Constitutional, HEENT, cardiovascular, pulmonary, gi and gu systems are negative, except as otherwise noted.      Objective    /64   Pulse (!) 46   Temp 98.2  F (36.8  C) (Tympanic)   Resp 14   Wt 58.5 kg (129 lb)   SpO2 97%   BMI 22.14 kg/m    Body mass index is 22.14 kg/m .  Physical Exam   GENERAL: healthy, alert and no distress  EYES: Eyes grossly normal to inspection, PERRL and conjunctivae and sclerae normal  RESP: lungs clear to auscultation - no rales, rhonchi or wheezes  CV: regular rate and rhythm, normal S1 S2, no S3 or S4, no murmur, click or rub, no peripheral edema and peripheral pulses strong  MS: no gross musculoskeletal defects noted, no edema  SKIN: no suspicious lesions or rashes  NEURO: Normal strength and tone, mentation intact and speech normal  PSYCH: mentation appears normal, affect normal/bright    No orders of the defined types were placed in this encounter.

## 2022-07-28 NOTE — Clinical Note
Just asking if you know anything about how to get this woman botox injections . These stopped due to the cost to her. Wondering if things have changed or where you'd recommend we send this patient

## 2022-08-01 ENCOUNTER — TELEPHONE (OUTPATIENT)
Dept: NEUROLOGY | Facility: CLINIC | Age: 72
End: 2022-08-01

## 2022-08-01 DIAGNOSIS — R07.9 CHEST PAIN, UNSPECIFIED TYPE: ICD-10-CM

## 2022-08-01 DIAGNOSIS — R60.9 EDEMA, UNSPECIFIED TYPE: ICD-10-CM

## 2022-08-01 DIAGNOSIS — I25.10 CORONARY ARTERY DISEASE INVOLVING NATIVE CORONARY ARTERY OF NATIVE HEART WITHOUT ANGINA PECTORIS: ICD-10-CM

## 2022-08-01 DIAGNOSIS — I42.9 CARDIOMYOPATHY (H): Primary | ICD-10-CM

## 2022-08-01 DIAGNOSIS — I10 HYPERTENSION, UNSPECIFIED TYPE: ICD-10-CM

## 2022-08-01 NOTE — PROGRESS NOTES
Dr. Jain,    I found notes in the past from neurosurgey a few years ago, 2/20/20, and in this encoutner I found these notes:         The patient will need a new neurosurgery referral since the last visit was back in 2020 but it looks like neurosurgery can help with Botox injection for her spasmodic torticollis.     Thanks,  PORSHA Alanis  [facility]

## 2022-08-01 NOTE — PROGRESS NOTES
Called patient and gave number to make appointment, nothing else needed.         Yadira HARE CMA ([facility])

## 2022-08-01 NOTE — TELEPHONE ENCOUNTER
Reason for call:  Other   Patient called regarding (reason for call): She needs the Neurology order faxed to [facility]  Additional comments:     Fax: [phone]    [email or web address]    Scheduling Line: [phone]  Scheduling/Referrals Fax: [phone]  Scheduling lines open: Mon - Fri (8am - 4:30pm)    Phone number to reach patient:  Cell number on file:    Telephone Information:   Mobile [phone]     Best Time:      Can we leave a detailed message on this number?  YES    Travel screening: Not Applicable

## 2022-08-04 DIAGNOSIS — I25.10 CORONARY ARTERY DISEASE INVOLVING NATIVE CORONARY ARTERY OF NATIVE HEART WITHOUT ANGINA PECTORIS: ICD-10-CM

## 2022-08-04 DIAGNOSIS — R07.9 CHEST PAIN, UNSPECIFIED TYPE: ICD-10-CM

## 2022-08-04 DIAGNOSIS — I10 HYPERTENSION, UNSPECIFIED TYPE: Primary | ICD-10-CM

## 2022-08-04 DIAGNOSIS — I42.9 CARDIOMYOPATHY (H): ICD-10-CM

## 2022-08-23 DIAGNOSIS — G89.4 CHRONIC PAIN SYNDROME: ICD-10-CM

## 2022-08-23 DIAGNOSIS — M79.7 FIBROMYALGIA: ICD-10-CM

## 2022-08-23 DIAGNOSIS — G24.3 SPASMODIC TORTICOLLIS: ICD-10-CM

## 2022-08-23 DIAGNOSIS — F11.90 CHRONIC, CONTINUOUS USE OF OPIOIDS: ICD-10-CM

## 2022-08-23 RX ORDER — HYDROCODONE BITARTRATE AND ACETAMINOPHEN 7.5; 325 MG/1; MG/1
2 TABLET ORAL 3 TIMES DAILY
Qty: 180 TABLET | Refills: 0 | Status: SHIPPED | OUTPATIENT
Start: 2022-08-23 | End: 2022-09-21

## 2022-09-21 DIAGNOSIS — M79.7 FIBROMYALGIA: ICD-10-CM

## 2022-09-21 DIAGNOSIS — G24.3 SPASMODIC TORTICOLLIS: ICD-10-CM

## 2022-09-21 DIAGNOSIS — G89.4 CHRONIC PAIN SYNDROME: ICD-10-CM

## 2022-09-21 DIAGNOSIS — F11.90 CHRONIC, CONTINUOUS USE OF OPIOIDS: ICD-10-CM

## 2022-09-21 RX ORDER — HYDROCODONE BITARTRATE AND ACETAMINOPHEN 7.5; 325 MG/1; MG/1
2 TABLET ORAL 3 TIMES DAILY
Qty: 180 TABLET | Refills: 0 | Status: SHIPPED | OUTPATIENT
Start: 2022-09-21 | End: 2022-10-21

## 2022-09-21 NOTE — TELEPHONE ENCOUNTER
Reason for Call:  Medication or medication refill:    Do you use a [facility]?  Name of the pharmacy and phone number for the current request:  [address]  Name of the medication requested: hydrocodin 7.5    Other request:     Can we leave a detailed message on this number? YES    Phone number patient can be reached at: Home number on file [phone] (home)    Best Time:     Call taken on 9/21/2022 at 7:56 AM by Angle Ledezma PTA

## 2022-09-21 NOTE — TELEPHONE ENCOUNTER
Controlled Substance Refill Request for HYDROcodone-acetaminophen (NORCO) 7.5-325 MG per tablet  Problem List Complete:  Yes

## 2022-09-22 ENCOUNTER — HOSPITAL ENCOUNTER (OUTPATIENT)
Dept: MRI IMAGING | Facility: HOSPITAL | Age: 72
Discharge: HOME | End: 2022-09-22
Attending: INTERNAL MEDICINE
Payer: MEDICARE

## 2022-09-22 VITALS — SYSTOLIC BLOOD PRESSURE: 141 MMHG | HEART RATE: 57 BPM | OXYGEN SATURATION: 94 % | DIASTOLIC BLOOD PRESSURE: 47 MMHG

## 2022-09-22 DIAGNOSIS — I10 HYPERTENSION, UNSPECIFIED TYPE: ICD-10-CM

## 2022-09-22 DIAGNOSIS — I42.9 CARDIOMYOPATHY (H): ICD-10-CM

## 2022-09-22 DIAGNOSIS — I25.10 CORONARY ARTERY DISEASE INVOLVING NATIVE CORONARY ARTERY OF NATIVE HEART WITHOUT ANGINA PECTORIS: ICD-10-CM

## 2022-09-22 DIAGNOSIS — R07.9 CHEST PAIN, UNSPECIFIED TYPE: ICD-10-CM

## 2022-09-22 LAB
ATRIAL RATE - MUSE: 50 BPM
ATRIAL RATE - MUSE: 55 BPM
DIASTOLIC BLOOD PRESSURE - MUSE: NORMAL MMHG
DIASTOLIC BLOOD PRESSURE - MUSE: NORMAL MMHG
INTERPRETATION ECG - MUSE: NORMAL
INTERPRETATION ECG - MUSE: NORMAL
P AXIS - MUSE: 72 DEGREES
P AXIS - MUSE: 75 DEGREES
PR INTERVAL - MUSE: 146 MS
PR INTERVAL - MUSE: 150 MS
QRS DURATION - MUSE: 90 MS
QRS DURATION - MUSE: 98 MS
QT - MUSE: 488 MS
QT - MUSE: 514 MS
QTC - MUSE: 466 MS
QTC - MUSE: 468 MS
R AXIS - MUSE: 100 DEGREES
R AXIS - MUSE: 92 DEGREES
SYSTOLIC BLOOD PRESSURE - MUSE: NORMAL MMHG
SYSTOLIC BLOOD PRESSURE - MUSE: NORMAL MMHG
T AXIS - MUSE: 54 DEGREES
T AXIS - MUSE: 57 DEGREES
VENTRICULAR RATE- MUSE: 50 BPM
VENTRICULAR RATE- MUSE: 55 BPM

## 2022-09-22 PROCEDURE — 250N000011 HC RX IP 250 OP 636: Performed by: INTERNAL MEDICINE

## 2022-09-22 PROCEDURE — 93010 ELECTROCARDIOGRAM REPORT: CPT | Mod: HOP | Performed by: INTERNAL MEDICINE

## 2022-09-22 PROCEDURE — 999N000122 MR MYOCARDIUM  OVERREAD

## 2022-09-22 PROCEDURE — A9585 GADOBUTROL INJECTION: HCPCS | Performed by: INTERNAL MEDICINE

## 2022-09-22 PROCEDURE — 255N000002 HC RX 255 OP 636: Performed by: INTERNAL MEDICINE

## 2022-09-22 PROCEDURE — 93005 ELECTROCARDIOGRAM TRACING: CPT

## 2022-09-22 PROCEDURE — G1010 CDSM STANSON: HCPCS

## 2022-09-22 PROCEDURE — 93016 CV STRESS TEST SUPVJ ONLY: CPT | Performed by: INTERNAL MEDICINE

## 2022-09-22 PROCEDURE — G1010 CDSM STANSON: HCPCS | Performed by: INTERNAL MEDICINE

## 2022-09-22 PROCEDURE — 75563 CARD MRI W/STRESS IMG & DYE: CPT | Mod: 26 | Performed by: INTERNAL MEDICINE

## 2022-09-22 PROCEDURE — 93018 CV STRESS TEST I&R ONLY: CPT | Performed by: INTERNAL MEDICINE

## 2022-09-22 RX ORDER — GADOBUTROL 604.72 MG/ML
16 INJECTION INTRAVENOUS ONCE
Status: COMPLETED | OUTPATIENT
Start: 2022-09-22 | End: 2022-09-22

## 2022-09-22 RX ORDER — REGADENOSON 0.08 MG/ML
0.4 INJECTION, SOLUTION INTRAVENOUS ONCE
Status: COMPLETED | OUTPATIENT
Start: 2022-09-22 | End: 2022-09-22

## 2022-09-22 RX ORDER — AMINOPHYLLINE 25 MG/ML
50 INJECTION, SOLUTION INTRAVENOUS
Status: DISCONTINUED | OUTPATIENT
Start: 2022-09-22 | End: 2022-09-22 | Stop reason: HOSPADM

## 2022-09-22 RX ADMIN — GADOBUTROL 16 ML: 604.72 INJECTION INTRAVENOUS at 11:01

## 2022-09-22 RX ADMIN — REGADENOSON 0.4 MG: 0.08 INJECTION, SOLUTION INTRAVENOUS at 10:49

## 2022-10-06 DIAGNOSIS — G24.3 SPASMODIC TORTICOLLIS: ICD-10-CM

## 2022-10-06 DIAGNOSIS — M79.7 FIBROMYALGIA: ICD-10-CM

## 2022-10-06 RX ORDER — BACLOFEN 10 MG/1
10 TABLET ORAL 4 TIMES DAILY
Qty: 360 TABLET | Refills: 0 | Status: SHIPPED | OUTPATIENT
Start: 2022-10-06 | End: 2023-01-03

## 2022-11-17 ENCOUNTER — TELEPHONE (OUTPATIENT)
Dept: INTERNAL MEDICINE | Facility: CLINIC | Age: 72
End: 2022-11-17

## 2022-11-17 DIAGNOSIS — G24.3 SPASMODIC TORTICOLLIS: ICD-10-CM

## 2022-11-17 DIAGNOSIS — G89.4 CHRONIC PAIN SYNDROME: ICD-10-CM

## 2022-11-17 DIAGNOSIS — F11.90 CHRONIC, CONTINUOUS USE OF OPIOIDS: ICD-10-CM

## 2022-11-17 DIAGNOSIS — M79.7 FIBROMYALGIA: ICD-10-CM

## 2022-11-17 NOTE — TELEPHONE ENCOUNTER
Patient has appointment scheduled with Gerber Linn on 12/23/2022  Requesting a refill on her Belinda Emery RN  [facility]

## 2022-11-18 RX ORDER — HYDROCODONE BITARTRATE AND ACETAMINOPHEN 7.5; 325 MG/1; MG/1
2 TABLET ORAL 3 TIMES DAILY
Qty: 180 TABLET | Refills: 0 | Status: SHIPPED | OUTPATIENT
Start: 2022-11-18 | End: 2022-12-19

## 2022-11-30 ENCOUNTER — TELEPHONE (OUTPATIENT)
Dept: INTERNAL MEDICINE | Facility: CLINIC | Age: 72
End: 2022-11-30

## 2022-11-30 ENCOUNTER — VIRTUAL VISIT (OUTPATIENT)
Dept: FAMILY MEDICINE | Facility: CLINIC | Age: 72
End: 2022-11-30
Payer: MEDICARE

## 2022-11-30 DIAGNOSIS — I25.10 CORONARY ARTERY DISEASE DUE TO LIPID RICH PLAQUE: ICD-10-CM

## 2022-11-30 DIAGNOSIS — Z72.0 TOBACCO USE: ICD-10-CM

## 2022-11-30 DIAGNOSIS — I10 HYPERTENSION GOAL BP (BLOOD PRESSURE) < 140/90: ICD-10-CM

## 2022-11-30 DIAGNOSIS — I25.83 CORONARY ARTERY DISEASE DUE TO LIPID RICH PLAQUE: ICD-10-CM

## 2022-11-30 DIAGNOSIS — U07.1 INFECTION DUE TO 2019 NOVEL CORONAVIRUS: Primary | ICD-10-CM

## 2022-11-30 DIAGNOSIS — G89.4 CHRONIC PAIN SYNDROME: ICD-10-CM

## 2022-11-30 PROCEDURE — 99443 PR PHYSICIAN TELEPHONE EVALUATION 21-30 MIN: CPT | Mod: 95 | Performed by: PHYSICIAN ASSISTANT

## 2022-11-30 NOTE — TELEPHONE ENCOUNTER
"Daughter, Isra calling. Consent to communicate is on file. States pt has covid like symptoms. Was exposed to daughter who is caregiver who had covid last week. Pt took an at home Covid test during the call and test came back positive. Symptoms started Monday night around 6pm. Was jerking, thrusting, couldn't complete full sentences and was not with it. Thinks the fever caused these symptoms. This has subsided. Daughter asked pt questions to r/o stroke and had no concerns for stroke. Temp went to 100.7 and was knocked out in bed. Every hour had to get her up to go to the bathroom and daughter had to assist with walking pt. Yesterday regained her strength. Slept most of the day yesterday. Today had no temp. O2 is still good. Has a deep cough when she needs to get the mucous out. Is taking her regular medications. Having regular BM's. Daughter is wondering if pt should take abx because of the mucous? Had pneumonia last year and was hospitalized. No wheezing. No difficulty breathing. Has an oximeter and O2 is at 93% HR 58. Is drinking fluids, staying hydrated. Recommended a virtual visit to discuss Covid treatment options. Appt scheduled with BE provider today.    Elizabet Rose RN  [facility]      Instructions for Patients  Your COVID-19 test was positive. This means you have the virus. Please follow the \"How can I take care of myself\" and isolation guidelines in these instructions.    What treatments are available?  Over-the-counter medicines may help with your symptoms such as runny or stuffy nose, cough, chills, and fever. Talk to your care team about your options.     Some people are at high risk for severe illness (for example if you have a weak immune system, you're 65 or older, or you have certain medical problems). If your risk is high and your symptoms started in the last 5 to 7 days, we strongly recommend for you to get COVID treatment as soon as possible. Paxlovid, Molnupiravir and " "the monoclonal antibody treatments are proven safe and effective, make you feel better faster, and prevent hospitalization and death.       These guidelines are for isolating and quarantining before returning to work, school or .     For employers, schools and day cares: This is an official notice for this person s medical guidelines for returning in-person.     For health care sites: The CDC gives different isolation and quarantine guidelines for healthcare sites, please check with these sites before arriving.     How do I self-isolate?  You isolate when you have symptoms of COVID or a test shows you have COVID, even if you don t have symptoms.     If you DO have symptoms:  o Stay home and away from others  - For at least 5 days after your symptoms started, AND   - You are fever free for 24 hours (with no medicine that reduces fever), AND  - Your other symptoms are better.  o Wear a mask for 10 full days any time you are around others.    If you DON T have symptoms:  o Stay at home and away from others for at least 5 days after your positive test.  o Wear a mask for 10 full days any time you are around others.    You can schedule an appointment to discuss COVID treatment by requesting an appointment on [place] by selecting \"schedule COVID-19 Treatment\" or by calling [facility] ([phone]).    What are the symptoms of COVID-19?  Symptoms can include fever, cough, shortness of breath, chills, headache, muscle pain sore throat, fatigue, runny or stuffy nose, and loss of taste and smell. Other less common symptoms include nausea, vomiting, or diarrhea (watery stools).    Know when to call 911. Emergency warning signs include:    Trouble breathing or shortness of breath    Pain or pressure in the chest that doesn't go away    Feeling confused like you haven't felt before, or not being able to wake up    Bluish-colored lips or face    How can I take care of myself?  1. Get lots of rest. Drink extra fluids " (unless a doctor has told you not to).  2. Take Tylenol (acetaminophen) for fever or pain. If you have liver or kidney problems, ask your family doctor if it's okay to take Tylenol   Adults:   650 mg (two 325 mg pills or tablets) every 4 to 6 hours, or...   1,000 mg (two 500 mg pills or tablets) every 8 hours as needed.  Note: Don't take more than 3,000 mg in one day. Acetaminophen is found in many medicines (both prescribed and over the counter). Read all labels to be sure you don't take too much.  For children, check the Tylenol bottle for the right dose. The dose is based on the child's age or weight.  3. Take over the counter medicines for your symptoms as needed. Talk to your pharmacist.  4. If you have other health problems (like cancer, heart failure, an organ transplant, or severe kidney disease): Call your specialty clinic if you don't feel better in the next 2 days.    These guidelines are for isolating and quarantining before returning to work, school or .     For employers, schools and day cares: This is an official notice for this person's medical guidelines for returning in-person.     For health care sites: The CDC gives different isolation and quarantine guidelines for healthcare sites, please check with these sites before arriving.     How do I self-isolate?  You isolate when you have symptoms of COVID or a test shows you have COVID, even if you don't have symptoms.     If you DO have symptoms:  o Stay home and away from others  - For at least 5 days after your symptoms started, AND   - You are fever free for 24 hours (with no medicine that reduces fever), AND  - Your other symptoms are better.  o Wear a mask for 10 full days any time you are around others.    If you DON'T have symptoms:  o Stay at home and away from others for at least 5 days after your positive test.  o Wear a mask for 10 full days any time you are around others.    How and when do I quarantine?  You quarantine when you may  have been exposed to the virus and DON'T have symptoms.     Stay home and away from others.     You must quarantine for 5 days after your last contact with a person who has COVID.  o Note: If you are fully vaccinated, you don't need to quarantine. You should still follow the steps below.     Wear a mask for 10 full days any time you're around others.    Get tested at least 5 days after you were exposed, even if you don't have symptoms.     If you start to have symptoms, isolate right away and get tested.    Where can I get more information?    [facility] COVID-19 Resource Hub: www.[email or web address].org/[email or web address]/     CDC Quarantine & Isolation: https://www.cdc.gov/coronavirus/2019-ncov/your-health/quarantine-isolation.html     [facility] - What to Do If You're Sick: https://www.cdc.gov/coronavirus/2019-ncov/if-you-are-sick/index.html    [facility] clinical trials (COVID-19 research studies): clinicalaffairs.[facility]/umn-clinical-trials    [place] Department of Health COVID-19 Public Hotline: 1-311.366.2075

## 2022-11-30 NOTE — PROGRESS NOTES
Carina is a 72 year old who is being evaluated via a billable telephone visit.      What phone number would you like to be contacted at? [phone]  How would you like to obtain your AVS? Mail a copy    Assessment & Plan     Infection due to 2019 novel coronavirus  Patient is a 72 year old female who presents to clinic due to symptoms of COVID-19 starting 2  day(s) ago with subsequent positive test results.  Patient is able to speak in full sentences-no signs of respiratory distress. Per CDC criteria, patient qualifies for prescribed treatment of COVID19 as patient meets high risk criteria. Discussed treatment options for COVID-19 as well as risks and benefits.  Patient elects to proceed with molnupiravir after full discussion of paxlovid and monoclonal antibodies as well.  She was quite concerned with trying to reduce her sleep and pain medication and prefers to not leave the house for the infusion.  Discussed home medications and possible interactions.  Also discussed OTC options for managing symptoms of COVID-19.  Daughter has questions about treating with zpack and prednisone, discussed this is a viral infection, therefore antibiotic is necessary. If she develops complications from covid, like a potential pneumonia, then we may consider the zpack.  Breathing is fine, no wheezing, therefore prednisone not indicated.     Return and urgent/emergent follow-up precautions provided.    - molnupiravir (LAGEVRIO) 200 MG capsule; Take 4 capsules (800 mg) by mouth every 12 hours      21 minutes spent on the date of the encounter doing chart review, history and exam, documentation and further activities per the note           Return in about 1 week (around 12/7/2022) for a recheck if symptoms do not improve.    Svetlana White PA-C  [facility] YUSEF Lim is a 72 year old accompanied by her daughter, presenting for the following health issues:  No chief complaint on  file.      HPI       COVID-19 Symptom Review  How many days ago did these symptoms start? Monday night    Are any of the following symptoms significant for you?    New or worsening difficulty breathing? Pt reports no but daughter says she checked her O2 levels and they were at 93%.     Worsening cough? Yes, I am coughing up mucus. Pt reports dark phlegm.     Fever or chills? Yes, the highest temperature was 100.7F Monday night and has since been within normal range.    Headache: YES- Headaches are a normal per pt, she has disc problems     Sore throat: No    Chest pain: No    Diarrhea: No    Body aches? YES    Still having some body aches.  At night, she is coughing a lot and getting a thick green mucus. No shortness of breath.      What treatments has patient tried? Pt takes one 500mg Tylenol before bed every night  Does patient live in a nursing home, group home, or shelter? No  Does patient have a way to get food/medications during quarantined? Yes, I have a friend or family member who can help me.            Review of Systems   Constitutional, HEENT, cardiovascular, pulmonary, gi and gu systems are negative, except as otherwise noted.      Objective           Vitals:  No vitals were obtained today due to virtual visit.    Physical Exam   healthy, alert and no distress  PSYCH: Alert and oriented times 3; coherent speech, normal   rate and volume, able to articulate logical thoughts, able   to abstract reason, no tangential thoughts, no hallucinations   or delusions  Her affect is normal  RESP: No cough, no audible wheezing, able to talk in full sentences  Remainder of exam unable to be completed due to telephone visits                Phone call duration: 21 minutes

## 2022-12-19 ENCOUNTER — TELEPHONE (OUTPATIENT)
Dept: FAMILY MEDICINE | Facility: CLINIC | Age: 72
End: 2022-12-19

## 2022-12-19 DIAGNOSIS — G89.4 CHRONIC PAIN SYNDROME: ICD-10-CM

## 2022-12-19 DIAGNOSIS — M79.7 FIBROMYALGIA: ICD-10-CM

## 2022-12-19 DIAGNOSIS — F11.90 CHRONIC, CONTINUOUS USE OF OPIOIDS: ICD-10-CM

## 2022-12-19 DIAGNOSIS — G24.3 SPASMODIC TORTICOLLIS: ICD-10-CM

## 2022-12-19 RX ORDER — HYDROCODONE BITARTRATE AND ACETAMINOPHEN 7.5; 325 MG/1; MG/1
2 TABLET ORAL 3 TIMES DAILY
Qty: 180 TABLET | Refills: 0 | Status: SHIPPED | OUTPATIENT
Start: 2022-12-19 | End: 2023-01-11

## 2022-12-19 NOTE — TELEPHONE ENCOUNTER
Called and left message stating medication was sent to pharmacy.    Pt due for medicare physical    Shaina Bang MA

## 2022-12-19 NOTE — TELEPHONE ENCOUNTER
Patient dtr, Isra, called asking for clarification on further refills of patients pain meds. Per dtr message was left saying that pt cannot have refills until she completes a physical. However, dtr was unable to get pt in sooner than 1/27/23. When reviewing note from today regarding pain med refill it does not state that she cannot get meds until physical complete. pts dtr understanding.     Thanks,  PORSHA Alanis  [facility]

## 2022-12-19 NOTE — TELEPHONE ENCOUNTER
Reason for Call:  Medication or medication refill:    Do you use a [facility]?  Name of the pharmacy and phone number for the current request:  [facility] [address]     Name of the medication requested: Hydrocodone    Other request: apt 12/23 but is out of pills    Can we leave a detailed message on this number? YES    Phone number patient can be reached at: Home number on file [phone] (home)    Best Time: any     Call taken on 12/19/2022 at 7:18 AM by Reyna Zee

## 2022-12-23 ENCOUNTER — VIRTUAL VISIT (OUTPATIENT)
Dept: INTERNAL MEDICINE | Facility: CLINIC | Age: 72
End: 2022-12-23
Payer: MEDICARE

## 2022-12-23 DIAGNOSIS — F11.90 CHRONIC, CONTINUOUS USE OF OPIOIDS: Primary | ICD-10-CM

## 2022-12-23 PROCEDURE — 99441 PR PHYSICIAN TELEPHONE EVALUATION 5-10 MIN: CPT | Mod: 95 | Performed by: INTERNAL MEDICINE

## 2022-12-23 ASSESSMENT — ANXIETY QUESTIONNAIRES
IF YOU CHECKED OFF ANY PROBLEMS ON THIS QUESTIONNAIRE, HOW DIFFICULT HAVE THESE PROBLEMS MADE IT FOR YOU TO DO YOUR WORK, TAKE CARE OF THINGS AT HOME, OR GET ALONG WITH OTHER PEOPLE: NOT DIFFICULT AT ALL
5. BEING SO RESTLESS THAT IT IS HARD TO SIT STILL: NOT AT ALL
2. NOT BEING ABLE TO STOP OR CONTROL WORRYING: NOT AT ALL
GAD7 TOTAL SCORE: 0
4. TROUBLE RELAXING: NOT AT ALL
3. WORRYING TOO MUCH ABOUT DIFFERENT THINGS: NOT AT ALL
1. FEELING NERVOUS, ANXIOUS, OR ON EDGE: NOT AT ALL
GAD7 TOTAL SCORE: 0
7. FEELING AFRAID AS IF SOMETHING AWFUL MIGHT HAPPEN: NOT AT ALL
6. BECOMING EASILY ANNOYED OR IRRITABLE: NOT AT ALL

## 2022-12-23 NOTE — PROGRESS NOTES
Carina is a 72 year old who is being evaluated via a billable telephone visit.      What phone number would you like to be contacted at? [phone]  How would you like to obtain your AVS? Mail a copy    Distant Location (provider location):  On-site    Assessment & Plan     Chronic, continuous use of opioids  Today was the obligatory 4 month check-in for the continued chronic oral opioid therapy this patient has had ongoing for approximately 20 years. She has had no red flag behaviors and no acceleration of use. Current with Chronic Pain Contract and Opioid pain medication are high risk though benefits outweigh the risks. The medication relieves at least 50% of the pain and increases the patients daily activity and functional level. There have been no aberrant drug-related behaviors and the patient is generally on track with the prescribed amount.  has been reviewed and is appropriate. The patient is adhering to the entire program of treatment as recommended.         Prescription drug management  10 minutes spent on the date of the encounter doing chart review, history and exam, documentation and further activities per the note      No follow-ups on file. - has appointment for complete physical exam next month     Gerber Jain MD  [facility]    Demarcus Lim is a 72 year old presenting for the following health issues:  Recheck Medication  3:19 PM   3:28 PM     HPI     Medication Followup of All Med    Taking Medication as prescribed: yes    Side Effects:  None    Medication Helping Symptoms:  yes    Patient feels things are going ok. Just got over a diagnosis of Coronavirus (COVID-19) , she got it bad. No need for hospitalization but she did take a prescription for molnupiravir .       Review of Systems   Constitutional, HEENT, cardiovascular, pulmonary, gi and gu systems are negative, except as otherwise noted.      Objective           Vitals:  No vitals were obtained today due  to virtual visit.    Physical Exam   healthy, alert and no distress  PSYCH: Alert and oriented times 3; coherent speech, normal   rate and volume, able to articulate logical thoughts, able   to abstract reason, no tangential thoughts, no hallucinations   or delusions  Her affect is normal  RESP: No cough, no audible wheezing, able to talk in full sentences  Remainder of exam unable to be completed due to telephone visits              Phone call duration: 10  minutes

## 2023-01-03 DIAGNOSIS — M79.7 FIBROMYALGIA: ICD-10-CM

## 2023-01-03 DIAGNOSIS — G24.3 SPASMODIC TORTICOLLIS: ICD-10-CM

## 2023-01-03 RX ORDER — BACLOFEN 10 MG/1
10 TABLET ORAL 4 TIMES DAILY
Qty: 360 TABLET | Refills: 0 | Status: SHIPPED | OUTPATIENT
Start: 2023-01-03 | End: 2023-03-27

## 2023-01-11 ENCOUNTER — TELEPHONE (OUTPATIENT)
Dept: FAMILY MEDICINE | Facility: CLINIC | Age: 73
End: 2023-01-11

## 2023-01-11 DIAGNOSIS — F11.90 CHRONIC, CONTINUOUS USE OF OPIOIDS: ICD-10-CM

## 2023-01-11 DIAGNOSIS — G24.3 SPASMODIC TORTICOLLIS: ICD-10-CM

## 2023-01-11 DIAGNOSIS — I21.4 NSTEMI (NON-ST ELEVATED MYOCARDIAL INFARCTION) (H): ICD-10-CM

## 2023-01-11 DIAGNOSIS — M79.7 FIBROMYALGIA: ICD-10-CM

## 2023-01-11 DIAGNOSIS — I10 HYPERTENSION GOAL BP (BLOOD PRESSURE) < 140/90: ICD-10-CM

## 2023-01-11 DIAGNOSIS — G89.4 CHRONIC PAIN SYNDROME: ICD-10-CM

## 2023-01-11 RX ORDER — HYDROCODONE BITARTRATE AND ACETAMINOPHEN 7.5; 325 MG/1; MG/1
2 TABLET ORAL 3 TIMES DAILY
Qty: 30 TABLET | Refills: 0 | Status: SHIPPED | OUTPATIENT
Start: 2023-01-11 | End: 2023-01-16

## 2023-01-11 NOTE — TELEPHONE ENCOUNTER
Pt calling regarding her hydrocodone script stating that she only got 150 tablets since that is all the pharmacy     [facility] [employer] [facility] [place] to confirm     [facility] confirms this to be true that they were only able to fill the script for 150 tablets .     Routing to PCP to address this    Yeni Gutierres RN  [facility]

## 2023-01-11 NOTE — TELEPHONE ENCOUNTER
Message reviewed , orders signed , you can close this encounter  Otherwise otherwise Reroute if additional input requested from me     Gerber Jain MD

## 2023-01-12 RX ORDER — METOPROLOL TARTRATE 50 MG
TABLET ORAL
Qty: 180 TABLET | Refills: 0 | Status: SHIPPED | OUTPATIENT
Start: 2023-01-12 | End: 2023-04-10

## 2023-01-12 NOTE — TELEPHONE ENCOUNTER
Patient called on status of medication refill (additional 30 tablets since pharmacy did not complete full refill). Writer stated that prescription had been sent to pharmacy for 30 additional tabs of [place] and pharmacy confirmed receipt, patient stated understanding.    MEAGAN Jiménez RN  [facility]

## 2023-01-16 DIAGNOSIS — G89.4 CHRONIC PAIN SYNDROME: ICD-10-CM

## 2023-01-16 DIAGNOSIS — M79.7 FIBROMYALGIA: ICD-10-CM

## 2023-01-16 DIAGNOSIS — G24.3 SPASMODIC TORTICOLLIS: ICD-10-CM

## 2023-01-16 DIAGNOSIS — F11.90 CHRONIC, CONTINUOUS USE OF OPIOIDS: ICD-10-CM

## 2023-01-16 NOTE — TELEPHONE ENCOUNTER
Dr. Jain,     Patient called for refill on Hydrocodone.     Per pt has ran out of the 30 pills given on 1/11/23, see encounter on this date. From what I can tell she is due for refills. Cued. Hoping to get today as she is completely out.     [phone], ok for detailed voice mails.     Thanks,  PORSHA Alanis  [facility]

## 2023-01-17 RX ORDER — HYDROCODONE BITARTRATE AND ACETAMINOPHEN 7.5; 325 MG/1; MG/1
2 TABLET ORAL 3 TIMES DAILY
Qty: 180 TABLET | Refills: 0 | Status: SHIPPED | OUTPATIENT
Start: 2023-01-17 | End: 2023-02-16

## 2023-01-27 ENCOUNTER — OFFICE VISIT (OUTPATIENT)
Dept: INTERNAL MEDICINE | Facility: CLINIC | Age: 73
End: 2023-01-27
Payer: MEDICARE

## 2023-01-27 VITALS
TEMPERATURE: 97.5 F | WEIGHT: 127.2 LBS | RESPIRATION RATE: 14 BRPM | DIASTOLIC BLOOD PRESSURE: 66 MMHG | HEART RATE: 52 BPM | BODY MASS INDEX: 21.72 KG/M2 | OXYGEN SATURATION: 96 % | SYSTOLIC BLOOD PRESSURE: 124 MMHG | HEIGHT: 64 IN

## 2023-01-27 DIAGNOSIS — Z00.00 ENCOUNTER FOR SUBSEQUENT ANNUAL WELLNESS VISIT (AWV) IN MEDICARE PATIENT: Primary | ICD-10-CM

## 2023-01-27 DIAGNOSIS — D69.6 THROMBOCYTOPENIA (H): ICD-10-CM

## 2023-01-27 DIAGNOSIS — L60.0 INGROWN TOENAIL OF RIGHT FOOT: ICD-10-CM

## 2023-01-27 DIAGNOSIS — G62.9 NEUROPATHY: ICD-10-CM

## 2023-01-27 PROCEDURE — G0438 PPPS, INITIAL VISIT: HCPCS | Performed by: INTERNAL MEDICINE

## 2023-01-27 ASSESSMENT — ANXIETY QUESTIONNAIRES
IF YOU CHECKED OFF ANY PROBLEMS ON THIS QUESTIONNAIRE, HOW DIFFICULT HAVE THESE PROBLEMS MADE IT FOR YOU TO DO YOUR WORK, TAKE CARE OF THINGS AT HOME, OR GET ALONG WITH OTHER PEOPLE: SOMEWHAT DIFFICULT
5. BEING SO RESTLESS THAT IT IS HARD TO SIT STILL: NOT AT ALL
2. NOT BEING ABLE TO STOP OR CONTROL WORRYING: NOT AT ALL
4. TROUBLE RELAXING: NOT AT ALL
7. FEELING AFRAID AS IF SOMETHING AWFUL MIGHT HAPPEN: NOT AT ALL
GAD7 TOTAL SCORE: 0
8. IF YOU CHECKED OFF ANY PROBLEMS, HOW DIFFICULT HAVE THESE MADE IT FOR YOU TO DO YOUR WORK, TAKE CARE OF THINGS AT HOME, OR GET ALONG WITH OTHER PEOPLE?: SOMEWHAT DIFFICULT
7. FEELING AFRAID AS IF SOMETHING AWFUL MIGHT HAPPEN: NOT AT ALL
1. FEELING NERVOUS, ANXIOUS, OR ON EDGE: NOT AT ALL
6. BECOMING EASILY ANNOYED OR IRRITABLE: NOT AT ALL
GAD7 TOTAL SCORE: 0
3. WORRYING TOO MUCH ABOUT DIFFERENT THINGS: NOT AT ALL

## 2023-01-27 ASSESSMENT — ENCOUNTER SYMPTOMS
SHORTNESS OF BREATH: 0
DYSURIA: 0
CONSTIPATION: 0
PALPITATIONS: 0
SORE THROAT: 0
ABDOMINAL PAIN: 0
NAUSEA: 0
EYE PAIN: 0
HEMATOCHEZIA: 0
BREAST MASS: 0
ARTHRALGIAS: 1
COUGH: 0
MYALGIAS: 1
CHILLS: 0
DIZZINESS: 0
JOINT SWELLING: 0
DIARRHEA: 0
FEVER: 0
PARESTHESIAS: 0
WEAKNESS: 1
NERVOUS/ANXIOUS: 0
HEADACHES: 1
HEARTBURN: 1
HEMATURIA: 0
FREQUENCY: 0

## 2023-01-27 ASSESSMENT — ACTIVITIES OF DAILY LIVING (ADL)
CURRENT_FUNCTION: TRANSPORTATION REQUIRES ASSISTANCE
CURRENT_FUNCTION: LAUNDRY REQUIRES ASSISTANCE
CURRENT_FUNCTION: BATHING REQUIRES ASSISTANCE
CURRENT_FUNCTION: PREPARING MEALS REQUIRES ASSISTANCE
CURRENT_FUNCTION: SHOPPING REQUIRES ASSISTANCE
CURRENT_FUNCTION: HOUSEWORK REQUIRES ASSISTANCE

## 2023-01-27 NOTE — PROGRESS NOTES
"SUBJECTIVE:   Carina is a 72 year old who presents for Preventive Visit.    Patient has been advised of split billing requirements and indicates understanding: Yes    Are you in the first 12 months of your Medicare coverage?  No    Healthy Habits:     In general, how would you rate your overall health?  Fair    Frequency of exercise:  None    Do you usually eat at least 4 servings of fruit and vegetables a day, include whole grains    & fiber and avoid regularly eating high fat or \"junk\" foods?  No    Taking medications regularly:  Yes    Medication side effects:  None    Ability to successfully perform activities of daily living:  Transportation requires assistance, shopping requires assistance, preparing meals requires assistance, housework requires assistance, bathing requires assistance and laundry requires assistance    Home Safety:  No safety concerns identified    Hearing Impairment:  Difficulty following a conversation in a noisy restaurant or crowded room    In the past 6 months, have you been bothered by leaking of urine?  No    In general, how would you rate your overall mental or emotional health?  Fair      PHQ-2 Total Score: 2    Additional concerns today:  Yes    Fibromyalgia syndrome and arthritis issues , pushes her into only describing her own health as fair and not good or excellent     Have you ever done Advance Care Planning? (For example, a Health Directive, POLST, or a discussion with a medical provider or your loved ones about your wishes): No, advance care planning information given to patient to review.  Advanced care planning was discussed at today's visit.   patient not interested in kiwi ./ advanced care planning healthcare directive   Fall risk  Fallen 2 or more times in the past year?: No  Any fall with injury in the past year?: No    Cognitive Screening   1) Repeat 3 items (Leader, Season, Table)    2) Clock draw: NORMAL  3) 3 item recall: Recalls 3 objects  Results: 3 items " recalled: COGNITIVE IMPAIRMENT LESS LIKELY    Mini-CogTM Copyright ALPESH Macedo. Licensed by the author for use in [facility]; reprinted with permission (ronne@[facility]). All rights reserved.          Reviewed and updated as needed this visit by clinical staff   Tobacco  Allergies  Meds              Reviewed and updated as needed this visit by Provider                 Social History     Tobacco Use     Smoking status: Former     Packs/day: 0.20     Years: 15.00     Pack years: 3.00     Types: Cigarettes     Smokeless tobacco: Never     Tobacco comments:     4 cigs a day, just lost    Substance Use Topics     Alcohol use: No       Current providers sharing in care for this patient include:   Patient Care Team:  Gerber Jain MD as PCP - General  Gerber Jain MD as Assigned PCP  Luis E Echevarria DO as MD (Cardiovascular Disease)  Luis E Echevarria DO as Assigned Heart and Vascular Provider  Gerber Jain MD as Assigned Pain Medication Provider    The following health maintenance items are reviewed in Epic and correct as of today:  Health Maintenance   Topic Date Due     COVID-19 Vaccine (1) Never done     Pneumococcal Vaccine: 65+ Years (1 - PCV) Never done     ZOSTER IMMUNIZATION (1 of 2) Never done     LUNG CANCER SCREENING  Never done     DTAP/TDAP/TD IMMUNIZATION (1 - Tdap) 12/09/2004     COLORECTAL CANCER SCREENING  12/23/2011     MEDICARE ANNUAL WELLNESS VISIT  03/14/2017     MAMMO SCREENING  02/15/2020     DEXA  02/15/2021     INFLUENZA VACCINE (1) 09/01/2022     URINE DRUG SCREEN  10/21/2022     ANNUAL REVIEW OF HM ORDERS  10/21/2022     PHQ-9  06/30/2023     TREATMENT AGREEMENT FOR CHRONIC PAIN MANAGEMENT  07/28/2023     TRUNG ASSESSMENT  01/27/2024     FALL RISK ASSESSMENT  01/27/2024     ADVANCE CARE PLANNING  02/24/2027     LIPID  05/26/2027     HEPATITIS C SCREENING  Completed     MIGRAINE ACTION PLAN  Completed     PHQ-2 (once per calendar year)  Completed     IPV  IMMUNIZATION  Aged Out     MENINGITIS IMMUNIZATION  Aged Out     Lab work is in process  Labs reviewed in EPIC  BP Readings from Last 3 Encounters:   01/27/23 124/66   09/22/22 (!) 141/47   07/28/22 126/64    Wt Readings from Last 3 Encounters:   01/27/23 57.7 kg (127 lb 3.2 oz)   07/28/22 58.5 kg (129 lb)   07/22/22 57.8 kg (127 lb 8 oz)                  Patient Active Problem List   Diagnosis     Neuropathy     Fibromyalgia     Restless leg syndrome     Spasmodic torticollis     Insomnia     Hyperlipidemia with target LDL less than 100     History of hepatitis C     Ex-smoker     Recurrent cold sores     Osteopenia     Migraine headache     Positive occult stool blood test     Subclinical hyperthyroidism     Hypovitaminosis D     Cramp of limb     NSTEMI (non-ST elevated myocardial infarction) (H)     Chronic pain     Hypertension goal BP (blood pressure) < 140/90     Cervical facet joint syndrome     Primary insomnia     Tobacco use     Thrombocytopenia (H)     Chronic, continuous use of opioids     Coronary artery disease due to lipid rich plaque     Past Surgical History:   Procedure Laterality Date     HERNIA REPAIR  9/2009    ventral     LITHOTRIPSY      2 x      TONSILLECTOMY       TUBAL LIGATION         Social History     Tobacco Use     Smoking status: Former     Packs/day: 0.20     Years: 15.00     Pack years: 3.00     Types: Cigarettes     Smokeless tobacco: Never     Tobacco comments:     4 cigs a day, just lost    Substance Use Topics     Alcohol use: No     Family History   Problem Relation Age of Onset     Neurologic Disorder Mother 65        Parkinsons     Alcohol/Drug Mother      Cerebrovascular Disease Mother      Respiratory Father         COPD     Alcohol/Drug Father      Cancer Brother      Diabetes Sister 52     Cancer Maternal Aunt      Cancer Maternal Uncle      C.A.D. No family hx of          Current Outpatient Medications   Medication Sig Dispense Refill     aspirin 81 MG tablet  Take 81 mg by mouth daily       baclofen (LIORESAL) 10 MG tablet TAKE 1 TABLET (10 MG) BY MOUTH 4 TIMES DAILY 360 tablet 0     Cholecalciferol (VITAMIN D) 2000 UNITS tablet Take 1 tablet by mouth daily       gabapentin (NEURONTIN) 300 MG capsule TAKE 1 CAPSULE BY MOUTH IN THE MORNING, 1 AT NOON 1 AT 5PM AND 2 AT BEDTIME ( 5 PER DAY ) 450 capsule 1     HYDROcodone-acetaminophen (NORCO) 7.5-325 MG per tablet Take 2 tablets by mouth 3 times daily 28 days or more between refills for controlled medications 180 tablet 0     ibuprofen (ADVIL/MOTRIN) 200 MG tablet Take 200 mg by mouth 3 times daily Takes with hydrocodone-acetaminophen       lactobacillus rhamnosus, GG, (CULTURELL) capsule Take 1 capsule by mouth 2 times daily (before meals) Lunch and supper       Melatonin 10 MG CAPS Take 20 mg by mouth At Bedtime 1 hour prior to bedtime       metoprolol tartrate (LOPRESSOR) 50 MG tablet TAKE 1 TABLET BY MOUTH TWICE A  tablet 0     nitroGLYcerin (NITROSTAT) 0.4 MG sublingual tablet Place 1 tablet (0.4 mg) under the tongue every 5 minutes as needed for chest pain 25 tablet 2     omeprazole (PRILOSEC OTC) 20 MG EC tablet Take 20 mg by mouth 2 times daily       polyethylene glycol (MIRALAX/GLYCOLAX) powder Take 17 g by mouth daily Reported on 5/15/2017       rosuvastatin (CRESTOR) 10 MG tablet Take 1 tablet (10 mg) by mouth daily 90 tablet 3     traZODone (DESYREL) 100 MG tablet Take 3 tablets (300 mg) by mouth At Bedtime 270 tablet 5     VITAMIN B-12 1000 MCG OR TABS 1 tablet daily       Allergies   Allergen Reactions     Diagnostic X-Ray Materials Anaphylaxis and Hives     Unable to breathe     Diatrizoate Hives     Hypaque     Droperidol      Ivp Dye [Contrast Dye]      Recent Labs   Lab Test 07/20/22  1859 05/26/22  1505 04/08/21  1711 02/04/20  1117 11/08/18  1308 11/14/17  1450 10/18/16  1532   LDL  --  39 40 40   < > 30 50   HDL  --  43* 51 57   < > 57 42*   TRIG  --  72 77 63   < > 103  --    ALT  --   --   --    "--   --  23 29   CR 0.80 0.79 0.93 0.67   < > 0.67 0.69   GFRESTIMATED 79 80 63 >90   < > 88 85   GFRESTBLACK  --   --  73 >90   < > >90 >90  African American GFR Calc     POTASSIUM 4.2 4.4 4.5 4.1   < > 4.3 4.7   TSH  --  1.20 2.36 1.91   < > 1.03 1.84    < > = values in this interval not displayed.              Review of Systems   Constitutional: Negative for chills and fever.   HENT: Negative for congestion, ear pain, hearing loss and sore throat.    Eyes: Negative for pain and visual disturbance.   Respiratory: Negative for cough and shortness of breath.    Cardiovascular: Negative for chest pain, palpitations and peripheral edema.   Gastrointestinal: Positive for heartburn. Negative for abdominal pain, constipation, diarrhea, hematochezia and nausea.   Breasts:  Negative for tenderness, breast mass and discharge.   Genitourinary: Positive for vaginal discharge. Negative for dysuria, frequency, genital sores, hematuria, pelvic pain, urgency and vaginal bleeding.   Musculoskeletal: Positive for arthralgias and myalgias. Negative for joint swelling.   Skin: Negative for rash.   Neurological: Positive for weakness and headaches. Negative for dizziness and paresthesias.   Psychiatric/Behavioral: Negative for mood changes. The patient is not nervous/anxious.      Constitutional, HEENT, cardiovascular, pulmonary, gi and gu systems are negative, except as otherwise noted.    OBJECTIVE:   /66   Pulse 52   Temp 97.5  F (36.4  C) (Temporal)   Resp 14   Ht 1.626 m (5' 4\")   Wt 57.7 kg (127 lb 3.2 oz)   SpO2 96%   BMI 21.83 kg/m   Estimated body mass index is 21.83 kg/m  as calculated from the following:    Height as of this encounter: 1.626 m (5' 4\").    Weight as of this encounter: 57.7 kg (127 lb 3.2 oz).  Physical Exam  GENERAL: healthy, alert and no distress, diminutive woman   EYES: Eyes grossly normal to inspection, PERRL and conjunctivae and sclerae normal  HENT: ear canals and TM's normal, nose and " "mouth without ulcers or lesions  NECK: no adenopathy, no asymmetry, masses, or scars and thyroid normal to palpation  RESP: lungs clear to auscultation - no rales, rhonchi or wheezes  BREAST: normal   CV: regular rate and rhythm, normal S1 S2, no S3 or S4, no murmur, click or rub, no peripheral edema and peripheral pulses strong  ABDOMEN: soft, nontender, no hepatosplenomegaly, no masses and bowel sounds normal  MS: no gross musculoskeletal defects noted, no edema  SKIN: no suspicious lesions or rashes  NEURO: Normal strength and tone, mentation intact and speech normal  PSYCH: mentation appears normal, affect normal/bright    Diagnostic Test Results:  Labs reviewed in Epic  Orders Placed This Encounter   Procedures     Orthopedic  Referral         ASSESSMENT / PLAN:     (Z00.00) Encounter for subsequent annual wellness visit (AWV) in Medicare patient  (primary encounter diagnosis)  Comment: today was scheduled as an annual wellness visit but patient entirely eschews preventative health measures / healthcare maintenance and has essentially zero interest in any of these options including declining all immunizations, DEXA scan, mammograms, other other items on Healthcare maintenance section / Healthcare Gaps .   Plan: see orders section of this encounter     (L60.0) Ingrown toenail of right foot  Comment: this is a separate matter, her right great toe has a painful toenail that is digging into the flesh of her toe. There's no truly concerning level of inflammation, no signs or symptoms  Of actual infection. But she wants to see a \" foot specialist\" and this is a reasonable request   Plan: Orthopedic  Referral            (G62.9) Neuropathy  Comment: an added component to be addressed during follow up visit with Podiatrist   Plan:     (D69.6) Thrombocytopenia (H)  Comment: problem is stable and ongoing monitoring    Plan: we agreed to wait for any laboratory monitoring until we see her back for further " follow up in 4 months     Patient has been advised of split billing requirements and indicates understanding: Yes      COUNSELING:  Reviewed preventive health counseling, as reflected in patient instructions        She reports that she has quit smoking. Her smoking use included cigarettes. She has a 3.00 pack-year smoking history. She has never used smokeless tobacco.      Appropriate preventive services were discussed with this patient, including applicable screening as appropriate for cardiovascular disease, diabetes, osteopenia/osteoporosis, and glaucoma.  As appropriate for age/gender, discussed screening for colorectal cancer, prostate cancer, breast cancer, and cervical cancer. Checklist reviewing preventive services available has been given to the patient.    Reviewed patients plan of care and provided an AVS. The Basic Care Plan (routine screening as documented in Health Maintenance) for Carina meets the Care Plan requirement. This Care Plan has been established and reviewed with the Patient.      Gerber Jain MD  [facility]    Identified Health Risks:  Answers for HPI/ROS submitted by the patient on 1/27/2023  TRUNG 7 TOTAL SCORE: 0

## 2023-01-27 NOTE — LETTER
Opioid / Opioid Plus Controlled Substance Agreement    This is an agreement between you and your provider about the safe and appropriate use of controlled substance/opioids prescribed by your care team. Controlled substances are medicines that can cause physical and mental dependence (abuse).    There are strict laws about having and using these medicines. We here at [facility] are committing to working with you in your efforts to get better. To support you in this work, we ll help you schedule regular office appointments for medicine refills. If we must cancel or change your appointment for any reason, we ll make sure you have enough medicine to last until your next appointment.     As a Provider, I will:    Listen carefully to your concerns and treat you with respect.     Recommend a treatment plan that I believe is in your best interest. This plan may involve therapies other than opioid pain medication.     Talk with you often about the possible benefits, and the risk of harm of any medicine that we prescribe for you.     Provide a plan on how to taper (discontinue or go off) using this medicine if the decision is made to stop its use.    As a Patient, I understand that opioid(s):     Are a controlled substance prescribed by my care team to help me function or work and manage my condition(s).     Are strong medicines and can cause serious side effects such as:    Drowsiness, which can seriously affect my driving ability    A lower breathing rate, enough to cause death    Harm to my thinking ability     Depression     Abuse of and addiction to this medicine    Need to be taken exactly as prescribed. Combining opioids with certain medicines or chemicals (such as illegal drugs, sedatives, sleeping pills, and benzodiazepines) can be dangerous or even fatal. If I stop opioids suddenly, I may have severe withdrawal symptoms.    Do not work for all types of pain nor for all patients. If they re not helpful, I may  be asked to stop them.        The risks, benefits and side effects of these medicine(s) were explained to me. I agree that:  1. I will take part in other treatments as advised by my care team. This may be psychiatry or counseling, physical therapy, behavioral therapy, group treatment or a referral to a specialist.     2. I will keep all my appointments. I understand that this is part of the monitoring of opioids. My care team may require an office visit for EVERY opioid/controlled substance refill. If I miss appointments or don t follow instructions, my care team may stop my medicine.    3. I will take my medicines as prescribed. I will not change the dose or schedule unless my care team tells me to. There will be no refills if I run out early.     4. I may be asked to come to the clinic and complete a urine drug test or complete a pill count at any time. If I don t give a urine sample or participate in a pill count, the care team may stop my medicine.    5. I will only receive prescriptions from this clinic for chronic pain. If I am treated by another provider for acute pain issues, I will tell them that I am taking opioid pain medication for chronic pain and that I have a treatment agreement with this provider. I will inform my [facility] care team within one business day if I am given a prescription for any pain medication by another healthcare provider. My [facility] care team can contact other providers and pharmacists about my use of any medicines.    6. It is up to me to make sure that I don t run out of my medicines on weekends or holidays. If my care team is willing to refill my opioid prescription without a visit, I must request refills only during office hours. Refills may take up to 3 business days to process. I will use one pharmacy to fill all my opioid and other controlled substance prescriptions. I will notify the clinic about any changes to my insurance or medication  availability.    7. I am responsible for my prescriptions. If the medicine/prescription is lost, stolen or destroyed, it will not be replaced. I also agree not to share controlled substance medicines with anyone.    8. I am aware I should not use any illegal or recreational drugs. I agree not to drink alcohol unless my care team says I can.       9. If I enroll in the [place] [facility] program, I will tell my care team prior to my next refill.     10. I will tell my care team right away if I become pregnant, have a new medical problem treated outside of my regular clinic, or have a change in my medications.    11. I understand that this medicine can affect my thinking, judgment and reaction time. Alcohol and drugs affect the brain and body, which can affect the safety of my driving. Being under the influence of alcohol or drugs can affect my decision-making, behaviors, personal safety, and the safety of others. Driving while impaired (DWI) can occur if a person is driving, operating, or in physical control of a car, motorcycle, boat, snowmobile, ATV, motorbike, off-road vehicle, or any other motor vehicle ([place] Statute 169A.20). I understand the risk if I choose to drive or operate any vehicle or machinery.    I understand that if I do not follow any of the conditions above, my prescriptions or treatment may be stopped or changed.          Opioids  What You Need to Know    What are opioids?   Opioids are pain medicines that must be prescribed by a doctor. They are also known as narcotics.     Examples are:   1. morphine (MS Contin, Мария)  2. oxycodone (Oxycontin)  3. oxycodone and acetaminophen (Percocet)  4. hydrocodone and acetaminophen (Vicodin, Norco)   5. fentanyl patch (Duragesic)   6. hydromorphone (Dilaudid)   7. methadone  8. codeine (Tylenol #3)     What do opioids do well?   Opioids are best for severe short-term pain such as after a surgery or injury. They may work well for cancer pain. They may  help some people with long-lasting (chronic) pain.     What do opioids NOT do well?   Opioids never get rid of pain entirely, and they don t work well for most patients with chronic pain. Opioids don t reduce swelling, one of the causes of pain.                                    Other ways to manage chronic pain and improve function include:       Treat the health problem that may be causing pain    Anti-inflammation medicines, which reduce swelling and tenderness, such as ibuprofen (Advil, Motrin) or naproxen (Aleve)    Acetaminophen (Tylenol)    Antidepressants and anti-seizure medicines, especially for nerve pain    Topical treatments such as patches or creams    Injections or nerve blocks    Chiropractic or osteopathic treatment    Acupuncture, massage, deep breathing, meditation, visual imagery, aromatherapy    Use heat or ice at the pain site    Physical therapy     Exercise    Stop smoking    Take part in therapy       Risks and side effects     Talk to your doctor before you start or decide to keep taking opioids. Possible side effects include:      Lowering your breathing rate enough to cause death    Overdose, including death, especially if taking higher than prescribed doses    Worse depression symptoms; less pleasure in things you usually enjoy    Feeling tired or sluggish    Slower thoughts or cloudy thinking    Being more sensitive to pain over time; pain is harder to control    Trouble sleeping or restless sleep    Changes in hormone levels (for example, less testosterone)    Changes in sex drive or ability to have sex    Constipation    Unsafe driving    Itching and sweating    Dizziness    Nausea, throwing up and dry mouth    What else should I know about opioids?    Opioids may lead to dependence, tolerance, or addiction.      Dependence means that if you stop or reduce the medicine too quickly, you will have withdrawal symptoms. These include loose poop (diarrhea), jitters, flu-like symptoms,  nervousness and tremors. Dependence is not the same as addiction.                       Tolerance means needing higher doses over time to get the same effect. This may increase the chance of serious side effects.      Addiction is when people improperly use a substance that harms their body, their mind or their relations with others. Use of opiates can cause a relapse of addiction if you have a history of drug or alcohol abuse.      People who have used opioids for a long time may have a lower quality of life, worse depression, higher levels of pain and more visits to doctors.    You can overdose on opioids. Take these steps to lower your risk of overdose:    1. Recognize the signs:  Signs of overdose include decrease or loss of consciousness (blackout), slowed breathing, trouble waking up and blue lips. If someone is worried about overdose, they should call 911.    2. Talk to your doctor about Narcan (naloxone).   If you are at risk for overdose, you may be given a prescription for Narcan. This medicine very quickly reverses the effects of opioids.   If you overdose, a friend or family member can give you Narcan while waiting for the ambulance. They need to know the signs of overdose and how to give Narcan.     3. Don't use alcohol or street drugs.   Taking them with opioids can cause death.    4. Do not take any of these medicines unless your doctor says it s OK. Taking these with opioids can cause death:    Benzodiazepines, such as lorazepam (Ativan), alprazolam (Xanax) or diazepam (Valium)    Muscle relaxers, such as cyclobenzaprine (Flexeril)    Sleeping pills like zolpidem (Ambien)     Other opioids      How to keep you and other people safe while taking opioids:    1. Never share your opioids with others.  Opioid medicines are regulated by the Drug Enforcement Agency (MICHELLE). Selling or sharing medications is a criminal act.    2. Be sure to store opioids in a secure place, locked up if possible. Young children  can easily swallow them and overdose.    3. When you are traveling with your medicines, keep them in the original bottles. If you use a pill box, be sure you also carry a copy of your medicine list from your clinic or pharmacy.    4. Safe disposal of opioids    Most pharmacies have places to get rid of medicine, called disposal kiosks. Medicine disposal options are also available in every [facility]. Search your county and  medication disposal  to find more options. You can find more details at:  [email or web address]     I agree that my provider, clinic care team, and pharmacy may work with any city, state or federal law enforcement agency that investigates the misuse, sale, or other diversion of my controlled medicine. I will allow my provider to discuss my care with, or share a copy of, this agreement with any other treating provider, pharmacy or emergency room where I receive care.    I have read this agreement and have asked questions about anything I did not understand.    _______________________________________________________  Patient Signature - Carina Calvert _____________________                   Date     _______________________________________________________  Provider Signature - Gerber Jain MD   _____________________                   Date     _______________________________________________________  Witness Signature (required if provider not present while patient signing)   _____________________                   Date

## 2023-02-07 ENCOUNTER — OFFICE VISIT (OUTPATIENT)
Dept: PODIATRY | Facility: CLINIC | Age: 73
End: 2023-02-07
Attending: INTERNAL MEDICINE
Payer: MEDICARE

## 2023-02-07 VITALS — HEIGHT: 64 IN | BODY MASS INDEX: 21.68 KG/M2 | WEIGHT: 127 LBS | HEART RATE: 54 BPM | OXYGEN SATURATION: 97 %

## 2023-02-07 DIAGNOSIS — L60.0 INGROWN TOENAIL OF RIGHT FOOT: ICD-10-CM

## 2023-02-07 PROCEDURE — 11750 EXCISION NAIL&NAIL MATRIX: CPT | Mod: T5 | Performed by: PODIATRIST

## 2023-02-07 PROCEDURE — 99203 OFFICE O/P NEW LOW 30 MIN: CPT | Mod: 25 | Performed by: PODIATRIST

## 2023-02-07 RX ORDER — LIDOCAINE HYDROCHLORIDE 20 MG/ML
5 INJECTION, SOLUTION INFILTRATION; PERINEURAL ONCE
Status: COMPLETED | OUTPATIENT
Start: 2023-02-07 | End: 2023-02-07

## 2023-02-07 RX ADMIN — LIDOCAINE HYDROCHLORIDE 5 ML: 20 INJECTION, SOLUTION INFILTRATION; PERINEURAL at 15:36

## 2023-02-07 ASSESSMENT — PAIN SCALES - GENERAL: PAINLEVEL: EXTREME PAIN (8)

## 2023-02-07 NOTE — LETTER
2/7/2023         RE: Carina Calvert  [address]  [facility] [place] [facility] [address]        Dear Colleague,    Thank you for referring your patient, aCrina Calvert, to the [facility]. Please see a copy of my visit note below.    FOOT AND ANKLE SURGERY/PODIATRY CONSULT NOTE         ASSESSMENT:   Ingrown toenail right great toe      TREATMENT:  Performed partial nail excision and matrixectomy of the medial border of the right great toenail today under local anesthesia of 2% lidocaine plain via the phenol and alcohol technique.  The patient tolerated the procedure and anesthesia well and was discharged in good condition.  She was given both written and verbal postoperative instructions.  She is to return to the clinic as needed.        HPI: I was asked to see Carina Calvert today to evaluate and treat a painful ingrown toenail involving the right great toe.  The patient indicated that she has had a problem with this toenail for many years.  She has a very sharp pain on the medial border of the right great toenail.  This toenail is aggravated by shoe gear and ambulation.  She denies trauma to the toenail.  She has not had any redness, swelling, drainage or bleeding.  There are no factors which relieve her pain.  She denies any other previous treatment.  The patient was seen in consultation at the request of Con Jain MD for for evaluation and treatment of painful ingrown toenail right great toe.     Past Medical History:   Diagnosis Date     Chronic constipation      Chronic sciatica     Pain clinic     DDD (degenerative disc disease), cervical      Fibromyalgia      History of hepatitis C      Hypertension goal BP (blood pressure) < 140/90      Kidney stones      Migraine headaches      Neuropathy      Osteopenia     boniva brayan w Dr Rayo at [facility]     PUD (peptic ulcer disease)     h/o bleeding ulcer     Recurrent cold sores      Restless leg syndrome      Spasmodic torticollis       Subclinical hyperthyroidism     NONTOXIC MULTINODULAR GOITER      Vertigo      Vitamin B12 deficiency      Vitamin D deficiency        Social History     Socioeconomic History     Marital status:      Spouse name: Not on file     Number of children: 4     Years of education: 11     Highest education level: Not on file   Occupational History     Occupation: housecleaning     Employer: [employer],[address] [place] [address]   Tobacco Use     Smoking status: Former     Packs/day: 0.20     Years: 15.00     Pack years: 3.00     Types: Cigarettes     Smokeless tobacco: Never     Tobacco comments:     4 cigs a day, just lost    Substance and Sexual Activity     Alcohol use: No     Drug use: No     Sexual activity: Never     Partners: Male     Birth control/protection: Surgical, Post-menopausal   Other Topics Concern     Parent/sibling w/ CABG, MI or angioplasty before 65F 55M? 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     Hobby Hazards No     Sleep Concern Yes     Comment: On medication     Stress Concern No     Weight Concern Yes     Comment: would like to gain weight     Special Diet No     Back Care No     Exercise No     Bike Helmet Not Asked     Seat Belt Yes     Self-Exams Yes   Social History Narrative     Not on file     Social Determinants of Health     Financial Resource Strain: Not on file   Food Insecurity: Not on file   Transportation Needs: Not on file   Physical Activity: Not on file   Stress: Not on file   Social Connections: Not on file   Intimate Partner Violence: Not on file   Housing Stability: Not on file          Allergies   Allergen Reactions     [employer] Anaphylaxis and Hives     Unable to breathe     Diatrizoate Hives     Hypaque     Droperidol      Ivp Dye [Contrast Dye]           Current Outpatient Medications:      aspirin 81 MG  tablet, Take 81 mg by mouth daily, Disp: , Rfl:      baclofen (LIORESAL) 10 MG tablet, TAKE 1 TABLET (10 MG) BY MOUTH 4 TIMES DAILY, Disp: 360 tablet, Rfl: 0     Cholecalciferol (VITAMIN D) 2000 UNITS tablet, Take 1 tablet by mouth daily, Disp: , Rfl:      gabapentin (NEURONTIN) 300 MG capsule, TAKE 1 CAPSULE BY MOUTH IN THE MORNING, 1 AT NOON 1 AT 5PM AND 2 AT BEDTIME ( 5 PER DAY ), Disp: 450 capsule, Rfl: 1     HYDROcodone-acetaminophen (NORCO) 7.5-325 MG per tablet, Take 2 tablets by mouth 3 times daily 28 days or more between refills for controlled medications, Disp: 180 tablet, Rfl: 0     ibuprofen (ADVIL/MOTRIN) 200 MG tablet, Take 200 mg by mouth 3 times daily Takes with hydrocodone-acetaminophen, Disp: , Rfl:      lactobacillus rhamnosus, GG, (CULTURELL) capsule, Take 1 capsule by mouth 2 times daily (before meals) Lunch and supper, Disp: , Rfl:      Melatonin 10 MG CAPS, Take 20 mg by mouth At Bedtime 1 hour prior to bedtime, Disp: , Rfl:      metoprolol tartrate (LOPRESSOR) 50 MG tablet, TAKE 1 TABLET BY MOUTH TWICE A DAY, Disp: 180 tablet, Rfl: 0     nitroGLYcerin (NITROSTAT) 0.4 MG sublingual tablet, Place 1 tablet (0.4 mg) under the tongue every 5 minutes as needed for chest pain, Disp: 25 tablet, Rfl: 2     omeprazole (PRILOSEC OTC) 20 MG EC tablet, Take 20 mg by mouth 2 times daily, Disp: , Rfl:      polyethylene glycol (MIRALAX/GLYCOLAX) powder, Take 17 g by mouth daily Reported on 5/15/2017, Disp: , Rfl:      rosuvastatin (CRESTOR) 10 MG tablet, Take 1 tablet (10 mg) by mouth daily, Disp: 90 tablet, Rfl: 3     traZODone (DESYREL) 100 MG tablet, Take 3 tablets (300 mg) by mouth At Bedtime, Disp: 270 tablet, Rfl: 5     VITAMIN B-12 1000 MCG OR TABS, 1 tablet daily, Disp: , Rfl:      Family History   Problem Relation Age of Onset     Neurologic Disorder Mother 65        Parkinsons     Alcohol/Drug Mother      Cerebrovascular Disease Mother      Respiratory Father         COPD     Alcohol/Drug Father       Cancer Brother      Diabetes Sister 52     Cancer Maternal Aunt      Cancer Maternal Uncle      C.A.D. No family hx of         Social History     Socioeconomic History     Marital status:      Spouse name: Not on file     Number of children: 4     Years of education: 11     Highest education level: Not on file   Occupational History     Occupation: housecleaning     Employer: [employer],[address] [place] [address]   Tobacco Use     Smoking status: Former     Packs/day: 0.20     Years: 15.00     Pack years: 3.00     Types: Cigarettes     Smokeless tobacco: Never     Tobacco comments:     4 cigs a day, just lost    Substance and Sexual Activity     Alcohol use: No     Drug use: No     Sexual activity: Never     Partners: Male     Birth control/protection: Surgical, Post-menopausal   Other Topics Concern     Parent/sibling w/ CABG, MI or angioplasty before 65F 55M? 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     Hobby Hazards No     Sleep Concern Yes     Comment: On medication     Stress Concern No     Weight Concern Yes     Comment: would like to gain weight     Special Diet No     Back Care No     Exercise No     Bike Helmet Not Asked     Seat Belt Yes     Self-Exams Yes   Social History Narrative     Not on file     Social Determinants of Health     Financial Resource Strain: Not on file   Food Insecurity: Not on file   Transportation Needs: Not on file   Physical Activity: Not on file   Stress: Not on file   Social Connections: Not on file   Intimate Partner Violence: Not on file   Housing Stability: Not on file        Review of Systems - Patient denies fever, chills, rash, wound, stiffness, limping, numbness, weakness, heart burn, blood in stool, chest pain with activity, calf pain when walking, shortness of breath with activity, chronic cough, easy bleeding/bruising,  "swelling of ankles, excessive thirst, fatigue, depression, anxiety.  Patient admits to painful ingrown toenail right great toe.      OBJECTIVE:  Appearance: alert, well appearing, and in no distress.    Pulse 54   Ht 1.626 m (5' 4\")   Wt 57.6 kg (127 lb)   SpO2 97%   BMI 21.80 kg/m       Body mass index is 21.8 kg/m .     General appearance: Patient is alert and fully cooperative with history & exam.  No sign of distress is noted during the visit.  Psychiatric: Affect is pleasant & appropriate.  Patient appears motivated to improve health.  Respiratory: Breathing is regular & unlabored while sitting.  HEENT: Hearing is intact to spoken word.  Speech is clear.  No gross evidence of visual impairment that would impact ambulation.    Vascular: Dorsalis pedis and posterior tibial pulses are palpable. There is no pedal hair growth bilaterally.  CFT < 3 sec from anterior tibial surface to distal digits bilaterally. There is no appreciable edema noted.  Dermatologic: The medial border of the right great toenail is severely incurvated.  Turgor and texture are within normal limits. No coloration or temperature changes. No primary or secondary lesions noted.  Neurologic: All epicritic and proprioceptive sensations are mildly diminished bilaterally.   Musculoskeletal: All active and passive ankle, subtalar, midtarsal, and 1st MPJ range of motion are grossly intact without pain or crepitus, with the exception of none. Manual muscle strength is within normal limits bilaterally. All dorsiflexors, plantarflexors, invertors, evertors are intact bilaterally. Tenderness present to the medial margin of the right great toenail on palpation.  No tenderness to the right great toe with range of motion. Calf is soft/non-tender without warmth/induration    Imaging:       No images are attached to the encounter or orders placed in the encounter.     No results found.   No results found.       Ray Casiano DPM  [facility] " [facility]le [facility]/Podiatry         Again, thank you for allowing me to participate in the care of your patient.        Sincerely,        Ray Powell DPM

## 2023-02-07 NOTE — PROGRESS NOTES
FOOT AND ANKLE SURGERY/PODIATRY CONSULT NOTE         ASSESSMENT:   Ingrown toenail right great toe      TREATMENT:  Performed partial nail excision and matrixectomy of the medial border of the right great toenail today under local anesthesia of 2% lidocaine plain via the phenol and alcohol technique.  The patient tolerated the procedure and anesthesia well and was discharged in good condition.  She was given both written and verbal postoperative instructions.  She is to return to the clinic as needed.        HPI: I was asked to see Carina AMANDEEP Enrike today to evaluate and treat a painful ingrown toenail involving the right great toe.  The patient indicated that she has had a problem with this toenail for many years.  She has a very sharp pain on the medial border of the right great toenail.  This toenail is aggravated by shoe gear and ambulation.  She denies trauma to the toenail.  She has not had any redness, swelling, drainage or bleeding.  There are no factors which relieve her pain.  She denies any other previous treatment.  The patient was seen in consultation at the request of Con Jain MD for for evaluation and treatment of painful ingrown toenail right great toe.     Past Medical History:   Diagnosis Date     Chronic constipation      Chronic sciatica     Pain clinic     DDD (degenerative disc disease), cervical      Fibromyalgia      History of hepatitis C      Hypertension goal BP (blood pressure) < 140/90      Kidney stones      Migraine headaches      Neuropathy      Osteopenia     jayne schmidt w Dr Rayo at [facility]     PUD (peptic ulcer disease)     h/o bleeding ulcer     Recurrent cold sores      Restless leg syndrome      Spasmodic torticollis      Subclinical hyperthyroidism     NONTOXIC MULTINODULAR GOITER      Vertigo      Vitamin B12 deficiency      Vitamin D deficiency        Social History     Socioeconomic History     Marital status:      Spouse name: Not on file     Number of  children: 4     Years of education: 11     Highest education level: Not on file   Occupational History     Occupation: housecleaning     Employer: [employer],[address] [employer]   Tobacco Use     Smoking status: Former     Packs/day: 0.20     Years: 15.00     Pack years: 3.00     Types: Cigarettes     Smokeless tobacco: Never     Tobacco comments:     4 cigs a day, just lost    Substance and Sexual Activity     Alcohol use: No     Drug use: No     Sexual activity: Never     Partners: Male     Birth control/protection: Surgical, Post-menopausal   Other Topics Concern     Parent/sibling w/ CABG, MI or angioplasty before 65F 55M? 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     Hobby Hazards No     Sleep Concern Yes     Comment: On medication     Stress Concern No     Weight Concern Yes     Comment: would like to gain weight     Special Diet No     Back Care No     Exercise No     Bike Helmet Not Asked     Seat Belt Yes     Self-Exams Yes   Social History Narrative     Not on file     Social Determinants of Health     Financial Resource Strain: Not on file   Food Insecurity: Not on file   Transportation Needs: Not on file   Physical Activity: Not on file   Stress: Not on file   Social Connections: Not on file   Intimate Partner Violence: Not on file   Housing Stability: Not on file          Allergies   Allergen Reactions     Diagn[employer] Anaphylaxis and Hives     Unable to breathe     Diatrizoate Hives     Hypaque     Droperidol      Ivp Dye [Contrast Dye]           Current Outpatient Medications:      aspirin 81 MG tablet, Take 81 mg by mouth daily, Disp: , Rfl:      baclofen (LIORESAL) 10 MG tablet, TAKE 1 TABLET (10 MG) BY MOUTH 4 TIMES DAILY, Disp: 360 tablet, Rfl: 0     Cholecalciferol (VITAMIN D) 2000 UNITS tablet, Take 1 tablet by mouth daily, Disp: , Rfl:       gabapentin (NEURONTIN) 300 MG capsule, TAKE 1 CAPSULE BY MOUTH IN THE MORNING, 1 AT NOON 1 AT 5PM AND 2 AT BEDTIME ( 5 PER DAY ), Disp: 450 capsule, Rfl: 1     HYDROcodone-acetaminophen (NORCO) 7.5-325 MG per tablet, Take 2 tablets by mouth 3 times daily 28 days or more between refills for controlled medications, Disp: 180 tablet, Rfl: 0     ibuprofen (ADVIL/MOTRIN) 200 MG tablet, Take 200 mg by mouth 3 times daily Takes with hydrocodone-acetaminophen, Disp: , Rfl:      lactobacillus rhamnosus, GG, (CULTURELL) capsule, Take 1 capsule by mouth 2 times daily (before meals) Lunch and supper, Disp: , Rfl:      Melatonin 10 MG CAPS, Take 20 mg by mouth At Bedtime 1 hour prior to bedtime, Disp: , Rfl:      metoprolol tartrate (LOPRESSOR) 50 MG tablet, TAKE 1 TABLET BY MOUTH TWICE A DAY, Disp: 180 tablet, Rfl: 0     nitroGLYcerin (NITROSTAT) 0.4 MG sublingual tablet, Place 1 tablet (0.4 mg) under the tongue every 5 minutes as needed for chest pain, Disp: 25 tablet, Rfl: 2     omeprazole (PRILOSEC OTC) 20 MG EC tablet, Take 20 mg by mouth 2 times daily, Disp: , Rfl:      polyethylene glycol (MIRALAX/GLYCOLAX) powder, Take 17 g by mouth daily Reported on 5/15/2017, Disp: , Rfl:      rosuvastatin (CRESTOR) 10 MG tablet, Take 1 tablet (10 mg) by mouth daily, Disp: 90 tablet, Rfl: 3     traZODone (DESYREL) 100 MG tablet, Take 3 tablets (300 mg) by mouth At Bedtime, Disp: 270 tablet, Rfl: 5     VITAMIN B-12 1000 MCG OR TABS, 1 tablet daily, Disp: , Rfl:      Family History   Problem Relation Age of Onset     Neurologic Disorder Mother 65        Parkinsons     Alcohol/Drug Mother      Cerebrovascular Disease Mother      Respiratory Father         COPD     Alcohol/Drug Father      Cancer Brother      Diabetes Sister 52     Cancer Maternal Aunt      Cancer Maternal Uncle      C.A.D. No family hx of         Social History     Socioeconomic History     Marital status:      Spouse name: Not on file     Number of children:  "4     Years of education: 11     Highest education level: Not on file   Occupational History     Occupation: housecleaning     Employer: [employer] [employer]   Tobacco Use     Smoking status: Former     Packs/day: 0.20     Years: 15.00     Pack years: 3.00     Types: Cigarettes     Smokeless tobacco: Never     Tobacco comments:     4 cigs a day, just lost    Substance and Sexual Activity     Alcohol use: No     Drug use: No     Sexual activity: Never     Partners: Male     Birth control/protection: Surgical, Post-menopausal   Other Topics Concern     Parent/sibling w/ CABG, MI or angioplasty before 65F 55M? 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     Hobby Hazards No     Sleep Concern Yes     Comment: On medication     Stress Concern No     Weight Concern Yes     Comment: would like to gain weight     Special Diet No     Back Care No     Exercise No     Bike Helmet Not Asked     Seat Belt Yes     Self-Exams Yes   Social History Narrative     Not on file     Social Determinants of Health     Financial Resource Strain: Not on file   Food Insecurity: Not on file   Transportation Needs: Not on file   Physical Activity: Not on file   Stress: Not on file   Social Connections: Not on file   Intimate Partner Violence: Not on file   Housing Stability: Not on file        Review of Systems - Patient denies fever, chills, rash, wound, stiffness, limping, numbness, weakness, heart burn, blood in stool, chest pain with activity, calf pain when walking, shortness of breath with activity, chronic cough, easy bleeding/bruising, swelling of ankles, excessive thirst, fatigue, depression, anxiety.  Patient admits to painful ingrown toenail right great toe.      OBJECTIVE:  Appearance: alert, well appearing, and in no distress.    Pulse 54   Ht 1.626 m (5' 4\")   Wt 57.6 kg (127 " lb)   SpO2 97%   BMI 21.80 kg/m       Body mass index is 21.8 kg/m .     General appearance: Patient is alert and fully cooperative with history & exam.  No sign of distress is noted during the visit.  Psychiatric: Affect is pleasant & appropriate.  Patient appears motivated to improve health.  Respiratory: Breathing is regular & unlabored while sitting.  HEENT: Hearing is intact to spoken word.  Speech is clear.  No gross evidence of visual impairment that would impact ambulation.    Vascular: Dorsalis pedis and posterior tibial pulses are palpable. There is no pedal hair growth bilaterally.  CFT < 3 sec from anterior tibial surface to distal digits bilaterally. There is no appreciable edema noted.  Dermatologic: The medial border of the right great toenail is severely incurvated.  Turgor and texture are within normal limits. No coloration or temperature changes. No primary or secondary lesions noted.  Neurologic: All epicritic and proprioceptive sensations are mildly diminished bilaterally.   Musculoskeletal: All active and passive ankle, subtalar, midtarsal, and 1st MPJ range of motion are grossly intact without pain or crepitus, with the exception of none. Manual muscle strength is within normal limits bilaterally. All dorsiflexors, plantarflexors, invertors, evertors are intact bilaterally. Tenderness present to the medial margin of the right great toenail on palpation.  No tenderness to the right great toe with range of motion. Calf is soft/non-tender without warmth/induration    Imaging:       No images are attached to the encounter or orders placed in the encounter.     No results found.   No results found.       Ray Casiano DPM  [facility] Foot & Ankle Surgery/Podiatry

## 2023-02-07 NOTE — PATIENT INSTRUCTIONS
POSTOPERATIVE INSTRUCTIONS AFTER CHEMICAL NAIL REMOVAL SURGERY    What to expect after surgery:  Your toe will be numb for around 2-8 hours after your nail procedure due to the shots that were given.  Expect some degree of soreness in your toe later today when the numbness wears off.  Rest, elevation and ice applied at the ankle will help ease the pain. Your bandage was wrapped snug to cut down on bleeding.    This may feel tight when the numbness wears off.  Please remove the bandage tomorrow morning and begin the foot soaks described below.  Warm water will feel good and helps to ease the pain  How to Care for Your Toe After Surgery  One daily foot soak will be necessary to heal properly.  Chemicals were used to kill the root of the nail.  Expect local redness, drainage and tenderness , this will last for 6-8 weeks.  Soaking helps loosen the scab and dried drainage.  Failure to soak leads to a hard scab that blocks drainage.  Back up drainage increases the pain and the scab may need to be removed with another office procedure.  You will heal much quicker and be more comfortable if you are consistent with local wound care and foot soaks.  Soak one time every day for 2 weeks.  Soaks should last 10 minutes.  Soak after taking a shower to get the germs out.  Soak in warm water with hydrogen peroxide 5 parts water to 1 part hydrogen peroxide.  A small amount of bleeding may be noted which is normal.   Clean the surgical site with a Q-tip during each soak.  Dip the Q-tip in the water and gently clean away any crusted drainage.  The area may be tender but you will not harm anything with the Q-tip.  Pat the area dry and allow a few minutes to air dry before applying any bandages.  Flexible fabric style band aids work best.  In general, the area will be wet from drainage.  A small dab of antibiotic ointment is okay but watch out for excessive moisture.  White and wrinkled skin is a sign of too much moisture and that the  skin needs to be dried out. It is ok to allow the toe some air by removing the band aid as needed.  Activity  Feel free to do normal activities as tolerated on the following day.  Wear open toe sandals if regular shoes are not comfortable.  Avoid shoes that are tight on the toe.  Medications   Finish any antibiotics if they have been prescribed.  Tylenol or ibuprofen may be used as needed for pain.  Icing and elevation also help with pain and swelling.  Risks  Watch for signs of infection.  It is normal to see redness, local tenderness, and drainage around the nail area for up to 8 weeks after permanent nail removal surgery.  Call the clinic at [phone] if you see red streaks spreading up the toe, foot, or leg.  Fever and chills are also concerning and you should notify the clinic if you are having these symptoms.  If these symptoms occur when the clinic is closed, please go to urgent care.  How Well Does Permanent Nail Surgery Work?  Permanent nail surgery means that we intend that the nail problem will not come back.  In a small percentage of patients, nail problems return in about 6 months to a year.  We would like to see you back in the office if you are experiencing problems in the future.  Please call [phone] with any additional questions.

## 2023-02-15 ENCOUNTER — TELEPHONE (OUTPATIENT)
Dept: FAMILY MEDICINE | Facility: CLINIC | Age: 73
End: 2023-02-15
Payer: MEDICARE

## 2023-02-15 DIAGNOSIS — G24.3 SPASMODIC TORTICOLLIS: ICD-10-CM

## 2023-02-15 DIAGNOSIS — M79.7 FIBROMYALGIA: ICD-10-CM

## 2023-02-15 DIAGNOSIS — F11.90 CHRONIC, CONTINUOUS USE OF OPIOIDS: ICD-10-CM

## 2023-02-15 DIAGNOSIS — G89.4 CHRONIC PAIN SYNDROME: ICD-10-CM

## 2023-02-15 NOTE — TELEPHONE ENCOUNTER
Patient requesting refill on her [place]    Pharmacy- [facility] [address], [place], [place]     Earline Emery RN  [facility]

## 2023-02-16 RX ORDER — HYDROCODONE BITARTRATE AND ACETAMINOPHEN 7.5; 325 MG/1; MG/1
2 TABLET ORAL 3 TIMES DAILY
Qty: 180 TABLET | Refills: 0 | Status: SHIPPED | OUTPATIENT
Start: 2023-02-16 | End: 2023-03-15

## 2023-03-01 ENCOUNTER — HOSPITAL ENCOUNTER (EMERGENCY)
Facility: HOSPITAL | Age: 73
Discharge: HOME | End: 2023-03-01
Attending: FAMILY MEDICINE | Admitting: FAMILY MEDICINE
Payer: MEDICARE

## 2023-03-01 ENCOUNTER — NURSE TRIAGE (OUTPATIENT)
Dept: NURSING | Facility: CLINIC | Age: 73
End: 2023-03-01
Payer: MEDICARE

## 2023-03-01 ENCOUNTER — APPOINTMENT (OUTPATIENT)
Dept: CT IMAGING | Facility: HOSPITAL | Age: 73
End: 2023-03-01
Attending: FAMILY MEDICINE
Payer: MEDICARE

## 2023-03-01 ENCOUNTER — APPOINTMENT (OUTPATIENT)
Dept: RADIOLOGY | Facility: HOSPITAL | Age: 73
End: 2023-03-01
Attending: STUDENT IN AN ORGANIZED HEALTH CARE EDUCATION/TRAINING PROGRAM
Payer: MEDICARE

## 2023-03-01 VITALS
WEIGHT: 127 LBS | RESPIRATION RATE: 20 BRPM | TEMPERATURE: 97.1 F | SYSTOLIC BLOOD PRESSURE: 177 MMHG | HEIGHT: 64 IN | DIASTOLIC BLOOD PRESSURE: 84 MMHG | HEART RATE: 52 BPM | OXYGEN SATURATION: 96 % | BODY MASS INDEX: 21.68 KG/M2

## 2023-03-01 DIAGNOSIS — Z95.5 H/O HEART ARTERY STENT: ICD-10-CM

## 2023-03-01 DIAGNOSIS — J20.9 ACUTE BRONCHITIS, UNSPECIFIED ORGANISM: ICD-10-CM

## 2023-03-01 LAB
ANION GAP SERPL CALCULATED.3IONS-SCNC: 9 MMOL/L (ref 7–15)
BASOPHILS # BLD AUTO: 0 10E3/UL (ref 0–0.2)
BASOPHILS NFR BLD AUTO: 0 %
BUN SERPL-MCNC: 9.2 MG/DL (ref 8–23)
CALCIUM SERPL-MCNC: 9.6 MG/DL (ref 8.8–10.2)
CHLORIDE SERPL-SCNC: 102 MMOL/L (ref 98–107)
CREAT SERPL-MCNC: 0.63 MG/DL (ref 0.51–0.95)
DEPRECATED HCO3 PLAS-SCNC: 30 MMOL/L (ref 22–29)
EOSINOPHIL # BLD AUTO: 0.1 10E3/UL (ref 0–0.7)
EOSINOPHIL NFR BLD AUTO: 1 %
ERYTHROCYTE [DISTWIDTH] IN BLOOD BY AUTOMATED COUNT: 13.7 % (ref 10–15)
FLUAV RNA SPEC QL NAA+PROBE: NEGATIVE
FLUBV RNA RESP QL NAA+PROBE: NEGATIVE
GFR SERPL CREATININE-BSD FRML MDRD: >90 ML/MIN/1.73M2
GLUCOSE SERPL-MCNC: 90 MG/DL (ref 70–99)
HCT VFR BLD AUTO: 43.4 % (ref 35–47)
HGB BLD-MCNC: 13.8 G/DL (ref 11.7–15.7)
HOLD SPECIMEN: NORMAL
IMM GRANULOCYTES # BLD: 0 10E3/UL
IMM GRANULOCYTES NFR BLD: 0 %
LYMPHOCYTES # BLD AUTO: 2.3 10E3/UL (ref 0.8–5.3)
LYMPHOCYTES NFR BLD AUTO: 33 %
MAGNESIUM SERPL-MCNC: 2.1 MG/DL (ref 1.7–2.3)
MCH RBC QN AUTO: 30.7 PG (ref 26.5–33)
MCHC RBC AUTO-ENTMCNC: 31.8 G/DL (ref 31.5–36.5)
MCV RBC AUTO: 97 FL (ref 78–100)
MONOCYTES # BLD AUTO: 0.6 10E3/UL (ref 0–1.3)
MONOCYTES NFR BLD AUTO: 8 %
NEUTROPHILS # BLD AUTO: 3.9 10E3/UL (ref 1.6–8.3)
NEUTROPHILS NFR BLD AUTO: 58 %
NRBC # BLD AUTO: 0 10E3/UL
NRBC BLD AUTO-RTO: 0 /100
NT-PROBNP SERPL-MCNC: 690 PG/ML (ref 0–900)
PLATELET # BLD AUTO: 133 10E3/UL (ref 150–450)
POTASSIUM SERPL-SCNC: 4.2 MMOL/L (ref 3.4–5.3)
RBC # BLD AUTO: 4.49 10E6/UL (ref 3.8–5.2)
RSV RNA SPEC NAA+PROBE: NEGATIVE
SARS-COV-2 RNA RESP QL NAA+PROBE: NEGATIVE
SODIUM SERPL-SCNC: 141 MMOL/L (ref 136–145)
TROPONIN T SERPL HS-MCNC: 10 NG/L
WBC # BLD AUTO: 6.8 10E3/UL (ref 4–11)

## 2023-03-01 PROCEDURE — 85048 AUTOMATED LEUKOCYTE COUNT: CPT | Performed by: FAMILY MEDICINE

## 2023-03-01 PROCEDURE — 87637 SARSCOV2&INF A&B&RSV AMP PRB: CPT | Performed by: FAMILY MEDICINE

## 2023-03-01 PROCEDURE — C9803 HOPD COVID-19 SPEC COLLECT: HCPCS

## 2023-03-01 PROCEDURE — 93005 ELECTROCARDIOGRAM TRACING: CPT | Performed by: STUDENT IN AN ORGANIZED HEALTH CARE EDUCATION/TRAINING PROGRAM

## 2023-03-01 PROCEDURE — 83880 ASSAY OF NATRIURETIC PEPTIDE: CPT | Performed by: FAMILY MEDICINE

## 2023-03-01 PROCEDURE — 82374 ASSAY BLOOD CARBON DIOXIDE: CPT | Performed by: FAMILY MEDICINE

## 2023-03-01 PROCEDURE — 71046 X-RAY EXAM CHEST 2 VIEWS: CPT

## 2023-03-01 PROCEDURE — 71250 CT THORAX DX C-: CPT | Mod: MG

## 2023-03-01 PROCEDURE — 83735 ASSAY OF MAGNESIUM: CPT | Performed by: FAMILY MEDICINE

## 2023-03-01 PROCEDURE — 99285 EMERGENCY DEPT VISIT HI MDM: CPT | Mod: 25

## 2023-03-01 PROCEDURE — 84484 ASSAY OF TROPONIN QUANT: CPT | Performed by: FAMILY MEDICINE

## 2023-03-01 PROCEDURE — 85014 HEMATOCRIT: CPT | Performed by: FAMILY MEDICINE

## 2023-03-01 PROCEDURE — 36415 COLL VENOUS BLD VENIPUNCTURE: CPT | Performed by: FAMILY MEDICINE

## 2023-03-01 RX ORDER — PREDNISONE 20 MG/1
20 TABLET ORAL DAILY
Qty: 5 TABLET | Refills: 0 | Status: SHIPPED | OUTPATIENT
Start: 2023-03-01 | End: 2023-03-06

## 2023-03-01 RX ORDER — ALBUTEROL SULFATE 90 UG/1
2 AEROSOL, METERED RESPIRATORY (INHALATION) EVERY 4 HOURS PRN
Qty: 18 G | Refills: 0 | Status: SHIPPED | OUTPATIENT
Start: 2023-03-01 | End: 2023-05-08

## 2023-03-01 ASSESSMENT — ENCOUNTER SYMPTOMS
COUGH: 1
SORE THROAT: 0
RHINORRHEA: 1
SHORTNESS OF BREATH: 1
FEVER: 0

## 2023-03-01 NOTE — ED TRIAGE NOTES
The pt states she is SOB, HR at home was 53, sats 93%. MI in 2015. The pt denies chest pain. Cough x 1 day. Pt has green colored Phlegm.

## 2023-03-01 NOTE — ED PROVIDER NOTES
EMERGENCY DEPARTMENT ENCOUNTER      NAME: Carina Calvert  AGE: 72 year old female  YOB: 1950  MRN: [id number]  EVALUATION DATE & TIME: No admission date for patient encounter.    PCP: Gerber Jain    ED PROVIDER: Noé Melton M.D.    Chief Complaint   Patient presents with     Shortness of Breath       FINAL IMPRESSION:  1. Acute bronchitis, unspecified organism    2. H/O heart artery stent        ED COURSE & MEDICAL DECISION MAKING:    Pertinent Labs & Imaging studies independently interpreted by me. (See chart for details)  9:30 AM Patient seen and examined, patient history of hypertension who presents today with shortness of breath.  Differential diagnosis includes but not limited to COPD, asthma, CHF, pneumonia, bronchitis, pneumothorax, myocardial infarction, pulmonary embolism, anxiety.  Patient with productive cough this morning although she says she frequently will have cough in the morning, also shortness of breath.  Slept poorly last night but does not describe orthopnea.  No chest pain.  No hypoxia or fever on exam, trace crackles.  Labs and chest x-ray are ordered.  Consider CT PE protocol but no pleuritic pain, no tachycardia or hypoxia.  1:22 PM labs independently interpreted by me demonstrate reassuring white blood cell count, normal hemoglobin.  BNP is normal, troponin is within gender reference range.  Basic panel normal.  Chest x-ray was negative, given dyspnea, CT scan of chest was ordered which does not demonstrate any acute infiltrates.  No evidence for pneumonia, heart failure, clinically pulmonary bolus is unlikely.  No anemia.  EKG is reassuring and troponin is negative, symptoms are unlikely to represent acute coronary syndrome.  Will be started on prednisone and inhaler for bronchitis and discharge.    At the conclusion of the encounter I discussed the results of all of the tests and the disposition. The questions were answered. The patient or family acknowledged  understanding and was agreeable with the care plan.     Medical Decision Making    History:    Supplemental history from: Family Member/Significant Other    External Record(s) reviewed: Documented in chart, if applicable.    Work Up:    Chart documentation includes differential considered and any EKGs or imaging independently interpreted by provider, where specified.    In additional to work up documented, I considered the following work up: Documented in chart, if applicable.    External consultation:    Discussion of management with another provider: Documented in chart, if applicable    Complicating factors:    Care impacted by chronic illness: Chronic Pain and Hypertension    Care affected by social determinants of health: Access to Medical Care    Disposition considerations: Discharge. I prescribed additional prescription strength medication(s) as charted. I considered admission, but discharged the patient after share decision making conversation.    MEDICATIONS GIVEN IN THE EMERGENCY:  Medications - No data to display    NEW PRESCRIPTIONS STARTED AT TODAY'S ER VISIT  New Prescriptions    ALBUTEROL (PROAIR HFA/PROVENTIL HFA/VENTOLIN HFA) 108 (90 BASE) MCG/ACT INHALER    Inhale 2 puffs into the lungs every 4 hours as needed for shortness of breath or wheezing    PREDNISONE (DELTASONE) 20 MG TABLET    Take 1 tablet (20 mg) by mouth daily for 5 days       =================================================================    HPI    Patient information was obtained from: patient and patient's daughter      Carina Calvert is a 72 year old female with a pertinent history of CAD, HTN, NSTEMI, HLD,  who presents to this ED by private car for evaluation of shortness of breath.    Patient reports shortness of breath that prevented her from sleeping last night. Endorses a productive cough that is not new for her, states she coughs up green phlegm every morning. Endorses a runny nose. Denies any other medical complaint at  this time. Daughter reports the patient had pneumonia in 2021 and COVID-19 in November of 2022.       REVIEW OF SYSTEMS   Review of Systems   Constitutional: Negative for fever.   HENT: Positive for rhinorrhea. Negative for sore throat.    Respiratory: Positive for cough and shortness of breath.    Cardiovascular: Negative for leg swelling.   All other systems reviewed and are negative.     All other systems reviewed and negative    PAST MEDICAL HISTORY:  Past Medical History:   Diagnosis Date     Chronic constipation      Chronic sciatica     Pain clinic     DDD (degenerative disc disease), cervical      Fibromyalgia      History of hepatitis C      Hypertension goal BP (blood pressure) < 140/90      Kidney stones      Migraine headaches      Neuropathy      Osteopenia     boniva infusions w Dr Rayo at [facility]     PUD (peptic ulcer disease)     h/o bleeding ulcer     Recurrent cold sores      Restless leg syndrome      Spasmodic torticollis      Subclinical hyperthyroidism     NONTOXIC MULTINODULAR GOITER      Vertigo      Vitamin B12 deficiency      Vitamin D deficiency        PAST SURGICAL HISTORY:  Past Surgical History:   Procedure Laterality Date     HERNIA REPAIR  9/2009    ventral     LITHOTRIPSY      2 x      TONSILLECTOMY       TUBAL LIGATION         CURRENT MEDICATIONS:    No current facility-administered medications for this encounter.     Current Outpatient Medications   Medication     albuterol (PROAIR HFA/PROVENTIL HFA/VENTOLIN HFA) 108 (90 Base) MCG/ACT inhaler     predniSONE (DELTASONE) 20 MG tablet     aspirin 81 MG tablet     baclofen (LIORESAL) 10 MG tablet     Cholecalciferol (VITAMIN D) 2000 UNITS tablet     gabapentin (NEURONTIN) 300 MG capsule     HYDROcodone-acetaminophen (NORCO) 7.5-325 MG per tablet     ibuprofen (ADVIL/MOTRIN) 200 MG tablet     lactobacillus rhamnosus, GG, (CULTURELL) capsule     Melatonin 10 MG CAPS     metoprolol tartrate (LOPRESSOR) 50 MG tablet     nitroGLYcerin  (NITROSTAT) 0.4 MG sublingual tablet     omeprazole (PRILOSEC OTC) 20 MG EC tablet     polyethylene glycol (MIRALAX/GLYCOLAX) powder     rosuvastatin (CRESTOR) 10 MG tablet     traZODone (DESYREL) 100 MG tablet     VITAMIN B-12 1000 MCG OR TABS       ALLERGIES:  Allergies   Allergen Reactions     Diagnostic [employer] Anaphylaxis and Hives     Unable to breathe     Diatrizoate Hives     Hypaque     Droperidol      Ivp Dye [Contrast Dye]        FAMILY HISTORY:  Family History   Problem Relation Age of Onset     Neurologic Disorder Mother 65        Parkinsons     Alcohol/Drug Mother      Cerebrovascular Disease Mother      Respiratory Father         COPD     Alcohol/Drug Father      Cancer Brother      Diabetes Sister 52     Cancer Maternal Aunt      Cancer Maternal Uncle      C.A.D. No family hx of        SOCIAL HISTORY:   Social History     Socioeconomic History     Marital status:      Number of children: 4     Years of education: 11   Occupational History     Occupation: housecleSing [employer]     Employer: [employer],[address] [place] [address]   Tobacco Use     Smoking status: Former     Packs/day: 0.20     Years: 15.00     Pack years: 3.00     Types: Cigarettes     Smokeless tobacco: Never     Tobacco comments:     4 cigs a day, just lost    Substance and Sexual Activity     Alcohol use: No     Drug use: No     Sexual activity: Never     Partners: Male     Birth control/protection: Surgical, Post-menopausal   Other Topics Concern      Service No     Blood Transfusions No     Caffeine Concern No     Occupational Exposure Yes     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   Constitutional:       Appearance: Normal appearance.   HENT:      Head: Normocephalic and atraumatic.      Right Ear: External ear normal.      Left Ear: External ear normal.      Nose: Nose normal.      Mouth/Throat:      Mouth: Mucous membranes are moist.   Eyes:      Extraocular Movements: Extraocular movements intact.      Conjunctiva/sclera: Conjunctivae normal.      Pupils: Pupils are equal, round, and reactive to light.   Cardiovascular:      Rate and Rhythm: Normal rate and regular rhythm.   Pulmonary:      Effort: Pulmonary effort is normal.      Breath sounds: No wheezing or rales.      Comments: Bibasilar crackles  Abdominal:      General: Abdomen is flat. There is no distension.      Palpations: Abdomen is soft.      Tenderness: There is no abdominal tenderness. There is no guarding.   Musculoskeletal:         General: Normal range of motion.      Cervical back: Normal range of motion and neck supple.      Right lower leg: No edema.      Left lower leg: No edema.   Lymphadenopathy:      Cervical: No cervical adenopathy.   Skin:     General: Skin is warm and dry.   Neurological:      General: No focal deficit present.      Mental Status: She is alert and oriented to person, place, and time. Mental status is at baseline.      Comments: No gross focal neurologic deficits. Shuffling gait   Psychiatric:         Mood and Affect: Mood normal.         Behavior: Behavior normal.         Thought Content: Thought content normal.       LAB:  All pertinent labs reviewed and interpreted.  Results for orders placed or performed during the hospital encounter of 03/01/23   Chest XR,  PA & LAT    Impression    IMPRESSION: Negative chest. No change from 7/20/2022.   Chest CT w/o contrast    Impression    IMPRESSION:   1.  No acute findings or other " explanation for dyspnea.    2.  Incidental tiny pulmonary nodules measuring up to 0.2 cm, which are indeterminate due to small size and lack of comparison imaging. Follow-up recommendations below:    REFERENCE:  Guidelines for Management of Incidental Pulmonary Nodules Detected on CT Images: From the Fleischner Society 2017.   Guidelines apply to incidental nodules in patients who are 35 years or older.  Guidelines do not apply to lung cancer screening, patients with immunosuppression, or patients with known primary cancer.    MULTIPLE NODULES  Nodule size <6 mm  Low-risk patients: No follow-up needed.  High-risk patients: Optional follow-up at 12 months.         Basic metabolic panel   Result Value Ref Range    Sodium 141 136 - 145 mmol/L    Potassium 4.2 3.4 - 5.3 mmol/L    Chloride 102 98 - 107 mmol/L    Carbon Dioxide (CO2) 30 (H) 22 - 29 mmol/L    Anion Gap 9 7 - 15 mmol/L    Urea Nitrogen 9.2 8.0 - 23.0 mg/dL    Creatinine 0.63 0.51 - 0.95 mg/dL    Calcium 9.6 8.8 - 10.2 mg/dL    Glucose 90 70 - 99 mg/dL    GFR Estimate >90 >60 mL/min/1.73m2   Result Value Ref Range    Magnesium 2.1 1.7 - 2.3 mg/dL   Result Value Ref Range    Troponin T, High Sensitivity 10 <=14 ng/L   Nt probnp inpatient (BNP)   Result Value Ref Range    N terminal Pro BNP Inpatient 690 0 - 900 pg/mL   Asymptomatic Influenza A/B, RSV, & SARS-CoV2 PCR (COVID-19) Nasopharyngeal    Specimen: Nasopharyngeal; Swab   Result Value Ref Range    Influenza A PCR Negative Negative    Influenza B PCR Negative Negative    RSV PCR Negative Negative    SARS CoV2 PCR Negative Negative   CBC with platelets and differential   Result Value Ref Range    WBC Count 6.8 4.0 - 11.0 10e3/uL    RBC Count 4.49 3.80 - 5.20 10e6/uL    Hemoglobin 13.8 11.7 - 15.7 g/dL    Hematocrit 43.4 35.0 - 47.0 %    MCV 97 78 - 100 fL    MCH 30.7 26.5 - 33.0 pg    MCHC 31.8 31.5 - 36.5 g/dL    RDW 13.7 10.0 - 15.0 %    Platelet Count 133 (L) 150 - 450 10e3/uL    % Neutrophils 58 %     % Lymphocytes 33 %    % Monocytes 8 %    % Eosinophils 1 %    % Basophils 0 %    % Immature Granulocytes 0 %    NRBCs per 100 WBC 0 <1 /100    Absolute Neutrophils 3.9 1.6 - 8.3 10e3/uL    Absolute Lymphocytes 2.3 0.8 - 5.3 10e3/uL    Absolute Monocytes 0.6 0.0 - 1.3 10e3/uL    Absolute Eosinophils 0.1 0.0 - 0.7 10e3/uL    Absolute Basophils 0.0 0.0 - 0.2 10e3/uL    Absolute Immature Granulocytes 0.0 <=0.4 10e3/uL    Absolute NRBCs 0.0 10e3/uL       RADIOLOGY:  Reviewed all pertinent imaging. Please see official radiology report.  Chest CT w/o contrast   Final Result   IMPRESSION:    1.  No acute findings or other explanation for dyspnea.      2.  Incidental tiny pulmonary nodules measuring up to 0.2 cm, which are indeterminate due to small size and lack of comparison imaging. Follow-up recommendations below:      REFERENCE:   Guidelines for Management of Incidental Pulmonary Nodules Detected on CT Images: From the Fleischner Society 2017.    Guidelines apply to incidental nodules in patients who are 35 years or older.   Guidelines do not apply to lung cancer screening, patients with immunosuppression, or patients with known primary cancer.      MULTIPLE NODULES   Nodule size <6 mm   Low-risk patients: No follow-up needed.   High-risk patients: Optional follow-up at 12 months.               Chest XR,  PA & LAT   Final Result   IMPRESSION: Negative chest. No change from 7/20/2022.          EKG:    Performed at: 10:04 AM  Impression: Normal EKG  Rate: 50  Rhythm: Sinus  Axis: Normal  CT Interval: 160  QRS Interval: 88  QTc Interval: 461  ST Changes: No acute ischemic changes  Comparison: Prior of September 2022, no changes    I have independently reviewed and interpreted the EKG(s) documented above.    I, Fabian Sorto, am serving as a scribe to document services personally performed by Dr. Melton based on my observation and the provider's statements to me. I, Noé Melton MD attest that Fabian Sorto is  acting in a scribe capacity, has observed my performance of the services and has documented them in accordance with my direction.    Noé Melton M.D.  Emergency Medicine  [facility] EMERGENCY DEPARTMENT  [address] [place] [id number] [phone]  Dept: [phone]     Noé Melton MD  03/01/23 [id number]

## 2023-03-01 NOTE — TELEPHONE ENCOUNTER
Nurse Triage SBAR    Is this a 2nd Level Triage? NO    Situation: Patient calling with daughter Isra - pt having SOB and mild cough. .  Consent: on file in chart    Background: Pulse oximeter shows 94% oxygen and Pulse rate is 53-55.  Pt states she is having SOB.  Mild cough - but noting to be SOB with minimal exertion.      Assessment:   * When taking a deep breath daughter can hear wheezing.    * Started earlier this morning about 30 min ago.    * SOB is intermittent - coming on within a few minutes apart.   * Moderate - wheezin and SOB with exertion and some at rest.    * Pt has not had this severe of SOB before.  In 2021 had pneumonia, 2022 had Covid (Nov 22) - this is the first time pt has ever told daughter it was hard to breathe.    * Heart attack in 2015 - 3 stents and 1 balloon.    * Denies fever or chest pain - slight cough which is normal for her in the mornings.   * Spo2% is 94%     Protocol Recommended Disposition:   ED NOW    Recommendation: Advised patient to Go to ED now . Reviewed concerning symptoms and when to call back.     Routed to provider as FYIU    Does the patient meet one of the following criteria for ADS visit consideration? 16+ years old, with an [facility] PCP     TIP  Providers, please consider if this condition is appropriate for management at one of our Acute and Diagnostic Services sites.     If patient is a good candidate, please use dotphrase <dot>triageresponse and select Refer to ADS to document.       Melida Woodard RN [facility] 3/1/2023 7:24 AM      Reason for Disposition    MODERATE difficulty breathing (e.g., speaks in phrases, SOB even at rest, pulse 100-120) of new-onset or worse than normal    Additional Information    Negative: SEVERE difficulty breathing (e.g., struggling for each breath, speaks in single words, pulse > 120)    Negative: Breathing stopped and hasn't returned    Negative: Choking on something    Negative: Bluish (or gray) lips or face     Negative: Difficult to awaken or acting confused (e.g., disoriented, slurred speech)    Negative: Passed out (i.e., fainted, collapsed and was not responding)    Negative: Wheezing started suddenly after medicine, an allergic food, or bee sting    Negative: Stridor    Negative: Slow, shallow and weak breathing    Negative: Sounds like a life-threatening emergency to the triager    Negative: Chest pain    Negative: Difficulty breathing and within 14 days of COVID-19 Exposure    Negative: Wheezing (high pitched whistling sound) and previous asthma attacks or use of asthma medicines    Negative: Difficulty breathing and only present when coughing    Negative: Difficulty breathing and only from stuffy nose    Negative: Difficulty breathing and only from stuffy nose or runny nose from common cold    Protocols used: BREATHING DIFFICULTY-A-OH

## 2023-03-15 DIAGNOSIS — G24.3 SPASMODIC TORTICOLLIS: ICD-10-CM

## 2023-03-15 DIAGNOSIS — M79.7 FIBROMYALGIA: ICD-10-CM

## 2023-03-15 DIAGNOSIS — F11.90 CHRONIC, CONTINUOUS USE OF OPIOIDS: ICD-10-CM

## 2023-03-15 DIAGNOSIS — G89.4 CHRONIC PAIN SYNDROME: ICD-10-CM

## 2023-03-15 DIAGNOSIS — F51.01 PRIMARY INSOMNIA: ICD-10-CM

## 2023-03-15 RX ORDER — TRAZODONE HYDROCHLORIDE 100 MG/1
300 TABLET ORAL AT BEDTIME
Qty: 270 TABLET | Refills: 5 | Status: SHIPPED | OUTPATIENT
Start: 2023-03-15 | End: 2023-07-24 | Stop reason: DRUGHIGH

## 2023-03-15 RX ORDER — HYDROCODONE BITARTRATE AND ACETAMINOPHEN 7.5; 325 MG/1; MG/1
2 TABLET ORAL 3 TIMES DAILY
Qty: 180 TABLET | Refills: 0 | Status: SHIPPED | OUTPATIENT
Start: 2023-03-16 | End: 2023-04-12

## 2023-03-15 NOTE — TELEPHONE ENCOUNTER
Routing to PCP  .  Patient requesting Oxycodone prior to the weekend.    Has 2 days left.    Appears earliest fill is 3/16.    Pended for 3/16.    RN received call from patient regarding above.    Amol Rizo RN, BSN, PHN  [facility]

## 2023-03-15 NOTE — TELEPHONE ENCOUNTER
I think you made a typographical errorr / transcription error , as this patient doesnt take oxyCODONE (ROXICODONE) but HYDROcodone (VICODIN)     Controlled substance E-prescribed via imprivata     Reroute if additional input requested from me otherwise you can close this encounter      Gerber Jain MD

## 2023-03-26 DIAGNOSIS — M79.7 FIBROMYALGIA: ICD-10-CM

## 2023-03-26 DIAGNOSIS — G24.3 SPASMODIC TORTICOLLIS: ICD-10-CM

## 2023-03-27 ENCOUNTER — TELEPHONE (OUTPATIENT)
Dept: FAMILY MEDICINE | Facility: CLINIC | Age: 73
End: 2023-03-27
Payer: MEDICARE

## 2023-03-27 DIAGNOSIS — R42 VERTIGO: Primary | ICD-10-CM

## 2023-03-27 DIAGNOSIS — R19.5 POSITIVE OCCULT STOOL BLOOD TEST: ICD-10-CM

## 2023-03-27 RX ORDER — BACLOFEN 10 MG/1
TABLET ORAL
Qty: 360 TABLET | Refills: 0 | Status: SHIPPED | OUTPATIENT
Start: 2023-03-27 | End: 2023-06-14

## 2023-03-27 NOTE — TELEPHONE ENCOUNTER
FYI - Status Update    Who is Calling: patient    Update: Pt is wanting to know if pcp thinks it would be okay toschedule upper endoscopy at same time as colonoscopy, and would it be safe for PT to do both at the same time?     PT gets botox for migraines, injections are no longer helping. PT has experience vertigo for 3days. What else could pcp recommend PT to try?    Does caller want a call/response back: Yes     Okay to leave a detailed message?: Yes at Cell number on file:    Telephone Information:   Mobile [phone]

## 2023-03-27 NOTE — TELEPHONE ENCOUNTER
Dr. Jain,     1. Patient has PT for migraines but this did not work for migraines but willing to try for vertigo. Per dtr pt c/o headaches and neck pain and feels the headaches overpower so unsure if vertigo related. Provided info for scheduling.     2. Daughter asking for GI referral for scopes. Willing to go wherever insurance covers. Willing to try [place].     Patient concerned about cardiac involvement with anesthesia for endoscopies but does want to be sedated.     Ok detailed vm on dtr phone.     Thanks,  PORSHA Alanis  [facility]

## 2023-03-27 NOTE — TELEPHONE ENCOUNTER
1. Lets get an assessment for her vertiginous symptoms via a referral to the physical therapy for vestibular reconditioning [ orders placed ]    2. Yes, completely appropriate for upper and lower endoscopy at the same time    Gerber Jain MD

## 2023-03-27 NOTE — TELEPHONE ENCOUNTER
Attempted to call, left vm asking return call.     Thanks,  PORSHA Alanis  [facility]

## 2023-03-27 NOTE — TELEPHONE ENCOUNTER
I reviewed this message and orders placed     I just wanted to double check to be sure we've got this all covered as per patient request    1. Upper and lower endoscopy ordered, the reasons are a] routine surveillance and 2] had a positive FIT test with never having procedures afterwards ?    2. Vestibular reconditioning ordered via physical therapy     Please Reroute if additional input requested from me or that I need to know otherwise you can close this encounter      Gerber Jain MD

## 2023-03-28 NOTE — TELEPHONE ENCOUNTER
Patient's daughter notified of provider message as written. Verbalized good understanding. No further action needed at this time - closing encounter.    Coleen CONNORN, RN  [facility]

## 2023-03-29 ENCOUNTER — TELEPHONE (OUTPATIENT)
Dept: GASTROENTEROLOGY | Facility: CLINIC | Age: 73
End: 2023-03-29
Payer: MEDICARE

## 2023-03-29 ENCOUNTER — HOSPITAL ENCOUNTER (OUTPATIENT)
Facility: CLINIC | Age: 73
End: 2023-03-29
Attending: COLON & RECTAL SURGERY | Admitting: COLON & RECTAL SURGERY
Payer: MEDICARE

## 2023-03-29 NOTE — TELEPHONE ENCOUNTER
Screening Questions  BLUE  KIND OF PREP RED  LOCATION [review exclusion criteria] GREEN  SEDATION TYPE        NO Are you active on mychart?       Gerber Jain MD  Ordering/Referring Provider?        MEDICARE What type of coverage do you have?      N Have you had a positive covid test in the last 14 days?     21.1 1. BMI  [BMI 40+ - review exclusion criteria]    Y  2. Are you able to give consent for your medical care? [IF NO,RN REVIEW]          Y  3. Are you taking any prescription pain medications on a routine schedule   (ex narcotics: oxycodone, roxicodone, oxycontin,  and percocet)? [RN Review]        hydrocodone 3a. EXTENDED PREP What kind of prescription?     N 4. Do you have any chemical dependencies such as alcohol, street drugs, or methadone?        **If yes 3- 5 , please schedule with MAC sedation.**          IF YES TO ANY 6 - 10 - HOSPITAL SETTING ONLY.     N 6.   Do you need assistance transferring?     N 7.   Have you had a heart or lung transplant?    N 8.   Are you currently on dialysis?   N 9.   Do you use daily home oxygen?   N 10. Do you take nitroglycerin?   10a. FROM NOW TO FEB ONLY ONCE If yes, how often?     11. [FEMALES]   Are you currently pregnant?    11a.  If yes, how many weeks? [ Greater than 12 weeks, OR NEEDED]    N 12. Do you have Pulmonary Hypertension? *NEED PAC APPT AT [facility] w/ MAC*     N 13. [review exclusion criteria]  Do you have any implantable devices in your body (pacemaker, defib, LVAD)?    N- HAD ONE 2015 14. In the past 6 months, have you had any heart related issues including cardiomyopathy or heart attack?     14a. NO BUT HAS 3 STENTS AND 1 BALLON If yes, did it require cardiac stenting if so when?     N 15. Have you had a stroke or Transient ischemic attack (TIA - aka  mini stroke ) within 6 months?      N 16. Do you have mod to severe Obstructive Sleep Apnea?  [Hospital only]    N 17. Do you have SEVERE AND UNCONTROLLED asthma? *NEED PAC APPT AT [facility] w/MAC*     18.  "Are you currently taking any blood thinners?     18a. No. Continue to 19.   18b. Yes/no Blood Thinner: No [CONTINUE TO #19]    N 19. Do you take the medication Phentermine?    19a. If yes, \"Hold for 7 days before procedure.  Please consult your prescribing provider if you have questions about holding this medication.\"     N  20. Do you have chronic kidney disease?      N  21. Do you have a diagnosis of diabetes?     N - SHE TAKES MARLAX DAILY  22. On a regular basis do you go 3-5 days between bowel movements?      23. Preferred LOCAL Pharmacy for Pre Prescription    [ LIST ONLY ONE PHARMACY]          [facility] [address] [place] [facility] - [address]      - CLOSING REMINDERS -    Informed patient they will need an adult    Cannot take any type of public or medical transportation alone    Conscious Sedation- Needs  for 6 hours after the procedure       MAC/General-Needs  for 24 hours after procedure    Pre-Procedure Covid test to be completed [[facility] PCR Testing Required]    Confirmed Nurse will call to complete assessment       - SCHEDULING DETAILS -   Hospital Setting Required? If yes, what is the exclusion?: NO BUT HAD NURSE REVIEW   [facility]    5/17/23  Date of Procedure  Lower Endoscopy [Colonoscopy]  [facility]   [facility]OL[facility] EXTENDED - If you answer yes to questions #1, #3, #22 (Howard Espinoza and CF pts)Which Colonoscopy Prep was Sent?     MAC Sedation Type     N PAC / Pre-op Required                 "

## 2023-03-30 ENCOUNTER — TELEPHONE (OUTPATIENT)
Dept: GASTROENTEROLOGY | Facility: CLINIC | Age: 73
End: 2023-03-30
Payer: MEDICARE

## 2023-03-30 NOTE — TELEPHONE ENCOUNTER
Kerline Laboy, Harshal Morgan,     Thank you for your message. I have reviewed the patient's chart. I do see Nitroglycerin on the patient's med list, however I do not see any other indication for needing a hospital setting. Patient should be appropriate for an ASC, unless she is taking Nitroglycerin once or more per week.     Please note, GI referral order requests for EGD and Colonoscopy.     It appears pt is currently only scheduled for a Colonoscopy with Dr. Su at [facility].     Optimally, pt should have both the EGD and Colonoscopy scheduled for the same day. I do not believe Dr. Su performs EGDs, so procedure may need to be rescheduled.       Thank you,   Kerline Laboy, RN   Endoscopy Procedure Pre-Assessment RN

## 2023-03-30 NOTE — TELEPHONE ENCOUNTER
Caller: Isra (daughter)    Reason for Reschedule/Cancellation (please be detailed, any staff messages or encounters to note?): Caller returning missed call to reschedule procedure.       Prior to reschedule please review:    Ordering Provider:Gerber Jain MD    Sedation per order:MAC    Does patient have any ASC Exclusions, please identify?: NO      Notes on Cancelled Procedure:    Procedure:Lower Endoscopy [Colonoscopy]     Date: 5/17    Location:[facility]; [address] [place] [address]    Surgeon: JACE        Rescheduled: Yes    Procedure: Upper and Lower Endoscopy [EGD and Colonoscopy]     Date: 5/25    Location:[facility]; [address] [place] [address]    Surgeon: CHRIS    Sedation Level Scheduled  MAC,  Reason for Sedation Level PER ORDER    Prep/Instructions updated and sent: LETTER

## 2023-03-30 NOTE — TELEPHONE ENCOUNTER
Caller: Isra Calvert  Reason for Reschedule/Cancellation (please be detailed, any staff messages or encounters to note?): PT was scheduled for a colonoscopy but needs Colonoscopy & Upper Endoscopy. PT would like us to schedule the appointment with her daughter. Left VM.      Prior to reschedule please review:    Ordering Provider:Cristobal    Sedation per order:MAC    Does patient have any ASC Exclusions, please identify?: No      Notes on Cancelled Procedure:    Procedure:Lower Endoscopy [Colonoscopy]     Date: 5/17/23    Location:[facility]; [facility] [address] [place] [address]    Surgeon: Sharlene        Rescheduled: No

## 2023-04-03 ENCOUNTER — TELEPHONE (OUTPATIENT)
Dept: FAMILY MEDICINE | Facility: CLINIC | Age: 73
End: 2023-04-03
Payer: MEDICARE

## 2023-04-03 NOTE — TELEPHONE ENCOUNTER
Patient's daughter (Isra- consent to communicate on file) calling regarding patient's PT referral. States that patient thought PT referral vertigo would be for home PT as she is not able to drive.    Asking if home PT (or PT through home care) is an option and if new referral can be placed? Would still like to go through F. Routing to PCP for order if appropriate.    Team- Isra (daughter) would like a call back when complete, CB#: [phone], dvm MEAGAN Ku RN  [facility]

## 2023-04-05 NOTE — TELEPHONE ENCOUNTER
I am not aware of home vestibular reconditioning physical therapy although it likely exists. Patient not driving doesn't mean she would qualify for home physical therapy though. I don't make up these rules ! She can walk and can attend things so transportation might need to be arranged. Paying an Uber  and coming in for therapy would cost thousands less and that is what the health insurance companies see.    Gerber Jain MD

## 2023-04-06 NOTE — TELEPHONE ENCOUNTER
Called patient's daughter, Isra and relayed message from Dr. Jain. She verbalized understanding. She states that currently she provides transportation for the patient. The only reason that she was asking about home PT is because when she called to schedule, she was told by  that she may be able to do this at home, but would need a new order. Writer notified Isra that the order that was placed for PT on 03/27 by Dr. Jain is for outpatient PT only. She could contact pt's insurance to see if they cover home PT for patient and if they do, then we could request a home care PT referral from PCP. Isra verbalized understanding. Gave her scheduling number for [facility] PT.     Maddy Danielle RN   [facility]

## 2023-04-08 DIAGNOSIS — I10 HYPERTENSION GOAL BP (BLOOD PRESSURE) < 140/90: ICD-10-CM

## 2023-04-08 DIAGNOSIS — I21.4 NSTEMI (NON-ST ELEVATED MYOCARDIAL INFARCTION) (H): ICD-10-CM

## 2023-04-10 RX ORDER — METOPROLOL TARTRATE 50 MG
TABLET ORAL
Qty: 180 TABLET | Refills: 1 | Status: SHIPPED | OUTPATIENT
Start: 2023-04-10 | End: 2023-10-03

## 2023-04-11 ENCOUNTER — TELEPHONE (OUTPATIENT)
Dept: FAMILY MEDICINE | Facility: CLINIC | Age: 73
End: 2023-04-11
Payer: MEDICARE

## 2023-04-11 DIAGNOSIS — M79.7 FIBROMYALGIA: ICD-10-CM

## 2023-04-11 DIAGNOSIS — G24.3 SPASMODIC TORTICOLLIS: ICD-10-CM

## 2023-04-11 DIAGNOSIS — F11.90 CHRONIC, CONTINUOUS USE OF OPIOIDS: ICD-10-CM

## 2023-04-11 DIAGNOSIS — G25.81 RESTLESS LEG SYNDROME: ICD-10-CM

## 2023-04-11 DIAGNOSIS — G89.4 CHRONIC PAIN SYNDROME: ICD-10-CM

## 2023-04-11 NOTE — TELEPHONE ENCOUNTER
Medication Question or Refill    What medication are you calling about (include dose and sig)?: hydrocodone 7.5 mg    Preferred Pharmacy\  [facility] [address] [facility] - [address] [address]  Phone: [phone] Fax: [phone]      Do you need a refill? Yes, however I was shorted 18 pills the pharmacy is asking I call you    When did you use the medication last? Will be short 3 days    Okay to leave a detailed message?: Yes at Home number on file [phone] (home)  PLEASE CALL when filled

## 2023-04-11 NOTE — TELEPHONE ENCOUNTER
"Patient is asking something I don't really understand. If a pharmacy \" shorts her\" then they have a record of this and a mode of redress of patients concerns.    Am I to provide a prescription for 3 days of medication ?  Am I to refill her monthly prescription and they refill it sooner then ?    Need help with determining actionable item     Thank you     Gerber Jain MD    "

## 2023-04-12 RX ORDER — HYDROCODONE BITARTRATE AND ACETAMINOPHEN 7.5; 325 MG/1; MG/1
2 TABLET ORAL 3 TIMES DAILY
Qty: 180 TABLET | Refills: 0 | Status: SHIPPED | OUTPATIENT
Start: 2023-04-12 | End: 2023-05-10

## 2023-04-12 RX ORDER — GABAPENTIN 300 MG/1
CAPSULE ORAL
Qty: 450 CAPSULE | Refills: 1 | Status: SHIPPED | OUTPATIENT
Start: 2023-04-12 | End: 2023-06-28

## 2023-04-12 NOTE — TELEPHONE ENCOUNTER
Called pharmacy and they have the Schaller prescription ready for .    Called and notified patient    Patient requesting refill on her gabapentin as well.    Earline Emery RN  [facility]

## 2023-04-12 NOTE — TELEPHONE ENCOUNTER
"Patient is simply using the expression \" shorted me\" to explain her understanding. No one thinks anything was inappropriate or fraudulent here.    I have sent a new month supply. Relate to patient to come  when she is running out of her current prescription. The pharmacy will know to refill a week early, etc.    Reroute if additional input requested from me otherwise you can close this encounter      Gerber Jain MD    "

## 2023-04-12 NOTE — TELEPHONE ENCOUNTER
"Dr. Jain,     Spoke with pharmacist at [facility], per pharmacist, \" We did not short her and we tried to explain this to her repeatedly this is frustrating. We dispensed 162 tabs of what we had in stock so she would need the other 18 tabs or her new script sent over early\", \"we do have enough in stock to give her the full script today\".     Please advise.       Thanks,  PORSHA Alanis  [facility]     "

## 2023-05-08 ENCOUNTER — OFFICE VISIT (OUTPATIENT)
Dept: INTERNAL MEDICINE | Facility: CLINIC | Age: 73
End: 2023-05-08
Payer: MEDICARE

## 2023-05-08 VITALS
DIASTOLIC BLOOD PRESSURE: 78 MMHG | WEIGHT: 125.6 LBS | BODY MASS INDEX: 21.56 KG/M2 | SYSTOLIC BLOOD PRESSURE: 144 MMHG | OXYGEN SATURATION: 98 % | RESPIRATION RATE: 16 BRPM | HEART RATE: 48 BPM | TEMPERATURE: 98.4 F

## 2023-05-08 DIAGNOSIS — Z01.818 PREOP GENERAL PHYSICAL EXAM: Primary | ICD-10-CM

## 2023-05-08 DIAGNOSIS — Z12.31 VISIT FOR SCREENING MAMMOGRAM: ICD-10-CM

## 2023-05-08 DIAGNOSIS — Z12.11 SCREEN FOR COLON CANCER: ICD-10-CM

## 2023-05-08 DIAGNOSIS — F42.4 SELF-EXCORIATION DISORDER: ICD-10-CM

## 2023-05-08 PROCEDURE — 99214 OFFICE O/P EST MOD 30 MIN: CPT | Performed by: INTERNAL MEDICINE

## 2023-05-08 ASSESSMENT — ANXIETY QUESTIONNAIRES
7. FEELING AFRAID AS IF SOMETHING AWFUL MIGHT HAPPEN: NOT AT ALL
GAD7 TOTAL SCORE: 0
3. WORRYING TOO MUCH ABOUT DIFFERENT THINGS: NOT AT ALL
GAD7 TOTAL SCORE: 0
4. TROUBLE RELAXING: NOT AT ALL
7. FEELING AFRAID AS IF SOMETHING AWFUL MIGHT HAPPEN: NOT AT ALL
6. BECOMING EASILY ANNOYED OR IRRITABLE: NOT AT ALL
5. BEING SO RESTLESS THAT IT IS HARD TO SIT STILL: NOT AT ALL
2. NOT BEING ABLE TO STOP OR CONTROL WORRYING: NOT AT ALL
8. IF YOU CHECKED OFF ANY PROBLEMS, HOW DIFFICULT HAVE THESE MADE IT FOR YOU TO DO YOUR WORK, TAKE CARE OF THINGS AT HOME, OR GET ALONG WITH OTHER PEOPLE?: SOMEWHAT DIFFICULT
1. FEELING NERVOUS, ANXIOUS, OR ON EDGE: NOT AT ALL
IF YOU CHECKED OFF ANY PROBLEMS ON THIS QUESTIONNAIRE, HOW DIFFICULT HAVE THESE PROBLEMS MADE IT FOR YOU TO DO YOUR WORK, TAKE CARE OF THINGS AT HOME, OR GET ALONG WITH OTHER PEOPLE: SOMEWHAT DIFFICULT

## 2023-05-08 NOTE — Clinical Note
1. Contact patient and advise no aspirin for 7 days prior to the planned procedure 2. Need to fax this along with old electrocardiogram and recent laboratory studies

## 2023-05-08 NOTE — PROGRESS NOTES
[facility]  [address]  [place] [address]  Phone: [phone]  Primary Provider: Gerber Jain  Pre-op Performing Provider: GERBER JAIN      PREOPERATIVE EVALUATION:  Today's date: 5/8/2023    Carina Calvert is a 73 year old female who presents for a preoperative evaluation.       View : No data to display.              Surgical Information:  Surgery/Procedure: Colonoscopy  Surgery Location: [facility]  Surgeon: Leo Loyd MD  Surgery Date: 05/25/2023  Time of Surgery: 9:00am  Where patient plans to recover: At home with family  Fax number for surgical facility:     Assessment & Plan     The proposed surgical procedure is considered LOW risk.    Preop general physical exam  This is a fully suitable operative candidate . See as detailed below   - REVIEW OF HEALTH MAINTENANCE PROTOCOL ORDERS  - Med Therapy Management Referral  - Adult Mental Health  Referral; Future    Screen for colon cancer  - REVIEW OF HEALTH MAINTENANCE PROTOCOL ORDERS  - Med Therapy Management Referral  - Adult Mental Health  Referral; Future    Visit for screening mammogram  Declines   - REVIEW OF HEALTH MAINTENANCE PROTOCOL ORDERS  - Med Therapy Management Referral  - Adult Mental Health  Referral; Future    Self-excoriation disorder  During this appointment , the several of this patients mental health issues become more apparent with her daughter present and we are seeing widespread patterns of secondary excoriation especially on arms and this is enough to cause scarring and this patient has an absolutely refractory chronic set from anxiety issues which I have been entirely unable to treat and a referral to a mental health care provider is recommended and patient / family agree  - REVIEW OF HEALTH MAINTENANCE PROTOCOL ORDERS  - Med Therapy Management Referral  - Adult Mental Health  Referral; Future       - No identified additional risk factors  other than previously addressed    Antiplatelet or Anticoagulation Medication Instructions:   - aspirin: Discontinue aspirin 7-10 days prior to procedure to reduce bleeding risk. It should be resumed postoperatively.  also don't take ibuprofen during this time window    Additional Medication Instructions:  Patient is to take all scheduled medications on the day of surgery    RECOMMENDATION:  APPROVAL GIVEN to proceed with proposed procedure, without further diagnostic evaluation.    Review of the result(s) of each unique test - recent labs are available in [employer]  Prescription drug management  32 minutes spent by me on the date of the encounter doing chart review, history and exam, documentation and further activities per the note  {    Subjective     HPI related to upcoming procedure: upcoming colonoscopy         5/8/2023     9:49 AM   Preop Questions   1. Have you ever had a heart attack or stroke? YES - 2016 has 3 stents and one balloon angioplasty    2. Have you ever had surgery on your heart or blood vessels, such as a stent placement, a coronary artery bypass, or surgery on an artery in your head, neck, heart, or legs? YES - as above    3. Do you have chest pain with activity? NO   4. Do you have a history of  heart failure? NO   5. Do you currently have a cold, bronchitis or symptoms of other infection? NO   6. Do you have a cough, shortness of breath, or wheezing? No   7. Do you or anyone in your family have previous history of blood clots? UNKNOWN - possibly mom secondary to a trauma, patient has no such history    8. Do you or does anyone in your family have a serious bleeding problem such as prolonged bleeding following surgeries or cuts? No   9. Have you ever had problems with anemia or been told to take iron pills? No   10. Have you had any abnormal blood loss such as black, tarry or bloody stools, or abnormal vaginal bleeding? YES - associated with hemorrhoids has some bright red blood per rectum on and  off on the toilet paper   11. Have you ever had a blood transfusion? No   12. Are you willing to have a blood transfusion if it is medically needed before, during, or after your surgery? YES   13. Have you or any of your relatives ever had problems with anesthesia? NO   14. Do you have sleep apnea, excessive snoring or daytime drowsiness? YES - sleeps all day. Suspect secondary to polypharmacy issues     14a. Do you have a CPAP machine? No   15. Do you have any artifical heart valves or other implanted medical devices like a pacemaker, defibrillator, or continuous glucose monitor? No   16. Do you have artificial joints? No   17. Are you allergic to latex? No       Health Care Directive:  Patient does not have a Health Care Directive or Living Will: Discussed advance care planning with patient; information given to patient to review.    Preoperative Review of :   reviewed - controlled substances reflected in medication list.      Status of Chronic Conditions:  See problem list for active medical problems.  Problems all longstanding and stable, except as noted/documented.  See ROS for pertinent symptoms related to these conditions.      Review of Systems  CONSTITUTIONAL: NEGATIVE for fever, chills, change in weight  ENT/MOUTH: NEGATIVE for ear, mouth and throat problems  RESP: NEGATIVE for significant cough or SOB  CV: NEGATIVE for chest pain, palpitations or peripheral edema  ENDOCRINE: NEGATIVE for temperature intolerance, skin/hair changes  HEME/ALLERGY/IMMUNE: NEGATIVE for bleeding problems  PSYCHIATRIC: POSITIVE foranxiety  ROS otherwise negative    Patient Active Problem List    Diagnosis Date Noted     Osteopenia      Priority: High     [facility] end[facility]in[facility], had been getting Boniva (Ibandronate Sodium) infusions 4 times a year , see care everywhere        Migraine headache      Priority: High     [facility]       Hyperlipidemia with target LDL less than 100 10/31/2010      Priority: High     Coronary artery disease due to lipid rich plaque 07/27/2022     Priority: Medium     Chronic, continuous use of opioids 05/24/2021     Priority: Medium     Thrombocytopenia (H) 01/28/2021     Priority: Medium     Tobacco use 10/08/2019     Priority: Medium     Primary insomnia 08/06/2018     Priority: Medium     Cervical facet joint syndrome 02/27/2018     Priority: Medium     Hypertension goal BP (blood pressure) < 140/90      Priority: Medium     Chronic pain 08/04/2015     Priority: Medium     Patient is followed by [facility] for ongoing prescription of pain medication.  All refills should be approved by this provider, or covering partner.    Medication(s): HYDROcodone (VICODIN) 7.5/325   .   Maximum quantity per month: 180  Clinic visit frequency required: Q 3 months     Controlled substance agreement on file: Yes       Date(s):   11/2015    Pain Clinic evaluation in the past: Yes       Date/Location:   [facility]     DIRE Total Score(s):  No flowsheet data found.    Last [facility] website verification: 8/19/2020   [email or web address]         NSTEMI (non-ST elevated myocardial infarction) (H) 06/06/2015     Priority: Medium     5/30/15 successful PCI to mid RCA with Promus 3.0x16mm MATHEW (post dilated with 3.5mm NC balloon)  6/2/15 successful PCI to proximal LCx with Promus 2.90y63ea MATHEW, proximal LAD with Promus 2.14v87dy MATHEW along with POBA of ostium of D1. 2016.  On prasurgel for a least one year interrupted until 6/2016.             Cramp of limb 02/16/2012     Priority: Medium     Subclinical hyperthyroidism      Priority: Medium     NONTOXIC MULTINODULAR GOITER        Hypovitaminosis D      Priority: Medium     Positive occult stool blood test 12/23/2010     Priority: Medium     Patient declines colonoscopy       Ex-smoker 12/07/2010     Priority: Medium     History of hepatitis C      Priority: Medium     Is cured, after a full year of alphainterferon and ribaviron  in 2001       Recurrent cold sores      Priority: Medium     Insomnia 08/05/2010     Priority: Medium     Spasmodic torticollis      Priority: Medium     [facility]       Neuropathy      Priority: Medium     [facility]       Fibromyalgia      Priority: Medium     [facility]       Restless leg syndrome      Priority: Medium     [facility]        Past Medical History:   Diagnosis Date     Chronic constipation      Chronic sciatica     Pain clinic     DDD (degenerative disc disease), cervical      Fibromyalgia      History of hepatitis C      Hypertension goal BP (blood pressure) < 140/90      Kidney stones      Migraine headaches      Neuropathy      Osteopenia     boniva infusions w Dr Rayo at [facility]     PUD (peptic ulcer disease)     h/o bleeding ulcer     Recurrent cold sores      Restless leg syndrome      Spasmodic torticollis      Subclinical hyperthyroidism     NONTOXIC MULTINODULAR GOITER      Vertigo      Vitamin B12 deficiency      Vitamin D deficiency      Past Surgical History:   Procedure Laterality Date     HERNIA REPAIR  9/2009    ventral     LITHOTRIPSY      2 x      TONSILLECTOMY       TUBAL LIGATION       Current Outpatient Medications   Medication Sig Dispense Refill     albuterol (PROAIR HFA/PROVENTIL HFA/VENTOLIN HFA) 108 (90 Base) MCG/ACT inhaler Inhale 2 puffs into the lungs every 4 hours as needed for shortness of breath or wheezing 18 g 0     aspirin 81 MG tablet Take 81 mg by mouth daily       baclofen (LIORESAL) 10 MG tablet TAKE 1 TABLET BY MOUTH FOUR TIMES A  tablet 0     Cholecalciferol (VITAMIN D) 2000 UNITS tablet Take 1 tablet by mouth daily       gabapentin (NEURONTIN) 300 MG capsule TAKE 1 CAPSULE BY MOUTH IN THE MORNING, 1 AT NOON 1 AT 5PM AND 2 AT BEDTIME ( 5 PER DAY ) 450 capsule 1     HYDROcodone-acetaminophen (NORCO) 7.5-325 MG per tablet Take 2 tablets by mouth 3 times daily 28 days or more between  refills for controlled medications 180 tablet 0     ibuprofen (ADVIL/MOTRIN) 200 MG tablet Take 200 mg by mouth 3 times daily Takes with hydrocodone-acetaminophen       lactobacillus rhamnosus, GG, (CULTURELL) capsule Take 1 capsule by mouth 2 times daily (before meals) Lunch and supper       Melatonin 10 MG CAPS Take 20 mg by mouth At Bedtime 1 hour prior to bedtime       metoprolol tartrate (LOPRESSOR) 50 MG tablet TAKE 1 TABLET BY MOUTH TWICE A  tablet 1     nitroGLYcerin (NITROSTAT) 0.4 MG sublingual tablet Place 1 tablet (0.4 mg) under the tongue every 5 minutes as needed for chest pain 25 tablet 2     omeprazole (PRILOSEC OTC) 20 MG EC tablet Take 20 mg by mouth 2 times daily       polyethylene glycol (MIRALAX/GLYCOLAX) powder Take 17 g by mouth daily Reported on 5/15/2017       rosuvastatin (CRESTOR) 10 MG tablet Take 1 tablet (10 mg) by mouth daily 90 tablet 3     traZODone (DESYREL) 100 MG tablet TAKE 3 TABLETS (300 MG) BY MOUTH AT BEDTIME 270 tablet 5     VITAMIN B-12 1000 MCG OR TABS 1 tablet daily         Allergies   Allergen Reactions     Iodinated Contrast Media Anaphylaxis and Hives     Unable to breathe     Diatrizoate Hives     Hypaque     Droperidol      Ivp Dye [Contrast Dye]         Social History     Tobacco Use     Smoking status: Former     Packs/day: 0.20     Years: 15.00     Pack years: 3.00     Types: Cigarettes     Smokeless tobacco: Never     Tobacco comments:     4 cigs a day, just lost    Vaping Use     Vaping status: Not on file   Substance Use Topics     Alcohol use: No     Family History   Problem Relation Age of Onset     Neurologic Disorder Mother 65        Parkinsons     Alcohol/Drug Mother      Cerebrovascular Disease Mother      Respiratory Father         COPD     Alcohol/Drug Father      Cancer Brother      Diabetes Sister 52     Cancer Maternal Aunt      Cancer Maternal Uncle      C.A.D. No family hx of      History   Drug Use No         Objective     BP (!)  144/78   Pulse (!) 48   Temp 98.4  F (36.9  C) (Temporal)   Resp 16   Wt 57 kg (125 lb 9.6 oz)   SpO2 98%   BMI 21.56 kg/m      Physical Exam  GENERAL APPEARANCE: healthy, alert and no distress  HENT: ear canals and TM's normal and nose and mouth without ulcers or lesions  RESP: lungs clear to auscultation - no rales, rhonchi or wheezes  CV: regular rate and rhythm, normal S1 S2, no S3 or S4 and no murmur, click or rub   ABDOMEN: soft, nontender, no HSM or masses and bowel sounds normal  NEURO: Normal strength and tone, sensory exam grossly normal, mentation intact and speech normal    Recent Labs   Lab Test 03/01/23  1223 07/20/22  1859   HGB 13.8 11.9   * 106*    140   POTASSIUM 4.2 4.2   CR 0.63 0.80        Diagnostics:  Orders Placed This Encounter   Procedures     REVIEW OF HEALTH MAINTENANCE PROTOCOL ORDERS     Med Therapy Management Referral     Adult Mental Health  Referral     See prior laboratory studies and electrocardiogram        Revised Cardiac Risk Index (RCRI):  The patient has the following serious cardiovascular risks for perioperative complications:   - Coronary Artery Disease (MI, positive stress test, angina, Qs on EKG) = 1 point     RCRI Interpretation: 1 point: Class II (low risk - 0.9% complication rate)           Signed Electronically by: Gerber Jain MD  Copy of this evaluation report is provided to requesting physician.      Answers for HPI/ROS submitted by the patient on 5/8/2023  TRUNG 7 TOTAL SCORE: 0

## 2023-05-09 ENCOUNTER — TELEPHONE (OUTPATIENT)
Dept: INTERNAL MEDICINE | Facility: CLINIC | Age: 73
End: 2023-05-09
Payer: MEDICARE

## 2023-05-09 NOTE — TELEPHONE ENCOUNTER
Patient was seen yesterday for:    Surgical Information:  Surgery/Procedure: Colonoscopy  Surgery Location: [facility]  Surgeon: Leo Loyd MD  Surgery Date: 05/25/2023      I see note in the pre-op visit:      Antiplatelet or Anticoagulation Medication Instructions:   - aspirin: Discontinue aspirin 7-10 days prior to procedure to reduce bleeding risk. It should be resumed postoperatively.  also don't take ibuprofen during this time window     Additional Medication Instructions:  Patient is to take all scheduled medications on the day of surgery    So, she should hold her aspirin starting 5/18/23.    I called and spoke to patient, advised her of the plan for meds and aspirin/ibuprofen per above.    Patient verbalized understanding of and agreement with plan.    Laura Sullivan RN  [facility]

## 2023-05-09 NOTE — TELEPHONE ENCOUNTER
Gerber Jain MD  [facility] [place]  1. Contact patient and advise no aspirin for 7 days prior to the planned procedure

## 2023-05-10 ENCOUNTER — TELEPHONE (OUTPATIENT)
Dept: FAMILY MEDICINE | Facility: CLINIC | Age: 73
End: 2023-05-10
Payer: MEDICARE

## 2023-05-10 DIAGNOSIS — M79.7 FIBROMYALGIA: ICD-10-CM

## 2023-05-10 DIAGNOSIS — G24.3 SPASMODIC TORTICOLLIS: ICD-10-CM

## 2023-05-10 DIAGNOSIS — G89.4 CHRONIC PAIN SYNDROME: ICD-10-CM

## 2023-05-10 DIAGNOSIS — F11.90 CHRONIC, CONTINUOUS USE OF OPIOIDS: ICD-10-CM

## 2023-05-10 RX ORDER — HYDROCODONE BITARTRATE AND ACETAMINOPHEN 7.5; 325 MG/1; MG/1
2 TABLET ORAL 3 TIMES DAILY
Qty: 180 TABLET | Refills: 0 | Status: SHIPPED | OUTPATIENT
Start: 2023-05-10 | End: 2023-06-08

## 2023-05-10 NOTE — TELEPHONE ENCOUNTER
Patient Returning Call    Reason for call: Patient is in need of a refill of her hydrocodone and would like it sent to [facility] ([address])     Information relayed to patient:      Patient has additional questions:  No      Okay to leave a detailed message?: Yes at Home number on file [phone] (home)

## 2023-05-14 DIAGNOSIS — E78.5 HYPERLIPIDEMIA WITH TARGET LDL LESS THAN 100: ICD-10-CM

## 2023-05-15 RX ORDER — ROSUVASTATIN CALCIUM 10 MG/1
10 TABLET, COATED ORAL DAILY
Qty: 90 TABLET | Refills: 3 | Status: SHIPPED | OUTPATIENT
Start: 2023-05-15 | End: 2024-02-12

## 2023-05-16 RX ORDER — BISACODYL 5 MG/1
TABLET, DELAYED RELEASE ORAL
Qty: 4 TABLET | Refills: 0 | Status: SHIPPED | OUTPATIENT
Start: 2023-05-16 | End: 2023-05-18

## 2023-05-18 ENCOUNTER — VIRTUAL VISIT (OUTPATIENT)
Dept: PSYCHIATRY | Facility: CLINIC | Age: 73
End: 2023-05-18
Attending: INTERNAL MEDICINE
Payer: MEDICARE

## 2023-05-18 DIAGNOSIS — F42.4 SELF-EXCORIATION DISORDER: ICD-10-CM

## 2023-05-18 DIAGNOSIS — F42.4 EXCORIATION (SKIN-PICKING) DISORDER: Primary | ICD-10-CM

## 2023-05-18 DIAGNOSIS — Z01.818 PREOP GENERAL PHYSICAL EXAM: ICD-10-CM

## 2023-05-18 DIAGNOSIS — Z12.31 VISIT FOR SCREENING MAMMOGRAM: ICD-10-CM

## 2023-05-18 DIAGNOSIS — Z12.11 SCREEN FOR COLON CANCER: ICD-10-CM

## 2023-05-18 PROCEDURE — 99205 OFFICE O/P NEW HI 60 MIN: CPT | Mod: VID | Performed by: PSYCHIATRY & NEUROLOGY

## 2023-05-18 RX ORDER — BACITRACIN ZINC, NEOMYCIN SULFATE, POLYMYXIN B SULFATE 400; 3.5; 5 [USP'U]/G; MG/G; [USP'U]/G
OINTMENT TOPICAL
COMMUNITY
Start: 2023-05-18 | End: 2023-06-28

## 2023-05-18 ASSESSMENT — PATIENT HEALTH QUESTIONNAIRE - PHQ9
SUM OF ALL RESPONSES TO PHQ QUESTIONS 1-9: 5
SUM OF ALL RESPONSES TO PHQ QUESTIONS 1-9: 5
10. IF YOU CHECKED OFF ANY PROBLEMS, HOW DIFFICULT HAVE THESE PROBLEMS MADE IT FOR YOU TO DO YOUR WORK, TAKE CARE OF THINGS AT HOME, OR GET ALONG WITH OTHER PEOPLE: SOMEWHAT DIFFICULT

## 2023-05-18 NOTE — NURSING NOTE
Is the patient currently in the state of [place]? YES    Visit mode:VIDEO    If the visit is dropped, the patient can be reconnected by: VIDEO VISIT: Text to cell phone:   Telephone Information:   Mobile [phone]       Will anyone else be joining the visit? No  (If patient encounters technical issues they should call [phone])    How would you like to obtain your AVS? Mail a copy    Are changes needed to the allergy or medication list? YES no longer taking omeprazole 20 mg    Rooming Documentation: Unable to complete qnrs due to time. and Attendance Guidelines - Care team has reviewed attendance agreement with patient. Patient advised that two failed appointments within 6 months may lead to termination of current episode of care.      Reason for visit: Video Visit     ALF Cox
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
## 2023-05-19 ENCOUNTER — TELEPHONE (OUTPATIENT)
Dept: PSYCHIATRY | Facility: CLINIC | Age: 73
End: 2023-05-19
Payer: MEDICARE

## 2023-05-19 NOTE — TELEPHONE ENCOUNTER
Reason for call:  Other   Patient called regarding (reason for call): call back  Additional comments: VD call from Clients Daughter(Isra)     ph.# [phone] Isra reports that someone had called from 's office and wanted to talk with her regarding her Mom's last appt.     Phone number to reach patient:  Other phone number:  [phone]    Best Time:  Anytime after 10:00 AM today 5/19/23    Can we leave a detailed message on this number?  YES    Travel screening: Not Applicable

## 2023-05-22 NOTE — TELEPHONE ENCOUNTER
RN returned a call to Isra Calvert (Daughter) [phone] (Mobile) as requested by [facility] Coordinator.      1.  RN was not able to determine who from Dr. Clifton's office had called her.    2.  RN noted that this patients 7/7/23 appointment had been canceled.  Dr. Clifton's last visit note that a return visit would be needed in 8 weeks.  RN instructed  Isra Calvert (Daughter) [phone] (Mobile) to call [facility] at [phone] to schedule a future appointment around 7/15/23.  Zechariah verbalized a understanding.      3.  RN instructed Isra to call [facility] [phone] this Wednesday if she had not heard back from scheduling regarding a Psychotherapy referral.

## 2023-05-24 ENCOUNTER — TELEPHONE (OUTPATIENT)
Dept: BEHAVIORAL HEALTH | Facility: CLINIC | Age: 73
End: 2023-05-24
Payer: MEDICARE

## 2023-05-24 NOTE — TELEPHONE ENCOUNTER
Schedule with TC for [facility] and [facility] until [facility]  Due: Today Received: Today  Drea Castaneda, LICSW  P Tc Referrals For Review  Next Level of Care Patient Will Be Transitioned To: [facility] Provider(s)Gabriela Bonilla Location [facility] Date/Time 9/27/2023   MEDICARE       Writer spoke with pt and scheduled initial TC therapy appointment on 06/08/2023 @ 1:30 pm. Writer sent intake documents via [employer]. Writer will reply to referral source.Tracker completed.    Estephanie Mckinnon  05/24/2023  920    ----- Message from Hansel Arzate sent at 5/23/2023  3:28 PM CDT -----  Transition Clinic Referral   [place]         Please Check Type of Referral Requested:       ___x_THERAPY: The Transition clinic is able to schedule patients without current medical insurance; these patient will be referred to our Social Work Care Coordinator for Medical Insurance              Assistance. We are open for referral for psychotherapy. Patient is referred from:  [facility]      ___  Referring Provider Contact Name: [facility]; Phone Number: [phone]    Reason for Transition Clinic Referral: Therapy    Next Level of Care Patient Will Be Transitioned To: [facility]  Provider(s)Gabriela Bonilla  [facility]  Date/Time 9/27/2023    What Would Be Helpful from the Transition Clinic: Therapy     Needs: NO    Does Patient Have Access to Technology: Yes    Patient E-mail Address: No e-mail address on record    Current Patient Phone Number: [phone]; N/A    Clinician Gender Preference (if applicable): Unknown    Patient location preference: Virtual    Hansel Arzate

## 2023-05-25 ENCOUNTER — ANESTHESIA (OUTPATIENT)
Dept: GASTROENTEROLOGY | Facility: CLINIC | Age: 73
End: 2023-05-25
Payer: MEDICARE

## 2023-05-25 ENCOUNTER — HOSPITAL ENCOUNTER (OUTPATIENT)
Facility: CLINIC | Age: 73
Discharge: HOME | End: 2023-05-25
Attending: INTERNAL MEDICINE | Admitting: INTERNAL MEDICINE
Payer: MEDICARE

## 2023-05-25 ENCOUNTER — ANESTHESIA EVENT (OUTPATIENT)
Dept: GASTROENTEROLOGY | Facility: CLINIC | Age: 73
End: 2023-05-25
Payer: MEDICARE

## 2023-05-25 VITALS
RESPIRATION RATE: 9 BRPM | TEMPERATURE: 98.5 F | SYSTOLIC BLOOD PRESSURE: 191 MMHG | OXYGEN SATURATION: 98 % | HEART RATE: 56 BPM | DIASTOLIC BLOOD PRESSURE: 83 MMHG

## 2023-05-25 DIAGNOSIS — Z12.11 ENCOUNTER FOR SCREENING COLONOSCOPY: Primary | ICD-10-CM

## 2023-05-25 LAB
COLONOSCOPY: NORMAL
UPPER GI ENDOSCOPY: NORMAL

## 2023-05-25 PROCEDURE — 250N000009 HC RX 250: Performed by: NURSE ANESTHETIST, CERTIFIED REGISTERED

## 2023-05-25 PROCEDURE — 370N000017 HC ANESTHESIA TECHNICAL FEE, PER MIN: Performed by: INTERNAL MEDICINE

## 2023-05-25 PROCEDURE — 999N000010 HC STATISTIC ANES STAT CODE-CRNA PER MINUTE: Performed by: INTERNAL MEDICINE

## 2023-05-25 PROCEDURE — 250N000011 HC RX IP 250 OP 636: Performed by: NURSE ANESTHETIST, CERTIFIED REGISTERED

## 2023-05-25 PROCEDURE — 258N000003 HC RX IP 258 OP 636: Performed by: NURSE ANESTHETIST, CERTIFIED REGISTERED

## 2023-05-25 PROCEDURE — 43239 EGD BIOPSY SINGLE/MULTIPLE: CPT | Performed by: INTERNAL MEDICINE

## 2023-05-25 PROCEDURE — 88305 TISSUE EXAM BY PATHOLOGIST: CPT | Mod: TC | Performed by: INTERNAL MEDICINE

## 2023-05-25 PROCEDURE — 88305 TISSUE EXAM BY PATHOLOGIST: CPT | Mod: 26 | Performed by: PATHOLOGY

## 2023-05-25 PROCEDURE — 45378 DIAGNOSTIC COLONOSCOPY: CPT | Performed by: INTERNAL MEDICINE

## 2023-05-25 PROCEDURE — G0121 COLON CA SCRN NOT HI RSK IND: HCPCS | Performed by: INTERNAL MEDICINE

## 2023-05-25 RX ORDER — PROPOFOL 10 MG/ML
INJECTION, EMULSION INTRAVENOUS CONTINUOUS PRN
Status: DISCONTINUED | OUTPATIENT
Start: 2023-05-25 | End: 2023-05-25

## 2023-05-25 RX ORDER — SODIUM CHLORIDE, SODIUM LACTATE, POTASSIUM CHLORIDE, CALCIUM CHLORIDE 600; 310; 30; 20 MG/100ML; MG/100ML; MG/100ML; MG/100ML
INJECTION, SOLUTION INTRAVENOUS CONTINUOUS PRN
Status: DISCONTINUED | OUTPATIENT
Start: 2023-05-25 | End: 2023-05-25

## 2023-05-25 RX ORDER — LIDOCAINE HYDROCHLORIDE 20 MG/ML
INJECTION, SOLUTION INFILTRATION; PERINEURAL PRN
Status: DISCONTINUED | OUTPATIENT
Start: 2023-05-25 | End: 2023-05-25

## 2023-05-25 RX ORDER — PROPOFOL 10 MG/ML
INJECTION, EMULSION INTRAVENOUS PRN
Status: DISCONTINUED | OUTPATIENT
Start: 2023-05-25 | End: 2023-05-25

## 2023-05-25 RX ADMIN — PROPOFOL 125 MCG/KG/MIN: 10 INJECTION, EMULSION INTRAVENOUS at 09:06

## 2023-05-25 RX ADMIN — PROPOFOL 30 MG: 10 INJECTION, EMULSION INTRAVENOUS at 09:12

## 2023-05-25 RX ADMIN — LIDOCAINE HYDROCHLORIDE 40 MG: 20 INJECTION, SOLUTION INFILTRATION; PERINEURAL at 09:04

## 2023-05-25 RX ADMIN — SODIUM CHLORIDE, POTASSIUM CHLORIDE, SODIUM LACTATE AND CALCIUM CHLORIDE: 600; 310; 30; 20 INJECTION, SOLUTION INTRAVENOUS at 09:02

## 2023-05-25 ASSESSMENT — ACTIVITIES OF DAILY LIVING (ADL): ADLS_ACUITY_SCORE: 35

## 2023-05-25 ASSESSMENT — LIFESTYLE VARIABLES: TOBACCO_USE: 1

## 2023-05-25 NOTE — ANESTHESIA CARE TRANSFER NOTE
Patient: Carina Calvert    Procedure: Procedure(s):  Colonoscopy  Esophagoscopy, gastroscopy, duodenoscopy (EGD), combined       Diagnosis: Positive occult stool blood test [R19.5]  Diagnosis Additional Information: No value filed.    Anesthesia Type:   MAC     Note:    Oropharynx: oropharynx clear of all foreign objects and spontaneously breathing  Level of Consciousness: awake  Oxygen Supplementation: nasal cannula  Level of Supplemental Oxygen (L/min / FiO2): 2  Independent Airway: airway patency satisfactory and stable  Dentition: dentition unchanged  Vital Signs Stable: post-procedure vital signs reviewed and stable  Report to RN Given: handoff report given  Patient transferred to: Phase II  Comments: Pt to ENDO Phase 2 with O2 via nasal cannula, airway patent, VSS. Report to RN.  Handoff Report: Identifed the Patient, Identified the Reponsible Provider, Reviewed the pertinent medical history, Discussed the surgical course, Reviewed Intra-OP anesthesia mangement and issues during anesthesia, Set expectations for post-procedure period and Allowed opportunity for questions and acknowledgement of understanding      Vitals:  Vitals Value Taken Time   BP     Temp     Pulse     Resp 38 05/25/23 0928   SpO2     Vitals shown include unvalidated device data.    Electronically Signed By: VINOD Fuentes CRNA  May 25, 2023  9:29 AM

## 2023-05-25 NOTE — ANESTHESIA POSTPROCEDURE EVALUATION
Patient: Carina Calvert    Procedure: Procedure(s):  Colonoscopy  Esophagoscopy, gastroscopy, duodenoscopy (EGD), combined       Anesthesia Type:  MAC    Note:  Disposition: Outpatient   Postop Pain Control: Uneventful            Sign Out: Well controlled pain   PONV: No   Neuro/Psych: Uneventful            Sign Out: Acceptable/Baseline neuro status   Airway/Respiratory: Uneventful            Sign Out: Acceptable/Baseline resp. status   CV/Hemodynamics: Uneventful            Sign Out: Acceptable CV status; No obvious hypovolemia; No obvious fluid overload   Other NRE: NONE   DID A NON-ROUTINE EVENT OCCUR? No           Last vitals:  Vitals Value Taken Time   /83 05/25/23 0952   Temp     Pulse 56 05/25/23 0957   Resp 41 05/25/23 0957   SpO2 96 % 05/25/23 0958   Vitals shown include unvalidated device data.    Electronically Signed By: Chantale Cardenas MD  May 25, 2023  10:49 AM

## 2023-05-25 NOTE — ANESTHESIA PREPROCEDURE EVALUATION
Anesthesia Pre-Procedure Evaluation    Patient: Carina Calvert   MRN: [id number] : 1950        Procedure : Procedure(s):  Colonoscopy  Esophagoscopy, gastroscopy, duodenoscopy (EGD), combined          Past Medical History:   Diagnosis Date     Chronic constipation      Chronic sciatica     Pain clinic     DDD (degenerative disc disease), cervical      Fibromyalgia      History of hepatitis C      Hypertension goal BP (blood pressure) < 140/90      Kidney stones      Migraine headaches      Neuropathy      Osteopenia     boniva infusions w Dr Rayo at [facility]     PUD (peptic ulcer disease)     h/o bleeding ulcer     Recurrent cold sores      Restless leg syndrome      Spasmodic torticollis      Subclinical hyperthyroidism     NONTOXIC MULTINODULAR GOITER      Vertigo      Vitamin B12 deficiency      Vitamin D deficiency       Past Surgical History:   Procedure Laterality Date     HERNIA REPAIR  2009    ventral     LITHOTRIPSY      2 x      TONSILLECTOMY       TUBAL LIGATION        Allergies   Allergen Reactions     Iodinated Contrast Media Anaphylaxis and Hives     Unable to breathe     Diatrizoate Hives     Hypaque     Droperidol      Ivp Dye [Contrast Dye]       Social History     Tobacco Use     Smoking status: Former     Packs/day: 0.20     Years: 15.00     Pack years: 3.00     Types: Cigarettes     Smokeless tobacco: Never     Tobacco comments:     4 cigs a day, just lost    Vaping Use     Vaping status: Not on file   Substance Use Topics     Alcohol use: No      Wt Readings from Last 1 Encounters:   23 57 kg (125 lb 9.6 oz)        Anesthesia Evaluation            ROS/MED HX  ENT/Pulmonary:     (+) tobacco use, Past use,     Neurologic: Comment: RLS      (+) peripheral neuropathy,     Cardiovascular:     (+) Dyslipidemia hypertension--CAD -past MI -stent-Previous cardiac testing   Echo: Date: Results:    Stress Test: Date: 2022 Results:  1. Pharmacological Regadenoson stress cardiac MRI  is negative for inducible myocardial ischemia.   2. Pharmacological stress ECG is negative for inducible myocardial ischemia. Frequent PVCs noted.   3. Normal left ventricular size, wall thickness and systolic function. The quantified left ventricular  ejection fraction is 59%. No myocardial scar is identified.    4.  Normal right ventricular size and systolic function.    5.  Mild eccentric anterior directed mitral regurgitation  6.  Moderate left atrial enlargement.  Mild right atrial enlargement.   ECG Reviewed: Date: Results:    Cath:  Date: Results:      METS/Exercise Tolerance:     Hematologic: Comments: Thrombocytopenia, Plts 133      Musculoskeletal:       GI/Hepatic:       Renal/Genitourinary:       Endo:       Psychiatric/Substance Use:     (+) psychiatric history (Self-excoriation disorder) H/O chronic opiod use .     Infectious Disease:       Malignancy:       Other:               OUTSIDE LABS:  CBC:   Lab Results   Component Value Date    WBC 6.8 03/01/2023    WBC 4.7 07/20/2022    HGB 13.8 03/01/2023    HGB 11.9 07/20/2022    HCT 43.4 03/01/2023    HCT 37.1 07/20/2022     (L) 03/01/2023     (L) 07/20/2022     BMP:   Lab Results   Component Value Date     03/01/2023     07/20/2022    POTASSIUM 4.2 03/01/2023    POTASSIUM 4.2 07/20/2022    CHLORIDE 102 03/01/2023    CHLORIDE 106 07/20/2022    CO2 30 (H) 03/01/2023    CO2 28 07/20/2022    BUN 9.2 03/01/2023    BUN 16 07/20/2022    CR 0.63 03/01/2023    CR 0.80 07/20/2022    GLC 90 03/01/2023     07/20/2022     COAGS: No results found for: PTT, INR, FIBR  POC: No results found for: BGM, HCG, HCGS  HEPATIC:   Lab Results   Component Value Date    ALBUMIN 4.1 11/14/2017    PROTTOTAL 7.7 11/14/2017    ALT 23 11/14/2017    AST 15 11/14/2017    ALKPHOS 46 11/14/2017    BILITOTAL 0.4 11/14/2017     OTHER:   Lab Results   Component Value Date    CARMINA 9.6 03/01/2023    MAG 2.1 03/01/2023    TSH 1.20 05/26/2022    T4 1.01 07/15/2013     CRP <5.0 08/25/2011       Anesthesia Plan    ASA Status:  3   NPO Status:  NPO Appropriate    Anesthesia Type: MAC.     - Reason for MAC: straight local not clinically adequate              Consents    Anesthesia Plan(s) and associated risks, benefits, and realistic alternatives discussed. Questions answered and patient/representative(s) expressed understanding.     - Discussed: Risks, Benefits and Alternatives for the PROCEDURE were discussed     - Discussed with:  Patient         Postoperative Care       PONV prophylaxis: Ondansetron (or other 5HT-3)     Comments:                Chantale Cardenas MD

## 2023-05-25 NOTE — CONSULTS
[facility]  Pre-Endoscopy History and Physical     Carina Calvert MRN# [id number]   YOB: 1950 Age: 73 year old     Date of Procedure: 2023  Primary care provider: Gerber Jain  Type of Endoscopy: colonoscopy  Reason for Procedure: screening  Type of Anesthesia Anticipated: MAC    HPI:    Carina is a 73 year old female who will be undergoing the above procedure.      A history and physical has been performed. The patient's medications and allergies have been reviewed. The risks and benefits of the procedure and the sedation options and risks were discussed with the patient.  All questions were answered and informed consent was obtained.      She denies a personal or family history of anesthesia complications or bleeding disorders.     Allergies   Allergen Reactions     Iodinated Contrast Media Anaphylaxis and Hives     Unable to breathe     Diatrizoate Hives     Hypaque     Droperidol      Ivp Dye [Contrast Dye]         Prior to Admission Medications   Prescriptions Last Dose Informant Patient Reported? Taking?   Cholecalciferol (VITAMIN D) 2000 UNITS tablet   Yes No   Sig: Take 1 tablet by mouth daily   HYDROcodone-acetaminophen (NORCO) 7.5-325 MG per tablet   No No   Sig: Take 2 tablets by mouth 3 times daily 28 days or more between refills for controlled medications   Melatonin 10 MG CAPS   Yes No   Sig: Take 20 mg by mouth At Bedtime 1 hour prior to bedtime   Neomycin-Bacitracin-Polymyxin (CURAD TRIPLE ANTIBIOTIC) 5-400-5000 OINT   Yes No   VITAMIN B-12 1000 MCG OR TABS   Yes No   Si tablet daily   aspirin 81 MG tablet   Yes No   Sig: Take 81 mg by mouth daily   baclofen (LIORESAL) 10 MG tablet   No No   Sig: TAKE 1 TABLET BY MOUTH FOUR TIMES A DAY   gabapentin (NEURONTIN) 300 MG capsule   No No   Sig: TAKE 1 CAPSULE BY MOUTH IN THE MORNING, 1 AT NOON 1 AT 5PM AND 2 AT BEDTIME ( 5 PER DAY )   ibuprofen (ADVIL/MOTRIN) 200 MG tablet   Yes No   Sig: Take 200 mg  by mouth 3 times daily Takes with hydrocodone-acetaminophen   lactobacillus rhamnosus, GG, (CULTURELL) capsule   Yes No   Sig: Take 1 capsule by mouth 2 times daily (before meals) Lunch and supper   metoprolol tartrate (LOPRESSOR) 50 MG tablet   No No   Sig: TAKE 1 TABLET BY MOUTH TWICE A DAY   nitroGLYcerin (NITROSTAT) 0.4 MG sublingual tablet   No No   Sig: Place 1 tablet (0.4 mg) under the tongue every 5 minutes as needed for chest pain   omeprazole (PRILOSEC OTC) 20 MG EC tablet   Yes No   Sig: Take 20 mg by mouth 2 times daily   Patient not taking: Reported on 5/18/2023   polyethylene glycol (MIRALAX/GLYCOLAX) powder   Yes No   Sig: Take 17 g by mouth daily Reported on 5/15/2017   rosuvastatin (CRESTOR) 10 MG tablet   No No   Sig: TAKE 1 TABLET (10 MG) BY MOUTH DAILY.   traZODone (DESYREL) 100 MG tablet   No No   Sig: TAKE 3 TABLETS (300 MG) BY MOUTH AT BEDTIME      Facility-Administered Medications: None       Patient Active Problem List   Diagnosis     Neuropathy     Fibromyalgia     Restless leg syndrome     Spasmodic torticollis     Insomnia     Hyperlipidemia with target LDL less than 100     History of hepatitis C     Ex-smoker     Recurrent cold sores     Osteopenia     Migraine headache     Positive occult stool blood test     Subclinical hyperthyroidism     Hypovitaminosis D     Cramp of limb     NSTEMI (non-ST elevated myocardial infarction) (H)     Chronic pain     Hypertension goal BP (blood pressure) < 140/90     Cervical facet joint syndrome     Primary insomnia     Tobacco use     Thrombocytopenia (H)     Chronic, continuous use of opioids     Coronary artery disease due to lipid rich plaque     Excoriation (skin-picking) disorder        Past Medical History:   Diagnosis Date     Chronic constipation      Chronic sciatica     Pain clinic     DDD (degenerative disc disease), cervical      Fibromyalgia      History of hepatitis C      Hypertension goal BP (blood pressure) < 140/90      Kidney  "stones      Migraine headaches      Neuropathy      Osteopenia     boniva infusions w Dr Rayo at [facility]     PUD (peptic ulcer disease)     h/o bleeding ulcer     Recurrent cold sores      Restless leg syndrome      Spasmodic torticollis      Subclinical hyperthyroidism     NONTOXIC MULTINODULAR GOITER      Vertigo      Vitamin B12 deficiency      Vitamin D deficiency         Past Surgical History:   Procedure Laterality Date     HERNIA REPAIR  9/2009    ventral     LITHOTRIPSY      2 x      TONSILLECTOMY       TUBAL LIGATION         Social History     Tobacco Use     Smoking status: Former     Packs/day: 0.20     Years: 15.00     Pack years: 3.00     Types: Cigarettes     Smokeless tobacco: Never     Tobacco comments:     4 cigs a day, just lost    Vaping Use     Vaping status: Not on file   Substance Use Topics     Alcohol use: No       Family History   Problem Relation Age of Onset     Neurologic Disorder Mother 65        Parkinsons     Alcohol/Drug Mother      Cerebrovascular Disease Mother      Respiratory Father         COPD     Alcohol/Drug Father      Cancer Brother      Diabetes Sister 52     Cancer Maternal Aunt      Cancer Maternal Uncle      C.A.D. No family hx of        REVIEW OF SYSTEMS:     5 point ROS negative except as noted above in HPI, including Gen., Resp., CV, GI &  system review.      PHYSICAL EXAM:   There were no vitals taken for this visit. Estimated body mass index is 21.56 kg/m  as calculated from the following:    Height as of 3/1/23: 1.626 m (5' 4\").    Weight as of 5/8/23: 57 kg (125 lb 9.6 oz).   GENERAL APPEARANCE: healthy, alert and no distress  MENTAL STATUS: alert  AIRWAY EXAM: Mallampatti Class I (visualization of the soft palate, fauces, uvula, anterior and posterior pillars)  RESP: lungs clear to auscultation - no rales, rhonchi or wheezes  CV: regular rates and rhythm      DIAGNOSTICS:    Not indicated      IMPRESSION   ASA Class 2 - Mild systemic disease        PLAN: "       Plan for colonoscopy. We discussed the risks, benefits and alternatives and the patient wished to proceed.    The above has been forwarded to the consulting provider.      Signed Electronically by: Leo Loyd MD,MD  May 25, 2023      [facility]  Ph: [phone] Fax: [phone]

## 2023-05-26 LAB
PATH REPORT.COMMENTS IMP SPEC: NORMAL
PATH REPORT.COMMENTS IMP SPEC: NORMAL
PATH REPORT.FINAL DX SPEC: NORMAL
PATH REPORT.GROSS SPEC: NORMAL
PATH REPORT.MICROSCOPIC SPEC OTHER STN: NORMAL
PATH REPORT.RELEVANT HX SPEC: NORMAL
PHOTO IMAGE: NORMAL

## 2023-06-08 ENCOUNTER — OFFICE VISIT (OUTPATIENT)
Dept: BEHAVIORAL HEALTH | Facility: CLINIC | Age: 73
End: 2023-06-08
Payer: MEDICARE

## 2023-06-08 ENCOUNTER — TELEPHONE (OUTPATIENT)
Dept: FAMILY MEDICINE | Facility: CLINIC | Age: 73
End: 2023-06-08
Payer: MEDICARE

## 2023-06-08 DIAGNOSIS — G89.4 CHRONIC PAIN SYNDROME: ICD-10-CM

## 2023-06-08 DIAGNOSIS — F33.2 SEVERE EPISODE OF RECURRENT MAJOR DEPRESSIVE DISORDER, WITHOUT PSYCHOTIC FEATURES (H): Primary | ICD-10-CM

## 2023-06-08 DIAGNOSIS — F11.90 CHRONIC, CONTINUOUS USE OF OPIOIDS: ICD-10-CM

## 2023-06-08 DIAGNOSIS — M79.7 FIBROMYALGIA: ICD-10-CM

## 2023-06-08 DIAGNOSIS — G24.3 SPASMODIC TORTICOLLIS: ICD-10-CM

## 2023-06-08 RX ORDER — HYDROCODONE BITARTRATE AND ACETAMINOPHEN 7.5; 325 MG/1; MG/1
2 TABLET ORAL 3 TIMES DAILY
Qty: 180 TABLET | Refills: 0 | Status: SHIPPED | OUTPATIENT
Start: 2023-06-08 | End: 2023-07-06

## 2023-06-08 ASSESSMENT — COLUMBIA-SUICIDE SEVERITY RATING SCALE - C-SSRS
1. HAVE YOU WISHED YOU WERE DEAD OR WISHED YOU COULD GO TO SLEEP AND NOT WAKE UP?: NO
2. HAVE YOU ACTUALLY HAD ANY THOUGHTS OF KILLING YOURSELF?: NO

## 2023-06-08 NOTE — TELEPHONE ENCOUNTER
Medication Question or Refill    Contacts       Type Contact Phone/Fax    06/08/2023 09:09 AM CDT Phone (Incoming) Carina Calvert (Self) [phone] (H)          What medication are you calling about (include dose and sig)?: Hydrocodone/Acetaminophen 7.5 mg    Preferred Pharmacy:       [facility] [id number] [place] [address] [address]  Phone: [phone] Fax: [phone]          Controlled Substance Agreement on file:   CSA -- Patient Level:     [Media Unavailable] Controlled Substance Agreement - Opioid - Scan on 9/23/2022  6:46 PM   [Media Unavailable] Controlled Substance Agreement - Opioid - Scan on 7/27/2021  7:53 AM       Who prescribed the medication?: Dr. Jain    Do you need a refill? Yes    When did you use the medication last? today    Patient offered an appointment? No    Do you have any questions or concerns?  No      Okay to leave a detailed message?: Yes at Cell number on file:    Telephone Information:   Mobile [phone]
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
## 2023-06-09 ASSESSMENT — COLUMBIA-SUICIDE SEVERITY RATING SCALE - C-SSRS
1. SINCE LAST CONTACT, HAVE YOU WISHED YOU WERE DEAD OR WISHED YOU COULD GO TO SLEEP AND NOT WAKE UP?: NO
2. HAVE YOU ACTUALLY HAD ANY THOUGHTS OF KILLING YOURSELF?: NO

## 2023-06-13 DIAGNOSIS — M79.7 FIBROMYALGIA: ICD-10-CM

## 2023-06-13 DIAGNOSIS — G24.3 SPASMODIC TORTICOLLIS: ICD-10-CM

## 2023-06-14 RX ORDER — BACLOFEN 10 MG/1
TABLET ORAL
Qty: 360 TABLET | Refills: 0 | Status: SHIPPED | OUTPATIENT
Start: 2023-06-14 | End: 2023-09-06

## 2023-06-21 ENCOUNTER — TELEPHONE (OUTPATIENT)
Dept: CARDIOLOGY | Facility: CLINIC | Age: 73
End: 2023-06-21
Payer: MEDICARE

## 2023-06-21 NOTE — TELEPHONE ENCOUNTER
[place] Community Measures Blood Pressure guideline reviewed.  Patients recent blood pressure is outside of guideline parameters.  Called pt to review, no answer.  Left voicemail message asking patient to check their blood pressure using a home blood pressure cuff or by going to a [facility].  Patient instructed to then call [phone] ([facility]) and leave a message with their name, date of birth, and blood pressure reading that was completed within the last 24 hours and where it was completed.  Will await call back for further review.

## 2023-06-25 ENCOUNTER — DOCUMENTATION ONLY (OUTPATIENT)
Dept: BEHAVIORAL HEALTH | Facility: CLINIC | Age: 73
End: 2023-06-25
Payer: MEDICARE

## 2023-06-28 ENCOUNTER — OFFICE VISIT (OUTPATIENT)
Dept: PHARMACY | Facility: CLINIC | Age: 73
End: 2023-06-28
Attending: INTERNAL MEDICINE

## 2023-06-28 VITALS — SYSTOLIC BLOOD PRESSURE: 138 MMHG | BODY MASS INDEX: 22.07 KG/M2 | DIASTOLIC BLOOD PRESSURE: 68 MMHG | WEIGHT: 128.6 LBS

## 2023-06-28 DIAGNOSIS — F51.01 PRIMARY INSOMNIA: ICD-10-CM

## 2023-06-28 DIAGNOSIS — I25.10 CORONARY ARTERY DISEASE DUE TO LIPID RICH PLAQUE: ICD-10-CM

## 2023-06-28 DIAGNOSIS — I10 HYPERTENSION GOAL BP (BLOOD PRESSURE) < 140/90: ICD-10-CM

## 2023-06-28 DIAGNOSIS — K92.2 GASTRIC BLEED: ICD-10-CM

## 2023-06-28 DIAGNOSIS — E78.5 HYPERLIPIDEMIA WITH TARGET LDL LESS THAN 100: ICD-10-CM

## 2023-06-28 DIAGNOSIS — I21.4 NSTEMI (NON-ST ELEVATED MYOCARDIAL INFARCTION) (H): ICD-10-CM

## 2023-06-28 DIAGNOSIS — G62.9 NEUROPATHY: ICD-10-CM

## 2023-06-28 DIAGNOSIS — G89.4 CHRONIC PAIN SYNDROME: Primary | ICD-10-CM

## 2023-06-28 DIAGNOSIS — Z78.9 TAKES DIETARY SUPPLEMENTS: ICD-10-CM

## 2023-06-28 DIAGNOSIS — G24.3 SPASMODIC TORTICOLLIS: ICD-10-CM

## 2023-06-28 DIAGNOSIS — I25.83 CORONARY ARTERY DISEASE DUE TO LIPID RICH PLAQUE: ICD-10-CM

## 2023-06-28 DIAGNOSIS — M79.7 FIBROMYALGIA: ICD-10-CM

## 2023-06-28 PROCEDURE — 99207 PR NO CHARGE LOS: CPT | Performed by: PHARMACIST

## 2023-06-28 RX ORDER — CHOLECALCIFEROL (VITAMIN D3) 50 MCG
1 TABLET ORAL DAILY
COMMUNITY

## 2023-06-28 RX ORDER — NITROGLYCERIN 0.4 MG/1
TABLET SUBLINGUAL
Qty: 25 TABLET | Refills: 1 | Status: SHIPPED | OUTPATIENT
Start: 2023-06-28 | End: 2024-02-12

## 2023-06-28 RX ORDER — GABAPENTIN 300 MG/1
CAPSULE ORAL
Qty: 270 CAPSULE | Refills: 1 | Status: SHIPPED | OUTPATIENT
Start: 2023-06-28 | End: 2023-11-27

## 2023-06-28 RX ORDER — ACETAMINOPHEN 500 MG
500 TABLET ORAL AT BEDTIME
COMMUNITY

## 2023-06-28 NOTE — PATIENT INSTRUCTIONS
"Recommendations from today's MTM visit:                                                         1. Decrease gabapentin to 300 mg every morning, 300 mg at noon, and 300 mg at bedtime - ok to open up and sprinkle on food (oatmeal, applesauce, yogurt).    2. Updated prescription for nitroglycerin sublingual tablets.     3. Updated prescription for omeprazole.     Follow-up: Return in about 4 weeks (around 7/26/2023) for Medication Therapy Management.    It was great speaking with you today.  I value your experience and would be very thankful for your time in providing feedback in our clinic survey. In the next few days, you may receive an email or text message from [employer] with a link to a survey related to your  clinical pharmacist.\"     To schedule another MTM appointment, please call the clinic directly or you may call the MTM scheduling line at [phone] or toll-free at [phone].     My Clinical Pharmacist's contact information:                                                      Please feel free to contact me with any questions or concerns you have.      Maria M Goemz, PharmD  Medication Therapy Management Pharmacist  [phone]     "

## 2023-06-28 NOTE — Clinical Note
WLAI DIANA note, thanks!  Maria M Gomez, PharmD Medication Therapy Management Pharmacist [phone]

## 2023-06-28 NOTE — PROGRESS NOTES
Medication Therapy Management (MTM) Encounter    ASSESSMENT:                            Medication Adherence/Access: No issues identified    Pain/muscle spasms:  Gabapentin, Norco, and trazodone can increase risk of CNS effects, falls, etc. May benefit from continuing to titrate down on gabapentin.     Sleep:  Daytime napping not helping overnight sleep - may benefit from reducing doses of sedating medications to allow for more wakefulness during the day - after decreasing gabapentin dose, may benefit from reducing trazodone dose, which she's open to.     Constipation: Stable.     History of stomach ulcers:  Stable.     Hypertension/CAD/History of Stroke: Stable. Blood pressure at goal < 140/90 at today's visit.     Hyperlipidemia: Stable.     Supplements: Discussed I would recommend maximum dose of 15 mg for zinc.     PLAN:                            1. Decrease gabapentin to 300 mg every morning, 300 mg at noon, and 300 mg at bedtime - ok to open up and sprinkle on food (oatmeal, applesauce, yogurt).    2. Updated prescription for nitroglycerin sublingual tablets.     3. Updated prescription for omeprazole.     Follow-up: Return in about 4 weeks (around 7/26/2023) for Medication Therapy Management.    SUBJECTIVE/OBJECTIVE:                          Carina Calvert is a 73 year old female coming in for an initial visit. She was referred to me from Gerber Jain for polypharmacy. Patient was accompanied by daughter, Isra..     Reason for visit: med review.    Allergies/ADRs: Reviewed in chart  Past Medical History: Reviewed in chart  Tobacco: She reports that she has quit smoking. Her smoking use included cigarettes. She has a 3.00 pack-year smoking history. She has never used smokeless tobacco.  Alcohol: none  Caffeine: 1-2 cups coffee/day    Medication Adherence/Access:   Patient takes medications directly from bottles.  Patient takes medications every 5 hours  Per patient, misses medication 0 times per week.   The  patient fills medications at [facility]: NO, fills medications at [facility] in [employer], [place].    Pain/muscle spasms:    Gabapentin 300 mg every morning, 300 mg at noon, 600 mg at bedtime - they are big and hard to swallow  Norco 7.5-325 mg, takes two every morning, two at noon and two at 5 pm.  Baclofen 10 mg four times daily   Acetaminophen 500 mg at bedtime    She is having dizziness, sleepiness and confusion - one dose of gabapentin was stopped due to this, pain not worse with this change.   Pain in neck and shoulders, has bulging disk. Fell on a trampoline in the past.   Does have constipation, as below.  She was going to have a procedure on her shoulders for pain, but blood pressure was too high when she was there so couldn't have this done. Was told she shouldn't take her pain med that day.  No longer takes ibuprofen because she was swelling up and has been told to avoid this.     Sleep:    Trazodone 300 mg at bedtime  Melatonin 20 mg at bedtime    She doesn't think these work, she's always waking up, sometimes she falls asleep after going to the bathroom, but often she's up and can't get back to sleep. Often up around 4 am and rests until 6/6:45 am, then is up, has some coffee, then crawls back into bed and takes a morning nap either right away or mid-morning. Also naps in the evening after dinner, naps from 7-9 pm-10 pm. Somehow she wakes up (Isra reports she wakes her up at 10 pm) so she can take her pills).     Constipation:    Miralax daily as needed  Probiotic three times daily before meals.    States this/these are effective. Denies side effects.     History of stomach ulcers:    Omeprazole 20 mg daily    Has a history of 5 bleeding ulcers. States this/these are effective. Denies side effects.   She notices when she misses a dose.     Hypertension/CAD/History of Stroke:   Aspirin 81 mg daily   Metoprolol 50 mg twice daily   NTG SL as needed (doesn't have on hand)    Blood pressure goes up when she's in a  lot of pain.   Patient reports no current medication side effects.  BP Readings from Last 3 Encounters:   06/28/23 138/68   05/25/23 (!) 191/83   05/08/23 (!) 144/78     Pulse Readings from Last 3 Encounters:   05/25/23 56   05/08/23 (!) 48   02/07/23 54     Hyperlipidemia:   rosuvastatin 10 mg daily     Patient reports no significant myalgias or other side effects.  Recent Labs   Lab Test 05/26/22  1505 04/08/21  1711   CHOL 96 106   HDL 43* 51   LDL 39 40   TRIG 72 77     Supplements:   Vitamin D 50 mcg daily   Vitamin B12 1000 mcg daily   Zinc daily     No reported issues at this time.     Today's Vitals: /68   Wt 128 lb 9.6 oz (58.3 kg)   BMI 22.07 kg/m    ----------------      I spent 50 minutes with this patient today. All changes were made via collaborative practice agreement with Gerber Jain MD. A copy of the visit note was provided to the patient's provider(s).    A summary of these recommendations was given to the patient.    Maria M Gomez, PharmD  Medication Therapy Management Pharmacist  [phone]       Medication Therapy Recommendations  Neuropathy    Current Medication: gabapentin (NEURONTIN) 300 MG capsule (Discontinued)   Rationale: Undesirable effect - Adverse medication event - Safety   Recommendation: Decrease Dose   Status: Accepted per CPA

## 2023-07-06 ENCOUNTER — TELEPHONE (OUTPATIENT)
Dept: FAMILY MEDICINE | Facility: CLINIC | Age: 73
End: 2023-07-06
Payer: MEDICARE

## 2023-07-06 ENCOUNTER — TELEPHONE (OUTPATIENT)
Dept: INTERNAL MEDICINE | Facility: CLINIC | Age: 73
End: 2023-07-06
Payer: MEDICARE

## 2023-07-06 ENCOUNTER — OFFICE VISIT (OUTPATIENT)
Dept: BEHAVIORAL HEALTH | Facility: CLINIC | Age: 73
End: 2023-07-06
Payer: MEDICARE

## 2023-07-06 DIAGNOSIS — F11.90 CHRONIC, CONTINUOUS USE OF OPIOIDS: ICD-10-CM

## 2023-07-06 DIAGNOSIS — F43.21 ADJUSTMENT DISORDER WITH DEPRESSED MOOD: Primary | ICD-10-CM

## 2023-07-06 DIAGNOSIS — M79.7 FIBROMYALGIA: ICD-10-CM

## 2023-07-06 DIAGNOSIS — G31.84 MILD COGNITIVE IMPAIRMENT: ICD-10-CM

## 2023-07-06 DIAGNOSIS — F42.4 EXCORIATION (SKIN-PICKING) DISORDER: ICD-10-CM

## 2023-07-06 DIAGNOSIS — G24.3 SPASMODIC TORTICOLLIS: ICD-10-CM

## 2023-07-06 DIAGNOSIS — G89.4 CHRONIC PAIN SYNDROME: ICD-10-CM

## 2023-07-06 DIAGNOSIS — R41.3 MEMORY LOSS: Primary | ICD-10-CM

## 2023-07-06 RX ORDER — HYDROCODONE BITARTRATE AND ACETAMINOPHEN 7.5; 325 MG/1; MG/1
2 TABLET ORAL 3 TIMES DAILY
Qty: 180 TABLET | Refills: 0 | Status: SHIPPED | OUTPATIENT
Start: 2023-07-06 | End: 2023-08-07

## 2023-07-06 ASSESSMENT — ANXIETY QUESTIONNAIRES
GAD7 TOTAL SCORE: 0
7. FEELING AFRAID AS IF SOMETHING AWFUL MIGHT HAPPEN: NOT AT ALL
6. BECOMING EASILY ANNOYED OR IRRITABLE: NOT AT ALL
1. FEELING NERVOUS, ANXIOUS, OR ON EDGE: NOT AT ALL
GAD7 TOTAL SCORE: 0
2. NOT BEING ABLE TO STOP OR CONTROL WORRYING: NOT AT ALL
5. BEING SO RESTLESS THAT IT IS HARD TO SIT STILL: NOT AT ALL
3. WORRYING TOO MUCH ABOUT DIFFERENT THINGS: NOT AT ALL
4. TROUBLE RELAXING: NOT AT ALL

## 2023-07-06 ASSESSMENT — PATIENT HEALTH QUESTIONNAIRE - PHQ9: SUM OF ALL RESPONSES TO PHQ QUESTIONS 1-9: 8

## 2023-07-06 ASSESSMENT — COLUMBIA-SUICIDE SEVERITY RATING SCALE - C-SSRS
2. HAVE YOU ACTUALLY HAD ANY THOUGHTS OF KILLING YOURSELF?: NO
1. HAVE YOU WISHED YOU WERE DEAD OR WISHED YOU COULD GO TO SLEEP AND NOT WAKE UP?: NO

## 2023-07-06 NOTE — PROGRESS NOTES
7/24/2023   Addended:  Corrected diagnosis  Adjustment D.O with depressed mood F43.21  Mild Cognitive Impairment G31.84  Excoriation (skin picking) F42.4  [facility]    Transition Clinic Progress Note    Patient Name:   Carina Calvert Date: July 6, 2023          Service Type: Individual      Session Start Time: 1235  Session End Time: 1325     Session Length:  TC Session Length: 45 (38-52 Minutes)    Session #: 2    Attendees: TC Attendees: Client and her daughter    Service Modality:  Service Modality: In-Person  DATA  Interactive Complexity: No  Crisis: No        Progress Since Last Session (Related to Symptoms / Goals / Homework):   Symptoms: No change Cognitive concerns, slow thoughts; Memory loss/confusion; skin picking    Homework: Completed in session      Episode of Care Goals: Satisfactory progress - PREPARATION (Decided to change - considering how); Intervened by negotiating a change plan and determining options / strategies for behavior change, identifying triggers, exploring social supports, and working towards setting a date to begin behavior change     Current / Ongoing Stressors and Concerns:   Jeane ane her daughter presented in person reporting no change in symptoms.  Patient is a pleasant woman and appears happy.  Jeane denies depression and anxiety.   When engaging with provider Jeane relied on her daughter to confirm her responses not sure she has the right information. Or refers to her daughter for the information needed to respond to provider questions.  Jeane has slowed thoughts.  She presents somewhat confused. Jeane has difficulty ambulating and relies on her daughter to walk beside her holding her arm.  Jeane states she is at home while daughter works.  Jeane states she cooks for her daughter sometimes but doesn't like to cook anymore. Jeane states he daughter does much of the meal prep.  Daughter reports Jaene is always calm and agreeable. Jeane continues to pick at her skin mostly  on her arms and legs.  Jeane is unable to pinpoint anything that drives the picking.  She denies the picking is from nervousness or worry.  Jeane reports she is often cold. She brought along a hand used that on her legs to keep warm. Jeane reports some itching and dry skin. She reports she does not sleep well every night. Jeane and her daughter report they will see her primary to discuss medication. Provider suggested talking with her PCP about testing her thyroid if not run in a few weeks.   Jeane has an appointment with Neurology December 14 2023.  Jeane denied suicidal or homicidal ideation.         Treatment Objective(s) Addressed in This Session:   complete ADLs daily (maintain personal hygiene, wears clean clothes, eats regular meals, etc.) 7  day per week Jeane does not ambulate far without her daughter.  There is concern about Marycarmen's ability to stay alone while daughter works. Discussed treatment goals and objectives with mode of treatment.       Intervention:   Solution Focused: exploring triggers and options to reduce symptoms and behaviors around     Assessments completed prior to visit:  The following assessments were completed by patient for this visit:  PHQ9:       1/12/2017     2:16 PM 5/10/2018     3:19 PM 8/29/2019    10:57 AM 4/9/2021     8:14 AM 5/26/2022     3:01 PM 5/18/2023     7:50 AM 7/6/2023     2:00 PM   PHQ-9 SCORE   PHQ-9 Total Score MyChart      5 (Mild depression)    PHQ-9 Total Score 0 3 16 5 2 5 8     GAD7:       5/10/2018     3:19 PM 8/29/2019    10:57 AM 5/26/2022     3:01 PM 12/23/2022     2:44 PM 1/27/2023     1:29 PM 5/8/2023     9:51 AM 7/6/2023     2:00 PM   TRUNG-7 SCORE   Total Score     0 (minimal anxiety) 0 (minimal anxiety)    Total Score 0 5 1 0 0 0 0     CAGE-AID:       7/6/2023     2:00 PM   CAGE-AID Total Score   Total Score 0     PROMIS 10-Global Health (only subscores and total score):       5/18/2023     7:54 AM 7/6/2023     2:00 PM   PROMIS-10 Scores Only   Global Mental Health  Score 14 13   Global Physical Health Score 12 9   PROMIS TOTAL - SUBSCORES 26 22     [place] Suicide Severity Rating Scale (Lifetime/Recent)      6/8/2023     4:00 PM 7/6/2023     1:00 PM   [place] Suicide Severity Rating (Lifetime/Recent)   1. Wish to be Dead (Lifetime) N N   2. Non-Specific Active Suicidal Thoughts (Lifetime) N N   Has subject engaged in non-suicidal self-injurious behavior? (Lifetime) N N     [place] Suicide Severity Rating Scale (Short Version)      7/20/2022     5:51 PM 3/1/2023     9:21 AM 6/9/2023    12:00 PM   [place]ord Suicide Severity Rating (Short Version)   Over the past 2 weeks have you felt down, depressed, or hopeless? no no    Over the past 2 weeks have you had thoughts of killing yourself? no no    Have you ever attempted to kill yourself? no no    1. Wish to be Dead (Since Last Contact)   N   2. Non-Specific Active Suicidal Thoughts (Since Last Contact)   N   Has subject engaged in non-suicidal self-injurious behavior? (Since Last Contact)   N   Calculated C-SSRS Risk Score (Since Last Contact)   No Risk Indicated         ASSESSMENT: Current Emotional / Mental Status (status of significant symptoms):   Risk status (Self / Other harm or suicidal ideation)   Patient denies current fears or concerns for personal safety.   Patient denies current or recent suicidal ideation or behaviors.   Patient denies current or recent homicidal ideation or behaviors.   Patient denies current or recent self injurious behavior or ideation.   Patient denies other safety concerns.   Patient reports there has been no change in risk factors since their last session.     Patient reports there has been no change in protective factors since their last session.     Recommended that patient call 911 or go to the local ED should there be a change in any of these risk factors.     Appearance:   Appropriate  eagerness to see provider.  Some Expression of some anxiety. however busy     Eye Contact:   Good     Psychomotor Behavior: Retarded (Slowed)  need by side and arm to ambulate, no indication of tics or     Attitude:   Cooperative    Orientation:   Situation All   Speech    Rate / Production: Slow     Volume:  Soft    Mood:    Irritable  Sad    Affect:    Appropriate    Thought Content:  Clear    Thought Form:  Coherent  Goal Directed    Insight:    Fair      Medication Review:   No changes to current psychiatric medication(s)     Medication Compliance:   No     Changes in Health Issues:   Yes: Pain, patient has low back and leg pain which travels down her leg.     Chemical Use Review:   Substance Use: Chemical use reviewed, no active concerns identified      Tobacco Use: No current tobacco use.      Diagnoses:      Skin picking      Rule out dementia's    Collateral Reports Completed:   TC Collateral: [employer] electronic medical records reviewed., Routed note to PCP Patients.  Verbally communicated with patients only daughter.    PLAN: (Patient Tasks / Therapist Tasks / Other)  Follow up with Primary Care provider and request thyroid function test.      Procedure Code: -CPT-CODES: Psychotherapy (with patient) - 45 [CPT 95709]    RAMÓN Rubi                                                       ______________________________________________________________________

## 2023-07-06 NOTE — TELEPHONE ENCOUNTER
Writer called patient's daughter Isra (consent to communicate on file) and relayed information, took ph# for Neurology and assisted caller in setting up lab only appointment. No further questions.    GEETA JiménezN PORSHA  [facility]

## 2023-07-06 NOTE — TELEPHONE ENCOUNTER
Order/Referral Request    Who is requesting: Carina Calvert/ Therapist is requesting for patient to get a neurology referral and patient's thyroid   Orders being requested: Neurology order and thyroid labs  Reason service is needed/diagnosis:   Patient is tired all the time and sluggish for thyroid labs  Patient's concern about memory is for the Neurology order   Family history of memory loss  When are orders needed by: as soon as it can   Has this been discussed with Provider: Unknown  Does patient have a preference on a Group/Provider/Facility? no  Where to send orders: Place orders within Epic  Okay to leave a detailed message?: Yes at Cell number on file:    Telephone Information:   Mobile [phone]

## 2023-07-06 NOTE — TELEPHONE ENCOUNTER
Patient calling for her norco refill, last Rx was 6/8/23.    Routing refill request to provider for review/approval because:  Drug not on the [facility] refill protocol     Laura Sullivan RN  [facility]

## 2023-07-17 ENCOUNTER — TELEPHONE (OUTPATIENT)
Dept: PHARMACY | Facility: CLINIC | Age: 73
End: 2023-07-17
Payer: MEDICARE

## 2023-07-17 NOTE — TELEPHONE ENCOUNTER
Received voicemail from Evette (left on , she has had worsened head, neck and shoulder pain since reducing gabapentin dose. She's also not sleeping well at night, staying awake for most of the night. She still takes some naps in the morning. She's wondering if she can go back up to 600 mg of gabapentin at bedtime.     Called and spoke with patient, she's doing much better now with gabapentin 300 mg three times daily. Only had one bad night but now is doing better. Still has same pain as she's always had, but it's not worse on the lower dose of gabapentin, certainly still there and contributes to migraines, but not worse.     Plan is to remain on current gabapentin 300 mg three times daily, can consider additional agents for migraines/prevention at follow-up.    Maria M Gomez, PharmD  Medication Therapy Management Pharmacist  [phone]

## 2023-07-24 ENCOUNTER — LAB (OUTPATIENT)
Dept: LAB | Facility: CLINIC | Age: 73
End: 2023-07-24

## 2023-07-24 ENCOUNTER — OFFICE VISIT (OUTPATIENT)
Dept: PHARMACY | Facility: CLINIC | Age: 73
End: 2023-07-24

## 2023-07-24 ENCOUNTER — VIRTUAL VISIT (OUTPATIENT)
Dept: BEHAVIORAL HEALTH | Facility: CLINIC | Age: 73
End: 2023-07-24
Payer: MEDICARE

## 2023-07-24 VITALS — DIASTOLIC BLOOD PRESSURE: 58 MMHG | SYSTOLIC BLOOD PRESSURE: 110 MMHG | BODY MASS INDEX: 21.97 KG/M2 | WEIGHT: 128 LBS

## 2023-07-24 DIAGNOSIS — G43.909 MIGRAINE WITHOUT STATUS MIGRAINOSUS, NOT INTRACTABLE, UNSPECIFIED MIGRAINE TYPE: ICD-10-CM

## 2023-07-24 DIAGNOSIS — G89.4 CHRONIC PAIN SYNDROME: Primary | ICD-10-CM

## 2023-07-24 DIAGNOSIS — M79.7 FIBROMYALGIA: ICD-10-CM

## 2023-07-24 DIAGNOSIS — R41.3 MEMORY LOSS: ICD-10-CM

## 2023-07-24 DIAGNOSIS — F51.01 PRIMARY INSOMNIA: ICD-10-CM

## 2023-07-24 DIAGNOSIS — F43.21 ADJUSTMENT DISORDER WITH DEPRESSED MOOD: Primary | ICD-10-CM

## 2023-07-24 DIAGNOSIS — G62.9 NEUROPATHY: ICD-10-CM

## 2023-07-24 PROCEDURE — 99207 PR NO CHARGE LOS: CPT | Performed by: PHARMACIST

## 2023-07-24 PROCEDURE — 84443 ASSAY THYROID STIM HORMONE: CPT

## 2023-07-24 PROCEDURE — 36415 COLL VENOUS BLD VENIPUNCTURE: CPT

## 2023-07-24 RX ORDER — GABAPENTIN 300 MG/1
CAPSULE ORAL
Qty: 360 CAPSULE | Refills: 0 | Status: SHIPPED | OUTPATIENT
Start: 2023-07-24 | End: 2024-02-12

## 2023-07-24 RX ORDER — TRAZODONE HYDROCHLORIDE 100 MG/1
200 TABLET ORAL AT BEDTIME
Qty: 180 TABLET | Refills: 0 | Status: SHIPPED | OUTPATIENT
Start: 2023-07-24 | End: 2023-11-10

## 2023-07-24 ASSESSMENT — COLUMBIA-SUICIDE SEVERITY RATING SCALE - C-SSRS
2. HAVE YOU ACTUALLY HAD ANY THOUGHTS OF KILLING YOURSELF?: NO
1. SINCE LAST CONTACT, HAVE YOU WISHED YOU WERE DEAD OR WISHED YOU COULD GO TO SLEEP AND NOT WAKE UP?: NO

## 2023-07-24 NOTE — Clinical Note
Gonzalo Jain,  Evette's had worse neck/shoulder/migraines since gabapentin reduction, will plan to increase back to previous dose. I don't see she's ever been on sumatriptan either for migraine treatment, and wondering if you think that would be appropriate for her to start? Sumatriptan and trazodone can both increase serotonin, but no concerns on interactions from my standpoint.   Thanks! Maria M Gomez, PharmD Medication Therapy Management Pharmacist [phone]

## 2023-07-24 NOTE — PATIENT INSTRUCTIONS
"Recommendations from today's MTM visit:                                                         Increase gabapentin back to 300 mg every morning, 300 mg every afternoon and 600 mg at bedtime.   Decrease trazodone to 200 mg at bedtime.  Can consider seeing a pain specialist, can discuss with Dr. Jain as well.    Follow-up: Return in about 2 weeks (around 8/7/2023) for Medication Therapy Management.    It was great speaking with you today.  I value your experience and would be very thankful for your time in providing feedback in our clinic survey. In the next few days, you may receive an email or text message from [employer] with a link to a survey related to your  clinical pharmacist.\"     To schedule another MTM appointment, please call the clinic directly or you may call the MTM scheduling line at [phone] or toll-free at [phone].     My Clinical Pharmacist's contact information:                                                      Please feel free to contact me with any questions or concerns you have.      Maria M Gomez, PharmD  Medication Therapy Management Pharmacist  [phone]     "

## 2023-07-24 NOTE — PROGRESS NOTES
Medication Therapy Management (MTM) Encounter    ASSESSMENT:                            Medication Adherence/Access: No issues identified    Pain/muscle spasms:  Gabapentin, Norco, and trazodone can increase risk of CNS effects, falls, etc. Does reduction in gabapentin has worsened pain, likely helpful as migraine prevention. She may benefit from resuming previous dose. Could consider PRN agent for migraines, possibly sumatriptan.     Sleep:  Daytime napping not helping overnight sleep - may benefit from reducing doses of sedating medications to allow for more wakefulness during the day. May benefit from reducing trazodone dose.    PLAN:                            Increase gabapentin back to 300 mg every morning, 300 mg every afternoon and 600 mg at bedtime.   Decrease trazodone to 200 mg at bedtime.  Can consider seeing a pain specialist, can discuss with Dr. Jain as well.    Hi Dr. Jain,   Evette's had worse neck/shoulder/migraines since gabapentin reduction, will plan to increase back to previous dose. I don't see she's ever been on sumatriptan either for migraine treatment, and wondering if you think that would be appropriate for her to start? Sumatriptan and trazodone can both increase serotonin, but no concerns on interactions from my standpoint.     Addendum 7/26/23:  To: Mary Gomez, [facility]     Could certainly be safely tried, especially if she is interested !     Gerber Jain MD       Prescription sent for sumatriptan 50 mg, may repeat in 2 hours if needed. Called Evette and provided education, she's a bit confused on why she can't take it daily when she has daily migraines, discussed to start with using 2x/week to see if helpful. She also wanted me to discuss with Isar -aditya and LVM, she can return my call.  -Isra called back and we discussed further.  Follow-up: Return in about 2 weeks (around 8/7/2023) for Medication Therapy Management.    SUBJECTIVE/OBJECTIVE:                          Carina Calvert is  a 73 year old female coming in for a follow-up visit from 6/28/23. Patient was accompanied by daughter, Isra.      Reason for visit: med review, follow-up on gabapentin dose decrease.    Allergies/ADRs: Reviewed in chart  Past Medical History: Reviewed in chart  Tobacco: She reports that she has quit smoking. Her smoking use included cigarettes. She has a 3.00 pack-year smoking history. She has never used smokeless tobacco.  Alcohol: none  Caffeine: 1-2 cups coffee/day    Medication Adherence/Access:   Patient takes medications directly from bottles.  Patient takes medications every 5 hours  Per patient, misses medication 0 times per week.   The patient fills medications at [facility]: NO, fills medications at [facility] in [facility], [place].    Pain/muscle spasms/migraines:    Gabapentin 300 mg every morning, 300 mg at noon, 300 mg at bedtime   Norco 7.5-325 mg, takes two every morning, two at noon and two at 5 pm.  Baclofen 10 mg four times daily   Acetaminophen 500 mg at bedtime    She hurts, her feet hurt, her neck, she reports this is worse with dose reduction on gabapentin. Evening dose of gabapentin was reduced from 600 mg one month ago. She doesn't think the Norco is helping, needs something for her migraines and her neck, used to do Botox but was expensive.  Doesn't recall taking anything as needed for migraines in the past besides Norco. Isra reports she still sleeps a majority of the day. She will be seeing neurology in December for memory evaluation.   Pain in neck and shoulders, has bulging disk. Fell on a trampoline in the past.   No longer takes ibuprofen because she was swelling up and has been told to avoid this.     Sleep:    Trazodone 300 mg at bedtime  Melatonin 20 mg at bedtime    She doesn't think these work, she's always waking up, sometimes she falls asleep after going to the bathroom, but often she's up and can't get back to sleep. Often up around 4 am and rests until 6/6:45 am, then is up, has some  coffee, then crawls back into bed and takes a morning nap either right away or mid-morning. Also naps in the evening after dinner, naps from 7-9 pm-10 pm. Somehow she wakes up (Isra reports she wakes her up at 10 pm) so she can take her pills).     Today's Vitals: /58   Wt 128 lb (58.1 kg)   BMI 21.97 kg/m    ----------------      I spent 30 minutes with this patient today. All changes were made via collaborative practice agreement with Gerber Jain MD. A copy of the visit note was provided to the patient's provider(s).    A summary of these recommendations was given to the patient.    Maria M Gomez, PharmD  Medication Therapy Management Pharmacist  [phone]       Medication Therapy Recommendations  Chronic pain    Current Medication: gabapentin (NEURONTIN) 300 MG capsule   Rationale: Dose too low - Dosage too low - Effectiveness   Recommendation: Increase Dose   Status: Accepted per CPA         Insomnia    Current Medication: traZODone (DESYREL) 100 MG tablet   Rationale: Dose too high - Dosage too high - Safety   Recommendation: Decrease Dose   Status: Accepted per CPA
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
## 2023-07-25 LAB — TSH SERPL DL<=0.005 MIU/L-ACNC: 0.83 UIU/ML (ref 0.3–4.2)

## 2023-07-26 RX ORDER — SUMATRIPTAN 50 MG/1
50 TABLET, FILM COATED ORAL
Qty: 15 TABLET | Refills: 1 | Status: SHIPPED | OUTPATIENT
Start: 2023-07-26 | End: 2024-05-08

## 2023-08-07 ENCOUNTER — VIRTUAL VISIT (OUTPATIENT)
Dept: PHARMACY | Facility: CLINIC | Age: 73
End: 2023-08-07

## 2023-08-07 DIAGNOSIS — G89.4 CHRONIC PAIN SYNDROME: Primary | ICD-10-CM

## 2023-08-07 DIAGNOSIS — F51.01 PRIMARY INSOMNIA: ICD-10-CM

## 2023-08-07 DIAGNOSIS — G89.4 CHRONIC PAIN SYNDROME: ICD-10-CM

## 2023-08-07 DIAGNOSIS — G62.9 NEUROPATHY: ICD-10-CM

## 2023-08-07 DIAGNOSIS — M79.7 FIBROMYALGIA: ICD-10-CM

## 2023-08-07 DIAGNOSIS — G24.3 SPASMODIC TORTICOLLIS: ICD-10-CM

## 2023-08-07 DIAGNOSIS — F11.90 CHRONIC, CONTINUOUS USE OF OPIOIDS: ICD-10-CM

## 2023-08-07 PROCEDURE — 99207 PR NO CHARGE LOS: CPT | Performed by: PHARMACIST

## 2023-08-07 RX ORDER — HYDROCODONE BITARTRATE AND ACETAMINOPHEN 7.5; 325 MG/1; MG/1
2 TABLET ORAL 3 TIMES DAILY
Qty: 180 TABLET | Refills: 0 | Status: SHIPPED | OUTPATIENT
Start: 2023-08-07 | End: 2023-08-09

## 2023-08-07 NOTE — Clinical Note
WALI DIANA note, thanks!  Maria M Gomez, PharmD Medication Therapy Management Pharmacist [phone]

## 2023-08-07 NOTE — PROGRESS NOTES
Medication Therapy Management (MTM) Encounter    ASSESSMENT:                            Medication Adherence/Access: No issues identified    Pain/muscle spasms:  Gabapentin, Norco, and trazodone can increase risk of CNS effects, falls, etc.  May benefit from physical therapy again.    Sleep:  Declines further dose reductions in trazodone, although would allow for more wakefulness during the day. Daytime naps/sleeping impacting overnight sleep.    PLAN:                            Call [phone] to get set up with home physical therapy, there is a referral in your chart already.     Follow-up: Return in about 3 months (around 11/7/2023) for Medication Therapy Management.    SUBJECTIVE/OBJECTIVE:                          Carina Calvert is a 73 year old female called for a follow-up visit from 7/24/23.       Reason for visit: pain, trazodone reduction.    Allergies/ADRs: Reviewed in chart  Past Medical History: Reviewed in chart  Tobacco: She reports that she has quit smoking. Her smoking use included cigarettes. She has a 3.00 pack-year smoking history. She has never used smokeless tobacco.  Alcohol: none  Caffeine: 1-2 cups coffee/day    Medication Adherence/Access:   Patient takes medications directly from bottles.  Patient takes medications every 5 hours  Per patient, misses medication 0 times per week.   The patient fills medications at [facility]: NO, fills medications at [facility] in [facility] [place].    Pain/muscle spasms/migraines:    Gabapentin 300 mg every morning, 300 mg at noon, 600 mg at bedtime   Norco 7.5-325 mg, takes two every morning, two at noon and two at 5 pm.  Baclofen 10 mg four times daily   Acetaminophen 500 mg at bedtime    Gabapentin increased back to above dose two weeks ago, still having lots of issues with pain. Tried sumatriptan, wasn't effective so she ground these up and threw away.  Dylan and back has been really bad, has good days and bad days.    She will be seeing neurology in December  for memory evaluation.   No longer takes ibuprofen because she was swelling up and has been told to avoid this.   She has done physcial therapy a while back, at home, and she'd like to start doing this again.    Sleep:    Trazodone 200 mg at bedtime (reduced from 300 mg two weeks ago)  Melatonin 20 mg at bedtime    Dose reduction on trazodone has gone fine. Still has some bad nights where she's up every hour. Per report from Daughter, Isra, at previous visits, she is sleeping most of the day.     Today's Vitals: There were no vitals taken for this visit.  ----------------      I spent 9 minutes with this patient today. All changes were made via collaborative practice agreement with Gerber Jain MD. A copy of the visit note was provided to the patient's provider(s).    A summary of these recommendations was mailed to the patient.    Maria M Gomez, PazD  Medication Therapy Management Pharmacist  [phone]    Telemedicine Visit Details  Type of service:  Telephone visit  Start Time: 3:32 PM  End Time: 3:41 PM     Medication Therapy Recommendations  No medication therapy recommendations to display

## 2023-08-07 NOTE — TELEPHONE ENCOUNTER
Patient calling to request refill on [place]. Medication and pharmacy pended.    Team - Patient requesting a call back regarding updates on refill request.    MEAGAN Schafer RN  [facility]

## 2023-08-07 NOTE — PATIENT INSTRUCTIONS
"Recommendations from today's MTM visit:                                                         Call [phone] to get set up with home physical therapy, there is a referral in your chart already.     Follow-up: Return in about 3 months (around 11/7/2023) for Medication Therapy Management.    It was great speaking with you today.  I value your experience and would be very thankful for your time in providing feedback in our clinic survey. In the next few days, you may receive an email or text message from [email or web address] with a link to a survey related to your  clinical pharmacist.\"     To schedule another MTM appointment, please call the clinic directly or you may call the MTM scheduling line at [phone] or toll-free at [phone].     My Clinical Pharmacist's contact information:                                                      Please feel free to contact me with any questions or concerns you have.      Maria M Gomez, PharmD  Medication Therapy Management Pharmacist  [phone]     "

## 2023-08-08 NOTE — TELEPHONE ENCOUNTER
Patient called and informed prescription was sent to pharmacy.  Patient advised appointment needed for any further refills.  Offered to schedule patient appointment, but she said she will call back to schedule.  Alice John,

## 2023-08-09 RX ORDER — HYDROCODONE BITARTRATE AND ACETAMINOPHEN 7.5; 325 MG/1; MG/1
2 TABLET ORAL 3 TIMES DAILY
Qty: 180 TABLET | Refills: 0 | Status: SHIPPED | OUTPATIENT
Start: 2023-08-09 | End: 2023-09-06

## 2023-08-09 NOTE — TELEPHONE ENCOUNTER
Received call from patient. She states that Norco is on back order at current pharmacy. Would like Norco sent to alternate [facility] pharmacy (see pended).    She needs this addressed today, 08/09/23 as she is now out of medication. Routing to covering provider pool to please review request.    Callback: [phone]    GEETA SebastianN RN  [facility], [facility]

## 2023-08-10 NOTE — TELEPHONE ENCOUNTER
Sorry, it looks like this is a completed action. If I am correct you can close this encounter . Otherwise reroute for orders to be placed      Gerber Jain MD

## 2023-08-17 ENCOUNTER — DOCUMENTATION ONLY (OUTPATIENT)
Dept: BEHAVIORAL HEALTH | Facility: CLINIC | Age: 73
End: 2023-08-17
Payer: MEDICARE

## 2023-08-17 NOTE — PROGRESS NOTES
Transition Clinic Progress Note  Discharge Summary    Discharge Summary Date:  August 17, 2023  Multiple Sessions    Client Name:  Carina Calvert MRN#: [id number] YOB: 1950    DSM5 Diagnoses: (Sustained by DSM5 Criteria Listed Above)  Diagnoses: Adjustment Disorders  309.0 (F43.21) With depressed mood    Psychosocial & Contextual Factors:   Raised with several half siblings by her biological mother. Close to he daughter who is mothers care giver.  Contextual influences on patient's health include: Contextual Factors: Family Factors lives with her daughter who is daughter is health care agent and care giver.  Cultural, Contextual, and socioeconomic factors do not affect the patient's access to services.    The following assessments were completed in the session on 7/6/2023 and on 7/24/2023  PHQ9:       1/12/2017     2:16 PM 5/10/2018     3:19 PM 8/29/2019    10:57 AM 4/9/2021     8:14 AM 5/26/2022     3:01 PM 5/18/2023     7:50 AM 7/6/2023     2:00 PM   PHQ-9 SCORE   PHQ-9 Total Score MyChart      5 (Mild depression)    PHQ-9 Total Score 0 3 16 5 2 5 8     GAD7:       5/10/2018     3:19 PM 8/29/2019    10:57 AM 5/26/2022     3:01 PM 12/23/2022     2:44 PM 1/27/2023     1:29 PM 5/8/2023     9:51 AM 7/6/2023     2:00 PM   TRUNG-7 SCORE   Total Score     0 (minimal anxiety) 0 (minimal anxiety)    Total Score 0 5 1 0 0 0 0     CAGE-AID:       7/6/2023     2:00 PM   CAGE-AID Total Score   Total Score 0     PROMIS 10-Global Health (only subscores and total score):       5/18/2023     7:54 AM 7/6/2023     2:00 PM   PROMIS-10 Scores Only   Global Mental Health Score 14 13   Global Physical Health Score 12 9   PROMIS TOTAL - SUBSCORES 26 22     [place] Suicide Severity Rating Scale (Short Version)      7/20/2022     5:51 PM 3/1/2023     9:21 AM 6/9/2023    12:00 PM 7/24/2023     5:00 PM   [place] Suicide Severity Rating (Short Version)   Over the past 2 weeks have you felt down, depressed, or hopeless? no no      Over the past 2 weeks have you had thoughts of killing yourself? no no     Have you ever attempted to kill yourself? no no     1. Wish to be Dead (Since Last Contact)   N N   2. Non-Specific Active Suicidal Thoughts (Since Last Contact)   N N   Has subject engaged in non-suicidal self-injurious behavior? (Since Last Contact)   N N   Calculated C-SSRS Risk Score (Since Last Contact)   No Risk Indicated No Risk Indicated        Presenting Concern:  Depressed mood with maladaptive acceptance and adjusting to the death of her spouse.  Medical concerns and memory loss.    Reason for Discharge:  Patient is being discharged with a successful bridging to [facility]    Disposition at Time of Last Encounter:   Comments:   No change.  Continued struggle accepting the death of her spouse.      Risk Management:  Client had denied a history of suicidal ideation, suicide attempts, self-injurious behavior, homicidal ideation, homicidal behavior, and and other safety concerns.  No safety plan indicated at this time.      Referred To:  No referrals.  Patient presented to  for bridging to [facility].    Procedure Code: No-Charge    Arleth Hernandez, [facility]  August 17, 2023

## 2023-09-05 ENCOUNTER — VIRTUAL VISIT (OUTPATIENT)
Dept: PSYCHOLOGY | Facility: CLINIC | Age: 73
End: 2023-09-05
Payer: MEDICARE

## 2023-09-05 DIAGNOSIS — F43.23 ADJUSTMENT DISORDER WITH MIXED ANXIETY AND DEPRESSED MOOD: ICD-10-CM

## 2023-09-05 DIAGNOSIS — F42.4 EXCORIATION (SKIN-PICKING) DISORDER: Primary | ICD-10-CM

## 2023-09-05 PROCEDURE — 90834 PSYTX W PT 45 MINUTES: CPT | Mod: VID

## 2023-09-05 NOTE — PROGRESS NOTES
[facility]                                     Progress Note    Patient Name: Carina Calvert  Date: 2023         Service Type: Individual      Session Start Time: 11:03 AM  Session End Time: 11:48 AM     Session Length: 45 Minutes    Session #: 1, with this provider    Attendees: Client and Daughter, Isra    Service Modality:  Video Visit:      Provider verified identity through the following two step process.  Patient provided:  Patient  and Patient address    Telemedicine Visit: The patient's condition can be safely assessed and treated via synchronous audio and visual telemedicine encounter.      Reason for Telemedicine Visit: Patient has requested telehealth visit    Originating Site (Patient Location): Patient's home    Distant Site (Provider Location): Provider Remote Setting- Home Office    Consent:  The patient/guardian has verbally consented to: the potential risks and benefits of telemedicine (video visit) versus in person care; bill my insurance or make self-payment for services provided; and responsibility for payment of non-covered services.     Patient would like the video invitation sent by:  Text to cell phone: [phone]    Mode of Communication:  Video Conference via [employer]    Distant Location (Provider):  Off-site    As the provider I attest to compliance with applicable laws and regulations related to telemedicine.    DATA  Interactive Complexity: No  Crisis: No        Progress Since Last Session (Related to Symptoms / Goals / Homework):   Symptoms: No change low mood, grief, lack of motivation and skin picking    Homework:  First Session      Episode of Care Goals: No improvement - CONTEMPLATION (Considering change and yet undecided); Intervened by assessing the negative and positive thinking (ambivalence) about behavior change     Current / Ongoing Stressors and Concerns:   Evette is coming to therapy to learning skills to cope with her depression, grief and  skin picking. Hers and her daughter's primary concern is around her skin picking. Evette's   suddenly in her arms in 2019 and reports significant changes in her life since then. There is concern with Evette's cognitive changes with memory and ability to follow directions. She has an assessment scheduled for .      Treatment Objective(s) Addressed in This Session:   Discuss motivation / ambivalence about taking anti-depressant / mood stabilizer medication(s)       Intervention:   Therapist gathered information from patient and her daughter regarding reasons for setting up the appointment and what has been beneficial from previous therapy. Patient reported continued low mood, grief, lack of motivation and skin picking. Therapist supported patient as she processed and validated patient. Therapist utilized Motivational Interviewing:  Assess and address ambivalence towards change and readiness and willingness to change, evoke change talk, challenge sustain talk, point out discrepancies, explore ambivalence towards change and roadblocks.     Assessments completed prior to visit:  The following assessments were completed by patient for this visit:  PHQ2:       2023     1:40 PM 2022     2:44 PM 2021    11:05 AM 2020    10:46 AM 2/15/2018     3:14 PM 2017    12:15 PM 5/15/2017    10:25 AM   PHQ-2 (  [employer])   Q1: Little interest or pleasure in doing things 2 0 0 0 0 0 0   Q2: Feeling down, depressed or hopeless 0 0 0 0 0 0 0   PHQ-2 Score 2 0 0 0 0 0 0   PHQ-2 Total Score (12-17 Years)- Positive if 3 or more points; Administer PHQ-A if positive   0 0      Q1: Little interest or pleasure in doing things More than half the days    More than half the days         Q2: Feeling down, depressed or hopeless Not at all    Not at all         PHQ-2 Score 2    2           PHQ9:       2017     2:16 PM 5/10/2018     3:19 PM 2019    10:57 AM 2021     8:14 AM 2022      3:01 PM 5/18/2023     7:50 AM 7/6/2023     2:00 PM   PHQ-9 SCORE   PHQ-9 Total Score MyChart      5 (Mild depression)    PHQ-9 Total Score 0 3 16 5 2 5 8     GAD2:       5/18/2023     7:50 AM   TRUNG-2   Feeling nervous, anxious, or on edge 0   Not being able to stop or control worrying 0   TRUNG-2 Total Score 0     GAD7:       5/10/2018     3:19 PM 8/29/2019    10:57 AM 5/26/2022     3:01 PM 12/23/2022     2:44 PM 1/27/2023     1:29 PM 5/8/2023     9:51 AM 7/6/2023     2:00 PM   TRUNG-7 SCORE   Total Score     0 (minimal anxiety) 0 (minimal anxiety)    Total Score 0 5 1 0 0 0 0     PROMIS 10-Global Health (all questions and answers displayed):       5/18/2023     7:54 AM 7/6/2023     2:00 PM   PROMIS 10   In general, would you say your health is: Good    In general, would you say your quality of life is: Very good    In general, how would you rate your physical health? Good    In general, how would you rate your mental health, including your mood and your ability to think? Good    In general, how would you rate your satisfaction with your social activities and relationships? Good    In general, please rate how well you carry out your usual social activities and roles Good    To what extent are you able to carry out your everyday physical activities such as walking, climbing stairs, carrying groceries, or moving a chair? A little    In the past 7 days, how often have you been bothered by emotional problems such as feeling anxious, depressed, or irritable? Rarely    In the past 7 days, how would you rate your fatigue on average? None    In the past 7 days, how would you rate your pain on average, where 0 means no pain, and 10 means worst imaginable pain? 7    In general, would you say your health is: 3    In general, would you say your quality of life is: 4    In general, how would you rate your physical health? 3    In general, how would you rate your mental health, including your mood and your ability to think? 3    In  general, how would you rate your satisfaction with your social activities and relationships? 3    In general, please rate how well you carry out your usual social activities and roles. (This includes activities at home, at work and in your community, and responsibilities as a parent, child, spouse, employee, friend, etc.) 3    To what extent are you able to carry out your everyday physical activities such as walking, climbing stairs, carrying groceries, or moving a chair? 2    In the past 7 days, how often have you been bothered by emotional problems such as feeling anxious, depressed, or irritable? 2    In the past 7 days, how would you rate your fatigue on average? 1    In the past 7 days, how would you rate your pain on average, where 0 means no pain, and 10 means worst imaginable pain? 7    Global Mental Health Score 14    Global Physical Health Score 12    PROMIS TOTAL - SUBSCORES 26        Information is confidential and restricted. Go to Review Flowsheets to unlock data.         ASSESSMENT: Current Emotional / Mental Status (status of significant symptoms):   Risk status (Self / Other harm or suicidal ideation)   Patient denies current fears or concerns for personal safety.   Patient denies current or recent suicidal ideation or behaviors.   Patient denies current or recent homicidal ideation or behaviors.   Patient denies current or recent self injurious behavior or ideation.   Patient denies other safety concerns.   Patient reports there has been no change in risk factors since their last session.     Patient reports there has been no change in protective factors since their last session.     Recommended that patient call 911 or go to the local ED should there be a change in any of these risk factors.     Appearance:   Appropriate    Eye Contact:   Good    Psychomotor Behavior: Normal    Attitude:   Cooperative    Orientation:   All   Speech    Rate / Production: Monotone     Volume:  Normal     Mood:    Depressed    Affect:    Blunted    Thought Content:  Clear    Thought Form:  Coherent  Logical    Insight:    Fair      Medication Review:   No current psychiatric medications prescribed     Medication Compliance:   NA     Changes in Health Issues:   Yes: Pain, Associated Psychological Distress     Chemical Use Review:   Substance Use: Chemical use reviewed, no active concerns identified      Tobacco Use: No current tobacco use.      Diagnosis:  1. Excoriation (skin-picking) disorder    2. Adjustment disorder with mixed anxiety and depressed mood        Collateral Reports Completed:   Not Applicable    PLAN: (Patient Tasks / Therapist Tasks / Other)  Therapist will connect with colluages that specialize in skin picking. Daughter will schedule follow up with Dr. Clifton and talk with PCP about getting restarted on medication.       Gabriela Bonilla, RAMÓN  September 5, 2023

## 2023-09-06 ENCOUNTER — TELEPHONE (OUTPATIENT)
Dept: FAMILY MEDICINE | Facility: CLINIC | Age: 73
End: 2023-09-06
Payer: MEDICARE

## 2023-09-06 DIAGNOSIS — F11.90 CHRONIC, CONTINUOUS USE OF OPIOIDS: ICD-10-CM

## 2023-09-06 DIAGNOSIS — M79.7 FIBROMYALGIA: ICD-10-CM

## 2023-09-06 DIAGNOSIS — G24.3 SPASMODIC TORTICOLLIS: ICD-10-CM

## 2023-09-06 DIAGNOSIS — G89.4 CHRONIC PAIN SYNDROME: ICD-10-CM

## 2023-09-06 RX ORDER — BACLOFEN 10 MG/1
10 TABLET ORAL 4 TIMES DAILY
Qty: 360 TABLET | Refills: 0 | Status: SHIPPED | OUTPATIENT
Start: 2023-09-06 | End: 2023-11-30

## 2023-09-06 RX ORDER — HYDROCODONE BITARTRATE AND ACETAMINOPHEN 7.5; 325 MG/1; MG/1
2 TABLET ORAL 3 TIMES DAILY
Qty: 180 TABLET | Refills: 0 | Status: SHIPPED | OUTPATIENT
Start: 2023-09-06 | End: 2023-09-21

## 2023-09-06 NOTE — TELEPHONE ENCOUNTER
Patient will be out of medications before next appointment on 9/11/23    Requesting refill to last until next appointment - cued       Please route back for team to call patient at: [phone] ok for detailed message;    Mattie Archuleta RN  [facility]

## 2023-09-11 ENCOUNTER — OFFICE VISIT (OUTPATIENT)
Dept: INTERNAL MEDICINE | Facility: CLINIC | Age: 73
End: 2023-09-11
Payer: MEDICARE

## 2023-09-11 VITALS
BODY MASS INDEX: 22.31 KG/M2 | RESPIRATION RATE: 18 BRPM | WEIGHT: 130 LBS | OXYGEN SATURATION: 100 % | SYSTOLIC BLOOD PRESSURE: 138 MMHG | HEART RATE: 85 BPM | DIASTOLIC BLOOD PRESSURE: 76 MMHG

## 2023-09-11 DIAGNOSIS — G89.4 CHRONIC PAIN SYNDROME: ICD-10-CM

## 2023-09-11 DIAGNOSIS — R41.3 MEMORY LOSS: ICD-10-CM

## 2023-09-11 DIAGNOSIS — K59.09 CHRONIC CONSTIPATION: Primary | ICD-10-CM

## 2023-09-11 PROCEDURE — G0480 DRUG TEST DEF 1-7 CLASSES: HCPCS | Performed by: INTERNAL MEDICINE

## 2023-09-11 PROCEDURE — 99214 OFFICE O/P EST MOD 30 MIN: CPT | Performed by: INTERNAL MEDICINE

## 2023-09-11 ASSESSMENT — ANXIETY QUESTIONNAIRES
3. WORRYING TOO MUCH ABOUT DIFFERENT THINGS: NOT AT ALL
1. FEELING NERVOUS, ANXIOUS, OR ON EDGE: MORE THAN HALF THE DAYS
6. BECOMING EASILY ANNOYED OR IRRITABLE: NOT AT ALL
7. FEELING AFRAID AS IF SOMETHING AWFUL MIGHT HAPPEN: NOT AT ALL
IF YOU CHECKED OFF ANY PROBLEMS ON THIS QUESTIONNAIRE, HOW DIFFICULT HAVE THESE PROBLEMS MADE IT FOR YOU TO DO YOUR WORK, TAKE CARE OF THINGS AT HOME, OR GET ALONG WITH OTHER PEOPLE: NOT DIFFICULT AT ALL
GAD7 TOTAL SCORE: 2
GAD7 TOTAL SCORE: 2
2. NOT BEING ABLE TO STOP OR CONTROL WORRYING: NOT AT ALL
4. TROUBLE RELAXING: NOT AT ALL
5. BEING SO RESTLESS THAT IT IS HARD TO SIT STILL: NOT AT ALL

## 2023-09-11 ASSESSMENT — PATIENT HEALTH QUESTIONNAIRE - PHQ9
10. IF YOU CHECKED OFF ANY PROBLEMS, HOW DIFFICULT HAVE THESE PROBLEMS MADE IT FOR YOU TO DO YOUR WORK, TAKE CARE OF THINGS AT HOME, OR GET ALONG WITH OTHER PEOPLE: SOMEWHAT DIFFICULT
SUM OF ALL RESPONSES TO PHQ QUESTIONS 1-9: 9
SUM OF ALL RESPONSES TO PHQ QUESTIONS 1-9: 9

## 2023-09-11 NOTE — LETTER
Opioid / Opioid Plus Controlled Substance Agreement    This is an agreement between you and your provider about the safe and appropriate use of controlled substance/opioids prescribed by your care team. Controlled substances are medicines that can cause physical and mental dependence (abuse).    There are strict laws about having and using these medicines. We here at [facility] are committing to working with you in your efforts to get better. To support you in this work, we ll help you schedule regular office appointments for medicine refills. If we must cancel or change your appointment for any reason, we ll make sure you have enough medicine to last until your next appointment.     As a Provider, I will:  Listen carefully to your concerns and treat you with respect.   Recommend a treatment plan that I believe is in your best interest. This plan may involve therapies other than opioid pain medication.   Talk with you often about the possible benefits, and the risk of harm of any medicine that we prescribe for you.   Provide a plan on how to taper (discontinue or go off) using this medicine if the decision is made to stop its use.    As a Patient, I understand that opioid(s):   Are a controlled substance prescribed by my care team to help me function or work and manage my condition(s).   Are strong medicines and can cause serious side effects such as:  Drowsiness, which can seriously affect my driving ability  A lower breathing rate, enough to cause death  Harm to my thinking ability   Depression   Abuse of and addiction to this medicine  Need to be taken exactly as prescribed. Combining opioids with certain medicines or chemicals (such as illegal drugs, sedatives, sleeping pills, and benzodiazepines) can be dangerous or even fatal. If I stop opioids suddenly, I may have severe withdrawal symptoms.  Do not work for all types of pain nor for all patients. If they re not helpful, I may be asked to stop  them.        The risks, benefits and side effects of these medicine(s) were explained to me. I agree that:  I will take part in other treatments as advised by my care team. This may be psychiatry or counseling, physical therapy, behavioral therapy, group treatment or a referral to a specialist.     I will keep all my appointments. I understand that this is part of the monitoring of opioids. My care team may require an office visit for EVERY opioid/controlled substance refill. If I miss appointments or don t follow instructions, my care team may stop my medicine.    I will take my medicines as prescribed. I will not change the dose or schedule unless my care team tells me to. There will be no refills if I run out early.     I may be asked to come to the clinic and complete a urine drug test or complete a pill count at any time. If I don t give a urine sample or participate in a pill count, the care team may stop my medicine.    I will only receive prescriptions from this clinic for chronic pain. If I am treated by another provider for acute pain issues, I will tell them that I am taking opioid pain medication for chronic pain and that I have a treatment agreement with this provider. I will inform my [facility] care team within one business day if I am given a prescription for any pain medication by another healthcare provider. My [facility] care team can contact other providers and pharmacists about my use of any medicines.    It is up to me to make sure that I don t run out of my medicines on weekends or holidays. If my care team is willing to refill my opioid prescription without a visit, I must request refills only during office hours. Refills may take up to 3 business days to process. I will use one pharmacy to fill all my opioid and other controlled substance prescriptions. I will notify the clinic about any changes to my insurance or medication availability.    I am responsible for my  prescriptions. If the medicine/prescription is lost, stolen or destroyed, it will not be replaced. I also agree not to share controlled substance medicines with anyone.    I am aware I should not use any illegal or recreational drugs. I agree not to drink alcohol unless my care team says I can.       If I enroll in the [place] [facility] program, I will tell my care team prior to my next refill.     I will tell my care team right away if I become pregnant, have a new medical problem treated outside of my regular clinic, or have a change in my medications.    I understand that this medicine can affect my thinking, judgment and reaction time. Alcohol and drugs affect the brain and body, which can affect the safety of my driving. Being under the influence of alcohol or drugs can affect my decision-making, behaviors, personal safety, and the safety of others. Driving while impaired (DWI) can occur if a person is driving, operating, or in physical control of a car, motorcycle, boat, snowmobile, ATV, motorbike, off-road vehicle, or any other motor vehicle ([place] Statute 169A.20). I understand the risk if I choose to drive or operate any vehicle or machinery.    I understand that if I do not follow any of the conditions above, my prescriptions or treatment may be stopped or changed.          Opioids  What You Need to Know    What are opioids?   Opioids are pain medicines that must be prescribed by a doctor. They are also known as narcotics.     Examples are:   morphine (MS Contin, Мария)  oxycodone (Oxycontin)  oxycodone and acetaminophen (Percocet)  hydrocodone and acetaminophen (Vicodin, Norco)   fentanyl patch (Duragesic)   hydromorphone (Dilaudid)   methadone  codeine (Tylenol #3)     What do opioids do well?   Opioids are best for severe short-term pain such as after a surgery or injury. They may work well for cancer pain. They may help some people with long-lasting (chronic) pain.     What do opioids NOT do  well?   Opioids never get rid of pain entirely, and they don t work well for most patients with chronic pain. Opioids don t reduce swelling, one of the causes of pain.                                    Other ways to manage chronic pain and improve function include:     Treat the health problem that may be causing pain  Anti-inflammation medicines, which reduce swelling and tenderness, such as ibuprofen (Advil, Motrin) or naproxen (Aleve)  Acetaminophen (Tylenol)  Antidepressants and anti-seizure medicines, especially for nerve pain  Topical treatments such as patches or creams  Injections or nerve blocks  Chiropractic or osteopathic treatment  Acupuncture, massage, deep breathing, meditation, visual imagery, aromatherapy  Use heat or ice at the pain site  Physical therapy   Exercise  Stop smoking  Take part in therapy       Risks and side effects     Talk to your doctor before you start or decide to keep taking opioids. Possible side effects include:    Lowering your breathing rate enough to cause death  Overdose, including death, especially if taking higher than prescribed doses  Worse depression symptoms; less pleasure in things you usually enjoy  Feeling tired or sluggish  Slower thoughts or cloudy thinking  Being more sensitive to pain over time; pain is harder to control  Trouble sleeping or restless sleep  Changes in hormone levels (for example, less testosterone)  Changes in sex drive or ability to have sex  Constipation  Unsafe driving  Itching and sweating  Dizziness  Nausea, throwing up and dry mouth    What else should I know about opioids?    Opioids may lead to dependence, tolerance, or addiction.    Dependence means that if you stop or reduce the medicine too quickly, you will have withdrawal symptoms. These include loose poop (diarrhea), jitters, flu-like symptoms, nervousness and tremors. Dependence is not the same as addiction.                     Tolerance means needing higher doses over time to  get the same effect. This may increase the chance of serious side effects.    Addiction is when people improperly use a substance that harms their body, their mind or their relations with others. Use of opiates can cause a relapse of addiction if you have a history of drug or alcohol abuse.    People who have used opioids for a long time may have a lower quality of life, worse depression, higher levels of pain and more visits to doctors.    You can overdose on opioids. Take these steps to lower your risk of overdose:    Recognize the signs:  Signs of overdose include decrease or loss of consciousness (blackout), slowed breathing, trouble waking up and blue lips. If someone is worried about overdose, they should call 911.    Talk to your doctor about Narcan (naloxone).   If you are at risk for overdose, you may be given a prescription for Narcan. This medicine very quickly reverses the effects of opioids.   If you overdose, a friend or family member can give you Narcan while waiting for the ambulance. They need to know the signs of overdose and how to give Narcan.     Don't use alcohol or street drugs.   Taking them with opioids can cause death.    Do not take any of these medicines unless your doctor says it s OK. Taking these with opioids can cause death:  Benzodiazepines, such as lorazepam (Ativan), alprazolam (Xanax) or diazepam (Valium)  Muscle relaxers, such as cyclobenzaprine (Flexeril)  Sleeping pills like zolpidem (Ambien)   Other opioids      How to keep you and other people safe while taking opioids:    Never share your opioids with others.  Opioid medicines are regulated by the Drug Enforcement Agency ([place]). Selling or sharing medications is a criminal act.    2. Be sure to store opioids in a secure place, locked up if possible. Young children can easily swallow them and overdose.    3. When you are traveling with your medicines, keep them in the original bottles. If you use a pill box, be sure you also  carry a copy of your medicine list from your clinic or pharmacy.    4. Safe disposal of opioids    Most pharmacies have places to get rid of medicine, called disposal kiosks. Medicine disposal options are also available in every [facility]. Search your county and  medication disposal  to find more options. You can find more details at:  [email or web address]://[email or web address].[facility].[place]./living-green/managing-unwanted-medications     I agree that my provider, clinic care team, and pharmacy may work with any city, state or federal law enforcement agency that investigates the misuse, sale, or other diversion of my controlled medicine. I will allow my provider to discuss my care with, or share a copy of, this agreement with any other treating provider, pharmacy or emergency room where I receive care.    I have read this agreement and have asked questions about anything I did not understand.    _______________________________________________________  Patient Signature - Carina Calvert _____________________                   Date     _______________________________________________________  Provider Signature - Gerber Jain MD   _____________________                   Date     _______________________________________________________  Witness Signature (required if provider not present while patient signing)   _____________________                   Date

## 2023-09-11 NOTE — PROGRESS NOTES
Assessment & Plan     Chronic constipation  Carina Calvert is a long term patient with me and I have followed her care for greater then 20 years. She's a challenging patient with many chronic pain issues and chronic mental health issues. 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 We reviewed everything today and as far as her chronic constipation goes, she needs to increase the MiraLax / GlycoLax (polyethylene glycol) to 2 doses a day and with lots of water. This is most likely opioid pain medication induced constipation and daughter is aware.    Memory loss  She has been referred to a neurologist who is an expert in memory issues and dementia diagnosis. I was surprised to see no orders for neuropsychiatric tests and no orders for a brain MRI. In my opinion these are tests that are indicated  and further follow up depending on the test results and definitely we want these tests to be on hand for her upcoming neurological consultation with Dr. Badillo in December   - Adult Neuropsychology Referral; Future  - MR Brain w/o & w Contrast; Future  We also completed Family Medical Leave Act paper work for carmelina Dhaliwal    Chronic pain syndrome  Today she completed the urine drug screen and the Chronic Pain Contract signed  - [id number] - Urine Drug Confirmation Panel (Comprehensive); Future  - [id number] - Urine Drug Confirmation Panel (Comprehensive)    Review of the result(s) of each unique test - today's tests  Prescription drug management  35 minutes spent by me on the date of the encounter doing chart review, history and exam, documentation and further activities per the note       Orders Placed This Encounter   Procedures    MR Brain w/o & w Contrast    Urine Drug Confirmation Panel    Urine Creatinine for  "Drug Screen Panel    Adult Neuropsychology Referral    [id number] - Urine Drug Confirmation Panel (Comprehensive)         Gerber Jain MD  [facility] NEHEMIAS Lim is a 73 year old, presenting for the following health issues:  Recheck Medication         No data to display                History of Present Illness       Mental Health Follow-up:  Patient presents to follow-up on Depression.Patient's depression since last visit has been:  Medium  The patient is having other symptoms associated with depression.      Any significant life events: grief or loss and health concerns  Patient is not feeling anxious or having panic attacks.  Patient has no concerns about alcohol or drug use.    Headaches:   Since the patient's last clinic visit, headaches are: worsened  The patient is getting headaches:  Daily  She is not able to do normal daily activities when she has a migraine.  The patient is taking the following rescue/relief medications:  Other   Patient states \"The relief is inconsistent\" from the rescue/relief medications.   The patient is taking the following medications to prevent migraines:  Other  In the past 4 weeks, the patient has gone to an Urgent Care or Emergency Room 0 times times due to headaches.    She eats 2-3 servings of fruits and vegetables daily.She consumes 1 sweetened beverage(s) daily.She exercises with enough effort to increase her heart rate 9 or less minutes per day.  She exercises with enough effort to increase her heart rate 3 or less days per week.   She is taking medications regularly.     Pain , this is her main burden.  Botox injections   Variety medications continue   Has seen medication therapy management consult  with the pharmacist primary   Her pain is primarily in the neck    Not sleeping well  Poor quality of sleeping   Has a compulsive skin picking and has been referred to a compulsive skin picking specialist.    She has possible memory issues   She " "has a pending appointment with a Dr. Clifton psychiatrist    She has a pending appointment with Dr. Badillo., neurologist     Daughter and I reviewed everything in good detail and she is well informed on what's going on         Review of Systems   Constitutional, HEENT, cardiovascular, pulmonary, gi and gu systems are negative, except as otherwise noted.      Objective    /76   Pulse 85   Resp 18   Wt 59 kg (130 lb)   SpO2 100%   BMI 22.31 kg/m    Body mass index is 22.31 kg/m .  Physical Exam   GENERAL: healthy, alert and no distress, seems to have more mental slowness with word finding difficulty and just seems a bit more of the \"deer in the headlights \" look   EYES: Eyes grossly normal to inspection, PERRL and conjunctivae and sclerae normal  RESP: lungs clear to auscultation - no rales, rhonchi or wheezes  CV: regular rate and rhythm, normal S1 S2, no S3 or S4, no murmur, click or rub, no peripheral edema and peripheral pulses strong  MS: no gross musculoskeletal defects noted, no edema  NEURO: Normal strength and tone, mentation intact and speech normal  PSYCH: mentation appears normal, affect normal/bright    Orders Placed This Encounter   Procedures    MR Brain w/o & w Contrast    Urine Drug Confirmation Panel    Urine Creatinine for Drug Screen Panel    Adult Neuropsychology Referral    [id number] - Urine Drug Confirmation Panel (Comprehensive)             "

## 2023-09-11 NOTE — LETTER
September 15, 2023    Carina Calvert  [address]   [place] [address]          Dear ,    We are writing to inform you of your test results.  All of these tests are within acceptable limits , things look good !       Resulted Orders   Urine Drug Confirmation Panel   Result Value Ref Range    Dihydrocodeine ng/mL 1,340 (H) <50 ng/mL    Dihydrocodeine 1,942 Absent ng/mg [creat]      Comment:      Hydromorphone and dihydrocodeine are expected metabolites of hydrocodone. Hydromorphone and dihydrocodeine are also available as scheduled prescription medications.    Hydrocodone ng/mL 4,100 (H) <50 ng/mL    Hydrocodone 5,942 Absent ng/mg [creat]      Comment:      Sources of hydrocodone include scheduled prescription medications.  Hydromorphone and dihydrocodeine are expected metabolites of hydrocodone. Hydromorphone and dihydrocodeine are also available as scheduled prescription medications.    Hydromorphone ng/mL 743 (H) <50 ng/mL    Hydromorphone 1,077 Absent ng/mg [creat]      Comment:      Hydromorphone may be present as a metabolite of morphine.  Concentrations of hydromorphone rarely exceed 5% of the morphine concentration when this is the source of hydromorphone.  Hydromorphone and dihydrocodeine are expected metabolites of hydrocodone. Hydromorphone and dihydrocodeine are also available as scheduled prescription medications.    Gabapentin (Neurontin) Present (A) Absent      Comment:      Sources of gabapentin are prescription medications.    Narrative    This test was developed and its performance characteristics determined by the [facility],  Special [facility] Laboratory. It has not been cleared or approved by the FDA. The laboratory is regulated under CLIA as qualified to perform high-complexity testing. This test is used for clinical purposes. It should not be regarded as investigational or for research.    Drugs with concentrations less than the cutoff will not  be reported.  The drugs with applicable detection cutoff limits that are included within the Drug Confirmation Panel are:    The following drugs are detected with a cutoff of 3 ng/ml: FENTANYL    The following drugs are detected with a cutoff of 5 ng/mL: 6-ACETYLMORPHINE, BUPRENORPHINE, NALOXONE, NORBUPRENORPHINE, NORFENTANYL    The following drugs are detected with a cutoff of 10 ng/mL: SUFENTANIL    The following drugs are detected with a cutoff of 20 ng/mL: PCP (PHENCYCLIDINE)    The following drugs are detected with a cutoff of 50 ng/mL: 7-AMINOCLONAZEPAM, 7-AMINOFLUNITRAZEPAM, ALPRAZOLAM, AMPHETAMINE, A-OH-ALPRAZOLAM, A-OH-MIDAZOLAM, A-OH-TRIAZOLAM, BENZOYLECGONINE (Cocaine Metabolite), CLONAZEPAM, COCAETHYLENE (Cocaine Metabolite), CODEINE, DIAZEPAM, DIHYDROCODEINE, EDDP (Methadone Metabolite), HYDROCODONE, HYDROMORPHONE, LORAZEPAM, MDA (3,4-Methylenedioxyamphetamine), MDEA (3,4-Pkuonjxdmcgmnt-N-ethylcathinone), MDMA (Methylenedioxyamphetamine,Ecstasy), MEPERIDINE, METHADONE, METHAMPHETAMINE, METHYLPHENIDATE (Ritalin), MORPHINE, NALTREXONE, N-DESMETH-TAPENTADOL, NORCODEINE, NORDIAZEPAM, NORMEPERIDINE, O-MATHEW-TRAMADOL, OXAZEPAM, OXYCODONE, OXYMORPHONE, PROPOXYPHENE, RITALINIC ACID, TAPENTADOL, TEMAZEPAM, THEBAINE, TRAMADOL    The following drugs are detected with a cutoff of 100 ng/mL: GABAPENTIN, KETAMINE    The following drugs are detected with a cutoff of 200 ng/mL: PREGABALIN     Urine Creatinine for Drug Screen Panel   Result Value Ref Range    Creatinine Urine for Drug Screen 69 mg/dL      Comment:      The reference range has not been established for creatinine in random urines. The results should be integrated into the clinical context for interpretation.       If you have any questions or concerns, please call the clinic at the number listed above.       Sincerely,      Gerber Jain MD

## 2023-09-12 ENCOUNTER — VIRTUAL VISIT (OUTPATIENT)
Dept: PSYCHOLOGY | Facility: CLINIC | Age: 73
End: 2023-09-12
Payer: MEDICARE

## 2023-09-12 ENCOUNTER — TELEPHONE (OUTPATIENT)
Dept: FAMILY MEDICINE | Facility: CLINIC | Age: 73
End: 2023-09-12
Payer: MEDICARE

## 2023-09-12 DIAGNOSIS — F43.23 ADJUSTMENT DISORDER WITH MIXED ANXIETY AND DEPRESSED MOOD: ICD-10-CM

## 2023-09-12 DIAGNOSIS — F42.4 EXCORIATION (SKIN-PICKING) DISORDER: Primary | ICD-10-CM

## 2023-09-12 LAB — CREAT UR-MCNC: 69 MG/DL

## 2023-09-12 PROCEDURE — 90834 PSYTX W PT 45 MINUTES: CPT | Mod: VID

## 2023-09-12 NOTE — TELEPHONE ENCOUNTER
FYI - Status Update    Who is Calling: family member, Isra Calvert-daughter    Update: tried to schedule with [facility] and daughter states the first available is March 2024.  They requested primary assist with this. [phone]     Does caller want a call/response back: Yes     Okay to leave a detailed message?: Yes at Cell number on file:    Telephone Information:   Mobile [phone]       Elissa Vizcaino

## 2023-09-13 NOTE — TELEPHONE ENCOUNTER
Per [facility] phone encounter, this was the soonest available appointment they could offer patient.    - please let daughter know this. She can check with insurance for other neuro clinics such as [facility] or [facility] to see if patient can get in sooner at one of their locations. She can call back if a new referral is needed to a different location    Earline Emery RN  [facility]

## 2023-09-13 NOTE — TELEPHONE ENCOUNTER
Called Isra, daughter (ph: [phone]) and gave her message from Dr. Jain and phone numbers of both [facility] (ph: [phone]) and [facility] (ph: [phone]) to call and see if she can get appointment sooner.  Advised to call us back if new referral is needed. Alice John,

## 2023-09-13 NOTE — TELEPHONE ENCOUNTER
Please help me with this project. Isra is daughter, patient is Carina Calvert . She has declines memory status and fei has an emerging Alzhemiers dementia versus possibly a pseudodementia    Needs neuropsychiatric tests , preferred is to be within the next month or 6 weeks    Try alternative choices such as with [facility] or [facility] or possibly other ?    Thank you so very much ! If we are successful and need new orders, reroute for signing     Gerber Jain MD

## 2023-09-15 LAB
DHC UR CFM-MCNC: 1340 NG/ML
DHC/CREAT UR: 1942 NG/MG {CREAT}
GABAPENTIN UR QL CFM: PRESENT
HYDROCODONE UR CFM-MCNC: 4100 NG/ML
HYDROCODONE/CREAT UR: 5942 NG/MG {CREAT}
HYDROMORPHONE UR CFM-MCNC: 743 NG/ML
HYDROMORPHONE/CREAT UR: 1077 NG/MG {CREAT}

## 2023-09-15 NOTE — PROGRESS NOTES
[facility]                                     Progress Note    Patient Name: Carina Calvert  Date: 9/12/2023         Service Type: Individual      Session Start Time: 3:00 PM  Session End Time: 3:47 PM     Session Length: 47 Minutes    Session #: 2    Attendees: Client and Daughter, Isra  and Client consented to today's session being shadowed by Shelly Yoon, MSW student.    Service Modality:  Video Visit:      Provider verified identity through the following two step process.  Patient provided:  Patient is known previously to provider    Telemedicine Visit: The patient's condition can be safely assessed and treated via synchronous audio and visual telemedicine encounter.      Reason for Telemedicine Visit: Patient has requested telehealth visit    Originating Site (Patient Location): Patient's home    Distant Site (Provider Location): Provider Remote Setting- Home Office    Consent:  The patient/guardian has verbally consented to: the potential risks and benefits of telemedicine (video visit) versus in person care; bill my insurance or make self-payment for services provided; and responsibility for payment of non-covered services.     Patient would like the video invitation sent by:  Text to cell phone: [phone]    Mode of Communication:  Video Conference via Amwell    Distant Location (Provider):  Off-site    As the provider I attest to compliance with applicable laws and regulations related to telemedicine.    DATA  Interactive Complexity: No  Crisis: No        Progress Since Last Session (Related to Symptoms / Goals / Homework):   Symptoms: No change low mood, grief, lack of motivation and skin picking    Homework:  Newly Established      Episode of Care Goals: No improvement - CONTEMPLATION (Considering change and yet undecided); Intervened by assessing the negative and positive thinking (ambivalence) about behavior change     Current / Ongoing Stressors and Concerns:   Evette de jesus  coming to therapy to learning skills to cope with her depression, grief and skin picking. Hers and her daughter's primary concern is around her skin picking. Evette's   suddenly in her arms in  and reports significant changes in her life since then. There is concern with Evette's cognitive changes with memory and ability to follow directions. She has an assessment scheduled for .     Treatment Objective(s) Addressed in This Session:   practice deep breathing at least twice a day       Intervention:   Therapist utilized reflected listening as patient gave brief reflection of the past week. Patient reported no change in her picking and low mood. Therapist supported patient as she processed and validated patient. Therapist introduced mindfulness and urge surfing. Therapist practiced 4-7-8 breathing.      Assessments completed prior to visit:  The following assessments were completed by patient for this visit:  PHQ2:       2023     1:40 PM 2022     2:44 PM 2021    11:05 AM 2020    10:46 AM 2/15/2018     3:14 PM 2017    12:15 PM 5/15/2017    10:25 AM   PHQ-2 (  [employer])   Q1: Little interest or pleasure in doing things 2 0 0 0 0 0 0   Q2: Feeling down, depressed or hopeless 0 0 0 0 0 0 0   PHQ-2 Score 2 0 0 0 0 0 0   PHQ-2 Total Score (12-17 Years)- Positive if 3 or more points; Administer PHQ-A if positive   0 0      Q1: Little interest or pleasure in doing things More than half the days    More than half the days         Q2: Feeling down, depressed or hopeless Not at all    Not at all         PHQ-2 Score 2    2           PHQ9:       5/10/2018     3:19 PM 2019    10:57 AM 2021     8:14 AM 2022     3:01 PM 2023     7:50 AM 2023     2:00 PM 2023     3:56 PM   PHQ-9 SCORE   PHQ-9 Total Score MyChart     5 (Mild depression)  9 (Mild depression)   PHQ-9 Total Score 3 16 5 2 5 8 9     GAD2:       2023     7:50 AM   TRUNG-2   Feeling  nervous, anxious, or on edge 0   Not being able to stop or control worrying 0   TRUNG-2 Total Score 0     GAD7:       8/29/2019    10:57 AM 5/26/2022     3:01 PM 12/23/2022     2:44 PM 1/27/2023     1:29 PM 5/8/2023     9:51 AM 7/6/2023     2:00 PM 9/11/2023     3:58 PM   TRUNG-7 SCORE   Total Score    0 (minimal anxiety) 0 (minimal anxiety)  2 (minimal anxiety)   Total Score 5 1 0 0 0 0 2     PROMIS 10-Global Health (all questions and answers displayed):       5/18/2023     7:54 AM 7/6/2023     2:00 PM   PROMIS 10   In general, would you say your health is: Good    In general, would you say your quality of life is: Very good    In general, how would you rate your physical health? Good    In general, how would you rate your mental health, including your mood and your ability to think? Good    In general, how would you rate your satisfaction with your social activities and relationships? Good    In general, please rate how well you carry out your usual social activities and roles Good    To what extent are you able to carry out your everyday physical activities such as walking, climbing stairs, carrying groceries, or moving a chair? A little    In the past 7 days, how often have you been bothered by emotional problems such as feeling anxious, depressed, or irritable? Rarely    In the past 7 days, how would you rate your fatigue on average? None    In the past 7 days, how would you rate your pain on average, where 0 means no pain, and 10 means worst imaginable pain? 7    In general, would you say your health is: 3    In general, would you say your quality of life is: 4    In general, how would you rate your physical health? 3    In general, how would you rate your mental health, including your mood and your ability to think? 3    In general, how would you rate your satisfaction with your social activities and relationships? 3    In general, please rate how well you carry out your usual social activities and roles. (This  includes activities at home, at work and in your community, and responsibilities as a parent, child, spouse, employee, friend, etc.) 3    To what extent are you able to carry out your everyday physical activities such as walking, climbing stairs, carrying groceries, or moving a chair? 2    In the past 7 days, how often have you been bothered by emotional problems such as feeling anxious, depressed, or irritable? 2    In the past 7 days, how would you rate your fatigue on average? 1    In the past 7 days, how would you rate your pain on average, where 0 means no pain, and 10 means worst imaginable pain? 7    Global Mental Health Score 14    Global Physical Health Score 12    PROMIS TOTAL - SUBSCORES 26        Information is confidential and restricted. Go to Review Flowsheets to unlock data.         ASSESSMENT: Current Emotional / Mental Status (status of significant symptoms):   Risk status (Self / Other harm or suicidal ideation)   Patient denies current fears or concerns for personal safety.   Patient denies current or recent suicidal ideation or behaviors.   Patient denies current or recent homicidal ideation or behaviors.   Patient denies current or recent self injurious behavior or ideation.   Patient denies other safety concerns.   Patient reports there has been no change in risk factors since their last session.     Patient reports there has been no change in protective factors since their last session.     Recommended that patient call 911 or go to the local ED should there be a change in any of these risk factors.     Appearance:   Appropriate    Eye Contact:   Fair    Psychomotor Behavior: Normal    Attitude:   Cooperative    Orientation:   All   Speech    Rate / Production: Monotone     Volume:  Normal    Mood:    Depressed  Tired   Affect:    Flat    Thought Content:  Clear    Thought Form:  Coherent  Logical    Insight:    Fair      Medication Review:   No current psychiatric medications  prescribed     Medication Compliance:   NA     Changes in Health Issues:   Yes: Pain, Associated Psychological Distress     Chemical Use Review:   Substance Use: Chemical use reviewed, no active concerns identified      Tobacco Use: No current tobacco use.      Diagnosis:  1. Excoriation (skin-picking) disorder    2. Adjustment disorder with mixed anxiety and depressed mood        Collateral Reports Completed:   Not Applicable    PLAN: (Patient Tasks / Therapist Tasks / Other)  Isra will contact [facility] to get connected with an Excoriation specialist.       Gabriela Bonilla, RAMÓN  September 12, 2023

## 2023-09-21 DIAGNOSIS — F11.90 CHRONIC, CONTINUOUS USE OF OPIOIDS: ICD-10-CM

## 2023-09-21 DIAGNOSIS — G89.4 CHRONIC PAIN SYNDROME: ICD-10-CM

## 2023-09-21 DIAGNOSIS — G24.3 SPASMODIC TORTICOLLIS: ICD-10-CM

## 2023-09-21 DIAGNOSIS — M79.7 FIBROMYALGIA: ICD-10-CM

## 2023-09-21 RX ORDER — HYDROCODONE BITARTRATE AND ACETAMINOPHEN 7.5; 325 MG/1; MG/1
2 TABLET ORAL 3 TIMES DAILY
Qty: 180 TABLET | Refills: 0 | Status: SHIPPED | OUTPATIENT
Start: 2023-09-21 | End: 2023-10-20

## 2023-10-03 DIAGNOSIS — I10 HYPERTENSION GOAL BP (BLOOD PRESSURE) < 140/90: ICD-10-CM

## 2023-10-03 DIAGNOSIS — I21.4 NSTEMI (NON-ST ELEVATED MYOCARDIAL INFARCTION) (H): ICD-10-CM

## 2023-10-03 RX ORDER — METOPROLOL TARTRATE 50 MG
TABLET ORAL
Qty: 180 TABLET | Refills: 1 | Status: SHIPPED | OUTPATIENT
Start: 2023-10-03 | End: 2024-02-12

## 2023-10-10 ENCOUNTER — VIRTUAL VISIT (OUTPATIENT)
Dept: PSYCHIATRY | Facility: CLINIC | Age: 73
End: 2023-10-10
Payer: MEDICARE

## 2023-10-10 DIAGNOSIS — F42.4 EXCORIATION (SKIN-PICKING) DISORDER: Primary | ICD-10-CM

## 2023-10-10 PROCEDURE — 99214 OFFICE O/P EST MOD 30 MIN: CPT | Mod: VID | Performed by: PSYCHIATRY & NEUROLOGY

## 2023-10-10 NOTE — Clinical Note
Hello, we are seeking habit reversal therapy or other treatment for excoriation disorder (skin picking disorder). Do you have someone that can provide this form of therapy? Thank you!  Sincerely, Alessandro Clifton M.D. Consultative Psychiatrist Program Medical Director, Lead Collaborative Care Psychiatry Service

## 2023-10-10 NOTE — PROGRESS NOTES
Virtual Visit Details    Type of service:  Video Visit   Video Start Time: 3:35 PM  Video End Time:3:55 PM    Originating Location (pt. Location): Home    Distant Location (provider location):  Off-site  Platform used for Video Visit: [facility] Consult Note    IDENTIFICATION   Name: Carina Calvert   : 1950/73 year old      Sex:    @ female          Telemedicine Visit: The patient's condition can be safely assessed and treated via synchronous audio and visual telemedicine encounter.        Face to Face/patient Contact total time: 20 minutes  Pre Charting time: 1 minutes; Post charting time, communication and other activities: 0 minutes; Total time 21 minutes  3:34 PM          SUBJECTIVE   Reorts no issues with anxiety and depression. Skin picking persists, but some days better. Has not picked skin today for example. Did talk to her therapist today and that may have been why.          PHQ-9 scores:      2023     7:50 AM 2023     2:00 PM 2023     3:56 PM   PHQ-9 SCORE   PHQ-9 Total Score MyChart 5 (Mild depression)  9 (Mild depression)   PHQ-9 Total Score 5 8 9       TRUNG-7 scores:      2023     9:51 AM 2023     2:00 PM 2023     3:58 PM   TRUNG-7 SCORE   Total Score 0 (minimal anxiety)  2 (minimal anxiety)   Total Score 0 0 2       OBJECTIVE     Vital Signs:   There were no vitals taken for this visit.    Labs:  na    Current Medications:  Current Outpatient Medications   Medication    acetaminophen (TYLENOL) 500 MG tablet    aspirin 81 MG tablet    baclofen (LIORESAL) 10 MG tablet    gabapentin (NEURONTIN) 300 MG capsule    gabapentin (NEURONTIN) 300 MG capsule    HYDROcodone-acetaminophen (NORCO) 7.5-325 MG per tablet    Melatonin 10 MG CAPS    metoprolol tartrate (LOPRESSOR) 50 MG tablet    nitroGLYcerin (NITROSTAT) 0.4 MG sublingual tablet    omeprazole (PRILOSEC) 20 MG DR capsule    polyethylene glycol (MIRALAX/GLYCOLAX) powder    Probiotic Product  (PROBIOTIC BLEND PO)    rosuvastatin (CRESTOR) 10 MG tablet    SUMAtriptan (IMITREX) 50 MG tablet    traZODone (DESYREL) 100 MG tablet    VITAMIN B-12 1000 MCG OR TABS    vitamin D3 (CHOLECALCIFEROL) 50 mcg (2000 units) tablet    Zinc Sulfate (ZINC 15 PO)     No current facility-administered medications for this visit.        The [place] Prescription Monitoring Program has been reviewed and there are no concerns about diversionary activity for controlled substances at this time.        ADDED HISTORY   Daughter reports prior to passing of pt's  did not see skin picking. It seems it may be enjoyable to some extent. Pt will feel a piece of skin, touch it to remove to smooth it out.       MENTAL STATUS EXAMINATION:   Appearance: Intact attention to grooming and hygiene, skin picking lesions shown by daughter, patient on arm, leg  Attitude: Cooperative  Eye Contact: Fair  Gait and Station: Sitting  Psychomotor Behavior: Within normal limits  Oriented to: Grossly person place and time  Attention Span and Concentration: Grossly intact  Speech: Within normal limits  Language: English  Mood:  Fair  Affect: Constricted  Associations:  no loose associations  Thought Process:  logical, linear and goal oriented  Thought Content: No evidence of delusions or suicidal or homicidal ideation plan or intent  Memory: Grossly fair  Fund of Knowledge: Grossly fair  Insight:  fair  Judgment:  intact, adequate for safety  Impulse Control:  intact        DIAGNOSES:   Unspecified depressive disorder  Excoriation Disorder  Alcohol use disorder, moderate, in sustained remission  qTC prolongation    ASSESSMENT:   Patient dealing with skin picking disorder and lesions.  Per daughter and patient she has relatively more insight, has not yet translated into physical appearance and this will take time to assess.  Medication benefits are relatively limited but may consider SSRI/N-acetylcysteine.      Patient has history of depression,  historically is difficult to assess severity but likely mild major depressive disorder with recurrence but has been treated with medications though there is a possibility of past history of moderate.  That being said a lot of worse depressive episode also involved considerable alcohol use. Currently sx are in partial remission; recommending psychotherapy as primary intervention and will monitor.    Today Carina Calvert reports no suicidal ideations. In addition, she has notable risk factors for self-harm, including one past suicide attempt in context of substance abuse - in sustained remission. However, risk is mitigated by commitment to family, [employer] beliefs and sobriety. Therefore, based on all available evidence including the factors cited above, she does not appear to be at imminent risk for self-harm, does not meet criteria for a 72-hr hold, and therefore remains appropriate for ongoing outpatient level of care.       PLAN:     Patient advised of consultative model. Patient will continue to be seen for ongoing consultation and stabilization.  Does not meet criteria for involuntary treatment or hospitalization  Will monitor, medication deferred currently; due to degree of QT prolongation utilize qt sparing medication (SNRI, mirtazapine, vilazodone, bupropion)  referred  Labs - reviewed  Referred for psychotherapy (Habit reversal therapy preferred, behavioral activation)  Return in 8-12 weeks        Administrative Billing:   Time spent with patient was greater than 50% of time and/or significant time was spent in counseling and coordination of care regarding above diagnoses and treatment plan. Pre charting time and post charting time/documentation/coordination are done on date of service.      Signed:   Alessandro Clifton M.D.  [facility] Psychiatry Service    Disclaimer: This note consists of symbols derived from keyboarding, dictation and/or voice recognition software. As a result, there may be errors in  the script that have gone undetected. Please consider this when interpreting information found in this chart.

## 2023-10-10 NOTE — Clinical Note
Dr. Jain,  Patient was wondering if there was some kind of salve or lotion for areas of excoriation. I advised her to reach out to you for this and setup an appointment. We are trying to resource habit reversal therapy for her to treat it. SSRIs can be used but of limited benefit.  Sincerely, Alessandro Clifton M.D. Consultative Psychiatrist Program Medical Director, Lead Collaborative Care Psychiatry Service

## 2023-10-10 NOTE — PATIENT INSTRUCTIONS
"[facility]  Call [phone]   For habit reversal therapy for excoriation disorder/skin picking    Or can call [facility]  [phone]      Patient Education   Collaborative Care Psychiatry Service  What to Expect  Here's what to expect from your Collaborative Care Psychiatry Service (CCPS).   About CCPS  CCPS means 2 people work together to help you get better. You'll meet with a behavioral health clinician and a psychiatric doctor. A behavioral health clinician helps people with mental health problems by talking with them. A psychiatric doctor helps people by giving them medicine.  How it works  At every visit, you'll see the behavioral health clinician (BHC) first. They'll talk with you about how you're doing and teach you how to feel better.   Then you'll see the psychiatric doctor. This doctor can help you deal with troubling thoughts and feelings by giving you medicine. They'll make sure you know the plan for your care.   CCPS usually takes 3 to 6 visits. If you need more visits, we may have you start seeing a different psychiatric doctor for ongoing care.  If you have any questions or concerns, we'll be glad to talk with you.  About visits  Be open  At your visits, please talk openly about your problems. It may feel hard, but it's the best way for us to help you.  Cancelling visits  If you can't come to your visit, please call us right away at [phone]. If you don't cancel at least 24 hours (1 full day) before your visit, that's \"late cancellation.\"  Being late to visits  Being very late is the same as not showing up. You will be a \"no show\" if:  Your appointment starts with a BHC, and you're more than 15 minutes late for a 30-minute (half hour) visit. This will also cancel your appointment with the psychiatric doctor.  Your appointment is with a psychiatric doctor only, and you're more than 15 minutes late for a 30-minute (half hour) " visit.  Your appointment is with a psychiatric doctor only, and you're more than 30 minutes late for a 60-minute (full hour) visit.  If you cancel late or don't show up 2 times within 6 months, we may end your care.   Getting help between visits  If you need help between visits, you can call us Monday to Friday from 8 a.m. to 4:30 p.m. at [phone].  Emergency care  Call 911 or go to the nearest emergency department if your life or someone else's life is in danger.  Call 988 anytime to reach the national Suicide and Crisis hotline.  Medicine refills  To refill your medicine, call your pharmacy. You can also call [facility]'s Behavioral Access at [phone], Monday to Friday, 8 a.m. to 4:30 p.m. It can take 1 to 3 business days to get a refill.   Forms, letters, and tests  You may have papers to fill out, like FMLA, short-term disability, and workability. We can help you with these forms at your visits, but you must have an appointment. You may need more than 1 visit for this, to be in an intensive therapy program, or both.  Before we can give you medicine for ADHD, we may refer you to get tested for it or confirm it another way.  We may not be able to give you an emotional support animal letter.  We don't do mental health checks ordered by the court.   We don't do mental health testing, but we can refer you to get tested.   Thank you for choosing us for your care.  For informational purposes only. Not to replace the advice of your health care provider. Copyright   2022 [facility]. All rights reserved. [employer] 511933 - 12/22.

## 2023-10-10 NOTE — NURSING NOTE
Is the patient currently in the state of [place]? YES    Visit mode:VIDEO    If the visit is dropped, the patient can be reconnected by: VIDEO VISIT: Text to cell phone:   Telephone Information:   Mobile [phone]       Will anyone else be joining the visit? NO  (If patient encounters technical issues they should call [phone]    How would you like to obtain your AVS? MyChart    Are changes needed to the allergy or medication list? Pt stated no changes to allergies and Pt stated no med changes    Reason for visit: SANJAY MILNER

## 2023-10-12 ENCOUNTER — TELEPHONE (OUTPATIENT)
Dept: FAMILY MEDICINE | Facility: CLINIC | Age: 73
End: 2023-10-12
Payer: MEDICARE

## 2023-10-12 NOTE — TELEPHONE ENCOUNTER
Alessandro Clifton MD Nadarevic, Marcy, PORSHA Alanis, would you all be able to convey these recommendations to the patient? Thank you!          Previous Messages       ----- Message -----  From: Annabelle Palm RN  Sent: 10/11/2023   1:25 PM CDT  To: Alessandro Clifton MD      ----- Message -----  From: Gerber Jain MD  Sent: 10/11/2023  12:56 PM CDT  To: Chris Rn Triage Pool    I received this chart, forwarded to me by this psychiatrist . The questions involve asking if there is a salve to help her skin. Here's my feedback    The idea of a salve to help protect skin is a good one but ultimately the problem is repeated skin trauma from compulsive skin picking. Until this behavior stops, salves are only similar to how a crutch can help someone with a knee that needs to be replaced. The crutches might be helpful but only to a limited extent.    The kind of creams suggested is a barrier protection and moisturizer just to protect skin as much as possible. Depending on how things go she is welcome to come in for a formal dermatologist consultation but otherwise I can mainly recommend skin care products such as    Eucerin skin care products  Lanolin [is a natural moisturizer with powerful emollient]  Lubriderm skin moisturizing products  Missy  Original body lotion      Other store brand water based emollient products similar to above list. Hopefully with use of skin care products along these lines, hopefully this might help. Can be applied between 2-3 times per day , on an as needed basis    Gerber Jain MD         ----- Message -----  From: Alessandro Clifton MD  Sent: 10/10/2023   3:44 PM CDT  To: MD Dr. Cristobal Dixon,    Patient was wondering if there was some kind of salve or lotion for areas of excoriation. I advised her to reach out to you for this and setup an appointment. We are trying to resource habit reversal therapy for her to treat it. SSRIs can be used but of limited benefit.    Sincerely,  Alessandro Clifton  M.D.  Consultative Psychiatrist  Program Medical Director, Lead  Collaborative Care Psychiatry Service

## 2023-10-12 NOTE — TELEPHONE ENCOUNTER
Spoke with patient and notified of OTC salves that she could purchase and use. Patient agreeable and verbalized understanding.     Thanks,  PORSHA Alanis  [facility]

## 2023-10-19 DIAGNOSIS — G24.3 SPASMODIC TORTICOLLIS: ICD-10-CM

## 2023-10-19 DIAGNOSIS — G89.4 CHRONIC PAIN SYNDROME: ICD-10-CM

## 2023-10-19 DIAGNOSIS — F11.90 CHRONIC, CONTINUOUS USE OF OPIOIDS: ICD-10-CM

## 2023-10-19 DIAGNOSIS — M79.7 FIBROMYALGIA: ICD-10-CM

## 2023-10-19 RX ORDER — HYDROCODONE BITARTRATE AND ACETAMINOPHEN 7.5; 325 MG/1; MG/1
2 TABLET ORAL 3 TIMES DAILY
Qty: 180 TABLET | Refills: 0 | OUTPATIENT
Start: 2023-10-19

## 2023-10-20 RX ORDER — HYDROCODONE BITARTRATE AND ACETAMINOPHEN 7.5; 325 MG/1; MG/1
2 TABLET ORAL 3 TIMES DAILY
Qty: 180 TABLET | Refills: 0 | Status: SHIPPED | OUTPATIENT
Start: 2023-10-20 | End: 2023-11-20

## 2023-10-20 NOTE — TELEPHONE ENCOUNTER
Patient is calling asking about status of refill request. States that she will be out of medication mid-day tomorrow. She takes 6 tabs daily and only has 2 tabs for tomorrow. PCP out of office today and not returning until 10/23.    Maddy Danielle RN   [facility]

## 2023-11-10 ENCOUNTER — TELEPHONE (OUTPATIENT)
Dept: FAMILY MEDICINE | Facility: CLINIC | Age: 73
End: 2023-11-10
Payer: MEDICARE

## 2023-11-10 DIAGNOSIS — F51.01 PRIMARY INSOMNIA: ICD-10-CM

## 2023-11-10 RX ORDER — TRAZODONE HYDROCHLORIDE 100 MG/1
200 TABLET ORAL AT BEDTIME
Qty: 180 TABLET | Refills: 1 | Status: SHIPPED | OUTPATIENT
Start: 2023-11-10 | End: 2024-02-12

## 2023-11-10 NOTE — TELEPHONE ENCOUNTER
Medication Question or Refill    Contacts         Type Contact Phone/Fax    11/10/2023 12:06 PM CST Phone (Incoming) Carina Calvert AMNADEEP (Self) [phone] (H)            What medication are you calling about (include dose and sig)?: traZODone (DESYREL) 100 MG tablet       Preferred Pharmacy:       [place] [address] [place] [address] [address]  Phone: [phone] Fax: [phone]        Controlled Substance Agreement on file:   CSA -- Patient Level:     [Media Unavailable] Controlled Substance Agreement - Opioid - Scan on 11/3/2023 12:21 PM   [Media Unavailable] Controlled Substance Agreement - Opioid - Scan on 9/23/2022  6:46 PM   [Media Unavailable] Controlled Substance Agreement - Opioid - Scan on 7/27/2021  7:53 AM       Who prescribed the medication?: Mary Gomez RPH      Do you need a refill? Yes    When did you use the medication last? Last night    Patient offered an appointment? No    Do you have any questions or concerns?  Yes: Pt needs a refill for the above Rx but she just recently moved and would like to make sure that the script gets sent to the correct Pharmacy which is listed above ([facility]) - Please call with any questions or concerns. Thank you!      Okay to leave a detailed message?: Yes at Home number on file [phone] (home)

## 2023-11-20 DIAGNOSIS — G89.4 CHRONIC PAIN SYNDROME: ICD-10-CM

## 2023-11-20 DIAGNOSIS — M79.7 FIBROMYALGIA: ICD-10-CM

## 2023-11-20 DIAGNOSIS — F11.90 CHRONIC, CONTINUOUS USE OF OPIOIDS: ICD-10-CM

## 2023-11-20 DIAGNOSIS — G24.3 SPASMODIC TORTICOLLIS: ICD-10-CM

## 2023-11-20 RX ORDER — HYDROCODONE BITARTRATE AND ACETAMINOPHEN 7.5; 325 MG/1; MG/1
2 TABLET ORAL 3 TIMES DAILY
Qty: 180 TABLET | Refills: 0 | Status: SHIPPED | OUTPATIENT
Start: 2023-11-20 | End: 2023-12-22

## 2023-11-20 NOTE — TELEPHONE ENCOUNTER
Pt calling, she is needing refill of her Norco.    She has enough medication for today      Dot RN    Triage Nurse  [facility]

## 2023-11-27 ENCOUNTER — VIRTUAL VISIT (OUTPATIENT)
Dept: PHARMACY | Facility: CLINIC | Age: 73
End: 2023-11-27

## 2023-11-27 DIAGNOSIS — F51.01 PRIMARY INSOMNIA: ICD-10-CM

## 2023-11-27 DIAGNOSIS — G89.4 CHRONIC PAIN SYNDROME: Primary | ICD-10-CM

## 2023-11-27 DIAGNOSIS — M79.7 FIBROMYALGIA: ICD-10-CM

## 2023-11-27 DIAGNOSIS — G62.9 NEUROPATHY: ICD-10-CM

## 2023-11-27 PROCEDURE — 99207 PR NO CHARGE LOS: CPT | Performed by: PHARMACIST

## 2023-11-27 NOTE — PROGRESS NOTES
Medication Therapy Management (MTM) Encounter    ASSESSMENT:                            Medication Adherence/Access: No issues identified    Pain/muscle spasms:  Gabapentin, Norco, and trazodone can increase risk of CNS effects, falls, etc.  Discussed she still may have same level of pain control if we reduce Norco dose, with more wakefulness/alertness ,but she isn't interested in this. Could move Norco to bedtime vs 5 pm, she'll move one pill first and see how that goes, the will consider moving both to at bedtime.  May benefit from physical therapy again.    Sleep:  Discussed daytime naps/sleeping impacting overnight sleep.    PLAN:                            Move Norco to 2 tablets in the morning, 2 at noon, 1 at 5 pm and 1 at bedtime.   Call [phone] to get set up with home physical therapy, there is a referral in your chart already.   Avoid napping late in the day, if you do lay down, set an alarm for 30 minutes.     Follow-up: Return in about 3 months (around 2/27/2024) for Medication Therapy Management.    SUBJECTIVE/OBJECTIVE:                          Carina Calvert is a 73 year old female called for a follow-up visit from 8/7/23.       Reason for visit: med review.    Allergies/ADRs: Reviewed in chart  Past Medical History: Reviewed in chart  Tobacco: She reports that she has quit smoking. Her smoking use included cigarettes. She has a 3.00 pack-year smoking history. She has never used smokeless tobacco.  Alcohol: none  Caffeine: 1-2 cups coffee/day    Medication Adherence/Access:   Patient takes medications directly from bottles.  Patient takes medications every 5 hours  Per patient, misses medication 0 times per week.   The patient fills medications at [facility]: NO, fills medications at [facility] in [facility].    Pain/muscle spasms/migraines:    Gabapentin 300 mg every morning, 300 mg at noon, 600 mg at bedtime   Norco 7.5-325 mg, takes two every morning, two at noon and two at 5 pm.  Baclofen 10 mg  four times daily   Acetaminophen 500 mg at bedtime    Pain is still bad, neck pain, bulging disk. Has not done physical therapy since our last visit, interested in doing again though. Previously tried reducing gabapentin with worsened pain.  She will be seeing neurology in December for memory evaluation.   No longer takes ibuprofen because she was swelling up and has been told to avoid this.     Sleep:    Trazodone 200 mg at bedtime (reduced from 300 mg in the past)    Sleep is still bad at night, she stopped the melatonin 20 mg, she didn't do they were doing anything, expensive.  She is still drowsy during the day, trying not to take naps, but does lay down 3-5 pm sometimes in there sometime.     Today's Vitals: There were no vitals taken for this visit.  ----------------      I spent 13 minutes with this patient today. All changes were made via collaborative practice agreement with Gerber Jain MD. A copy of the visit note was provided to the patient's provider(s).    A summary of these recommendations was mailed to the patient.    Maria M Gomez, PharmD  Medication Therapy Management Pharmacist  [phone]    Telemedicine Visit Details  Type of service:  Telephone visit  Start Time: 5:00 PM  End Time: 5:13 PM     Medication Therapy Recommendations  No medication therapy recommendations to display

## 2023-11-27 NOTE — PATIENT INSTRUCTIONS
"Recommendations from today's MTM visit:                                                           Move Norco to 2 tablets in the morning, 2 at noon, 1 at 5 pm and 1 at bedtime.   Call [phone] to get set up with home physical therapy, there is a referral in your chart already.   Avoid napping late in the day, if you do lay down, set an alarm for 30 minutes.     Follow-up: Return in about 3 months (around 2/27/2024) for Medication Therapy Management.    It was great speaking with you today.  I value your experience and would be very thankful for your time in providing feedback in our clinic survey. In the next few days, you may receive an email or text message from [employer] with a link to a survey related to your  clinical pharmacist.\"     To schedule another MTM appointment, please call the clinic directly or you may call the MTM scheduling line at [phone] or toll-free at [phone].     My Clinical Pharmacist's contact information:                                                      Please feel free to contact me with any questions or concerns you have.      Maria M Gomez, PharmD  Medication Therapy Management Pharmacist  [phone]   "

## 2023-11-27 NOTE — Clinical Note
WALI DIANA note, thanks!  Maria M Gomez, PharmD Medication Therapy Management Pharmacist [phone]

## 2023-11-28 ENCOUNTER — TELEPHONE (OUTPATIENT)
Dept: FAMILY MEDICINE | Facility: CLINIC | Age: 73
End: 2023-11-28
Payer: MEDICARE

## 2023-11-28 DIAGNOSIS — G24.3 SPASMODIC TORTICOLLIS: ICD-10-CM

## 2023-11-28 DIAGNOSIS — G89.4 CHRONIC PAIN SYNDROME: ICD-10-CM

## 2023-11-28 DIAGNOSIS — M79.7 FIBROMYALGIA: Primary | ICD-10-CM

## 2023-11-28 DIAGNOSIS — R25.2 CRAMP OF LIMB: ICD-10-CM

## 2023-11-28 DIAGNOSIS — M47.812 CERVICAL FACET JOINT SYNDROME: ICD-10-CM

## 2023-11-28 DIAGNOSIS — G25.81 RESTLESS LEG SYNDROME: ICD-10-CM

## 2023-11-28 NOTE — TELEPHONE ENCOUNTER
Message reviewed , orders signed     I am asking Santa Gomez RP, the [facility] pharmacist if she has any further input for me ?    Not sure this order I signed was for at home physical therapy. If it is not, please reroute for additional orders to be placed . According to my understanding if this is not an at home physical therapy order, then we would most likely need to go through [facility] which is an entirely different order. If I am right please reroute for orders to be placed    I have included Maria M with this reroute message to be appraised of any considerations with this case that I may have overlooked .  Gerber Jain MD

## 2023-11-28 NOTE — TELEPHONE ENCOUNTER
Consent on file for daughter, calling. She states mother was referred to do at home PT by the MT Mary Gomez. RN reviewed notes 11/27/23 :  PLAN:                             Move Norco to 2 tablets in the morning, 2 at noon, 1 at 5 pm and 1 at bedtime.   Call [phone] to get set up with home physical therapy, there is a referral in your chart already.   Avoid napping late in the day, if you do lay down, set an alarm for 30 minutes.     Daughter stated they tried to call the number to schedule but they told her there is no referral that is placed. RN reviewed, unable to locate referral.     Daughter would like a phone call when placed.     Please advise/sign    Sheela Mcguire RN on 11/28/2023 at 8:00 AM

## 2023-11-29 NOTE — TELEPHONE ENCOUNTER
Hi Dr. Jain, my apologies, yes, I believe a new order is needed for home physical therapy (she expressed she'd prefer home physical therapy if possible).    Thanks,  Maria M Gomez, PharmD  Medication Therapy Management Pharmacist  [phone]

## 2023-11-29 NOTE — TELEPHONE ENCOUNTER
We need to ask some questions to patient . Home physical therapy , according to my understanding, is only an option through home health care     We would need to complete the entire of home health care order page . And the primary determinant here that would potentially make or break her efforts for this is, if she is a home-bound patient ?    If she says yes she is a home-bound patient this means she does not drive ? And if she says correct I do not drive we would like to know why?    We would then need to write a letter to [place] Department [employer] Public [employer] for have her drivers license suspended  It would be helpful to understand what is the primary reason she does not drive      Gerber Jain MD

## 2023-11-30 DIAGNOSIS — M79.7 FIBROMYALGIA: ICD-10-CM

## 2023-11-30 DIAGNOSIS — G24.3 SPASMODIC TORTICOLLIS: ICD-10-CM

## 2023-11-30 RX ORDER — BACLOFEN 10 MG/1
10 TABLET ORAL 4 TIMES DAILY
Qty: 360 TABLET | Refills: 0 | Status: SHIPPED | OUTPATIENT
Start: 2023-11-30 | End: 2024-02-12

## 2023-11-30 NOTE — TELEPHONE ENCOUNTER
Requested Prescriptions   Pending Prescriptions Disp Refills    baclofen (LIORESAL) 10 MG tablet 360 tablet 0     Sig: Take 1 tablet (10 mg) by mouth 4 times daily       There is no refill protocol information for this order        Gerber Musa   [facility]

## 2023-11-30 NOTE — TELEPHONE ENCOUNTER
Called Isra (consent for PHI on file) and relayed message from Dr. Jain. Asked her if patient currently drives and/or has a license. She replied that pt does currently have a license and only drives for emergencies. She states that she will discuss with patient further and plan to do outpatient PT. She has scheduling number.    Maddy Danielle RN   [facility]

## 2023-12-01 NOTE — TELEPHONE ENCOUNTER
Hi Dr. Jain, I agree reducing doses in general would be very helpful for her. I believe there is significant daytime sedation going on. I feel tapering down on Norco, possibly getting to 5/235 mg strength would be most beneficial, but reducing baclofen like you suggest would also be helpful. When I spoke with her earlier this week she did not want to reduce Norco dose, but will change timing of it.     Thanks,  Maria M Gomez, PharmD  Medication Therapy Management Pharmacist  [phone]

## 2023-12-01 NOTE — TELEPHONE ENCOUNTER
Can I get your input with this one Maria M ? I realize this patient has used this medication for years and years [ I think] but taking baclofen (LIORESAL) , a powerful muscle relaxers with some sedating side effects , to take this four times a day seems excessive and there are massive polypharmacy issues with this patient. I now you've met with her before. Would you agree that we should try to cut back with this ? Do you think going to 3 times a day is better for possibly staying with four times a day but reducing dose to 5 milligrams only ?    Gerber Jain MD

## 2023-12-15 ENCOUNTER — OFFICE VISIT (OUTPATIENT)
Dept: NEUROLOGY | Facility: CLINIC | Age: 73
End: 2023-12-15
Payer: MEDICARE

## 2023-12-15 VITALS
DIASTOLIC BLOOD PRESSURE: 85 MMHG | HEART RATE: 54 BPM | SYSTOLIC BLOOD PRESSURE: 159 MMHG | BODY MASS INDEX: 22.77 KG/M2 | HEIGHT: 64 IN | TEMPERATURE: 97.2 F | WEIGHT: 133.4 LBS

## 2023-12-15 DIAGNOSIS — R41.3 MEMORY LOSS: ICD-10-CM

## 2023-12-15 NOTE — PROGRESS NOTES
Chief Complaint: memory problems    History of Present Illness:  Ms. Calvert is a 73 year old right-handed female with history of hyperlipidemia, migraines, hypertension, CAD, presenting for evaluation of memory problems. She is accompanied to today's visit by daughter Isra, who assists in providing the history.    Ms. Calvert reports that she has a memory problem. She noticed that she cannot remember stuff.  Her daughter tells her something then she does not remember the next day.  The memory problem has been gradually getting worse.      Her  informant was interviewed and reports that Memory problem for one year.      Her   in .  She got off her anti-depressant and saw a psychotherapist.  She was thought to be traumatized because she was holding her dying  and she had to yell for help for a long time, for people were outside.  She stopped working after .  She used to do everything with her .  She has been home alone since , then she moved out into a new home with her daughter.  She is still on the same medications, except no depression medication.  She had COVID in  and pneumonia in , which wiped her out.  She had do PT.  Memory change was before COVID.  She is forgetful of recent information, what's going on that day.  She forgets new information in a few hours.    She has really bad headaches.  When she is in pain, she has to go lay down.  She has a bulging disc in her neck.  She gets bad stiffness and headaches.  She is on Norco and gabapentin daily for a long-term.  She has headaches everyday and all the time.  The dosage has not changed.  She used to get botox.  Her eyes get big and then after taking the Norco her pupils get smaller.    Her long-term memory is fine.    Isra thinks that Carina is not as motivated to do things.      She had a heart attack in .  Since then she has weakness in her left leg.      iADLs:  Finances: Her  when he was alive and  now her daughter manage the finances.  Driving:  She is no longer driving because she does not have to.    Household chores:  She is doing the dishes.  She does not do the laundry because the tubs are too low.  She does not vacuum.    Medications: She calls in the refills.  She remembers to take her medications.    Medical appointments:  Inga manages them most of them, but Carina is able to make some of them.      Mood: Okay  Sleep:  She naps throughout of the day.  She yells, kicks and fight in her dreams for years.  Her insomnia has gotten better since taking the Norco at bedtime.          Patient Active Problem List   Diagnosis     Neuropathy     Fibromyalgia     Restless leg syndrome     Spasmodic torticollis     Insomnia     Hyperlipidemia with target LDL less than 100     History of hepatitis C     Ex-smoker     Recurrent cold sores     Osteopenia     Migraine headache     Positive occult stool blood test     Subclinical hyperthyroidism     Hypovitaminosis D     Cramp of limb     NSTEMI (non-ST elevated myocardial infarction) (H)     Chronic pain     Hypertension goal BP (blood pressure) < 140/90     Cervical facet joint syndrome     Primary insomnia     Tobacco use     Thrombocytopenia (H24)     Chronic, continuous use of opioids     Coronary artery disease due to lipid rich plaque     Excoriation (skin-picking) disorder       Past Medical History:   Diagnosis Date     Chronic constipation      Chronic sciatica     Pain clinic     DDD (degenerative disc disease), cervical      Fibromyalgia      History of hepatitis C      Hypertension goal BP (blood pressure) < 140/90      Kidney stones      Migraine headaches      Neuropathy      Osteopenia     boniva brayan w Dr Rayo at [facility]     PUD (peptic ulcer disease)     h/o bleeding ulcer     Recurrent cold sores      Restless leg syndrome      Spasmodic torticollis      Subclinical hyperthyroidism     NONTOXIC MULTINODULAR GOITER      Vertigo      Vitamin B12  deficiency      Vitamin D deficiency        Past Surgical History:   Procedure Laterality Date     COLONOSCOPY N/A 5/25/2023    Procedure: Colonoscopy;  Surgeon: Leo Loyd MD;  Location:  GI     ESOPHAGOSCOPY, GASTROSCOPY, DUODENOSCOPY (EGD), COMBINED N/A 5/25/2023    Procedure: Esophagoscopy, gastroscopy, duodenoscopy (EGD), combined;  Surgeon: Leo Loyd MD;  Location:  GI     HERNIA REPAIR  9/2009    ventral     LITHOTRIPSY      2 x      TONSILLECTOMY       TUBAL LIGATION         Current Outpatient Medications   Medication Sig Dispense Refill     acetaminophen (TYLENOL) 500 MG tablet Take 500 mg by mouth At Bedtime       aspirin 81 MG tablet Take 81 mg by mouth daily       baclofen (LIORESAL) 10 MG tablet Take 1 tablet (10 mg) by mouth 4 times daily 360 tablet 0     gabapentin (NEURONTIN) 300 MG capsule Take by mouth 300 mg every morning, 300 mg every afternoon, and 600 mg at bedtime 360 capsule 0     HYDROcodone-acetaminophen (NORCO) 7.5-325 MG per tablet Take 2 tablets by mouth 3 times daily 28 days or more between refills 180 tablet 0     metoprolol tartrate (LOPRESSOR) 50 MG tablet TAKE 1 TABLET BY MOUTH TWICE A  tablet 1     nitroGLYcerin (NITROSTAT) 0.4 MG sublingual tablet For chest pain place 1 tablet under the tongue every 5 minutes for 3 doses. If symptoms persist 5 minutes after 1st dose call 911. 25 tablet 1     omeprazole (PRILOSEC) 20 MG DR capsule Take 1 capsule (20 mg) by mouth daily 90 capsule 1     polyethylene glycol (MIRALAX/GLYCOLAX) powder Take 17 g by mouth daily as needed Reported on 5/15/2017       Probiotic Product (PROBIOTIC BLEND PO) Take 1 each by mouth 3 times daily (before meals)       rosuvastatin (CRESTOR) 10 MG tablet TAKE 1 TABLET (10 MG) BY MOUTH DAILY. 90 tablet 3     SUMAtriptan (IMITREX) 50 MG tablet Take 1 tablet (50 mg) by mouth at onset of headache for migraine May repeat in 2 hours. Max 4 tablets/24 hours. 15 tablet 1     traZODone  (DESYREL) 100 MG tablet Take 2 tablets (200 mg) by mouth at bedtime 180 tablet 1     VITAMIN B-12 1000 MCG OR TABS Take 1,000 mcg by mouth daily       vitamin D3 (CHOLECALCIFEROL) 50 mcg (2000 units) tablet Take 1 tablet by mouth daily       Zinc Sulfate (ZINC 15 PO) One daily          Allergies   Allergen Reactions     Iodinated Contrast Media Anaphylaxis and Hives     Unable to breathe     Diatrizoate Hives     Hypaque     Droperidol      Ivp Dye [Contrast Dye]        Family History   Problem Relation Age of Onset     Neurologic Disorder Mother 65        Parkinsons     Alcohol/Drug Mother      Cerebrovascular Disease Mother      Respiratory Father         COPD     Alcohol/Drug Father      Cancer Brother      Diabetes Sister 52     Cancer Maternal Aunt      Cancer Maternal Uncle      C.A.D. No family hx of          Social History     Socioeconomic History     Marital status:      Spouse name: Not on file     Number of children: 4     Years of education: 11     Highest education level: Not on file   Occupational History     Occupation: housecle[employer]     Employer: [employer],[address] [place] [address]   Tobacco Use     Smoking status: Former     Packs/day: 0.20     Years: 15.00     Additional pack years: 0.00     Total pack years: 3.00     Types: Cigarettes     Smokeless tobacco: Never     Tobacco comments:     4 cigs a day, just lost    Substance and Sexual Activity     Alcohol use: No     Drug use: No     Sexual activity: Never     Partners: Male     Birth control/protection: Surgical, Post-menopausal   Other Topics Concern     Parent/sibling w/ CABG, MI or angioplasty before 65F 55M? 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     Hobby Hazards No     Sleep Concern Yes     Comment: On medication     Stress Concern No     Weight Concern Yes     Comment: would like to gain  "weight     Special Diet No     Back Care No     Exercise No     Bike Helmet Not Asked     Seat Belt Yes     Self-Exams Yes   Social History Narrative     Not on file     Social Determinants of Health     Financial Resource Strain: Not on file   Food Insecurity: Not on file   Transportation Needs: Not on file   Physical Activity: Not on file   Stress: Not on file   Social Connections: Not on file   Interpersonal Safety: Not on file   Housing Stability: Not on file     FHX: Her mother had PD.  Half sister from her mother has dementia.  Maternal aunt had dementia.  Maternal grandmother had MI.  +Alcoholism.     SHX: She lives at home with daughter at home.  She had 12 years of education, but did not graduate high school, for she was pregnant.  She worked as a .  She does not drink.  She quit alcohol many years ago.  She is recovering alcoholic for 17 years.        General Physical examination:  BP (!) 159/85   Pulse 54   Temp 97.2  F (36.2  C) (Temporal)   Ht 1.626 m (5' 4\")   Wt 60.5 kg (133 lb 6.4 oz)   BMI 22.90 kg/m       General: Ms. Calvert is well appearing and is appropriately groomed and dressed.    Neurological examination:    Mental status: Ms. Calvert  is awake, alert, and appropriate, with fluent speech, no word finding difficulties and no difficulty in answering questions.   Cranial nerves:  Visual fields are full to confrontation with no visual extinction. Extraocular movements are intact. Smooth pursuit is intact. Saccades are normal in initiation, velocity and amplitude both vertically and horizontally.  Facial strength is full bilaterally.  Sternocledomastoid and trapezius muscle strength is full bilaterally.  Motor examination: Bulk is normal throughout. Axial and upper and lower extremity tone is normal. No pronator drift is noted. Strength is 5/5 and symmetric throughout. There is no tremor or other involuntary movement.  Coordination: Finger-nose-finger testing is normal with no " dysmetria bilaterally.  Rapid finger tapping, opening and closing of fist and pronation-supination are not slowed.   Gait: She has a wide based gait.  Had to use the arms of the chair to stand up.  Could not do Romberg.      Neuropsychological evaluation:  Pending 3/2024 appointment    Labs:  Lab Results   Component Value Date    WBC 6.8 03/01/2023    RBC 4.49 03/01/2023    HGB 13.8 03/01/2023    HCT 43.4 03/01/2023    MCV 97 03/01/2023    MCH 30.7 03/01/2023    MCHC 31.8 03/01/2023    RDW 13.7 03/01/2023     (L) 03/01/2023     03/01/2023    POTASSIUM 4.2 03/01/2023    CHLORIDE 102 03/01/2023    CO2 30 (H) 03/01/2023    ANIONGAP 9 03/01/2023    GLC 90 03/01/2023    BUN 9.2 03/01/2023    CR 0.63 03/01/2023    CARMINA 9.6 03/01/2023    TSH 0.83 07/24/2023    T4 1.01 07/15/2013    B12 1433 03/16/2009    VITD25 54.8 07/15/2013        Imaging:  CT head 8/17/2021 [facility]  FINDINGS:   INTRACRANIAL CONTENTS: No finding for intracranial hemorrhage, mass, or acute infarct. Ventricles, sulci, and cisterns are unremarkable in size for age. Small amount of nondescript gliosis is seen within the periventricular white matter. Otherwise normal gray-white matter differentiation. No mass effect or midline shift.     Cerebellar tonsils are normally positioned. Sella is unremarkable. Corpus callosum is normally formed.     VISUALIZED ORBITS/SINUSES/MASTOIDS: Orbits are normal in appearance. Shallow dependent air-fluid level right maxillary sinus suggesting active sinus disease. Middle ear cavities and mastoid air cells are clear.     BONES/SOFT TISSUES: Calvarium is intact, without fracture or suspicious lytic or blastic foci.     IMPRESSION:   1.  No finding for intracranial hemorrhage, mass, or acute infarct.         Impression:  Ms. Calvert is a 73 year old right-handed female with history of restless leg syndrome, hyperlipidemia, migraines, hypertension, CAD, who presents with memory problems.  She has progressive  decline in short-term memory.  She possibly has mild neurocognitive disorder.  The differential includes medications (although she has been on the narcotics and gabapentin for many years prior to onset of memory problems), sleep, mood, and neurodegenerative.  She likely has REM sleep disorder.  We discussed further workup with neuropsychological testing and MRI brain, which the patient and her daughter agreed.      Recommendations:  1. MRI brain  2. Neuropsychological testing    Follow-up: Return in about 5 months.    I spent 75 minutes total today for this visit, which includes face-to-face with the patient, reviewing the chart (neuropsychological testing, MRI images, lab reports, clinical notes, medications), ordering diagnostic tests, counseling, and documentation.

## 2023-12-15 NOTE — LETTER
12/15/2023       RE: Carina Calvert  [address]  [facility] [address]       Dear Colleague,    Thank you for referring your patient, Carina Calvert, to the  [facility] at [facility]. Please see a copy of my visit note below.    Chief Complaint: memory problems    History of Present Illness:  Ms. Calvert is a 73 year old right-handed female with history of hyperlipidemia, migraines, hypertension, CAD, presenting for evaluation of memory problems. She is accompanied to today's visit by daughter Isra, who assists in providing the history.    Ms. Calvert reports that she has a memory problem. She noticed that she cannot remember stuff.  Her daughter tells her something then she does not remember the next day.  The memory problem has been gradually getting worse.      Her  informant was interviewed and reports that Memory problem for one year.      Her   in .  She got off her anti-depressant and saw a psychotherapist.  She was thought to be traumatized because she was holding her dying  and she had to yell for help for a long time, for people were outside.  She stopped working after .  She used to do everything with her .  She has been home alone since , then she moved out into a new home with her daughter.  She is still on the same medications, except no depression medication.  She had COVID in  and pneumonia in , which wiped her out.  She had do PT.  Memory change was before COVID.  She is forgetful of recent information, what's going on that day.  She forgets new information in a few hours.    She has really bad headaches.  When she is in pain, she has to go lay down.  She has a bulging disc in her neck.  She gets bad stiffness and headaches.  She is on Norco and gabapentin daily for a long-term.  She has headaches everyday and all the time.  The dosage has not changed.  She used to get  botox.  Her eyes get big and then after taking the Norco her pupils get smaller.    Her long-term memory is fine.    Isra thinks that Carina is not as motivated to do things.      She had a heart attack in 2015.  Since then she has weakness in her left leg.      iADLs:  Finances: Her  when he was alive and now her daughter manage the finances.  Driving:  She is no longer driving because she does not have to.    Household chores:  She is doing the dishes.  She does not do the laundry because the tubs are too low.  She does not vacuum.    Medications: She calls in the refills.  She remembers to take her medications.    Medical appointments:  Inga manages them most of them, but Carina is able to make some of them.      Mood: Okay  Sleep:  She naps throughout of the day.  She yells, kicks and fight in her dreams for years.  Her insomnia has gotten better since taking the Norco at bedtime.          Patient Active Problem List   Diagnosis    Neuropathy    Fibromyalgia    Restless leg syndrome    Spasmodic torticollis    Insomnia    Hyperlipidemia with target LDL less than 100    History of hepatitis C    Ex-smoker    Recurrent cold sores    Osteopenia    Migraine headache    Positive occult stool blood test    Subclinical hyperthyroidism    Hypovitaminosis D    Cramp of limb    NSTEMI (non-ST elevated myocardial infarction) (H)    Chronic pain    Hypertension goal BP (blood pressure) < 140/90    Cervical facet joint syndrome    Primary insomnia    Tobacco use    Thrombocytopenia (H24)    Chronic, continuous use of opioids    Coronary artery disease due to lipid rich plaque    Excoriation (skin-picking) disorder       Past Medical History:   Diagnosis Date    Chronic constipation     Chronic sciatica     Pain clinic    DDD (degenerative disc disease), cervical     Fibromyalgia     History of hepatitis C     Hypertension goal BP (blood pressure) < 140/90     Kidney stones     Migraine headaches     Neuropathy      Osteopenia     boniva brayan w Dr Rayo at [facility]    PUD (peptic ulcer disease)     h/o bleeding ulcer    Recurrent cold sores     Restless leg syndrome     Spasmodic torticollis     Subclinical hyperthyroidism     NONTOXIC MULTINODULAR GOITER     Vertigo     Vitamin B12 deficiency     Vitamin D deficiency        Past Surgical History:   Procedure Laterality Date    COLONOSCOPY N/A 5/25/2023    Procedure: Colonoscopy;  Surgeon: Leo Loyd MD;  Location:  GI    ESOPHAGOSCOPY, GASTROSCOPY, DUODENOSCOPY (EGD), COMBINED N/A 5/25/2023    Procedure: Esophagoscopy, gastroscopy, duodenoscopy (EGD), combined;  Surgeon: Leo Loyd MD;  Location:  GI    HERNIA REPAIR  9/2009    ventral    LITHOTRIPSY      2 x     TONSILLECTOMY      TUBAL LIGATION         Current Outpatient Medications   Medication Sig Dispense Refill    acetaminophen (TYLENOL) 500 MG tablet Take 500 mg by mouth At Bedtime      aspirin 81 MG tablet Take 81 mg by mouth daily      baclofen (LIORESAL) 10 MG tablet Take 1 tablet (10 mg) by mouth 4 times daily 360 tablet 0    gabapentin (NEURONTIN) 300 MG capsule Take by mouth 300 mg every morning, 300 mg every afternoon, and 600 mg at bedtime 360 capsule 0    HYDROcodone-acetaminophen (NORCO) 7.5-325 MG per tablet Take 2 tablets by mouth 3 times daily 28 days or more between refills 180 tablet 0    metoprolol tartrate (LOPRESSOR) 50 MG tablet TAKE 1 TABLET BY MOUTH TWICE A  tablet 1    nitroGLYcerin (NITROSTAT) 0.4 MG sublingual tablet For chest pain place 1 tablet under the tongue every 5 minutes for 3 doses. If symptoms persist 5 minutes after 1st dose call 911. 25 tablet 1    omeprazole (PRILOSEC) 20 MG DR capsule Take 1 capsule (20 mg) by mouth daily 90 capsule 1    polyethylene glycol (MIRALAX/GLYCOLAX) powder Take 17 g by mouth daily as needed Reported on 5/15/2017      Probiotic Product (PROBIOTIC BLEND PO) Take 1 each by mouth 3 times daily (before meals)       rosuvastatin (CRESTOR) 10 MG tablet TAKE 1 TABLET (10 MG) BY MOUTH DAILY. 90 tablet 3    SUMAtriptan (IMITREX) 50 MG tablet Take 1 tablet (50 mg) by mouth at onset of headache for migraine May repeat in 2 hours. Max 4 tablets/24 hours. 15 tablet 1    traZODone (DESYREL) 100 MG tablet Take 2 tablets (200 mg) by mouth at bedtime 180 tablet 1    VITAMIN B-12 1000 MCG OR TABS Take 1,000 mcg by mouth daily      vitamin D3 (CHOLECALCIFEROL) 50 mcg (2000 units) tablet Take 1 tablet by mouth daily      Zinc Sulfate (ZINC 15 PO) One daily          Allergies   Allergen Reactions    Iodinated Contrast Media Anaphylaxis and Hives     Unable to breathe    Diatrizoate Hives     Hypaque    Droperidol     Ivp Dye [Contrast Dye]        Family History   Problem Relation Age of Onset    Neurologic Disorder Mother 65        Parkinsons    Alcohol/Drug Mother     Cerebrovascular Disease Mother     Respiratory Father         COPD    Alcohol/Drug Father     Cancer Brother     Diabetes Sister 52    Cancer Maternal Aunt     Cancer Maternal Uncle     C.A.D. No family hx of          Social History     Socioeconomic History    Marital status:      Spouse name: Not on file    Number of children: 4    Years of education: 11    Highest education level: Not on file   Occupational History    Occupation: housecleaning     Employer: [employer],[address] [place] [address]   Tobacco Use    Smoking status: Former     Packs/day: 0.20     Years: 15.00     Additional pack years: 0.00     Total pack years: 3.00     Types: Cigarettes    Smokeless tobacco: Never    Tobacco comments:     4 cigs a day, just lost    Substance and Sexual Activity    Alcohol use: No    Drug use: No    Sexual activity: Never     Partners: Male     Birth control/protection: Surgical, Post-menopausal   Other Topics Concern    Parent/sibling w/ CABG, MI or angioplasty before 65F 55M? 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    Hobby Hazards No    Sleep Concern Yes     Comment: On medication    Stress Concern No    Weight Concern Yes     Comment: would like to gain weight    Special Diet No    Back Care No    Exercise No    Bike Helmet Not Asked    Seat Belt Yes    Self-Exams Yes   Social History Narrative    Not on file     Social Determinants of Health     Financial Resource Strain: Not on file   Food Insecurity: Not on file   Transportation Needs: Not on file   Physical Activity: Not on file   Stress: Not on file   Social Connections: Not on file   Interpersonal Safety: Not on file   Housing Stability: Not on file     FHX: Her mother had PD.  Half sister from her mother has dementia.  Maternal aunt had dementia.  Maternal grandmother had MI.  +Alcoholism.     SHX: She lives at home with daughter at home.  She had 12 years of education, but did not graduate high school, for she was pregnant.  She worked as a .  She does not drink.  She quit alcohol many years ago.  She is recovering alcoholic for 17 years.        General Physical examination:  BP (!) 159/85   Pulse 54   Temp 97.2  F (36.2  C) (Temporal)   Ht 1.626 m (5' 4\")   Wt 60.5 kg (133 lb 6.4 oz)   BMI 22.90 kg/m       General: Ms. Calvert is well appearing and is appropriately groomed and dressed.    Neurological examination:    Mental status: Ms. Calvert  is awake, alert, and appropriate, with fluent speech, no word finding difficulties and no difficulty in answering questions.   Cranial nerves:  Visual fields are full to confrontation with no visual extinction. Extraocular movements are intact. Smooth pursuit is intact. Saccades are normal in initiation, velocity and amplitude both vertically and horizontally.  Facial strength is full bilaterally.  Sternocledomastoid and trapezius muscle strength is full bilaterally.  Motor examination: Bulk is normal throughout. Axial and upper and " lower extremity tone is normal. No pronator drift is noted. Strength is 5/5 and symmetric throughout. There is no tremor or other involuntary movement.  Coordination: Finger-nose-finger testing is normal with no dysmetria bilaterally.  Rapid finger tapping, opening and closing of fist and pronation-supination are not slowed.   Gait: She has a wide based gait.  Had to use the arms of the chair to stand up.  Could not do Romberg.      Neuropsychological evaluation:  Pending 3/2024 appointment    Labs:  Lab Results   Component Value Date    WBC 6.8 03/01/2023    RBC 4.49 03/01/2023    HGB 13.8 03/01/2023    HCT 43.4 03/01/2023    MCV 97 03/01/2023    MCH 30.7 03/01/2023    MCHC 31.8 03/01/2023    RDW 13.7 03/01/2023     (L) 03/01/2023     03/01/2023    POTASSIUM 4.2 03/01/2023    CHLORIDE 102 03/01/2023    CO2 30 (H) 03/01/2023    ANIONGAP 9 03/01/2023    GLC 90 03/01/2023    BUN 9.2 03/01/2023    CR 0.63 03/01/2023    CARMINA 9.6 03/01/2023    TSH 0.83 07/24/2023    T4 1.01 07/15/2013    B12 1433 03/16/2009    VITD25 54.8 07/15/2013        Imaging:  CT head 8/17/2021 [facility]  FINDINGS:   INTRACRANIAL CONTENTS: No finding for intracranial hemorrhage, mass, or acute infarct. Ventricles, sulci, and cisterns are unremarkable in size for age. Small amount of nondescript gliosis is seen within the periventricular white matter. Otherwise normal gray-white matter differentiation. No mass effect or midline shift.     Cerebellar tonsils are normally positioned. Sella is unremarkable. Corpus callosum is normally formed.     VISUALIZED ORBITS/SINUSES/MASTOIDS: Orbits are normal in appearance. Shallow dependent air-fluid level right maxillary sinus suggesting active sinus disease. Middle ear cavities and mastoid air cells are clear.     BONES/SOFT TISSUES: Calvarium is intact, without fracture or suspicious lytic or blastic foci.     IMPRESSION:   1.  No finding for intracranial hemorrhage, mass, or acute infarct.          Impression:  Ms. Calvert is a 73 year old right-handed female with history of restless leg syndrome, hyperlipidemia, migraines, hypertension, CAD, who presents with memory problems.  She has progressive decline in short-term memory.  She possibly has mild neurocognitive disorder.  The differential includes medications (although she has been on the narcotics and gabapentin for many years prior to onset of memory problems), sleep, mood, and neurodegenerative.  She likely has REM sleep disorder.  We discussed further workup with neuropsychological testing and MRI brain, which the patient and her daughter agreed.      Recommendations:  1. MRI brain  2. Neuropsychological testing    Follow-up: Return in about 5 months.    I spent 75 minutes total today for this visit, which includes face-to-face with the patient, reviewing the chart (neuropsychological testing, MRI images, lab reports, clinical notes, medications), ordering diagnostic tests, counseling, and documentation.          Again, thank you for allowing me to participate in the care of your patient.      Sincerely,    Dana Badillo MD

## 2023-12-18 DIAGNOSIS — K92.2 GASTRIC BLEED: ICD-10-CM

## 2023-12-18 NOTE — TELEPHONE ENCOUNTER
It can be challenging to manage high blood pressure. From the sound of things she's transiently low because the data we have do not include episodic low blood pressures    BP Readings from Last 6 Encounters:   10/21/21 126/67   09/01/21 119/68   07/23/21 117/71   04/08/21 130/82   01/28/21 126/71   02/20/20 (!) 149/81     So this low blood pressure is clearly an aberration , possibly a problem with measurements and prior to any decisions I recommend she bring the home based blood pressure cuff and let us do a side by side comparison with her cuff and ours    Gerber Jain MD    
Patient was just seen on 10/21/21 BP was 126/67    Luiza PT calling in due to patient having lower than normal BP readings    88/55  53  80/51  59  O2 levels 94% and 96%    Did not wait 5 minutes between reading. Arm cuff, sitting.      Patient is feeling fine, no dizziness when standing, able ambulate without difficulty.  No SOB, no swelling in her feet,   Always has slight headaches, along with some other aches and pains that is her baseline normal  She does have a stomach ache, no boating.  Normal bowels      Patient would like a call back if she should reduce any of her BP medications    Looks like she is taking Metoprolol 50mg BID        Dot Shay RN  [facility]   
The BP checks were done by home care, not by patient.    Called Luiza, PT with [facility] but she is not available.   A message was left with .- if Luiza calls back, just let her know that patient has already updated us that her last BP reading yesterday was 116/63 which is patient's usual. Provider has not made any changes in meds.     Called patient  She does not check BP at home but could ask her daughter to start monitoring for her.   She stated that PT did not wait between BP checks and she disagrees with the readings.  Per patient, her final BP recheck with home care was 116/63 which is more her usual (this was not reported below in initial message). She is not concerned    FYI to provider- please route back if you would still like patient to come in for a BP check. Would not be bringing in her own BP cuff for comparison as she does not have one.    Earline Emery RN  [facility]    
No

## 2023-12-20 ENCOUNTER — TELEPHONE (OUTPATIENT)
Dept: FAMILY MEDICINE | Facility: CLINIC | Age: 73
End: 2023-12-20
Payer: MEDICARE

## 2023-12-20 DIAGNOSIS — F11.90 CHRONIC, CONTINUOUS USE OF OPIOIDS: ICD-10-CM

## 2023-12-20 DIAGNOSIS — M79.7 FIBROMYALGIA: ICD-10-CM

## 2023-12-20 DIAGNOSIS — G89.4 CHRONIC PAIN SYNDROME: ICD-10-CM

## 2023-12-20 DIAGNOSIS — G24.3 SPASMODIC TORTICOLLIS: ICD-10-CM

## 2023-12-20 RX ORDER — HYDROCODONE BITARTRATE AND ACETAMINOPHEN 7.5; 325 MG/1; MG/1
2 TABLET ORAL 3 TIMES DAILY
Qty: 180 TABLET | Refills: 0 | Status: CANCELLED | OUTPATIENT
Start: 2023-12-20

## 2023-12-22 RX ORDER — HYDROCODONE BITARTRATE AND ACETAMINOPHEN 5; 325 MG/1; MG/1
2 TABLET ORAL 3 TIMES DAILY
Qty: 180 TABLET | Refills: 0 | Status: SHIPPED | OUTPATIENT
Start: 2023-12-22 | End: 2024-01-18

## 2023-12-22 NOTE — TELEPHONE ENCOUNTER
Prescription is sent to the pharmacy , chronic pain entry in problem list modified with new parameters    Gerber Jain MD

## 2023-12-22 NOTE — TELEPHONE ENCOUNTER
Patient called.  Advised of the message below per PCP.    Patient stated that she would like the refill then, of the 5 mg tabs asap, as the pharmacy will be closed for the weekend, and she only has pills for today.    Routed to PCP.    Camila RHODES RN  Triage Nurse  [facility]

## 2023-12-22 NOTE — TELEPHONE ENCOUNTER
Notified patient. Verbalized understanding.     Thanks,  PORSHA Alanis  [facility]

## 2024-01-04 ENCOUNTER — PATIENT OUTREACH (OUTPATIENT)
Dept: CARE COORDINATION | Facility: CLINIC | Age: 74
End: 2024-01-04
Payer: MEDICARE

## 2024-01-16 ENCOUNTER — TELEPHONE (OUTPATIENT)
Dept: CARDIOLOGY | Facility: CLINIC | Age: 74
End: 2024-01-16
Payer: MEDICARE

## 2024-01-16 DIAGNOSIS — I10 HYPERTENSION, UNSPECIFIED TYPE: Primary | ICD-10-CM

## 2024-01-16 DIAGNOSIS — I25.10 CORONARY ARTERY DISEASE INVOLVING NATIVE CORONARY ARTERY OF NATIVE HEART WITHOUT ANGINA PECTORIS: ICD-10-CM

## 2024-01-16 DIAGNOSIS — I42.9 CARDIOMYOPATHY (H): ICD-10-CM

## 2024-01-16 DIAGNOSIS — R07.9 CHEST PAIN, UNSPECIFIED TYPE: ICD-10-CM

## 2024-01-16 NOTE — TELEPHONE ENCOUNTER
[place] reviewed.  Patients recent blood pressure is outside of guideline parameters.      Called pt to review, asking patient to check their blood pressure using a home blood pressure cuff or by going to a [facility].  Pt will have daughter take BP at home when returns from work.     Patient instructed to then call [phone] (East) and leave a message with their name, date of birth, and blood pressure reading that was completed within the last 24 hours and where it was completed.Pt understands.       Will await call back for further review.     Thanks, Beatriz BRANDT

## 2024-01-18 ENCOUNTER — TELEPHONE (OUTPATIENT)
Dept: FAMILY MEDICINE | Facility: CLINIC | Age: 74
End: 2024-01-18
Payer: MEDICARE

## 2024-01-18 ENCOUNTER — PATIENT OUTREACH (OUTPATIENT)
Dept: CARE COORDINATION | Facility: CLINIC | Age: 74
End: 2024-01-18
Payer: MEDICARE

## 2024-01-18 DIAGNOSIS — G24.3 SPASMODIC TORTICOLLIS: ICD-10-CM

## 2024-01-18 DIAGNOSIS — F11.90 CHRONIC, CONTINUOUS USE OF OPIOIDS: ICD-10-CM

## 2024-01-18 DIAGNOSIS — G89.4 CHRONIC PAIN SYNDROME: ICD-10-CM

## 2024-01-18 DIAGNOSIS — M79.7 FIBROMYALGIA: ICD-10-CM

## 2024-01-18 RX ORDER — HYDROCODONE BITARTRATE AND ACETAMINOPHEN 5; 325 MG/1; MG/1
2 TABLET ORAL 3 TIMES DAILY
Qty: 180 TABLET | Refills: 0 | Status: SHIPPED | OUTPATIENT
Start: 2024-01-18 | End: 2024-02-12

## 2024-01-18 NOTE — TELEPHONE ENCOUNTER
Patient calling for refill of her norco, she only has encough to get through tomorrow.    [facility] and pharmacy.    Routed to PCP.    Laura BARRETO RN  [facility] Triage

## 2024-01-24 VITALS — SYSTOLIC BLOOD PRESSURE: 175 MMHG | DIASTOLIC BLOOD PRESSURE: 89 MMHG

## 2024-01-24 NOTE — TELEPHONE ENCOUNTER
Patient returned call and left voicemail message with update blood pressure reading.      Last Blood Pressure: 159/85  Last Heart Rate: 54  Date: 12/15/2023  Location: Other Specialty    Today's Blood Pressure: 175/89  Location: Home BP    Patient reported blood pressure updated in Epic. Blood pressure continues to fall outside of the [place] Community Measures guidelines.  Message sent to primary cardiology team for further review.

## 2024-01-25 NOTE — TELEPHONE ENCOUNTER
Dr. Echevarria please review. Elevated BP. Last seen by cardiology 7/2022. Have OV with [facility] next available for annual visit and BP management? Alt recommendation? MECCA,RN

## 2024-01-26 NOTE — TELEPHONE ENCOUNTER
PC with patient and review. Verbalized understanding of the recommendation. Agreeable to office visit, daughter Isra makes her appointments. Pt requests to have her daughter contacted to make appt. PC to daughter, and LVM to return call to the clinic to arrange [phone]. Follow-up order placed. Staff message sent to schedulers to arrange. MECAC,RN

## 2024-02-11 NOTE — PROGRESS NOTES
HEART CARE ENCOUNTER CONSULTATON NOTE      [facility]  [phone]      Assessment/Recommendations   Assessment:   CAD: Coronary artery disease s/p PCI of RCA (May 2015) with MATHEW, and PCI to LCx with DESx1, LAD with DESx1, and POBA to D1 (6/2/15).    - aspirin 81 mg daily  - Continues to have atypical sharp chest pain lasting seconds, unchanged from visit 7/2022. Cardiac MRI with stress 9/2022 negative for myocardial ischemia/infarction infarction with LVEF 59%.  Appears compensated on exam, no evidence of fluid retention despite slow weight gain.  Dyslipidemia: rosuvastatin 10 mg  Intermittent hypertension: controlled today with recently elevated measures   Bradycardia: History of bradycardia in the 50s upon chart review, manual measurement in clinic shows 52 bpm, regular pulse.  Denies increasing lightheadedness, dizziness.  Will continue metoprolol tartrate at this time with intermittent hypertension.    Plan:   Continue current medication without change  Lipid panel ordered to be completed when patient is fasting  Monitor for increasing lightheadedness, presyncopal symptoms,         Follow up in 1 year or sooner as needed     History of Present Illness/Subjective    HPI: Carina Calvert is a 73 year old female with PMHx of CAD/PCI, HLD, intermittent hypertension Hx tobacco use presents for follow up.  She is accompanied by her daughter today who shares patient has been diagnosed with a mild cognitive impairment in the last year.  Otherwise patient denies significant change.    Patient continues to struggle with diffuse pains with fibromyalgia.  She denies change or progression of atypical chest pain discussed at previous visits.  Pain is sharp and lasts seconds over the last chest or left arm, most often occurring at rest.  Cardiac MRI stress 9/2022 with similar symptoms revealed no myocardial ischemia.  Has noticed some slow gaining of weight over the last year, approximately 8 pounds  from last visit in 7/2022.  No lower extremity edema discussed in the past has resolved, or new shortness of breath, orthopnea.  Is no longer taking ibuprofen, but continues to have sporadically elevated blood pressure.  Her heart rate is slow to today, she does have history of this on metoprolol to tartrate, but denies new or progressing lightheadedness with position changes.  This happens intermittently.      Cardiac MRI with stress 9/2022 Results:  1.  Pharmacological Regadenoson stress cardiac MRI is negative for inducible myocardial ischemia.   2.  Pharmacological stress ECG is negative for inducible myocardial ischemia. Frequent PVCs noted.   3.  Normal left ventricular size, wall thickness and systolic function. The quantified left ventricular  ejection fraction is 59%.  No myocardial scar is identified.    4.  Normal right ventricular size and systolic function.    5.  Mild eccentric anterior directed mitral regurgitation  6.  Moderate left atrial enlargement.  Mild right atrial enlargement.       Physical Examination  Review of Systems   Vitals: /62 (BP Location: Left arm, Patient Position: Sitting, Cuff Size: Adult Regular)   Pulse 52   Resp 16   Ht 1.524 m (5')   Wt 60.8 kg (134 lb)   BMI 26.17 kg/m    BMI= Body mass index is 26.17 kg/m .  Wt Readings from Last 3 Encounters:   02/12/24 60.8 kg (134 lb)   12/15/23 60.5 kg (133 lb 6.4 oz)   09/11/23 59 kg (130 lb)           ENT/Mouth: membranes moist, no oral lesions or bleeding gums.      EYES:  no scleral icterus, normal conjunctivae                    Neck: No carotid bruit    Chest/Lungs:   lungs are clear to auscultation, no rales or wheezing, equal chest wall expansion    Cardiovascular:   Regular, bradycardic. Normal first and second heart sounds with no murmurs, rubs, or gallops; the carotid, radial pulses are intact, absent edema bilaterally        Extremities: no cyanosis or clubbing   Skin: no xanthelasma, warm.    Neurologic: no  tremors     Psychiatric: alert and oriented x3, calm        Please refer above for cardiac ROS details.        Medical History  Surgical History Family History Social History   Past Medical History:   Diagnosis Date    Chronic constipation     Chronic sciatica     Pain clinic    DDD (degenerative disc disease), cervical     Fibromyalgia     History of hepatitis C     Hypertension goal BP (blood pressure) < 140/90     Kidney stones     Migraine headaches     Neuropathy     Osteopenia     boniva infusions w Dr Rayo at [facility]    PUD (peptic ulcer disease)     h/o bleeding ulcer    Recurrent cold sores     Restless leg syndrome     Spasmodic torticollis     Subclinical hyperthyroidism     NONTOXIC MULTINODULAR GOITER     Vertigo     Vitamin B12 deficiency     Vitamin D deficiency      Past Surgical History:   Procedure Laterality Date    COLONOSCOPY N/A 5/25/2023    Procedure: Colonoscopy;  Surgeon: Leo Loyd MD;  Location:  GI    ESOPHAGOSCOPY, GASTROSCOPY, DUODENOSCOPY (EGD), COMBINED N/A 5/25/2023    Procedure: Esophagoscopy, gastroscopy, duodenoscopy (EGD), combined;  Surgeon: Leo Loyd MD;  Location:  GI    HERNIA REPAIR  9/2009    ventral    LITHOTRIPSY      2 x     TONSILLECTOMY      TUBAL LIGATION       Family History   Problem Relation Age of Onset    Neurologic Disorder Mother 65        Parkinsons    Alcohol/Drug Mother     Cerebrovascular Disease Mother     Respiratory Father         COPD    Alcohol/Drug Father     Cancer Brother     Diabetes Sister 52    Cancer Maternal Aunt     Cancer Maternal Uncle     C.A.D. No family hx of         Social History     Socioeconomic History    Marital status:      Spouse name: Not on file    Number of children: 4    Years of education: 11    Highest education level: Not on file   Occupational History    Occupation: housecleaning     Employer: [employer],[address] [place] [address]   Tobacco Use    Smoking status: Former      Packs/day: 0.20     Years: 15.00     Additional pack years: 0.00     Total pack years: 3.00     Types: Cigarettes    Smokeless tobacco: Never    Tobacco comments:     4 cigs a day, just lost    Substance and Sexual Activity    Alcohol use: No    Drug use: No    Sexual activity: Never     Partners: Male     Birth control/protection: Surgical, Post-menopausal   Other Topics Concern    Parent/sibling w/ CABG, MI or angioplasty before 65F 55M? 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    Hobby Hazards No    Sleep Concern Yes     Comment: On medication    Stress Concern No    Weight Concern Yes     Comment: would like to gain weight    Special Diet No    Back Care No    Exercise No    Bike Helmet Not Asked    Seat Belt Yes    Self-Exams Yes   Social History Narrative    Not on file     Social Determinants of Health     Financial Resource Strain: Not on file   Food Insecurity: Not on file   Transportation Needs: Not on file   Physical Activity: Not on file   Stress: Not on file   Social Connections: Not on file   Interpersonal Safety: Not on file   Housing Stability: Not on file           Medications  Allergies   Current Outpatient Medications   Medication Sig Dispense Refill    acetaminophen (TYLENOL) 500 MG tablet Take 500 mg by mouth At Bedtime      aspirin 81 MG tablet Take 81 mg by mouth daily      baclofen (LIORESAL) 10 MG tablet Take 1 tablet (10 mg) by mouth 4 times daily 360 tablet 0    gabapentin (NEURONTIN) 300 MG capsule Take by mouth 300 mg every morning, 300 mg every afternoon, and 600 mg at bedtime 360 capsule 0    HYDROcodone-acetaminophen (NORCO) 5-325 MG tablet Take 2 tablets by mouth 3 times daily No further refills until appointment (Patient taking differently: Take 2 tablets by mouth 3 times daily No further refills until appointment, taking 5 mg) 180 tablet 0    metoprolol  "tartrate (LOPRESSOR) 50 MG tablet TAKE 1 TABLET BY MOUTH TWICE A  tablet 1    nitroGLYcerin (NITROSTAT) 0.4 MG sublingual tablet For chest pain place 1 tablet under the tongue every 5 minutes for 3 doses. If symptoms persist 5 minutes after 1st dose call 911. 25 tablet 3    omeprazole (PRILOSEC) 20 MG DR capsule TAKE 1 CAPSULE BY MOUTH EVERY DAY 90 capsule 0    polyethylene glycol (MIRALAX/GLYCOLAX) powder Take 17 g by mouth daily as needed Reported on 5/15/2017      Probiotic Product (PROBIOTIC BLEND PO) Take 1 each by mouth 3 times daily (before meals)      rosuvastatin (CRESTOR) 10 MG tablet TAKE 1 TABLET (10 MG) BY MOUTH DAILY. 90 tablet 3    traZODone (DESYREL) 100 MG tablet Take 2 tablets (200 mg) by mouth at bedtime 180 tablet 1    VITAMIN B-12 1000 MCG OR TABS Take 1,000 mcg by mouth daily      vitamin D3 (CHOLECALCIFEROL) 50 mcg (2000 units) tablet Take 1 tablet by mouth daily      Zinc Sulfate (ZINC 15 PO) One daily      SUMAtriptan (IMITREX) 50 MG tablet Take 1 tablet (50 mg) by mouth at onset of headache for migraine May repeat in 2 hours. Max 4 tablets/24 hours. (Patient not taking: Reported on 2/12/2024) 15 tablet 1       Allergies   Allergen Reactions    Iodinated Contrast Media Anaphylaxis and Hives     Unable to breathe    Diatrizoate Hives     Hypaque    Droperidol     Ivp Dye [Contrast Dye]           Lab Results    Chemistry/lipid CBC Cardiac Enzymes/BNP/TSH/INR   Recent Labs   Lab Test 05/26/22  1505   CHOL 96   HDL 43*   LDL 39   TRIG 72     Recent Labs   Lab Test 05/26/22  1505 04/08/21  1711 02/04/20  1117   LDL 39 40 40     Recent Labs   Lab Test 03/01/23  1223      POTASSIUM 4.2   CHLORIDE 102   CO2 30*   GLC 90   BUN 9.2   CR 0.63   GFRESTIMATED >90   CARMINA 9.6     Recent Labs   Lab Test 03/01/23  1223 07/20/22  1859 05/26/22  1505   CR 0.63 0.80 0.79     No results for input(s): \"A1C\" in the last 74953 hours.       Recent Labs   Lab Test 03/01/23  1223   WBC 6.8   HGB 13.8   HCT " "43.4   MCV 97   *     Recent Labs   Lab Test 03/01/23  1223 07/20/22  1859 05/26/22  1505   HGB 13.8 11.9 12.1    Recent Labs   Lab Test 07/20/22  2142 07/20/22  1842   TROPONINI <0.01 <0.01     Recent Labs   Lab Test 03/01/23  1223   NTBNPI 690     Recent Labs   Lab Test 07/24/23  1356   TSH 0.83     No results for input(s): \"INR\" in the last 24226 hours.       This note has been dictated using voice recognition software. Any grammatical, typographical, or context distortions are unintentional and inherent to the software    Michelle Eldridge PA-C                                       "

## 2024-02-12 ENCOUNTER — OFFICE VISIT (OUTPATIENT)
Dept: INTERNAL MEDICINE | Facility: CLINIC | Age: 74
End: 2024-02-12
Payer: MEDICARE

## 2024-02-12 ENCOUNTER — OFFICE VISIT (OUTPATIENT)
Dept: CARDIOLOGY | Facility: CLINIC | Age: 74
End: 2024-02-12
Attending: INTERNAL MEDICINE
Payer: MEDICARE

## 2024-02-12 VITALS
HEIGHT: 60 IN | HEART RATE: 52 BPM | SYSTOLIC BLOOD PRESSURE: 116 MMHG | WEIGHT: 134 LBS | DIASTOLIC BLOOD PRESSURE: 62 MMHG | BODY MASS INDEX: 26.31 KG/M2 | RESPIRATION RATE: 16 BRPM

## 2024-02-12 VITALS
SYSTOLIC BLOOD PRESSURE: 148 MMHG | BODY MASS INDEX: 26.31 KG/M2 | HEIGHT: 60 IN | WEIGHT: 134 LBS | RESPIRATION RATE: 16 BRPM | TEMPERATURE: 97.2 F | OXYGEN SATURATION: 97 % | DIASTOLIC BLOOD PRESSURE: 86 MMHG | HEART RATE: 50 BPM

## 2024-02-12 DIAGNOSIS — Z23 NEED FOR SHINGLES VACCINE: ICD-10-CM

## 2024-02-12 DIAGNOSIS — Z23 NEED FOR TDAP VACCINATION: ICD-10-CM

## 2024-02-12 DIAGNOSIS — I10 HYPERTENSION, UNSPECIFIED TYPE: ICD-10-CM

## 2024-02-12 DIAGNOSIS — F11.90 CHRONIC, CONTINUOUS USE OF OPIOIDS: ICD-10-CM

## 2024-02-12 DIAGNOSIS — K92.2 GASTRIC BLEED: ICD-10-CM

## 2024-02-12 DIAGNOSIS — G24.3 SPASMODIC TORTICOLLIS: ICD-10-CM

## 2024-02-12 DIAGNOSIS — D69.6 THROMBOCYTOPENIA (H): ICD-10-CM

## 2024-02-12 DIAGNOSIS — M79.7 FIBROMYALGIA: ICD-10-CM

## 2024-02-12 DIAGNOSIS — I25.10 CORONARY ARTERY DISEASE INVOLVING NATIVE CORONARY ARTERY OF NATIVE HEART WITHOUT ANGINA PECTORIS: Primary | ICD-10-CM

## 2024-02-12 DIAGNOSIS — G89.4 CHRONIC PAIN SYNDROME: ICD-10-CM

## 2024-02-12 DIAGNOSIS — E78.5 HYPERLIPIDEMIA WITH TARGET LDL LESS THAN 100: ICD-10-CM

## 2024-02-12 DIAGNOSIS — I10 HYPERTENSION GOAL BP (BLOOD PRESSURE) < 140/90: ICD-10-CM

## 2024-02-12 DIAGNOSIS — F51.01 PRIMARY INSOMNIA: ICD-10-CM

## 2024-02-12 DIAGNOSIS — G62.9 NEUROPATHY: ICD-10-CM

## 2024-02-12 DIAGNOSIS — Z00.00 ENCOUNTER FOR SUBSEQUENT ANNUAL WELLNESS VISIT (AWV) IN MEDICARE PATIENT: Primary | ICD-10-CM

## 2024-02-12 DIAGNOSIS — R07.9 CHEST PAIN, UNSPECIFIED TYPE: ICD-10-CM

## 2024-02-12 DIAGNOSIS — Z29.11 NEED FOR VACCINATION AGAINST RESPIRATORY SYNCYTIAL VIRUS: ICD-10-CM

## 2024-02-12 DIAGNOSIS — R00.1 BRADYCARDIA: ICD-10-CM

## 2024-02-12 DIAGNOSIS — I21.4 NSTEMI (NON-ST ELEVATED MYOCARDIAL INFARCTION) (H): ICD-10-CM

## 2024-02-12 DIAGNOSIS — Z23 NEED FOR PROPHYLACTIC VACCINATION AGAINST HEPATITIS B VIRUS: ICD-10-CM

## 2024-02-12 LAB
BASOPHILS # BLD AUTO: 0 10E3/UL (ref 0–0.2)
BASOPHILS NFR BLD AUTO: 1 %
EOSINOPHIL # BLD AUTO: 0.1 10E3/UL (ref 0–0.7)
EOSINOPHIL NFR BLD AUTO: 2 %
ERYTHROCYTE [DISTWIDTH] IN BLOOD BY AUTOMATED COUNT: 13 % (ref 10–15)
HCT VFR BLD AUTO: 40.6 % (ref 35–47)
HGB BLD-MCNC: 13.1 G/DL (ref 11.7–15.7)
IMM GRANULOCYTES # BLD: 0 10E3/UL
IMM GRANULOCYTES NFR BLD: 0 %
LYMPHOCYTES # BLD AUTO: 1.9 10E3/UL (ref 0.8–5.3)
LYMPHOCYTES NFR BLD AUTO: 32 %
MCH RBC QN AUTO: 31.1 PG (ref 26.5–33)
MCHC RBC AUTO-ENTMCNC: 32.3 G/DL (ref 31.5–36.5)
MCV RBC AUTO: 96 FL (ref 78–100)
MONOCYTES # BLD AUTO: 0.6 10E3/UL (ref 0–1.3)
MONOCYTES NFR BLD AUTO: 10 %
NEUTROPHILS # BLD AUTO: 3.2 10E3/UL (ref 1.6–8.3)
NEUTROPHILS NFR BLD AUTO: 55 %
PLATELET # BLD AUTO: 129 10E3/UL (ref 150–450)
RBC # BLD AUTO: 4.21 10E6/UL (ref 3.8–5.2)
WBC # BLD AUTO: 5.8 10E3/UL (ref 4–11)

## 2024-02-12 PROCEDURE — 36415 COLL VENOUS BLD VENIPUNCTURE: CPT | Performed by: INTERNAL MEDICINE

## 2024-02-12 PROCEDURE — 99214 OFFICE O/P EST MOD 30 MIN: CPT | Performed by: INTERNAL MEDICINE

## 2024-02-12 PROCEDURE — 85025 COMPLETE CBC W/AUTO DIFF WBC: CPT | Performed by: INTERNAL MEDICINE

## 2024-02-12 PROCEDURE — 99214 OFFICE O/P EST MOD 30 MIN: CPT

## 2024-02-12 PROCEDURE — 80061 LIPID PANEL: CPT | Performed by: INTERNAL MEDICINE

## 2024-02-12 PROCEDURE — 80048 BASIC METABOLIC PNL TOTAL CA: CPT | Performed by: INTERNAL MEDICINE

## 2024-02-12 RX ORDER — RESPIRATORY SYNCYTIAL VIRUS VACCINE 120MCG/0.5
0.5 KIT INTRAMUSCULAR ONCE
Qty: 1 EACH | Refills: 0 | Status: CANCELLED | OUTPATIENT
Start: 2024-02-12 | End: 2024-02-12

## 2024-02-12 RX ORDER — GABAPENTIN 300 MG/1
CAPSULE ORAL
Qty: 360 CAPSULE | Refills: 3 | Status: SHIPPED | OUTPATIENT
Start: 2024-02-12

## 2024-02-12 RX ORDER — HYDROCODONE BITARTRATE AND ACETAMINOPHEN 5; 325 MG/1; MG/1
2 TABLET ORAL 3 TIMES DAILY
Qty: 180 TABLET | Refills: 0 | Status: SHIPPED | OUTPATIENT
Start: 2024-02-12 | End: 2024-03-13

## 2024-02-12 RX ORDER — TRAZODONE HYDROCHLORIDE 100 MG/1
200 TABLET ORAL AT BEDTIME
Qty: 180 TABLET | Refills: 3 | Status: SHIPPED | OUTPATIENT
Start: 2024-02-12 | End: 2024-09-18

## 2024-02-12 RX ORDER — NITROGLYCERIN 0.4 MG/1
TABLET SUBLINGUAL
Qty: 25 TABLET | Refills: 3 | Status: SHIPPED | OUTPATIENT
Start: 2024-02-12

## 2024-02-12 RX ORDER — METOPROLOL TARTRATE 50 MG
TABLET ORAL
Qty: 180 TABLET | Refills: 3 | Status: SHIPPED | OUTPATIENT
Start: 2024-02-12

## 2024-02-12 RX ORDER — BACLOFEN 10 MG/1
10 TABLET ORAL 4 TIMES DAILY
Qty: 360 TABLET | Refills: 3 | Status: SHIPPED | OUTPATIENT
Start: 2024-02-12

## 2024-02-12 RX ORDER — ROSUVASTATIN CALCIUM 10 MG/1
10 TABLET, COATED ORAL DAILY
Qty: 90 TABLET | Refills: 3 | Status: SHIPPED | OUTPATIENT
Start: 2024-02-12

## 2024-02-12 ASSESSMENT — PATIENT HEALTH QUESTIONNAIRE - PHQ9
SUM OF ALL RESPONSES TO PHQ QUESTIONS 1-9: 4
SUM OF ALL RESPONSES TO PHQ QUESTIONS 1-9: 4
10. IF YOU CHECKED OFF ANY PROBLEMS, HOW DIFFICULT HAVE THESE PROBLEMS MADE IT FOR YOU TO DO YOUR WORK, TAKE CARE OF THINGS AT HOME, OR GET ALONG WITH OTHER PEOPLE: SOMEWHAT DIFFICULT

## 2024-02-12 NOTE — PATIENT INSTRUCTIONS
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 She is doing yoga and meditation but wants if fixed, increasing citalopram for 10 days did not make a different. No CI to oc's.  The general health status is good.  The patient's diet is described as balanced.  Exercise: routine.  Associated symptoms consist of none.  Last menstrual period: regular, with Mirena.  Medical encounters:.  Additional pertinent history: tobacco use none.        Review of Systems   Constitutional:  Negative except as documented in history of present illness.    Eye:  Negative except as documented in history of present illness.    Respiratory:  Negative except as documented in history of present illness.    Cardiovascular:  Negative except as documented in history of present illness.    Breast:  Negative.    Gastrointestinal:  Negative except as documented in history of present illness.    Genitourinary:  Negative except as documented in history of present illness.    Gynecologic:  Negative except as documented in history of present illness.    Hematology/Lymphatics:  Negative except as documented in history of present illness.    Endocrine:  Negative except as documented in history of present illness.    Immunologic:  Negative except as documented in history of present illness.    Musculoskeletal:  Negative except as documented in history of present illness.    Integumentary:  Negative except as documented in  It was a pleasure taking part in your care today:    - Fasting cholesterol panel at [facility]   - Continue current medications  - Follow up in 1 year    Please call the [facility] clinic with any questions or concerns at [phone].     Michelle Eldridge PA-C     history of present illness.    Neurologic:  Negative except as documented in history of present illness.    Psychiatric:  Negative except as documented in history of present illness.              Health Status   Allergies:    Allergic Reactions (Selected)  No Known Medication Allergies   Medications:  (Selected)   Prescriptions  Prescribed  Delaney 3 mg-0.02 mg oral tablet: 1 tab(s), Oral, daily, # 84 tab(s), 1 Refill(s), Type: Maintenance, Pharmacy: [facility] #[id number], 1 tab(s) Oral daily  citalopram 10 mg oral tablet: See Instructions, Instructions: 1 tab daily but increase to 2 tabs daily each month for 10 days prior to expected menses, # 120 tab(s), 3 Refill(s), Type: Maintenance, Pharmacy: [id number], 1 tab daily but increase to 2 tabs daily each...  citalopram 20 mg oral tablet: = 1 tab(s) ( 20 mg ), Oral, daily, # 90 tab(s), 1 Refill(s), Type: Maintenance, Pharmacy: [facility] #[id number], 1 tab(s) Oral daily  Documented Medications  Documented  Mirena 52 mg intrauterine device: 1 EA ( 52 mg ), intrauteral, once, Instructions: Lot:  [id number]  Exp:  09/2018, 0 Refill(s), Type: Maintenance   Problem list:    All Problems  XIOMARA (generalized anxiety disorder) / SNOMED CT [id number] / Confirmed      Histories   Past Medical History:    No active or resolved past medical history items have been selected or recorded.   Family History:    Mother    History is negative.  Father    History is negative.  Sister    History is negative.  Sister    History is negative.  Son: Lb      History is negative.  Son: Silvan      History is negative.     Procedure history:    No active procedure history items have been selected or recorded.   Social History:        Alcohol Assessment            Current, Wine (5 oz), 1-2 times per week, 1 drinks/episode average.      Tobacco Assessment            Never smoker      Exercise and Physical Activity Assessment            Exercise frequency: 5-6 times/week.   Exercise type: Running.      Sexual Assessment            Sexually active: Yes.  Sexual orientation: Heterosexual.        Physical Examination   Vital Signs   9/13/2019 10:58 AM CDT Temperature Tympanic 97.0 DegF  LOW    Peripheral Pulse Rate 72 bpm    Pulse Site Radial artery    HR Method Manual    Systolic Blood Pressure 128 mmHg    Diastolic Blood Pressure 70 mmHg    Mean Arterial Pressure 89 mmHg    BP Site Right arm    BP Method Manual      Measurements from flowsheet : Measurements   9/13/2019 10:58 AM CDT Height Measured - Standard 61.5 in    Weight Measured - Standard 137.6 lb    BSA 1.64 m2    Body Mass Index 25.58 kg/m2  HI      General:  Alert and oriented, No acute distress, vital signs stable, as noted above.    Eye:  Pupils are equal, round and reactive to light, Extraocular movements are intact, Normal conjunctiva.    HENT:  Normocephalic, Tympanic membranes are clear, Normal hearing, Oral mucosa is moist, No pharyngeal erythema.    Neck:  Supple, Non-tender, No lymphadenopathy, No thyromegaly.    Respiratory:  Lungs are clear to auscultation, Respirations are non-labored, Breath sounds are equal, Symmetrical chest wall expansion.    Cardiovascular:  Normal rate, Regular rhythm, No murmur, No edema.    Breast:  No mass, No tenderness, No discharge, Breasts examined .  No infra nor supraclavicular nodes palpable.  No axillary nodes or masses palpable.  No nipple discharge. Breasts normal throughout.    Gastrointestinal:  Soft, Non-tender, Non-distended, No organomegaly.    Genitourinary:  Normal genitalia for age and sex, No inguinal tenderness, No urethral discharge, No lesions, IUD string visible.         Perineum: Within normal limits.         Groin/ inguinal region: Within normal limits.         Urethra: Within normal limits.         Vagina: Within normal limits.         Labia: Within normal limits.         Cervix: Within normal limits.         Uterus: Within normal limits.         Adnexa: Within  normal limits.    Lymphatics:  no axillary, no supra or infraclavicular nor inguinal lymphadenopathy palpable.    Musculoskeletal:  Normal range of motion, Normal strength, No deformity, Normal gait.    Integumentary:  Warm, Dry, Pink, Intact, No rash.    Neurologic:  Alert, Oriented, Normal sensory, Normal motor function, Cranial Nerves II-XII are grossly intact.    Psychiatric:  Cooperative, Appropriate mood & affect, Normal judgment, PHQ 9/CAGE questionaire reviewed and discussed with patient,  see score.       Impression and Plan   Diagnosis     Well adult (BDB99-RI Z00.00).     XIOMARA (generalized anxiety disorder) (BLM83-VP F41.1).     Patient Instructions:       Counseled: Patient, Regarding diagnosis, Regarding medications, Verbalized understanding, counseled on health benefits of healthy weight, regular exercise, healthy diet, will add low dose oc for PMDD symptoms and if not effective, rtc.    Orders     Orders (Selected)   Prescriptions  Prescribed  Delaney 3 mg-0.02 mg oral tablet: 1 tab(s), Oral, daily, # 84 tab(s), 1 Refill(s), Type: Maintenance, Pharmacy: [facility] #[id number], 1 tab(s) Oral daily  citalopram 20 mg oral tablet: = 1 tab(s) ( 20 mg ), Oral, daily, # 90 tab(s), 1 Refill(s), Type: Maintenance, Pharmacy: [facility] #[id number], 1 tab(s) Oral daily.       pap and flu shot today.

## 2024-02-12 NOTE — LETTER
2/12/2024    Gerber Jain MD  [address]  [place] [address]    RE: Carina Calvert       Dear Colleague,     I had the pleasure of seeing Carina Calvert in the [facility].    HEART CARE ENCOUNTER CONSULTATON NOTE      [facility]  [phone]      Assessment/Recommendations   Assessment:   CAD: Coronary artery disease s/p PCI of RCA (May 2015) with MATHEW, and PCI to LCx with DESx1, LAD with DESx1, and POBA to D1 (6/2/15).    - aspirin 81 mg daily  - Continues to have atypical sharp chest pain lasting seconds, unchanged from visit 7/2022. Cardiac MRI with stress 9/2022 negative for myocardial ischemia/infarction infarction with LVEF 59%.  Appears compensated on exam, no evidence of fluid retention despite slow weight gain.  Dyslipidemia: rosuvastatin 10 mg  Intermittent hypertension: controlled today with recently elevated measures   Bradycardia: History of bradycardia in the 50s upon chart review, manual measurement in clinic shows 52 bpm, regular pulse.  Denies increasing lightheadedness, dizziness.  Will continue metoprolol tartrate at this time with intermittent hypertension.    Plan:   Continue current medication without change  Lipid panel ordered to be completed when patient is fasting  Monitor for increasing lightheadedness, presyncopal symptoms,         Follow up in 1 year or sooner as needed     History of Present Illness/Subjective    HPI: Carina Calvert is a 73 year old female with PMHx of CAD/PCI, HLD, intermittent hypertension Hx tobacco use presents for follow up.  She is accompanied by her daughter today who shares patient has been diagnosed with a mild cognitive impairment in the last year.  Otherwise patient denies significant change.    Patient continues to struggle with diffuse pains with fibromyalgia.  She denies change or progression of atypical chest pain discussed at previous visits.  Pain is sharp and lasts seconds over the last chest or  left arm, most often occurring at rest.  Cardiac MRI stress 9/2022 with similar symptoms revealed no myocardial ischemia.  Has noticed some slow gaining of weight over the last year, approximately 8 pounds from last visit in 7/2022.  No lower extremity edema discussed in the past has resolved, or new shortness of breath, orthopnea.  Is no longer taking ibuprofen, but continues to have sporadically elevated blood pressure.  Her heart rate is slow to today, she does have history of this on metoprolol to tartrate, but denies new or progressing lightheadedness with position changes.  This happens intermittently.      Cardiac MRI with stress 9/2022 Results:  1.  Pharmacological Regadenoson stress cardiac MRI is negative for inducible myocardial ischemia.   2.  Pharmacological stress ECG is negative for inducible myocardial ischemia. Frequent PVCs noted.   3.  Normal left ventricular size, wall thickness and systolic function. The quantified left ventricular  ejection fraction is 59%.  No myocardial scar is identified.    4.  Normal right ventricular size and systolic function.    5.  Mild eccentric anterior directed mitral regurgitation  6.  Moderate left atrial enlargement.  Mild right atrial enlargement.       Physical Examination  Review of Systems   Vitals: /62 (BP Location: Left arm, Patient Position: Sitting, Cuff Size: Adult Regular)   Pulse 52   Resp 16   Ht 1.524 m (5')   Wt 60.8 kg (134 lb)   BMI 26.17 kg/m    BMI= Body mass index is 26.17 kg/m .  Wt Readings from Last 3 Encounters:   02/12/24 60.8 kg (134 lb)   12/15/23 60.5 kg (133 lb 6.4 oz)   09/11/23 59 kg (130 lb)           ENT/Mouth: membranes moist, no oral lesions or bleeding gums.      EYES:  no scleral icterus, normal conjunctivae                    Neck: No carotid bruit    Chest/Lungs:   lungs are clear to auscultation, no rales or wheezing, equal chest wall expansion    Cardiovascular:   Regular, bradycardic. Normal first and second  heart sounds with no murmurs, rubs, or gallops; the carotid, radial pulses are intact, absent edema bilaterally        Extremities: no cyanosis or clubbing   Skin: no xanthelasma, warm.    Neurologic: no tremors     Psychiatric: alert and oriented x3, calm        Please refer above for cardiac ROS details.        Medical History  Surgical History Family History Social History   Past Medical History:   Diagnosis Date    Chronic constipation     Chronic sciatica     Pain clinic    DDD (degenerative disc disease), cervical     Fibromyalgia     History of hepatitis C     Hypertension goal BP (blood pressure) < 140/90     Kidney stones     Migraine headaches     Neuropathy     Osteopenia     boniva infusions w Dr Rayo at [facility]    PUD (peptic ulcer disease)     h/o bleeding ulcer    Recurrent cold sores     Restless leg syndrome     Spasmodic torticollis     Subclinical hyperthyroidism     NONTOXIC MULTINODULAR GOITER     Vertigo     Vitamin B12 deficiency     Vitamin D deficiency      Past Surgical History:   Procedure Laterality Date    COLONOSCOPY N/A 5/25/2023    Procedure: Colonoscopy;  Surgeon: Leo Loyd MD;  Location:  GI    ESOPHAGOSCOPY, GASTROSCOPY, DUODENOSCOPY (EGD), COMBINED N/A 5/25/2023    Procedure: Esophagoscopy, gastroscopy, duodenoscopy (EGD), combined;  Surgeon: Leo Loyd MD;  Location:  GI    HERNIA REPAIR  9/2009    ventral    LITHOTRIPSY      2 x     TONSILLECTOMY      TUBAL LIGATION       Family History   Problem Relation Age of Onset    Neurologic Disorder Mother 65        Parkinsons    Alcohol/Drug Mother     Cerebrovascular Disease Mother     Respiratory Father         COPD    Alcohol/Drug Father     Cancer Brother     Diabetes Sister 52    Cancer Maternal Aunt     Cancer Maternal Uncle     C.A.D. No family hx of         Social History     Socioeconomic History    Marital status:      Spouse name: Not on file    Number of children: 4    Years of education:  11    Highest education level: Not on file   Occupational History    Occupation: housecleaning     Employer: [employer],[address] [employer]   Tobacco Use    Smoking status: Former     Packs/day: 0.20     Years: 15.00     Additional pack years: 0.00     Total pack years: 3.00     Types: Cigarettes    Smokeless tobacco: Never    Tobacco comments:     4 cigs a day, just lost    Substance and Sexual Activity    Alcohol use: No    Drug use: No    Sexual activity: Never     Partners: Male     Birth control/protection: Surgical, Post-menopausal   Other Topics Concern    Parent/sibling w/ CABG, MI or angioplasty before 65F 55M? 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    Hobby Hazards No    Sleep Concern Yes     Comment: On medication    Stress Concern No    Weight Concern Yes     Comment: would like to gain weight    Special Diet No    Back Care No    Exercise No    Bike Helmet Not Asked    Seat Belt Yes    Self-Exams Yes   Social History Narrative    Not on file     Social Determinants of Health     Financial Resource Strain: Not on file   Food Insecurity: Not on file   Transportation Needs: Not on file   Physical Activity: Not on file   Stress: Not on file   Social Connections: Not on file   Interpersonal Safety: Not on file   Housing Stability: Not on file           Medications  Allergies   Current Outpatient Medications   Medication Sig Dispense Refill    acetaminophen (TYLENOL) 500 MG tablet Take 500 mg by mouth At Bedtime      aspirin 81 MG tablet Take 81 mg by mouth daily      baclofen (LIORESAL) 10 MG tablet Take 1 tablet (10 mg) by mouth 4 times daily 360 tablet 0    gabapentin (NEURONTIN) 300 MG capsule Take by mouth 300 mg every morning, 300 mg every afternoon, and 600 mg at bedtime 360 capsule 0    HYDROcodone-acetaminophen (NORCO) 5-325 MG tablet Take 2 tablets by mouth  3 times daily No further refills until appointment (Patient taking differently: Take 2 tablets by mouth 3 times daily No further refills until appointment, taking 5 mg) 180 tablet 0    metoprolol tartrate (LOPRESSOR) 50 MG tablet TAKE 1 TABLET BY MOUTH TWICE A  tablet 1    nitroGLYcerin (NITROSTAT) 0.4 MG sublingual tablet For chest pain place 1 tablet under the tongue every 5 minutes for 3 doses. If symptoms persist 5 minutes after 1st dose call 911. 25 tablet 3    omeprazole (PRILOSEC) 20 MG DR capsule TAKE 1 CAPSULE BY MOUTH EVERY DAY 90 capsule 0    polyethylene glycol (MIRALAX/GLYCOLAX) powder Take 17 g by mouth daily as needed Reported on 5/15/2017      Probiotic Product (PROBIOTIC BLEND PO) Take 1 each by mouth 3 times daily (before meals)      rosuvastatin (CRESTOR) 10 MG tablet TAKE 1 TABLET (10 MG) BY MOUTH DAILY. 90 tablet 3    traZODone (DESYREL) 100 MG tablet Take 2 tablets (200 mg) by mouth at bedtime 180 tablet 1    VITAMIN B-12 1000 MCG OR TABS Take 1,000 mcg by mouth daily      vitamin D3 (CHOLECALCIFEROL) 50 mcg (2000 units) tablet Take 1 tablet by mouth daily      Zinc Sulfate (ZINC 15 PO) One daily      SUMAtriptan (IMITREX) 50 MG tablet Take 1 tablet (50 mg) by mouth at onset of headache for migraine May repeat in 2 hours. Max 4 tablets/24 hours. (Patient not taking: Reported on 2/12/2024) 15 tablet 1       Allergies   Allergen Reactions    Iodinated Contrast Media Anaphylaxis and Hives     Unable to breathe    Diatrizoate Hives     Hypaque    Droperidol     Ivp Dye [Contrast Dye]           Lab Results    Chemistry/lipid CBC Cardiac Enzymes/BNP/TSH/INR   Recent Labs   Lab Test 05/26/22  1505   CHOL 96   HDL 43*   LDL 39   TRIG 72     Recent Labs   Lab Test 05/26/22  1505 04/08/21  1711 02/04/20  1117   LDL 39 40 40     Recent Labs   Lab Test 03/01/23  1223      POTASSIUM 4.2   CHLORIDE 102   CO2 30*   GLC 90   BUN 9.2   CR 0.63   GFRESTIMATED >90   CARMINA 9.6     Recent Labs   Lab Test  "03/01/23  1223 07/20/22  1859 05/26/22  1505   CR 0.63 0.80 0.79     No results for input(s): \"A1C\" in the last 04705 hours.       Recent Labs   Lab Test 03/01/23  1223   WBC 6.8   HGB 13.8   HCT 43.4   MCV 97   *     Recent Labs   Lab Test 03/01/23  1223 07/20/22  1859 05/26/22  1505   HGB 13.8 11.9 12.1    Recent Labs   Lab Test 07/20/22  2142 07/20/22  1842   TROPONINI <0.01 <0.01     Recent Labs   Lab Test 03/01/23  1223   NTBNPI 690     Recent Labs   Lab Test 07/24/23  1356   TSH 0.83     No results for input(s): \"INR\" in the last 51362 hours.       This note has been dictated using voice recognition software. Any grammatical, typographical, or context distortions are unintentional and inherent to the software    Michelle Eldridge PA-C        Thank you for allowing me to participate in the care of your patient.      Sincerely,     Michelle Lopez PA-C     [facility]  cc:   Luis E Echevarria, DO  [address] [place] [address]      "

## 2024-02-12 NOTE — PROGRESS NOTES
"  Subjective   Carina is a 73 year old, presenting for the following health issues:  Recheck Medication      2/12/2024     4:54 PM   Additional Questions   Roomed by Malka KOLB CMA   Accompanied by Daughter Isra     Pt has no current health concerns to report and would simply like medications refilled. Upon chart review, Norco dose decreased from 7.5 mg to 5 mg in Dec 2023. Pt notes that pain control is \"ok\" and still able to participate in ADLs.     Declined immunizations, preventative screenings, etc.     Of note, pt exhibiting flat affect, STM loss, and aphasic traits which has been noted in previous appointments. Pt has been previously encouraged have MRI brain scan to assess cognitive decline which was readdressed at this appointment. Pt declined.     History of Present Illness       Reason for visit:  Follow up    She eats 2-3 servings of fruits and vegetables daily.She consumes 2 sweetened beverage(s) daily.She exercises with enough effort to increase her heart rate 9 or less minutes per day.  She exercises with enough effort to increase her heart rate 3 or less days per week.   She is taking medications regularly.       Review of Systems  Constitutional, HEENT, cardiovascular, pulmonary, gi and gu systems are negative, except as otherwise noted.      Objective    BP (!) 148/86 (BP Location: Right arm, Patient Position: Chair, Cuff Size: Adult Regular)   Pulse 50   Temp 97.2  F (36.2  C) (Temporal)   Resp 16   Ht 1.524 m (5')   Wt 60.8 kg (134 lb)   SpO2 97%   BMI 26.17 kg/m    Body mass index is 26.17 kg/m .  Physical Exam   GENERAL: alert and no distress  RESP: lungs clear to auscultation - no rales, rhonchi or wheezes  CV: regular rate and rhythm, normal S1 S2, no S3 or S4, no murmur, click or rub, no peripheral edema  ABDOMEN: soft, nontender, no hepatosplenomegaly, no masses and bowel sounds normal  MS: no gross musculoskeletal defects noted, no edema    (Z00.00) Encounter for subsequent annual " wellness visit (AWV) in Medicare patient  (primary encounter diagnosis)  Comment: Discussed health maintenance and preventative screenings  Plan: Basic metabolic panel  (Ca, Cl, CO2, Creat, Gluc, K, Na, BUN)    (G24.3) Spasmodic torticollis  Comment: Stable. Continue w/ POC and ongoing monitoring.  Plan: baclofen (LIORESAL) 10 MG tablet, HYDROcodone-acetaminophen (NORCO) 5-325 MG tablet    (M79.7) Fibromyalgia  Comment: Stable. Continue w/ POC and ongoing monitoring.  Plan: baclofen (LIORESAL) 10 MG tablet, gabapentin (NEURONTIN) 300 MG capsule, HYDROcodone-acetaminophen (NORCO) 5-325 MG tablet    (G89.4) Chronic pain syndrome  Comment: Stable. Continue w/ POC and ongoing monitoring.  Plan: gabapentin (NEURONTIN) 300 MG capsule, HYDROcodone-acetaminophen (NORCO) 5-325 MG tablet    (G62.9) Neuropathy  Comment: Stable. Continue w/ POC and ongoing monitoring.  Plan: gabapentin (NEURONTIN) 300 MG capsule    (D69.6) Thrombocytopenia (H24)  Comment: Stable. Continue w/ POC and ongoing monitoring.  Plan: CBC with platelets and differential    (I21.4) NSTEMI (non-ST elevated myocardial infarction) (H)  Comment: Seen by cardiology today. Stable. Continue w/ POC per cards.  Plan: metoprolol tartrate (LOPRESSOR) 50 MG tablet, Basic metabolic panel  (Ca, Cl, CO2, Creat, Gluc, K, Na, BUN)    (I10) Hypertension goal BP (blood pressure) < 140/90  Comment: Stable. Continue w/ POC per cards.  Plan: metoprolol tartrate (LOPRESSOR) 50 MG tablet    (K92.2) Gastric bleed  Comment: Stable. Continue w/ POC and ongoing monitoring.  Plan: omeprazole (PRILOSEC) 20 MG DR capsule    (E78.5) Hyperlipidemia with target LDL less than 100  Comment: Fasting lipid panel ordered by cards this AM.   Plan: rosuvastatin (CRESTOR) 10 MG tablet, Basic metabolic panel  (Ca, Cl, CO2, Creat, Gluc, K, Na, BUN), Lipid panel reflex to direct LDL Fasting    (F51.01) Primary insomnia  Comment: Stable. Continue w/ POC and ongoing monitoring.  Plan: traZODone  (DESYREL) 100 MG tablet    (Z23) Need for shingles vaccine  Comment: Pt uninterested in immunizations at this time.   Plan: Plan to readdress at a later date.     (Z23) Need for Tdap vaccination  Comment: Pt uninterested in immunizations at this time.   Plan: Plan to readdress at a later date.    (Z23) Need for prophylactic vaccination against hepatitis B virus  Comment: Pt uninterested in immunizations at this time.   Plan: Plan to readdress at a later date.    (Z29.11) Need for vaccination against respiratory syncytial virus  Comment: Pt uninterested in immunizations at this time.   Plan: Plan to readdress at a later date.    (F11.90) Chronic, continuous use of opioids  Comment: Pt tolerating decrease in Norco well per her report. Continue w/ current dose  Plan: HYDROcodone-acetaminophen (NORCO) 5-325 MG tablet    Gabrielle Askew NP Student    This patient office visit today was staffed with me, I did review the entire clinical presentation and history, exam and medical decision making with DNP student Gabrielle Askew I agree with and have approved the office visit entirely. Patient seen with me and ANDRA Askew  together today. I agree with assessment and plans.     Signed Electronically by: Gerber Jain MD

## 2024-02-12 NOTE — LETTER
February 15, 2024    Carina Calvert  [address]   [place] [address]          Dear ,    We are writing to inform you of your test results.    All of these tests are within acceptable limits , things look good !     Resulted Orders   Basic metabolic panel  (Ca, Cl, CO2, Creat, Gluc, K, Na, BUN)   Result Value Ref Range    Sodium 142 135 - 145 mmol/L      Comment:      Reference intervals for this test were updated on 09/26/2023 to more accurately reflect our healthy population. There may be differences in the flagging of prior results with similar values performed with this method. Interpretation of those prior results can be made in the context of the updated reference intervals.     Potassium 4.3 3.4 - 5.3 mmol/L    Chloride 104 98 - 107 mmol/L    Carbon Dioxide (CO2) 29 22 - 29 mmol/L    Anion Gap 9 7 - 15 mmol/L    Urea Nitrogen 14.7 8.0 - 23.0 mg/dL    Creatinine 0.80 0.51 - 0.95 mg/dL    GFR Estimate 78 >60 mL/min/1.73m2    Calcium 9.5 8.8 - 10.2 mg/dL    Glucose 112 (H) 70 - 99 mg/dL   Lipid panel reflex to direct LDL Fasting   Result Value Ref Range    Cholesterol 117 <200 mg/dL    Triglycerides 75 <150 mg/dL    Direct Measure HDL 55 >=50 mg/dL    LDL Cholesterol Calculated 47 <=100 mg/dL    Non HDL Cholesterol 62 <130 mg/dL    Patient Fasting > 8hrs? No     Narrative    Cholesterol  Desirable:  <200 mg/dL    Triglycerides  Normal:  Less than 150 mg/dL  Borderline High:  150-199 mg/dL  High:  200-499 mg/dL  Very High:  Greater than or equal to 500 mg/dL    Direct Measure HDL  Female:  Greater than or equal to 50 mg/dL   Male:  Greater than or equal to 40 mg/dL    LDL Cholesterol  Desirable:  <100mg/dL  Above Desirable:  100-129 mg/dL   Borderline High:  130-159 mg/dL   High:  160-189 mg/dL   Very High:  >= 190 mg/dL    Non HDL Cholesterol  Desirable:  130 mg/dL  Above Desirable:  130-159 mg/dL  Borderline High:  160-189 mg/dL  High:  190-219 mg/dL  Very High:  Greater than or equal to  220 mg/dL   CBC with platelets and differential   Result Value Ref Range    WBC Count 5.8 4.0 - 11.0 10e3/uL    RBC Count 4.21 3.80 - 5.20 10e6/uL    Hemoglobin 13.1 11.7 - 15.7 g/dL    Hematocrit 40.6 35.0 - 47.0 %    MCV 96 78 - 100 fL    MCH 31.1 26.5 - 33.0 pg    MCHC 32.3 31.5 - 36.5 g/dL    RDW 13.0 10.0 - 15.0 %    Platelet Count 129 (L) 150 - 450 10e3/uL    % Neutrophils 55 %    % Lymphocytes 32 %    % Monocytes 10 %    % Eosinophils 2 %    % Basophils 1 %    % Immature Granulocytes 0 %    Absolute Neutrophils 3.2 1.6 - 8.3 10e3/uL    Absolute Lymphocytes 1.9 0.8 - 5.3 10e3/uL    Absolute Monocytes 0.6 0.0 - 1.3 10e3/uL    Absolute Eosinophils 0.1 0.0 - 0.7 10e3/uL    Absolute Basophils 0.0 0.0 - 0.2 10e3/uL    Absolute Immature Granulocytes 0.0 <=0.4 10e3/uL     If you have any questions or concerns, please call the clinic at the number listed above.     Sincerely,    Gerber Jain MD

## 2024-02-13 LAB
ANION GAP SERPL CALCULATED.3IONS-SCNC: 9 MMOL/L (ref 7–15)
BUN SERPL-MCNC: 14.7 MG/DL (ref 8–23)
CALCIUM SERPL-MCNC: 9.5 MG/DL (ref 8.8–10.2)
CHLORIDE SERPL-SCNC: 104 MMOL/L (ref 98–107)
CHOLEST SERPL-MCNC: 117 MG/DL
CREAT SERPL-MCNC: 0.8 MG/DL (ref 0.51–0.95)
DEPRECATED HCO3 PLAS-SCNC: 29 MMOL/L (ref 22–29)
EGFRCR SERPLBLD CKD-EPI 2021: 78 ML/MIN/1.73M2
FASTING STATUS PATIENT QL REPORTED: NO
GLUCOSE SERPL-MCNC: 112 MG/DL (ref 70–99)
HDLC SERPL-MCNC: 55 MG/DL
LDLC SERPL CALC-MCNC: 47 MG/DL
NONHDLC SERPL-MCNC: 62 MG/DL
POTASSIUM SERPL-SCNC: 4.3 MMOL/L (ref 3.4–5.3)
SODIUM SERPL-SCNC: 142 MMOL/L (ref 135–145)
TRIGL SERPL-MCNC: 75 MG/DL

## 2024-03-13 ENCOUNTER — TELEPHONE (OUTPATIENT)
Dept: FAMILY MEDICINE | Facility: CLINIC | Age: 74
End: 2024-03-13
Payer: MEDICARE

## 2024-03-13 DIAGNOSIS — G24.3 SPASMODIC TORTICOLLIS: ICD-10-CM

## 2024-03-13 DIAGNOSIS — M79.7 FIBROMYALGIA: ICD-10-CM

## 2024-03-13 DIAGNOSIS — G89.4 CHRONIC PAIN SYNDROME: ICD-10-CM

## 2024-03-13 DIAGNOSIS — F11.90 CHRONIC, CONTINUOUS USE OF OPIOIDS: ICD-10-CM

## 2024-03-13 RX ORDER — HYDROCODONE BITARTRATE AND ACETAMINOPHEN 5; 325 MG/1; MG/1
2 TABLET ORAL 3 TIMES DAILY
Qty: 180 TABLET | Refills: 0 | Status: SHIPPED | OUTPATIENT
Start: 2024-03-13 | End: 2024-04-10

## 2024-03-13 NOTE — TELEPHONE ENCOUNTER
Medication Question or Refill    Contacts         Type Contact Phone/Fax    03/13/2024 08:04 AM CDT Phone (Incoming) Carina Calvert AMANDEEP (Self) [phone] (M)            What medication are you calling about (include dose and sig)?: HYDROcodone-acetaminophen (NORCO) 5-325 MG tablet     Preferred Pharmacy:   [place] [address] [place] [address] [address]  Phone: [phone] Fax: [phone]      Controlled Substance Agreement on file:   CSA -- Patient Level:     [Media Unavailable] Controlled Substance Agreement - Opioid - Scan on 11/3/2023 12:21 PM   [Media Unavailable] Controlled Substance Agreement - Opioid - Scan on 9/23/2022  6:46 PM   [Media Unavailable] Controlled Substance Agreement - Opioid - Scan on 7/27/2021  7:53 AM       Who prescribed the medication?: PCP    Do you need a refill? Yes    When did you use the medication last? Unknown     Patient offered an appointment? No    Do you have any questions or concerns?  No      Okay to leave a detailed message?: Yes at Cell number on file:    Telephone Information:   Mobile   [phone]

## 2024-04-10 ENCOUNTER — TELEPHONE (OUTPATIENT)
Dept: PHARMACY | Facility: CLINIC | Age: 74
End: 2024-04-10
Payer: MEDICARE

## 2024-04-10 DIAGNOSIS — G24.3 SPASMODIC TORTICOLLIS: ICD-10-CM

## 2024-04-10 DIAGNOSIS — G89.4 CHRONIC PAIN SYNDROME: ICD-10-CM

## 2024-04-10 DIAGNOSIS — M79.7 FIBROMYALGIA: ICD-10-CM

## 2024-04-10 DIAGNOSIS — F11.90 CHRONIC, CONTINUOUS USE OF OPIOIDS: ICD-10-CM

## 2024-04-10 RX ORDER — HYDROCODONE BITARTRATE AND ACETAMINOPHEN 5; 325 MG/1; MG/1
2 TABLET ORAL 3 TIMES DAILY
Qty: 180 TABLET | Refills: 0 | Status: SHIPPED | OUTPATIENT
Start: 2024-04-10 | End: 2024-05-07

## 2024-04-10 NOTE — TELEPHONE ENCOUNTER
Routing to PCP    Patient requesting refill of hydrocodone-acetaminophen rx    Willing to send?    RN pended for pcp to sign if agreeable    Michelle Guevara RN

## 2024-04-10 NOTE — TELEPHONE ENCOUNTER
Called to schedule MTM appt, no answer, LVM.     Maria M Gomez, PharmD  Medication Therapy Management Pharmacist  [phone]  [facility]: [phone]

## 2024-04-26 ENCOUNTER — TELEPHONE (OUTPATIENT)
Dept: FAMILY MEDICINE | Facility: CLINIC | Age: 74
End: 2024-04-26
Payer: MEDICARE

## 2024-04-26 NOTE — TELEPHONE ENCOUNTER
Patient Quality Outreach    Patient is due for the following:   Breast Cancer Screening - Mammogram    Next Steps:   No follow up needed at this time. Patient declined mammogram     Type of outreach:    Chart review performed, no outreach needed.      Questions for provider review:    None           Yadira Sanders CMA  Chart routed to none.

## 2024-05-07 DIAGNOSIS — M79.7 FIBROMYALGIA: ICD-10-CM

## 2024-05-07 DIAGNOSIS — F11.90 CHRONIC, CONTINUOUS USE OF OPIOIDS: ICD-10-CM

## 2024-05-07 DIAGNOSIS — G89.4 CHRONIC PAIN SYNDROME: ICD-10-CM

## 2024-05-07 DIAGNOSIS — G24.3 SPASMODIC TORTICOLLIS: ICD-10-CM

## 2024-05-07 RX ORDER — HYDROCODONE BITARTRATE AND ACETAMINOPHEN 5; 325 MG/1; MG/1
2 TABLET ORAL 3 TIMES DAILY
Qty: 180 TABLET | Refills: 0 | Status: SHIPPED | OUTPATIENT
Start: 2024-05-07 | End: 2024-06-03

## 2024-05-08 ENCOUNTER — VIRTUAL VISIT (OUTPATIENT)
Dept: PHARMACY | Facility: CLINIC | Age: 74
End: 2024-05-08
Payer: MEDICARE

## 2024-05-08 DIAGNOSIS — G24.3 SPASMODIC TORTICOLLIS: ICD-10-CM

## 2024-05-08 DIAGNOSIS — K92.2 GASTRIC BLEED: ICD-10-CM

## 2024-05-08 DIAGNOSIS — F51.01 PRIMARY INSOMNIA: ICD-10-CM

## 2024-05-08 DIAGNOSIS — G89.4 CHRONIC PAIN SYNDROME: Primary | ICD-10-CM

## 2024-05-08 DIAGNOSIS — G62.9 NEUROPATHY: ICD-10-CM

## 2024-05-08 DIAGNOSIS — Z78.9 TAKES DIETARY SUPPLEMENTS: ICD-10-CM

## 2024-05-08 DIAGNOSIS — G43.909 MIGRAINE WITHOUT STATUS MIGRAINOSUS, NOT INTRACTABLE, UNSPECIFIED MIGRAINE TYPE: ICD-10-CM

## 2024-05-08 DIAGNOSIS — E78.5 HYPERLIPIDEMIA WITH TARGET LDL LESS THAN 100: ICD-10-CM

## 2024-05-08 DIAGNOSIS — I10 HYPERTENSION GOAL BP (BLOOD PRESSURE) < 140/90: ICD-10-CM

## 2024-05-08 DIAGNOSIS — I25.83 CORONARY ARTERY DISEASE DUE TO LIPID RICH PLAQUE: ICD-10-CM

## 2024-05-08 DIAGNOSIS — M79.7 FIBROMYALGIA: ICD-10-CM

## 2024-05-08 DIAGNOSIS — I25.10 CORONARY ARTERY DISEASE DUE TO LIPID RICH PLAQUE: ICD-10-CM

## 2024-05-08 DIAGNOSIS — I21.4 NSTEMI (NON-ST ELEVATED MYOCARDIAL INFARCTION) (H): ICD-10-CM

## 2024-05-08 PROCEDURE — 99207 PR NO CHARGE LOS: CPT | Mod: 93 | Performed by: PHARMACIST

## 2024-05-08 NOTE — PROGRESS NOTES
Medication Therapy Management (MTM) Encounter    ASSESSMENT:                            Medication Adherence/Access: No issues identified    Pain/muscle spasms/migraines:  Gabapentin, Norco, and trazodone can increase risk of CNS effects, cognitive impairment falls, etc - discussed again today, she declines any changes here.    Sleep: As above regarding trazodone, could consider slight decrease in dose to trazodone 150 mg at bedtime, however she declines, she will consider this in the future though.    Hypertension /CAD: blood pressure is not at goal < 140/90, may benefit from recheck.    GERD /History of 5 bleeding ulcers/Constipation: Stable.    Hyperlipidemia Stable.    Supplements Stable.     Mild cognitive impairment:  As above, discussed Gabapentin, Norco, and trazodone can all worsen this, she declines any changes today but will consider in future.    PLAN:                            1. Contact pharmacy and request same manufacture of gabapentin 300 mg from 9/17/23 fill - this was the smaller capsule size that you like.  2. Check blood pressure at home later today and call me and leave a message with what the reading is.   3. Gabapentin, Norco, and trazodone can all worsen memory and please consider reducing doses in future and let me or Dr. Jain know if and when you are ready to do this.    Follow-up: Return in about 3 months (around 8/8/2024) for Medication Therapy Management.    SUBJECTIVE/OBJECTIVE:                          Carina Calvert is a 74 year old female called for a follow-up visit.       Reason for visit: med review.    Allergies/ADRs: Reviewed in chart  Past Medical History: Reviewed in chart  Tobacco: She reports that she has quit smoking. Her smoking use included cigarettes. She has a 3 pack-year smoking history. She has been exposed to tobacco smoke. She has never used smokeless tobacco.  Alcohol: none  Caffeine: 1-2 cups coffee/day    Medication Adherence/Access:   Patient takes  medications directly from bottles - sets them out in the morning for the whole day.  Patient takes medications every 5 hours  Per patient, misses medication 0 times per week.   The patient fills medications at [place]: NO, fills medications at [facility].    Pain/muscle spasms/migraines:    Gabapentin 300 mg every morning, 300 mg at noon, 600 mg at bedtime   Norco 5-325 mg, takes two every morning, two at noon and one at 5 pm and one at bedtime  Baclofen 10 mg four times daily   Acetaminophen 500 mg at bedtime    She'd like smaller pill size for gabapentin 300 mg - she's had a fill from 9/17/23 where the pill sizes were smaller and that worked better (imprint ). The larger pills are harder to swallow.  Pain is still bad, neck pain, bulging disk. Previously tried reducing gabapentin with worsened pain.  No longer takes ibuprofen because she was swelling up and has been told to avoid this.     Sleep:    Trazodone 200 mg at bedtime (reduced from 300 mg in the past)    Sleep is here and there, has good nights and bad nights.   Notes maybe a little drowsy in the morning, but she lays down and rests, no other daytime drowsiness. She is hesitant to go down any further on trazodone, feels like she needs it.  Medication History:  Melatonin 20 mg not effective    Hypertension /CAD:  Metoprolol tartrate 50 mg twice daily  Aspirin 81 mg daily   Nitroglycerin SL if needed, no recent use    Follows with [facility] cardiology. History of three stents.  Patient reports no current medication side effects  Patient  has a home blood pressure cuff, doesn't always check, needs daughter Isra to help her use this who is currently at work .       BP Readings from Last 3 Encounters:   02/12/24 (!) 148/86   02/12/24 116/62   01/24/24 (!) 175/89     Pulse Readings from Last 3 Encounters:   02/12/24 50   02/12/24 52   12/15/23 54     GERD /History of 5 bleeding ulcers/Constipation:  Omeprazole 20 mg daily   Polyethylene glycol  17 daily with coffee, sometimes twice daily  Probiotic twice daily    Patient feels that current regimen is effective.  The patient does have a history of GI bleed.       Hyperlipidemia   Rosuvastatin 10 mg daily     Patient reports no significant myalgias or other side effects.     Recent Labs   Lab Test 02/12/24  1738 05/26/22  1505   CHOL 117 96   HDL 55 43*   LDL 47 39   TRIG 75 72     Supplements   Vitamin B12 1000 mcg daily   Vitamin D 2000 international unit(s) daily    States this/these are effective. Denies side effects.      Mild cognitive impairment:    She saw neurology 12/15/23, Dr. Dana Badillo and reports she was told she has mild cognitive disorder. They recommended a follow-up appointment, however Carina didn't feel that was necessary yet - they were going to do a scan on her brain.    Today's Vitals: There were no vitals taken for this visit.  ----------------      I spent 32 minutes with this patient today. All changes were made via collaborative practice agreement with Gerber Jain MD. A copy of the visit note was provided to the patient's provider(s).    A summary of these recommendations was mailed to the patient.    Maria M Gomez, PazD  Medication Therapy Management Pharmacist    Telemedicine Visit Details  Type of service:  Telephone visit  Start Time: 7:37 AM  End Time: 8:09 AM     Medication Therapy Recommendations  Hypertension goal BP (blood pressure) < 140/90    Current Medication: metoprolol tartrate (LOPRESSOR) 50 MG tablet   Rationale: Medication requires monitoring - Needs additional monitoring   Recommendation: Self-Monitoring   Status: Patient Agreed - Adherence/Education

## 2024-05-08 NOTE — Clinical Note
WALI DIANA note, thanks!  Maria M Gomez, PharmD Medication Therapy Management Pharmacist [phone]

## 2024-05-08 NOTE — PATIENT INSTRUCTIONS
"Recommendations from today's MTM visit:                                                    MTM (medication therapy management) is a service provided by a clinical pharmacist designed to help you get the most of out of your medicines.   Today we reviewed what your medicines are for, how to know if they are working, that your medicines are safe and how to make your medicine regimen as easy as possible.      1. Contact pharmacy and request same manufacture of gabapentin 300 mg from 9/17/23 fill - this was the smaller capsule size that you like.  2. Check blood pressure at home later today and call me and leave a message with what the reading is.   3. Gabapentin, Norco, and trazodone can all worsen memory and please consider reducing doses in future and let me or Dr. Jain know if and when you are ready to do this.    Follow-up: Return in about 3 months (around 8/8/2024) for Medication Therapy Management.    It was great speaking with you today.  I value your experience and would be very thankful for your time in providing feedback in our clinic survey. In the next few days, you may receive an email or text message from [employer] with a link to a survey related to your  clinical pharmacist.\"     To schedule another MTM appointment, please call the clinic directly or you may call the MTM scheduling line at [phone] or toll-free at [phone].     My Clinical Pharmacist's contact information:                                                      Please feel free to contact me with any questions or concerns you have.      Maria M Gomez, PharmD  Medication Therapy Management Pharmacist     "

## 2024-05-15 ENCOUNTER — HOSPITAL ENCOUNTER (EMERGENCY)
Facility: CLINIC | Age: 74
Discharge: HOME | End: 2024-05-15
Attending: EMERGENCY MEDICINE | Admitting: EMERGENCY MEDICINE
Payer: MEDICARE

## 2024-05-15 ENCOUNTER — TELEPHONE (OUTPATIENT)
Dept: PHARMACY | Facility: CLINIC | Age: 74
End: 2024-05-15
Payer: MEDICARE

## 2024-05-15 VITALS
DIASTOLIC BLOOD PRESSURE: 74 MMHG | OXYGEN SATURATION: 93 % | SYSTOLIC BLOOD PRESSURE: 172 MMHG | RESPIRATION RATE: 29 BRPM | HEART RATE: 60 BPM | TEMPERATURE: 98 F

## 2024-05-15 DIAGNOSIS — R07.89 OTHER CHEST PAIN: ICD-10-CM

## 2024-05-15 PROBLEM — G43.709 CHRONIC MIGRAINE WITHOUT AURA, NOT INTRACTABLE, WITHOUT STATUS MIGRAINOSUS: Status: ACTIVE | Noted: 2021-04-01

## 2024-05-15 LAB
ANION GAP SERPL CALCULATED.3IONS-SCNC: 9 MMOL/L (ref 7–15)
ATRIAL RATE - MUSE: 53 BPM
ATRIAL RATE - MUSE: 55 BPM
BUN SERPL-MCNC: 14.2 MG/DL (ref 8–23)
CALCIUM SERPL-MCNC: 9.3 MG/DL (ref 8.8–10.2)
CHLORIDE SERPL-SCNC: 103 MMOL/L (ref 98–107)
CREAT SERPL-MCNC: 0.8 MG/DL (ref 0.51–0.95)
DEPRECATED HCO3 PLAS-SCNC: 28 MMOL/L (ref 22–29)
DIASTOLIC BLOOD PRESSURE - MUSE: 74 MMHG
DIASTOLIC BLOOD PRESSURE - MUSE: 79 MMHG
EGFRCR SERPLBLD CKD-EPI 2021: 78 ML/MIN/1.73M2
ERYTHROCYTE [DISTWIDTH] IN BLOOD BY AUTOMATED COUNT: 13.6 % (ref 10–15)
GLUCOSE SERPL-MCNC: 117 MG/DL (ref 70–99)
HCT VFR BLD AUTO: 41.3 % (ref 35–47)
HGB BLD-MCNC: 13.4 G/DL (ref 11.7–15.7)
INTERPRETATION ECG - MUSE: NORMAL
INTERPRETATION ECG - MUSE: NORMAL
MAGNESIUM SERPL-MCNC: 2.3 MG/DL (ref 1.7–2.3)
MCH RBC QN AUTO: 30.5 PG (ref 26.5–33)
MCHC RBC AUTO-ENTMCNC: 32.4 G/DL (ref 31.5–36.5)
MCV RBC AUTO: 94 FL (ref 78–100)
P AXIS - MUSE: 57 DEGREES
P AXIS - MUSE: 64 DEGREES
PLATELET # BLD AUTO: 132 10E3/UL (ref 150–450)
POTASSIUM SERPL-SCNC: 4.4 MMOL/L (ref 3.4–5.3)
PR INTERVAL - MUSE: 140 MS
PR INTERVAL - MUSE: 166 MS
QRS DURATION - MUSE: 84 MS
QRS DURATION - MUSE: 86 MS
QT - MUSE: 474 MS
QT - MUSE: 474 MS
QTC - MUSE: 444 MS
QTC - MUSE: 453 MS
R AXIS - MUSE: 68 DEGREES
R AXIS - MUSE: 72 DEGREES
RBC # BLD AUTO: 4.4 10E6/UL (ref 3.8–5.2)
SODIUM SERPL-SCNC: 140 MMOL/L (ref 135–145)
SYSTOLIC BLOOD PRESSURE - MUSE: 194 MMHG
SYSTOLIC BLOOD PRESSURE - MUSE: 198 MMHG
T AXIS - MUSE: 52 DEGREES
T AXIS - MUSE: 53 DEGREES
TROPONIN T SERPL HS-MCNC: 8 NG/L
VENTRICULAR RATE- MUSE: 53 BPM
VENTRICULAR RATE- MUSE: 55 BPM
WBC # BLD AUTO: 5.2 10E3/UL (ref 4–11)

## 2024-05-15 PROCEDURE — 93005 ELECTROCARDIOGRAM TRACING: CPT | Performed by: EMERGENCY MEDICINE

## 2024-05-15 PROCEDURE — 83735 ASSAY OF MAGNESIUM: CPT | Performed by: EMERGENCY MEDICINE

## 2024-05-15 PROCEDURE — 250N000013 HC RX MED GY IP 250 OP 250 PS 637: Performed by: EMERGENCY MEDICINE

## 2024-05-15 PROCEDURE — 36415 COLL VENOUS BLD VENIPUNCTURE: CPT | Performed by: EMERGENCY MEDICINE

## 2024-05-15 PROCEDURE — 84484 ASSAY OF TROPONIN QUANT: CPT | Performed by: EMERGENCY MEDICINE

## 2024-05-15 PROCEDURE — 80048 BASIC METABOLIC PNL TOTAL CA: CPT | Performed by: EMERGENCY MEDICINE

## 2024-05-15 PROCEDURE — 85027 COMPLETE CBC AUTOMATED: CPT | Performed by: EMERGENCY MEDICINE

## 2024-05-15 PROCEDURE — 99284 EMERGENCY DEPT VISIT MOD MDM: CPT

## 2024-05-15 RX ORDER — FAMOTIDINE 40 MG/1
40 TABLET, FILM COATED ORAL DAILY
Qty: 14 TABLET | Refills: 0 | Status: SHIPPED | OUTPATIENT
Start: 2024-05-15 | End: 2024-05-31

## 2024-05-15 RX ORDER — METHOCARBAMOL 500 MG/1
500 TABLET, FILM COATED ORAL 2 TIMES DAILY PRN
Qty: 18 TABLET | Refills: 0 | Status: SHIPPED | OUTPATIENT
Start: 2024-05-15 | End: 2024-05-31

## 2024-05-15 RX ORDER — MAGNESIUM HYDROXIDE/ALUMINUM HYDROXICE/SIMETHICONE 120; 1200; 1200 MG/30ML; MG/30ML; MG/30ML
30 SUSPENSION ORAL ONCE
Status: COMPLETED | OUTPATIENT
Start: 2024-05-15 | End: 2024-05-15

## 2024-05-15 RX ADMIN — ALUMINUM HYDROXIDE, MAGNESIUM HYDROXIDE, AND DIMETHICONE 30 ML: 200; 20; 200 SUSPENSION ORAL at 19:38

## 2024-05-15 ASSESSMENT — COLUMBIA-SUICIDE SEVERITY RATING SCALE - C-SSRS
6. HAVE YOU EVER DONE ANYTHING, STARTED TO DO ANYTHING, OR PREPARED TO DO ANYTHING TO END YOUR LIFE?: NO
2. HAVE YOU ACTUALLY HAD ANY THOUGHTS OF KILLING YOURSELF IN THE PAST MONTH?: NO
1. IN THE PAST MONTH, HAVE YOU WISHED YOU WERE DEAD OR WISHED YOU COULD GO TO SLEEP AND NOT WAKE UP?: NO

## 2024-05-15 ASSESSMENT — ACTIVITIES OF DAILY LIVING (ADL)
ADLS_ACUITY_SCORE: 35
ADLS_ACUITY_SCORE: 35

## 2024-05-15 NOTE — ED PROVIDER NOTES
EMERGENCY DEPARTMENT ENCOUNTER      NAME: Carina Calvert  AGE: 74 year old female  YOB: 1950  MRN: [id number]  EVALUATION DATE & TIME: 5/15/2024  6:46 PM    PCP: Gerber Jain    ED PROVIDER: Christiano Lanza M.D.      Chief Complaint   Patient presents with    Chest Pain         FINAL IMPRESSION:  1. Other chest pain          ED COURSE & MEDICAL DECISION MAKING:    Pertinent Labs & Imaging studies reviewed below.  All EKGs below represent my independent interpretation.   ED Course as of 05/15/24 2302   Wed May 15, 2024   1929 Patient is a 74-year-old woman with history of CAD, ACS, status post multiple drug-eluting stents, on aspirin, here with intermittent chest pain that has been ongoing for months, but has been significantly worse over the last 24 hours.  It is intermittent fleeting 1 to 3 seconds pain to the left chest, dull pain.  No provoking factors.  On arrival she is hypertensive 198/74 with normal temperature, heart rate.  Lung sounds are clear.  Plan to screen for ACS, give GI cocktail to see if this helps.  Otherwise musculoskeletal pain chest wall muscle spasm is on the differential.   1929 EKG shows sinus bradycardia with rate of 55.  No acute ischemic ST or T wave morphology.  Normal axis, normal intervals.  Compared to prior EKG on March 1 May 2023, there is no significant change.  Impression: Sinus bradycardia with rate of 55.   1939 Patient's chest pain seemed worse, she hit the call button.  Repeat EKG was performed.   1939 Repeat EKG shows sinus bradycardia with rate of 53.  No ST or T wave changes when compared to prior.  Impression: Sinus bradycardia without change from prior EKG.   2021 Troponin T, High Sensitivity: 8   I updated the patient.  She is continue to feel the sensation throughout her stay here.  Troponin of 8 with symptoms greater than 6 hours is more than adequate to rule out ACS in the context of low cardiac risk symptomatology.    Recommend muscle relaxer, H2  blocker to add onto her PPI to see if this helps for other possible etiologies.  Primary care follow-up.  I do not think this is cardiac etiology.    Additional ED Course Timestamps:  6:51 PM I introduced myself to the patient, obtained patient history, performed a physical exam, and discussed plan for ED workup including potential diagnostic laboratory/imaging studies and interventions.  8:45 PM Rechecked and updated the patient. We discussed the plan for discharge and the patient is agreeable. Reviewed supportive cares, symptomatic treatment, outpatient follow up, and reasons to return to the Emergency Department. Patient to be discharged by ED RN.       Medical Decision Making  Obtained supplemental history:Supplemental history obtained?: Family Member/Significant Other  Reviewed external records: External records reviewed?: Documented in chart and Outpatient Record: 2/12/24 cardiology office visit  Care impacted by chronic illness:Heart Disease, Hyperlipidemia, and Hypertension  Care significantly affected by social determinants of health:N/A  Did you consider but not order tests?: Work up considered but not performed and documented in chart, if applicable  Did you interpret images independently?: Independent interpretation of ECG and images noted in documentation, when applicable.  Consultation discussion with other provider: Phone conversation with consultants will be documented in the ED Course  Discharge. I prescribed additional prescription strength medication(s) as charted. N/A.      At the conclusion of the encounter I discussed the results of all of the tests and the disposition. The questions were answered. The patient or family acknowledged understanding and was agreeable with the care plan.       MEDICATIONS GIVEN IN THE EMERGENCY:  Medications   alum & mag hydroxide-simethicone (MAALOX) suspension 30 mL (30 mLs Oral $Given 5/15/24 1938)         NEW PRESCRIPTIONS STARTED AT TODAY'S ER VISIT  Discharge  Medication List as of 5/15/2024  8:52 PM        START taking these medications    Details   famotidine (PEPCID) 40 MG tablet Take 1 tablet (40 mg) by mouth daily, Disp-14 tablet, R-0, E-Prescribe      methocarbamol (ROBAXIN) 500 MG tablet Take 1 tablet (500 mg) by mouth 2 times daily as needed for muscle spasms (chest pain), Disp-18 tablet, R-0, E-Prescribe                =================================================================    HPI    Carina Calvert is a 74 year old female who presents to this ED for evaluation of chest pain.    Patient reports that she normally gets short and non severe episodes of left sided chest pain that quickly resolved on its own. Over the past 2 days she has been having more frequent episodes that are more painful and last longer. She says the episodes last a few seconds up to a few minutes.  She describes the pain as dull. Nothing makes the pain worse including exertion or deep inspiration. She denies shortness of breath, abdominal pain, leg swelling or pain.       VITALS:  BP (!) 172/74   Pulse 60   Temp 98  F (36.7  C) (Temporal)   Resp 29   SpO2 93%     PHYSICAL EXAM    Constitutional: Well developed, well nourished. Comfortable appearing.  HENT: Atraumatic, mucous membranes moist, nose normal. Neck- Supple, gross ROM intact.   Eyes: Pupils mid-range, conjunctiva without injection, no discharge.   Respiratory: Clear to auscultation bilaterally, no respiratory distress, no wheezing, speaks full sentences easily. No cough.  Cardiovascular: Normal heart rate, regular rhythm, no murmurs.   GI: Soft, no tenderness to deep palpation in all quadrants, no masses.  Musculoskeletal: Moving all 4 extremities intentionally and without pain. No obvious deformity.  Skin: Warm, dry, no rash.  Neurologic: Alert & oriented x 3, cranial nerves grossly intact.  Psychiatric: Affect normal, cooperative.    IEder am serving as a scribe to document services personally performed by  Dr. Christiano Lanza based on my observation and the provider's statements to me. I, Christiano Lanza MD attest that Eder Muller is acting in a scribe capacity, has observed my performance of the services and has documented them in accordance with my direction.    Christiano Lanza M.D.  Emergency Medicine  [facility] EMERGENCY ROOM  [address] [address]  [phone]  Dept: [phone]       Christiano Lanza MD  05/15/24 5604

## 2024-05-15 NOTE — TELEPHONE ENCOUNTER
Returned Jeane and daughter Isra's phone call - they had left message stating Jeane's blood pressure reading yesterday was 191/97. This reading was taken right after Jeane was busy getting ready for the day and active on her feet. No symptoms of headache, chest pain, arm pain, or anything else. Feels fine.     Discussed recheck blood pressure today, resting 20 minutes, no caffeine within 20 minutes.  If > 180 for the top number, go to ER OR if develops any new symptoms or doesn't feel right.   Can also have blood pressure checked at [facility].    Maria M Gomez, PharmD  Medication Therapy Management Pharmacist

## 2024-05-15 NOTE — ED TRIAGE NOTES
Pt arrives to ED with c/o worsening left sided chest pain for the past two days. No shortness of breath. Hypertension hx. Heart attack in 2015 three stents and a balloon placed.      Triage Assessment (Adult)       Row Name 05/15/24 5544          Triage Assessment    Airway WDL WDL        Respiratory WDL    Respiratory WDL WDL        Skin Circulation/Temperature WDL    Skin Circulation/Temperature WDL WDL        Cardiac WDL    Cardiac WDL X;chest pain        Chest Pain Assessment    Chest Pain Location anterior chest, left     Chest Pain Intervention cardiac monitor placed;12-lead ECG obtained        Peripheral/Neurovascular WDL    Peripheral Neurovascular WDL WDL        Cognitive/Neuro/Behavioral WDL    Cognitive/Neuro/Behavioral WDL WDL        [place] Coma Scale    Best Eye Response 4-->(E4) spontaneous     Best Motor Response 6-->(M6) obeys commands     Best Verbal Response 5-->(V5) oriented     [place]mar Coma Scale Score 15

## 2024-05-16 NOTE — DISCHARGE INSTRUCTIONS
On top of Tylenol, I recommend a muscle relaxer.  Try a single dose before you go to bed tonight.  If it makes you too sedated or sleepy during the day, do not take it.  If it is not helping after the first 1 or 2 doses, do not continue taking it either.    Otherwise see if adding famotidine to your regimen for the next 2 weeks helps.  This dropped your stomach acid level and it would help prevent spasm of the esophagus.    It will be important to follow-up with your primary care doctor, please call tomorrow to set up an appointment for the next 1 to 2 weeks.

## 2024-05-31 ENCOUNTER — OFFICE VISIT (OUTPATIENT)
Dept: INTERNAL MEDICINE | Facility: CLINIC | Age: 74
End: 2024-05-31
Payer: MEDICARE

## 2024-05-31 VITALS
HEIGHT: 60 IN | TEMPERATURE: 98 F | DIASTOLIC BLOOD PRESSURE: 75 MMHG | SYSTOLIC BLOOD PRESSURE: 158 MMHG | OXYGEN SATURATION: 96 % | BODY MASS INDEX: 27.05 KG/M2 | RESPIRATION RATE: 16 BRPM | HEART RATE: 49 BPM | WEIGHT: 137.8 LBS

## 2024-05-31 DIAGNOSIS — I21.4 NSTEMI (NON-ST ELEVATED MYOCARDIAL INFARCTION) (H): ICD-10-CM

## 2024-05-31 DIAGNOSIS — Z23 NEED FOR TDAP VACCINATION: ICD-10-CM

## 2024-05-31 DIAGNOSIS — K21.00 GASTROESOPHAGEAL REFLUX DISEASE WITH ESOPHAGITIS, UNSPECIFIED WHETHER HEMORRHAGE: ICD-10-CM

## 2024-05-31 DIAGNOSIS — M85.80 OSTEOPENIA, UNSPECIFIED LOCATION: ICD-10-CM

## 2024-05-31 DIAGNOSIS — I10 HYPERTENSION GOAL BP (BLOOD PRESSURE) < 140/90: ICD-10-CM

## 2024-05-31 DIAGNOSIS — B18.1 CHRONIC VIRAL HEPATITIS B WITHOUT DELTA AGENT AND WITHOUT COMA (H): ICD-10-CM

## 2024-05-31 DIAGNOSIS — Z23 NEED FOR SHINGLES VACCINE: ICD-10-CM

## 2024-05-31 DIAGNOSIS — Z29.11 NEED FOR VACCINATION AGAINST RESPIRATORY SYNCYTIAL VIRUS: ICD-10-CM

## 2024-05-31 DIAGNOSIS — R07.89 MUSCULOSKELETAL CHEST PAIN: Primary | ICD-10-CM

## 2024-05-31 DIAGNOSIS — F33.2 SEVERE EPISODE OF RECURRENT MAJOR DEPRESSIVE DISORDER, WITHOUT PSYCHOTIC FEATURES (H): ICD-10-CM

## 2024-05-31 DIAGNOSIS — Z23 NEED FOR PROPHYLACTIC VACCINATION AGAINST HEPATITIS A: ICD-10-CM

## 2024-05-31 DIAGNOSIS — K92.2 GASTRIC BLEED: ICD-10-CM

## 2024-05-31 DIAGNOSIS — Z23 NEED FOR PROPHYLACTIC VACCINATION AGAINST HEPATITIS B VIRUS: ICD-10-CM

## 2024-05-31 DIAGNOSIS — Z12.31 VISIT FOR SCREENING MAMMOGRAM: ICD-10-CM

## 2024-05-31 PROCEDURE — G2211 COMPLEX E/M VISIT ADD ON: HCPCS | Performed by: INTERNAL MEDICINE

## 2024-05-31 PROCEDURE — 99214 OFFICE O/P EST MOD 30 MIN: CPT | Performed by: INTERNAL MEDICINE

## 2024-05-31 RX ORDER — FAMOTIDINE 40 MG/1
40 TABLET, FILM COATED ORAL 2 TIMES DAILY PRN
Qty: 60 TABLET | Refills: 3 | Status: SHIPPED | OUTPATIENT
Start: 2024-05-31 | End: 2024-09-18

## 2024-05-31 RX ORDER — LOSARTAN POTASSIUM 25 MG/1
25 TABLET ORAL DAILY
Qty: 30 TABLET | Refills: 0 | Status: SHIPPED | OUTPATIENT
Start: 2024-05-31 | End: 2024-06-24

## 2024-05-31 RX ORDER — RESPIRATORY SYNCYTIAL VIRUS VACCINE 120MCG/0.5
0.5 KIT INTRAMUSCULAR ONCE
Qty: 1 EACH | Refills: 0 | Status: CANCELLED | OUTPATIENT
Start: 2024-05-31 | End: 2024-05-31

## 2024-05-31 RX ORDER — METHOCARBAMOL 500 MG/1
500 TABLET, FILM COATED ORAL 2 TIMES DAILY PRN
Qty: 180 TABLET | Refills: 1 | Status: SHIPPED | OUTPATIENT
Start: 2024-05-31 | End: 2024-09-26

## 2024-05-31 NOTE — PROGRESS NOTES
Assessment & Plan     Musculoskeletal chest pain  I am seeing this patient in follow up, post emergency room evaluation follow up office visit  . Daughter is present for this appointment. Patient presented with a chest pain history that had no features consistent with angina pectoralis and had several features consistent with musculoskeletal chest wall pain. We reviewed the emergency room discharge summary in significant detail  and reviewed her diagnosis which , given the fact that she has a decades long fibromyalgia syndrome history this diagnosis was not questioned by options or her daughter , the emergency room . Muscle relaxers were tried and patient says these were beneficial so this is prescribed. Also considered gastroesophageal reflux disease as being a possible contributing factor and thus she was prescribed a new medication, pepcid (Famotidine) to be taken on top of her already current regularly scheduled medication of a proton pump inhibitor.   - REVIEW OF HEALTH MAINTENANCE PROTOCOL ORDERS  - methocarbamol (ROBAXIN) 500 MG tablet; Take 1 tablet (500 mg) by mouth 2 times daily as needed for muscle spasms (chest pain)    Gastroesophageal reflux disease with esophagitis, unspecified whether hemorrhage  She requested we go ahead and send a new prescription   - famotidine (PEPCID) 40 MG tablet; Take 1 tablet (40 mg) by mouth 2 times daily as needed for heartburn    Gastric bleed  As above   - famotidine (PEPCID) 40 MG tablet; Take 1 tablet (40 mg) by mouth 2 times daily as needed for heartburn    Hypertension goal BP (blood pressure) < 140/90  We needed to add losartan ( Cozaar ) today as her mono therapy with beta blocker is not able to be increased further and she definitely needs a second drug   - losartan (COZAAR) 25 MG tablet; Take 1 tablet (25 mg) by mouth daily Have medical assistant blood pressure recheck at the clinic in 3-5 weeks of starting this medication    NSTEMI (non-ST elevated myocardial  infarction) (H)  Problem is stable and ongoing monitoring      Need for shingles vaccine  Declines   - REVIEW OF HEALTH MAINTENANCE PROTOCOL ORDERS    Need for Tdap vaccination  Declines   - REVIEW OF HEALTH MAINTENANCE PROTOCOL ORDERS    Need for prophylactic vaccination against hepatitis A  Declines   - REVIEW OF HEALTH MAINTENANCE PROTOCOL ORDERS    Need for prophylactic vaccination against hepatitis B virus  Declines   - REVIEW OF HEALTH MAINTENANCE PROTOCOL ORDERS    Need for vaccination against respiratory syncytial virus  Declines   - REVIEW OF HEALTH MAINTENANCE PROTOCOL ORDERS    Visit for screening mammogram  Declines     Osteopenia, unspecified location  Declines DEXA scan     Severe episode of recurrent major depressive disorder, without psychotic features (H)  Said to be problem is stable and ongoing monitoring      Chronic viral hepatitis B without delta agent and without coma (H)  This appears to be an inaccurate diagnosis and will check hepatitis B markers at our soonest opportunity       MED REC REQUIRED  Post Medication Reconciliation Status: discharge medications reconciled and changed, per note/orders  BMI  Estimated body mass index is 26.91 kg/m  as calculated from the following:    Height as of this encounter: 1.524 m (5').    Weight as of this encounter: 62.5 kg (137 lb 12.8 oz).   Weight management plan: Discussed healthy diet and exercise guidelines      Demarcus Lim is a 74 year old, presenting for the following health issues:  Hospital F/U      2015 ST elevation myocardial infarction followed by 3 stents and  long term following with cardiology  , still has once a year follow up ,last office visit was said to be everything fine. Blood pressure concerns, always just a little bit elevated    BP Readings from Last 6 Encounters:   05/31/24 (!) 158/75   05/15/24 (!) 172/74   02/12/24 (!) 148/86   02/12/24 116/62   01/24/24 (!) 175/89   12/15/23 (!) 159/85       Wt Readings from Last 5  Encounters:   05/31/24 62.5 kg (137 lb 12.8 oz)   02/12/24 60.8 kg (134 lb)   02/12/24 60.8 kg (134 lb)   12/15/23 60.5 kg (133 lb 6.4 oz)   09/11/23 59 kg (130 lb)     Body mass index is 26.91 kg/m .          5/31/2024    10:57 AM   Additional Questions   Roomed by isai HODGES   ED/UC Followup:    Facility:  [facility]  Date of visit: 05/15/2024  Reason for visit: chest pain   Current Status: still having pains, and taking muscle relaxer every 3 days         Review of Systems  Constitutional, HEENT, cardiovascular, pulmonary, gi and gu systems are negative, except as otherwise noted.      Objective    BP (!) 158/75   Pulse (!) 49   Temp 98  F (36.7  C) (Temporal)   Resp 16   Ht 1.524 m (5')   Wt 62.5 kg (137 lb 12.8 oz)   SpO2 96%   BMI 26.91 kg/m    Body mass index is 26.91 kg/m .  Physical Exam   GENERAL: alert and no distress  EYES: Eyes grossly normal to inspection, PERRL and conjunctivae and sclerae normal  MS: no gross musculoskeletal defects noted, no edema  NEURO: Normal strength and tone, mentation intact and speech normal  PSYCH: mentation appears normal, affect normal/bright    Orders Placed This Encounter   Procedures    REVIEW OF HEALTH MAINTENANCE PROTOCOL ORDERS             Signed Electronically by: Gerber Jain MD

## 2024-06-03 ENCOUNTER — TELEPHONE (OUTPATIENT)
Dept: FAMILY MEDICINE | Facility: CLINIC | Age: 74
End: 2024-06-03
Payer: MEDICARE

## 2024-06-03 DIAGNOSIS — F11.90 CHRONIC, CONTINUOUS USE OF OPIOIDS: ICD-10-CM

## 2024-06-03 DIAGNOSIS — M79.7 FIBROMYALGIA: ICD-10-CM

## 2024-06-03 DIAGNOSIS — G24.3 SPASMODIC TORTICOLLIS: ICD-10-CM

## 2024-06-03 DIAGNOSIS — G89.4 CHRONIC PAIN SYNDROME: ICD-10-CM

## 2024-06-03 RX ORDER — HYDROCODONE BITARTRATE AND ACETAMINOPHEN 5; 325 MG/1; MG/1
2 TABLET ORAL 3 TIMES DAILY
Qty: 180 TABLET | Refills: 0 | Status: SHIPPED | OUTPATIENT
Start: 2024-06-03 | End: 2024-07-02

## 2024-06-03 NOTE — TELEPHONE ENCOUNTER
Medication Question or Refill    Contacts         Type Contact Phone/Fax    06/03/2024 08:48 AM CDT Phone (Incoming) Carina Calvert (Self) [phone] (H)            What medication are you calling about (include dose and sig)?: hydrocodone/acetaminophen    Preferred Pharmacy:     [facility] [id number] [place] [facility] [address] [place] [address]  [address] [place] [address]  Phone: [phone] Fax: [phone]      Controlled Substance Agreement on file:   CSA -- Patient Level:     [Media Unavailable] Controlled Substance Agreement - Opioid - Scan on 11/3/2023 12:21 PM   [Media Unavailable] Controlled Substance Agreement - Opioid - Scan on 9/23/2022  6:46 PM   [Media Unavailable] Controlled Substance Agreement - Opioid - Scan on 7/27/2021  7:53 AM       Who prescribed the medication?: Dr. VALERIE Jain     Do you need a refill? Yes    Okay to leave a detailed message?: Yes at Home number on file [phone] (home)

## 2024-06-03 NOTE — TELEPHONE ENCOUNTER
Refill request sent to refill team    GEETA SebastianN RN  [facility]

## 2024-06-05 ENCOUNTER — TELEPHONE (OUTPATIENT)
Dept: INTERNAL MEDICINE | Facility: CLINIC | Age: 74
End: 2024-06-05
Payer: MEDICARE

## 2024-06-05 NOTE — TELEPHONE ENCOUNTER
PA is needed for the medication, Methocarbamol 500mg. Would you like to start PA or Rx alternative medication? If PA, please list all medications this patient has tried along with any contraindications that may have been experienced in the past. Thank you.    Pharmacy: [facility]  Phone: [phone]    Insurance Plan: Medicare [facility]  Phone:   Pt ID: 6/5/2024   Group:     Forwarded to PA for review.

## 2024-06-13 NOTE — TELEPHONE ENCOUNTER
Central Prior Authorization Team   Phone: [phone]    PA Initiation    Medication: Methocarbamol 500mg  Insurance Company: [employer] ([facility]) - Phone [phone] Fax [phone]  Pharmacy Filling the Rx: [facility] [id number] [place], [place] - [address] [place] [address]  Filling Pharmacy Phone: [phone]  Filling Pharmacy Fax:    Start Date: 6/13/2024

## 2024-06-13 NOTE — TELEPHONE ENCOUNTER
Prior Authorization Approval    Authorization Effective Date: 6/13/2024  Authorization Expiration Date: 12/31/2024  Medication: Methocarbamol 500mg  Approved Dose/Quantity:    Reference #:     Insurance Company: [employer] ([facility]) - Phone [phone] Fax [phone]  Expected CoPay:       CoPay Card Available:      Foundation Assistance Needed:    Which Pharmacy is filling the prescription (Not needed for infusion/clinic administered): [facility] [id number] [place] [address]  Pharmacy Notified:  Yes  Patient Notified:  **Instructed pharmacy to notify patient when script is ready to /ship.**

## 2024-06-24 DIAGNOSIS — I10 HYPERTENSION GOAL BP (BLOOD PRESSURE) < 140/90: ICD-10-CM

## 2024-06-24 RX ORDER — LOSARTAN POTASSIUM 25 MG/1
25 TABLET ORAL DAILY
Qty: 30 TABLET | Refills: 0 | Status: SHIPPED | OUTPATIENT
Start: 2024-06-24 | End: 2024-08-21

## 2024-06-27 ENCOUNTER — ALLIED HEALTH/NURSE VISIT (OUTPATIENT)
Dept: FAMILY MEDICINE | Facility: CLINIC | Age: 74
End: 2024-06-27
Payer: MEDICARE

## 2024-06-27 VITALS — DIASTOLIC BLOOD PRESSURE: 73 MMHG | SYSTOLIC BLOOD PRESSURE: 169 MMHG

## 2024-06-27 DIAGNOSIS — I10 HYPERTENSION GOAL BP (BLOOD PRESSURE) < 140/90: Primary | ICD-10-CM

## 2024-06-27 NOTE — PROGRESS NOTES
Carina Calvert is a 74 year old patient who comes in today for a Blood Pressure check.  Initial BP:  BP (!) 171/75 (BP Location: Right arm, Patient Position: Chair, Cuff Size: Adult Regular)    2nd BP Readin/73 right arm chair adult regular  Data Unavailable  Disposition: results routed to provider    Checked with the provider he said to route chart to him and he will reach out to the patient. Patient was feeling good when she left.    Malka Crawford [facility]

## 2024-06-28 ENCOUNTER — TELEPHONE (OUTPATIENT)
Dept: FAMILY MEDICINE | Facility: CLINIC | Age: 74
End: 2024-06-28
Payer: MEDICARE

## 2024-06-28 RX ORDER — LOSARTAN POTASSIUM 50 MG/1
50 TABLET ORAL DAILY
Qty: 30 TABLET | Refills: 1 | Status: SHIPPED | OUTPATIENT
Start: 2024-06-28 | End: 2024-07-22

## 2024-06-28 NOTE — PROGRESS NOTES
BP Readings from Last 6 Encounters:   06/27/24 (!) 169/73   05/31/24 (!) 158/75   05/15/24 (!) 172/74   02/12/24 (!) 148/86   02/12/24 116/62   01/24/24 (!) 175/89     We have not made any significant progress yet and we need to continue with medication adjustments     For now we need to double up on the losartan ( Cozaar ) to 50 milligrams    Ask patient to  Double up on the 25 milligram pills , take 2 of these at a time  I am sending in new 50 milligram dose  Please return to clinic for medical assistant blood pressure recheck  in 2-4 weeks after starting new dose      Gerber Jain MD

## 2024-06-28 NOTE — PROGRESS NOTES
Pt returned call. Provider's message relayed to pt. Pt verbalized understanding and agrees with plan. Pt declined to schedule ancillary appt at this time because her Daughter brings her to appointments and she will need to check with her and call back.    Elizabet Rose RN  [facility]

## 2024-06-28 NOTE — TELEPHONE ENCOUNTER
Patient called to see if anyone has called her.  She though that she had missed a call.    Advised patient it looks like she did talk to a nurse yesterday regarding the below:    We have not made any significant progress yet and we need to continue with medication adjustments      For now we need to double up on the losartan ( Cozaar ) to 50 milligrams     Ask patient to  Double up on the 25 milligram pills , take 2 of these at a time  I am sending in new 50 milligram dose  Please return to clinic for medical assistant blood pressure recheck  in 2-4 weeks after starting new dose    Stated that yes, she did talk about this already.  She was just making sure no one else called.    Camila RHODES RN  Triage Nurse  [facility]       negative...

## 2024-07-02 ENCOUNTER — TELEPHONE (OUTPATIENT)
Dept: FAMILY MEDICINE | Facility: CLINIC | Age: 74
End: 2024-07-02
Payer: MEDICARE

## 2024-07-02 DIAGNOSIS — F11.90 CHRONIC, CONTINUOUS USE OF OPIOIDS: ICD-10-CM

## 2024-07-02 DIAGNOSIS — G24.3 SPASMODIC TORTICOLLIS: ICD-10-CM

## 2024-07-02 DIAGNOSIS — G89.4 CHRONIC PAIN SYNDROME: ICD-10-CM

## 2024-07-02 DIAGNOSIS — M79.7 FIBROMYALGIA: ICD-10-CM

## 2024-07-02 RX ORDER — HYDROCODONE BITARTRATE AND ACETAMINOPHEN 5; 325 MG/1; MG/1
2 TABLET ORAL 3 TIMES DAILY
Qty: 180 TABLET | Refills: 0 | Status: SHIPPED | OUTPATIENT
Start: 2024-07-02 | End: 2024-07-29

## 2024-07-02 NOTE — TELEPHONE ENCOUNTER
Medication Question or Refill        What medication are you calling about (include dose and sig)?: NORCO 5-325 MG tablet Route: Take 2 tablets by mouth 3 times daily 28 days or more between refills - Oral     Preferred Pharmacy:       [facility] [address] [place] [address] [address]  Phone: [phone] Fax: [phone]      Controlled Substance Agreement on file:   CSA -- Patient Level:     [Media Unavailable] Controlled Substance Agreement - Opioid - Scan on 11/3/2023 12:21 PM   [Media Unavailable] Controlled Substance Agreement - Opioid - Scan on 9/23/2022  6:46 PM   [Media Unavailable] Controlled Substance Agreement - Opioid - Scan on 7/27/2021  7:53 AM       Who prescribed the medication?: Dr Jain    Do you need a refill? Yes    When did you use the medication last? today    Patient offered an appointment? No    Do you have any questions or concerns?  Yes: pt has enough pills for today.  Requesting refill be approved today.      Okay to leave a detailed message?: Yes at Home number on file [phone] (home)

## 2024-07-02 NOTE — TELEPHONE ENCOUNTER
Patient calling about her norco Rx, she only has a dose left for this evening and then she is out.    I carson'd pharmcy and Rx, PCP is out of clinic until 7/11/24 so routed high priority to covering provider team.    Laura BARRETO RN  [facility] Triage

## 2024-07-22 DIAGNOSIS — I10 HYPERTENSION GOAL BP (BLOOD PRESSURE) < 140/90: ICD-10-CM

## 2024-07-22 RX ORDER — LOSARTAN POTASSIUM 50 MG/1
50 TABLET ORAL DAILY
Qty: 90 TABLET | Refills: 1 | Status: SHIPPED | OUTPATIENT
Start: 2024-07-22

## 2024-07-22 NOTE — TELEPHONE ENCOUNTER
Patient stated the pharmacy needs a 90 day supply of the Losartan 50 mg.     Mahad'd if appropriate.     Sheela Mcguire RN on 7/22/2024 at 12:11 PM

## 2024-07-29 ENCOUNTER — TELEPHONE (OUTPATIENT)
Dept: FAMILY MEDICINE | Facility: CLINIC | Age: 74
End: 2024-07-29
Payer: MEDICARE

## 2024-07-29 DIAGNOSIS — G24.3 SPASMODIC TORTICOLLIS: ICD-10-CM

## 2024-07-29 DIAGNOSIS — G89.4 CHRONIC PAIN SYNDROME: ICD-10-CM

## 2024-07-29 DIAGNOSIS — F11.90 CHRONIC, CONTINUOUS USE OF OPIOIDS: ICD-10-CM

## 2024-07-29 DIAGNOSIS — M79.7 FIBROMYALGIA: ICD-10-CM

## 2024-07-29 RX ORDER — HYDROCODONE BITARTRATE AND ACETAMINOPHEN 5; 325 MG/1; MG/1
2 TABLET ORAL 3 TIMES DAILY
Qty: 180 TABLET | Refills: 0 | Status: SHIPPED | OUTPATIENT
Start: 2024-07-29 | End: 2024-08-26

## 2024-07-29 NOTE — TELEPHONE ENCOUNTER
Patient calling for her Mullan refll from Dr. Jain.    I see last Rx was 7/2/24 so due for refill.    Mahad'd and routed to PCP for refill.    Laura BARRETO RN  [facility] Triage

## 2024-08-21 ENCOUNTER — VIRTUAL VISIT (OUTPATIENT)
Dept: PHARMACY | Facility: CLINIC | Age: 74
End: 2024-08-21

## 2024-08-21 DIAGNOSIS — M79.7 FIBROMYALGIA: ICD-10-CM

## 2024-08-21 DIAGNOSIS — K92.2 GASTRIC BLEED: ICD-10-CM

## 2024-08-21 DIAGNOSIS — G89.4 CHRONIC PAIN SYNDROME: Primary | ICD-10-CM

## 2024-08-21 DIAGNOSIS — I21.4 NSTEMI (NON-ST ELEVATED MYOCARDIAL INFARCTION) (H): ICD-10-CM

## 2024-08-21 DIAGNOSIS — Z78.9 TAKES DIETARY SUPPLEMENTS: ICD-10-CM

## 2024-08-21 DIAGNOSIS — I10 HYPERTENSION GOAL BP (BLOOD PRESSURE) < 140/90: ICD-10-CM

## 2024-08-21 DIAGNOSIS — G24.3 SPASMODIC TORTICOLLIS: ICD-10-CM

## 2024-08-21 DIAGNOSIS — G43.909 MIGRAINE WITHOUT STATUS MIGRAINOSUS, NOT INTRACTABLE, UNSPECIFIED MIGRAINE TYPE: ICD-10-CM

## 2024-08-21 DIAGNOSIS — I25.10 CORONARY ARTERY DISEASE DUE TO LIPID RICH PLAQUE: ICD-10-CM

## 2024-08-21 DIAGNOSIS — E78.5 HYPERLIPIDEMIA WITH TARGET LDL LESS THAN 100: ICD-10-CM

## 2024-08-21 DIAGNOSIS — G62.9 NEUROPATHY: ICD-10-CM

## 2024-08-21 DIAGNOSIS — I25.83 CORONARY ARTERY DISEASE DUE TO LIPID RICH PLAQUE: ICD-10-CM

## 2024-08-21 DIAGNOSIS — F51.01 PRIMARY INSOMNIA: ICD-10-CM

## 2024-08-21 PROCEDURE — 99207 PR NO CHARGE LOS: CPT | Mod: 93 | Performed by: PHARMACIST

## 2024-08-21 RX ORDER — FAMOTIDINE 20 MG/1
40 TABLET, FILM COATED ORAL EVERY EVENING
Qty: 90 TABLET | Refills: 1 | Status: SHIPPED | OUTPATIENT
Start: 2024-08-21 | End: 2024-09-18

## 2024-08-21 NOTE — PATIENT INSTRUCTIONS
"Recommendations from today's MTM visit:                                                         Decrease famotidine to 40 mg every evening.  Schedule blood pressure check - Maria M will let Isra know you need to do this.  Gabapentin, Norco, and trazodone, baclofen and methocarbamol can all worsen memory and contribute to drowsiness and dizziness. Please consider reducing doses and let me or Dr. Jain know if and when you are ready to do this.    Follow-up: Return in about 4 weeks (around 9/18/2024) for Medication Therapy Management.    It was great speaking with you today.  I value your experience and would be very thankful for your time in providing feedback in our clinic survey. In the next few days, you may receive an email or text message from [employer] with a link to a survey related to your  clinical pharmacist.\"     To schedule another MTM appointment, please call the clinic directly or you may call the MTM scheduling line at [phone] or toll-free at [phone].     My Clinical Pharmacist's contact information:                                                      Please feel free to contact me with any questions or concerns you have.      Maria M Gomez, PharmD  Medication Therapy Management Pharmacist     "

## 2024-08-21 NOTE — Clinical Note
WALI DIANA note, thanks!  Maria M Gomez, PharmD Medication Therapy Management Pharmacist [phone]

## 2024-08-21 NOTE — PROGRESS NOTES
Medication Therapy Management (MTM) Encounter    ASSESSMENT:                            Medication Adherence/Access: No issues identified    Pain/muscle spasms/migraines:  Gabapentin, Norco, trazodone, baclofen and methocarbamol can increase risk of CNS effects, cognitive impairment falls, etc - discussed again today, she declines any changes here - she maintain methocarbamol as once daily vs increasing to twice daily.    Sleep: As above regarding trazodone, has decreased this overall from 300 mg.    Hypertension/CAD: blood pressure is not at goal < 140/90, may benefit from recheck.    GERD /History of 5 bleeding ulcers/Constipation: Famotidine dose is ok in the short term, however now that symptoms are improved, may benefit from reducing dose which is more appropriate long-term per renal function.    Hyperlipidemia Stable.    Supplements Stable.     Mild cognitive impairment:  As above, discussed Gabapentin, Norco, and trazodone, baclofen, and methocarbamol can all worsen this, she declines any changes today but will consider in future.    PLAN:                            Decrease famotidine to 40 mg every evening.  Schedule blood pressure check - Maria M will let Isra know you need to do this.  Gabapentin, Norco, and trazodone, baclofen and methocarbamol can all worsen memory and contribute to drowsiness and dizziness. Please consider reducing doses and let me or Dr. Jain know if and when you are ready to do this.    -Called and spoke with Isra - she will help Carina take a home blood pressure reading with their blood pressure machine.    Follow-up: Return in about 4 weeks (around 9/18/2024) for Medication Therapy Management.    SUBJECTIVE/OBJECTIVE:                          Carina Calvert is a 74 year old female seen for a follow-up visit.       Reason for visit: med review.    Allergies/ADRs: Reviewed in chart  Past Medical History: Reviewed in chart  Tobacco: She reports that she has quit smoking. Her smoking  use included cigarettes. She has a 3 pack-year smoking history. She has been exposed to tobacco smoke. She has never used smokeless tobacco.  Alcohol: none    Medication Adherence/Access:   Patient takes medications directly from bottles - sets them out in the morning for the whole day.  Patient takes medications every 5 hours  Per patient, misses medication 0 times per week.   The patient fills medications at [place]: NO, fills medications at [facility]    Pain/muscle spasms/migraines:    Gabapentin 300 mg every morning, 300 mg at noon, 600 mg at bedtime   Norco 5-325 mg, takes two every morning, two at noon and one at 5 pm and one at bedtime  Baclofen 10 mg four times daily (also for hiccups, rachel horses)  Acetaminophen 500 mg at bedtime  Methocarbamol 500 mg twice daily as needed, she's only been taking it once daily- for chest muscle pain    No longer takes ibuprofen because she was swelling up and has been told to avoid this.   Denies side effects.    Sleep:    Trazodone 150-175 mg at bedtime     Sleep is here and there, has good nights and bad nights.   Notes maybe a little drowsy in the morning, but she lays down and rests, no other daytime drowsiness. She is hesitant to go down any further on trazodone, feels like she needs it.  Medication History:  Melatonin 20 mg not effective    Hypertension /CAD:  Metoprolol tartrate 50 mg twice daily  Losartan 50 mg daily -blood pressure hasn't been rechecked since starting this two months ago  Aspirin 81 mg daily   Nitroglycerin SL if needed, no recent use    [facility]. History of three stents.  Patient reports no current medication side effects  Patient has a home blood pressure cuff, doesn't always check, needs daughter Isra to help her use this who is currently at work.      BP Readings from Last 3 Encounters:   06/27/24 (!) 169/73   05/31/24 (!) 158/75   05/15/24 (!) 172/74     GERD /History of 5 bleeding  ulcers/Constipation:  Omeprazole 20 mg daily   Famotidine 40 mg twice daily   Polyethylene glycol 17 daily with coffee, sometimes twice daily  Probiotic once to twice daily    Heartburn gone now with addition of famotidine.  Patient feels that current regimen is effective.  The patient does have a history of GI bleed.     Hyperlipidemia   Rosuvastatin 10 mg daily     Patient reports no significant myalgias or other side effects.  Recent Labs   Lab Test 02/12/24  1738 05/26/22  1505   CHOL 117 96   HDL 55 43*   LDL 47 39   TRIG 75 72     Supplements   Vitamin B12 1000 mcg daily   Vitamin D 2000 international unit(s) daily    States this/these are effective. Denies side effects.      Mild cognitive impairment:    She saw neurology 12/15/23, Dr. Dana Badillo and reports she was told she has mild cognitive disorder. They recommended a follow-up appointment, however Carina didn't feel that was necessary yet - they were going to do a scan on her brain.    Today's Vitals: There were no vitals taken for this visit.  ----------------      I spent 24 minutes with this patient today. All changes were made via collaborative practice agreement with Gerber Jain MD. A copy of the visit note was provided to the patient's provider(s).    A summary of these recommendations was mailed to the patient.    Maria M Gomez PharmD  Medication Therapy Management Pharmacist      Telemedicine Visit Details  Type of service:  Telephone visit  Start Time: 1:03 PM  End Time: 1:27 PM     Medication Therapy Recommendations  Gastric bleed    Current Medication: famotidine (PEPCID) 40 MG tablet   Rationale: Dose too high - Dosage too high - Safety   Recommendation: Decrease Dose   Status: Accepted per CPA

## 2024-08-26 DIAGNOSIS — F11.90 CHRONIC, CONTINUOUS USE OF OPIOIDS: ICD-10-CM

## 2024-08-26 DIAGNOSIS — K92.2 GASTRIC BLEED: ICD-10-CM

## 2024-08-26 DIAGNOSIS — M79.7 FIBROMYALGIA: ICD-10-CM

## 2024-08-26 DIAGNOSIS — G89.4 CHRONIC PAIN SYNDROME: ICD-10-CM

## 2024-08-26 DIAGNOSIS — G24.3 SPASMODIC TORTICOLLIS: ICD-10-CM

## 2024-08-26 RX ORDER — HYDROCODONE BITARTRATE AND ACETAMINOPHEN 5; 325 MG/1; MG/1
2 TABLET ORAL 3 TIMES DAILY
Qty: 180 TABLET | Refills: 0 | Status: SHIPPED | OUTPATIENT
Start: 2024-08-26 | End: 2024-09-23

## 2024-08-26 NOTE — TELEPHONE ENCOUNTER
Medication Question or Refill    Contacts       Contact Date/Time Type Contact Phone/Fax    08/26/2024 08:39 AM CDT Phone (Incoming) Carina Calvert (Self) [phone] (H)        What medication are you calling about (include dose and sig)?:   HYDROcodone-acetaminophen (NORCO) 5-325 MG tablet 180 tablet 0 7/29/2024 -- No   Sig - Route: Take 2 tablets by mouth 3 times daily 28 days or more between refills - Oral     Preferred Pharmacy: [place] [address] [place] [facility] [place] [address] [address]  Phone: [phone] Fax: [phone]  Controlled Substance Agreement on file:   CSA -- Patient Level:     [Media Unavailable] Controlled Substance Agreement - Opioid - Scan on 11/3/2023 12:21 PM   [Media Unavailable] Controlled Substance Agreement - Opioid - Scan on 9/23/2022  6:46 PM   [Media Unavailable] Controlled Substance Agreement - Opioid - Scan on 7/27/2021  7:53 AM       Who prescribed the medication?: Gerber Jain   Do you need a refill? Yes  Patient offered an appointment? Yes:   Next 5 appointments (look out 90 days)      Sep 18, 2024 10:00 AM  Pharmacist Visit with Mary Gomez RPH  [facility] ([facility] ) [address] [place] [id number] [phone]     Sep 26, 2024 4:40 PM  (Arrive by 4:20 PM)  Provider Visit with Gerber Jain MD  [facility] ([facility] ) [address] [place] [phone]        Do you have any questions or concerns?  Yes: She will be out of medications after tomorrow 8/27/2024    Okay to leave a detailed message?: Yes at Cell number on file:    Telephone Information:   Mobile [phone]

## 2024-08-27 RX ORDER — FAMOTIDINE 20 MG/1
40 TABLET, FILM COATED ORAL EVERY EVENING
Qty: 90 TABLET | Refills: 1 | OUTPATIENT
Start: 2024-08-27

## 2024-09-18 ENCOUNTER — VIRTUAL VISIT (OUTPATIENT)
Dept: PHARMACY | Facility: CLINIC | Age: 74
End: 2024-09-18

## 2024-09-18 DIAGNOSIS — Z78.9 TAKES DIETARY SUPPLEMENTS: ICD-10-CM

## 2024-09-18 DIAGNOSIS — I21.4 NSTEMI (NON-ST ELEVATED MYOCARDIAL INFARCTION) (H): ICD-10-CM

## 2024-09-18 DIAGNOSIS — G24.3 SPASMODIC TORTICOLLIS: ICD-10-CM

## 2024-09-18 DIAGNOSIS — I25.10 CORONARY ARTERY DISEASE DUE TO LIPID RICH PLAQUE: ICD-10-CM

## 2024-09-18 DIAGNOSIS — G62.9 NEUROPATHY: ICD-10-CM

## 2024-09-18 DIAGNOSIS — G89.4 CHRONIC PAIN SYNDROME: Primary | ICD-10-CM

## 2024-09-18 DIAGNOSIS — G43.909 MIGRAINE WITHOUT STATUS MIGRAINOSUS, NOT INTRACTABLE, UNSPECIFIED MIGRAINE TYPE: ICD-10-CM

## 2024-09-18 DIAGNOSIS — I25.83 CORONARY ARTERY DISEASE DUE TO LIPID RICH PLAQUE: ICD-10-CM

## 2024-09-18 DIAGNOSIS — F51.01 PRIMARY INSOMNIA: ICD-10-CM

## 2024-09-18 DIAGNOSIS — K92.2 GASTRIC BLEED: ICD-10-CM

## 2024-09-18 DIAGNOSIS — I10 HYPERTENSION GOAL BP (BLOOD PRESSURE) < 140/90: ICD-10-CM

## 2024-09-18 DIAGNOSIS — E78.5 HYPERLIPIDEMIA WITH TARGET LDL LESS THAN 100: ICD-10-CM

## 2024-09-18 DIAGNOSIS — M79.7 FIBROMYALGIA: ICD-10-CM

## 2024-09-18 PROCEDURE — 99207 PR NO CHARGE LOS: CPT | Mod: 93 | Performed by: PHARMACIST

## 2024-09-18 RX ORDER — TRAZODONE HYDROCHLORIDE 100 MG/1
150 TABLET ORAL AT BEDTIME
Qty: 135 TABLET | Refills: 1 | Status: SHIPPED | OUTPATIENT
Start: 2024-09-18

## 2024-09-18 RX ORDER — FAMOTIDINE 40 MG/1
40 TABLET, FILM COATED ORAL EVERY EVENING
Qty: 90 TABLET | Refills: 1 | Status: SHIPPED | OUTPATIENT
Start: 2024-09-18

## 2024-09-18 NOTE — PATIENT INSTRUCTIONS
"Recommendations from today's MTM visit:                                                         Check blood pressure at home and call Maria M with reading (resting blood pressure, sit and rest 20 minutes before reading, legs uncrossed, no caffeine with in 20 minutes of reading).    Follow-up: Return in about 6 months (around 3/18/2025) for Medication Therapy Management.    It was great speaking with you today.  I value your experience and would be very thankful for your time in providing feedback in our clinic survey. In the next few days, you may receive an email or text message from [employer] with a link to a survey related to your  clinical pharmacist.\"     To schedule another MTM appointment, please call the clinic directly or you may call the MTM scheduling line at [phone] or toll-free at [phone].     My Clinical Pharmacist's contact information:                                                      Please feel free to contact me with any questions or concerns you have.      Maria M Gomez, PharmD  Medication Therapy Management Pharmacist     "

## 2024-09-18 NOTE — Clinical Note
WALI DIANA note, thanks!  Maria M Gomez, PharmD Medication Therapy Management Pharmacist [phone]

## 2024-09-18 NOTE — PROGRESS NOTES
Medication Therapy Management (MTM) Encounter    ASSESSMENT:                            Medication Adherence/Access: See below for considerations    Pain/muscle spasms/migraines:  Gabapentin, Norco, trazodone, baclofen and methocarbamol can increase risk of CNS effects, cognitive impairment falls, etc - discussed again today, she declines any changes here.    Sleep: As above regarding trazodone, has decreased this overall from 300 mg.    Hypertension/CAD: due for blood pressure and BMP recheck after increasing losartan - has PCP visit soon.    GERD /History of 5 bleeding ulcers/Constipation: Stable.    Hyperlipidemia Stable.    Supplements Stable.     Mild cognitive impairment:  As above, discussed Gabapentin, Norco, and trazodone, baclofen, and methocarbamol can all worsen this, she declines any changes today but will consider in future.    PLAN:                            Check blood pressure at home and call Maria M with reading (resting blood pressure, sit and rest 20 minutes before reading, legs uncrossed, no caffeine with in 20 minutes of reading).    Follow-up: Return in about 6 months (around 3/18/2025) for Medication Therapy Management.    SUBJECTIVE/OBJECTIVE:                          Carina Calvert is a 74 year old female seen for a follow-up visit.       Reason for visit: med review.    Allergies/ADRs: Reviewed in chart  Past Medical History: Reviewed in chart  Tobacco: She reports that she has quit smoking. Her smoking use included cigarettes. She has a 3 pack-year smoking history. She has been exposed to tobacco smoke. She has never used smokeless tobacco.  Alcohol: none    Medication Adherence/Access:   Patient takes medications directly from bottles - sets them out in the morning for the whole day.  Patient takes medications every 5 hours  Per patient, misses medication 0 times per week.   The patient fills medications at [place]: NO, fills medications at [facility] in [facility].    Pain/muscle  spasms/migraines:    Gabapentin 300 mg every morning, 300 mg at noon, 600 mg at bedtime   Norco 5-325 mg, takes two every morning, two at noon and one at 5 pm and one at bedtime  Baclofen 10 mg four times daily (also for hiccups, rachel horses)  Acetaminophen 500 mg at bedtime  Methocarbamol 500 mg twice daily as needed, every 2-3 days    Chest pain/muscle spasm comes and goes. Neuropathy can act up bad at times.  No longer takes ibuprofen because she was swelling up and has been told to avoid this.   Denies side effects.    Sleep:    Trazodone 150-175 mg at bedtime     Sleep is here and there, has good nights and bad nights.   Notes maybe a little drowsy in the morning, but she lays down and rests, no other daytime drowsiness. She is hesitant to go down any further on trazodone, feels like she needs it.  Medication History:  Melatonin 20 mg not effective    Hypertension/CAD:  Metoprolol tartrate 50 mg twice daily  Losartan 50 mg daily -blood pressure hasn't been rechecked since starting this three months ago   Aspirin 81 mg daily   Nitroglycerin SL if needed, no recent use    [facility]. History of three stents.  Patient reports no current medication side effects  Patient has a home blood pressure cuff, doesn't always check, needs daughter Isra to help her use this who is currently at work.    BP Readings from Last 3 Encounters:   06/27/24 (!) 169/73   05/31/24 (!) 158/75   05/15/24 (!) 172/74     GERD /History of 5 bleeding ulcers/Constipation:  Omeprazole 20 mg daily   Famotidine 40 mg every evening (decreased from 40 mg twice daily)  Polyethylene glycol 17 daily with coffee, sometimes twice daily  Probiotic once to twice daily    Heartburn gone now with addition of famotidine (lower dose of 40 mg daily is good), omeprazole along wasn't enough.   Patient feels that current regimen is effective.  The patient does have a history of GI bleed.     Hyperlipidemia   Rosuvastatin 10 mg daily      Patient reports no significant myalgias or other side effects.  Recent Labs   Lab Test 02/12/24  1738 05/26/22  1505   CHOL 117 96   HDL 55 43*   LDL 47 39   TRIG 75 72     Supplements   Vitamin B12 1000 mcg daily   Vitamin D 2000 international unit(s) daily    States this/these are effective. Denies side effects.      Mild cognitive impairment:    She saw neurology 12/15/23, Dr. Dana Badillo and reports she was told she has mild cognitive disorder. They recommended a follow-up appointment, however Carina didn't feel that was necessary yet - they were going to do a scan on her brain.    Today's Vitals: There were no vitals taken for this visit.  ----------------      I spent 14 minutes with this patient today. All changes were made via collaborative practice agreement with Gerber Jain MD. A copy of the visit note was provided to the patient's provider(s).    A summary of these recommendations was mailed to the patient.    Maria M Gomez, PharmD  Medication Therapy Management Pharmacist      Telemedicine Visit Details  Type of service:  Telephone visit  Start Time: 10:01 AM  End Time: 10:15 AM     Medication Therapy Recommendations  No medication therapy recommendations to display

## 2024-09-23 ENCOUNTER — TELEPHONE (OUTPATIENT)
Dept: FAMILY MEDICINE | Facility: CLINIC | Age: 74
End: 2024-09-23
Payer: MEDICARE

## 2024-09-23 DIAGNOSIS — G24.3 SPASMODIC TORTICOLLIS: ICD-10-CM

## 2024-09-23 DIAGNOSIS — G89.4 CHRONIC PAIN SYNDROME: ICD-10-CM

## 2024-09-23 DIAGNOSIS — F11.90 CHRONIC, CONTINUOUS USE OF OPIOIDS: ICD-10-CM

## 2024-09-23 DIAGNOSIS — M79.7 FIBROMYALGIA: ICD-10-CM

## 2024-09-23 RX ORDER — HYDROCODONE BITARTRATE AND ACETAMINOPHEN 5; 325 MG/1; MG/1
2 TABLET ORAL 3 TIMES DAILY
Qty: 180 TABLET | Refills: 0 | Status: SHIPPED | OUTPATIENT
Start: 2024-09-23

## 2024-09-23 NOTE — TELEPHONE ENCOUNTER
Patient Quality Outreach    Patient is due for the following:   Chronic Opioid Use -  Treatment Agreement (CSA), Urine Drug Screen, and TRUNG-7    Next Steps:   Patient has upcoming appointment, these items will be addressed at that time.    Type of outreach:    Chart review performed, no outreach needed.      Questions for provider review:    None           Yadira Sanders CMA  Chart routed to none.

## 2024-09-23 NOTE — TELEPHONE ENCOUNTER
Patient calling, states that she has appointment on Thursday 9/26/24 with arrival tome of 4:20pm. Patient states that she will be out of her NORCO prescription after Wednesday and does not want to go the whole day without pain control until the appointment. Asking if PCP is willing to send script as she has the appointment scheduled.    Pharmacy: [facility] [address] [place] [address]     Patient requesting a callback with provider's response CB#: [phone], dvm ok    Thanks,  GEETA JiménezN RN  [facility]

## 2024-09-26 ENCOUNTER — OFFICE VISIT (OUTPATIENT)
Dept: INTERNAL MEDICINE | Facility: CLINIC | Age: 74
End: 2024-09-26
Payer: MEDICARE

## 2024-09-26 VITALS
OXYGEN SATURATION: 97 % | TEMPERATURE: 97.8 F | BODY MASS INDEX: 27.17 KG/M2 | HEIGHT: 60 IN | RESPIRATION RATE: 16 BRPM | HEART RATE: 49 BPM | WEIGHT: 138.4 LBS | DIASTOLIC BLOOD PRESSURE: 78 MMHG | SYSTOLIC BLOOD PRESSURE: 154 MMHG

## 2024-09-26 DIAGNOSIS — I10 HYPERTENSION GOAL BP (BLOOD PRESSURE) < 140/90: ICD-10-CM

## 2024-09-26 DIAGNOSIS — Z23 NEED FOR SHINGLES VACCINE: ICD-10-CM

## 2024-09-26 DIAGNOSIS — Z23 NEED FOR PROPHYLACTIC VACCINATION AGAINST HEPATITIS A: ICD-10-CM

## 2024-09-26 DIAGNOSIS — R07.9 CHRONIC CHEST PAIN: ICD-10-CM

## 2024-09-26 DIAGNOSIS — G89.4 CHRONIC PAIN SYNDROME: Primary | ICD-10-CM

## 2024-09-26 DIAGNOSIS — G89.29 CHRONIC CHEST PAIN: ICD-10-CM

## 2024-09-26 DIAGNOSIS — I25.10 CORONARY ARTERY DISEASE DUE TO LIPID RICH PLAQUE: ICD-10-CM

## 2024-09-26 DIAGNOSIS — Z12.31 VISIT FOR SCREENING MAMMOGRAM: ICD-10-CM

## 2024-09-26 DIAGNOSIS — Z23 NEED FOR PROPHYLACTIC VACCINATION AGAINST HEPATITIS B VIRUS: ICD-10-CM

## 2024-09-26 DIAGNOSIS — I25.83 CORONARY ARTERY DISEASE DUE TO LIPID RICH PLAQUE: ICD-10-CM

## 2024-09-26 DIAGNOSIS — Z29.11 NEED FOR VACCINATION AGAINST RESPIRATORY SYNCYTIAL VIRUS: ICD-10-CM

## 2024-09-26 DIAGNOSIS — M85.89 OSTEOPENIA OF MULTIPLE SITES: ICD-10-CM

## 2024-09-26 DIAGNOSIS — Z23 NEED FOR TDAP VACCINATION: ICD-10-CM

## 2024-09-26 PROCEDURE — 99214 OFFICE O/P EST MOD 30 MIN: CPT | Performed by: INTERNAL MEDICINE

## 2024-09-26 PROCEDURE — G0481 DRUG TEST DEF 8-14 CLASSES: HCPCS | Performed by: INTERNAL MEDICINE

## 2024-09-26 RX ORDER — LOSARTAN POTASSIUM 100 MG/1
100 TABLET ORAL DAILY
Qty: 90 TABLET | Refills: 1 | Status: SHIPPED | OUTPATIENT
Start: 2024-09-26

## 2024-09-26 ASSESSMENT — ANXIETY QUESTIONNAIRES
GAD7 TOTAL SCORE: 0
8. IF YOU CHECKED OFF ANY PROBLEMS, HOW DIFFICULT HAVE THESE MADE IT FOR YOU TO DO YOUR WORK, TAKE CARE OF THINGS AT HOME, OR GET ALONG WITH OTHER PEOPLE?: NOT DIFFICULT AT ALL
GAD7 TOTAL SCORE: 0
GAD7 TOTAL SCORE: 0
7. FEELING AFRAID AS IF SOMETHING AWFUL MIGHT HAPPEN: NOT AT ALL

## 2024-09-26 ASSESSMENT — PATIENT HEALTH QUESTIONNAIRE - PHQ9
SUM OF ALL RESPONSES TO PHQ QUESTIONS 1-9: 8
SUM OF ALL RESPONSES TO PHQ QUESTIONS 1-9: 8
10. IF YOU CHECKED OFF ANY PROBLEMS, HOW DIFFICULT HAVE THESE PROBLEMS MADE IT FOR YOU TO DO YOUR WORK, TAKE CARE OF THINGS AT HOME, OR GET ALONG WITH OTHER PEOPLE: SOMEWHAT DIFFICULT

## 2024-09-26 NOTE — PROGRESS NOTES
"  Assessment & Plan     Chronic pain syndrome  Seeing Carina Calvert with her daughter Isra as is how all her appointments are now. She's a chronic oral opioid therapy patient for many many years. I have gradually decreased these to some extent but she does continue with regular daily dosing. We completed the urine drug screen and the Chronic Pain Contract today as is necessary annually.  - [id number] - Urine Drug Confirmation Panel (Comprehensive); Future  - [id number] - Urine Drug Confirmation Panel (Comprehensive)    Chronic chest pain  We got into a detailed discussion about a confusing problem. She has at this point developed a chronic chest pain. She's had a somatization disorder with chronic pain at all times and this goes as far back as 10-20 years ago now. But since at the very least 6-9 months ago she has complaints of chest pain and daughter becomes worried, understandably given the fact that this patient also has a legitimate history of coronary artery disease with stents and a myocardial infarction history with a wall motion abnormality. She's seeing a new.cardiologist now , Michelle Eldridge PA-C and I reviewed her last office visit notes from 2/2024. She's had an emergency room evaluation for these symptoms which were thought to be noncardiac. She was however prescribed Methocarbamol (Robaxin) which is not safe given the fact that she's already taking baclofen (LIORESAL) and 2 muscle relaxers makes no sense to me and beyond that, there is some risk involved from over-sedation and this is not right. We discussed the actual role of sublingual nitroglycerin and also the fact that I easily showed reproducible pain along the chest wall which duplicates her chest pain symptoms just with digital pressure . I do however feel concerned that I am told these chest pain symptoms are \" new\"meaning 6-9 months. I recommended trying some sublingual nitroglycerin but will include her cardiologist with this discussion " and further steps not yet determined       Osteopenia of multiple sites  She is not due for the DEXA scan having had this last year and she is only to be treated at this point with 2527-7130 milligram of calcium and 2000 international units of vitamin D a day     Coronary artery disease due to lipid rich plaque  As detailed above     Hypertension goal BP (blood pressure) < 140/90  I increased her losartan ( Cozaar ) to 100 milligrams and recommended medical assistant blood pressure recheck  in 3-6 weeks  - losartan (COZAAR) 100 MG tablet; Take 1 tablet (100 mg) by mouth daily.    Need for shingles vaccine  Declines     Need for Tdap vaccination  Declines     Need for prophylactic vaccination against hepatitis A  Declines     Need for prophylactic vaccination against hepatitis B virus  Declines     Need for vaccination against respiratory syncytial virus  Declines     Visit for screening mammogram  Declines         Demarcus Lim is a 74 year old, presenting for the following health issues:  No chief complaint on file.        9/26/2024     4:37 PM   Additional Questions   Roomed by isai     History of Present Illness       Reason for visit:  Revisit    She eats 2-3 servings of fruits and vegetables daily.She consumes 2 sweetened beverage(s) daily.She exercises with enough effort to increase her heart rate 9 or less minutes per day.  She exercises with enough effort to increase her heart rate 3 or less days per week.   She is taking medications regularly.     Saw cardiologist in 2/2024  Michelle Eldridge PA-C     BP Readings from Last 6 Encounters:   09/26/24 (!) 154/78   06/27/24 (!) 169/73   05/31/24 (!) 158/75   05/15/24 (!) 172/74   02/12/24 (!) 148/86   02/12/24 116/62           Review of Systems  Constitutional, HEENT, cardiovascular, pulmonary, gi and gu systems are negative, except as otherwise noted.      Objective    BP (!) 154/78   Pulse (!) 49   Temp 97.8  F (36.6  C) (Temporal)   Resp 16    Ht 1.524 m (5')   Wt 62.8 kg (138 lb 6.4 oz)   SpO2 97%   BMI 27.03 kg/m    Body mass index is 27.03 kg/m .  Physical Exam   GENERAL: alert and no distress  EYES: Eyes grossly normal to inspection, PERRL and conjunctivae and sclerae normal  RESP: lungs clear to auscultation - no rales, rhonchi or wheezes  CV: regular rate and rhythm, normal S1 S2, no S3 or S4, no murmur, click or rub, no peripheral edema  MS: no anatomic abnormality  but easily reproducible pain along the chest wall      Orders Placed This Encounter   Procedures    Urine Drug Confirmation Panel    Urine Creatinine for Drug Screen Panel    [id number] - Urine Drug Confirmation Panel (Comprehensive)             Signed Electronically by: Gerber Jain MD

## 2024-09-26 NOTE — LETTER
October 2, 2024      Carina Calvert  [address]   [facility] [address]        Dear Carina:    We are writing to inform you of your test results.    All of these tests are within acceptable limits, things look good !     Resulted Orders   Urine Drug Confirmation Panel   Result Value Ref Range    Dihydrocodeine ng/mL 638 (H) <50 ng/mL    Dihydrocodeine 434 Absent ng/mg [creat]      Comment:      Hydromorphone and dihydrocodeine are expected metabolites of hydrocodone. Hydromorphone and dihydrocodeine are also available as scheduled prescription medications.    Hydrocodone ng/mL 1,260 (H) <50 ng/mL    Hydrocodone 857 Absent ng/mg [creat]      Comment:      Sources of hydrocodone include scheduled prescription medications.  Hydromorphone and dihydrocodeine are expected metabolites of hydrocodone. Hydromorphone and dihydrocodeine are also available as scheduled prescription medications.    Hydromorphone ng/mL 537 (H) <50 ng/mL    Hydromorphone 365 Absent ng/mg [creat]      Comment:      Hydromorphone may be present as a metabolite of morphine.  Concentrations of hydromorphone rarely exceed 5% of the morphine concentration when this is the source of hydromorphone.  Hydromorphone and dihydrocodeine are expected metabolites of hydrocodone. Hydromorphone and dihydrocodeine are also available as scheduled prescription medications.    Gabapentin (Neurontin) Present (A) Absent      Comment:      Sources of gabapentin are prescription medications.    Narrative    This test was developed and its performance characteristics determined by the [facility],  Special Chemistry Laboratory. It has not been cleared or approved by the FDA. The laboratory is regulated under CLIA as qualified to perform high-complexity testing. This test is used for clinical purposes. It should not be regarded as investigational or for research.  Drugs with concentrations less than the cutoff will not be reported.   The drugs with applicable detection cutoff limits that are included within the Drug Confirmation Panel are:    The following drugs are detected with a cutoff of 3 ng/ml: FENTANYL    The following drugs are detected with a cutoff of 5 ng/mL: 6-ACETYLMORPHINE, BUPRENORPHINE, NALOXONE, NORBUPRENORPHINE, NORFENTANYL    The following drugs are detected with a cutoff of 10 ng/mL: SUFENTANIL    The following drugs are detected with a cutoff of 20 ng/mL: PCP (PHENCYCLIDINE)    The following drugs are detected with a cutoff of 50 ng/mL: 7-AMINOCLONAZEPAM, 7-AMINOFLUNITRAZEPAM, ALPRAZOLAM, AMPHETAMINE, A-OH-ALPRAZOLAM, A-OH-MIDAZOLAM, A-OH-TRIAZOLAM, BENZOYLECGONINE (Cocaine Metabolite), CLONAZEPAM, COCAETHYLENE (Cocaine Metabolite), CODEINE, DIAZEPAM, DIHYDROCODEINE, EDDP (Methadone Metabolite), HYDROCODONE, HYDROMORPHONE, LORAZEPAM, MDA (3,4-Methylenedioxyamphetamine), MDEA (3,3-Qipzchedzqpisf-J-ethylcathinone), MDMA (Methylenedioxyamphetamine,Ecstasy), MEPERIDINE, METHADONE, METHAMPHETAMINE, METHYLPHENIDATE (Ritalin), MORPHINE, NALTREXONE, N-DESMETH-TAPENTADOL, NORCODEINE, NORDIAZEPAM, NORMEPERIDINE, O-MATHEW-TRAMADOL, OXAZEPAM, OXYCODONE, OXYMORPHONE, PROPOXYPHENE, RITALINIC ACID, TAPENTADOL, TEMAZEPAM, THEBAINE, TRAMADOL    The following drugs are detected with a cutoff of 100 ng/mL: GABAPENTIN, KETAMINE    The following drugs are detected with a cutoff of 200 ng/mL: PREGABALIN, XYLAZINE    Providers with questions surrounding testing results should contact the Clinical Chemistry service line.   Urine Creatinine for Drug Screen Panel   Result Value Ref Range    Creatinine Urine for Drug Screen 147 mg/dL      Comment:      The reference range has not been established for creatinine in random urines. The results should be integrated into the clinical context for interpretation.     If you have any questions or concerns, please call the clinic at the number listed above.     Sincerely,      Gerber Jain MD/patt

## 2024-09-26 NOTE — LETTER

## 2024-09-26 NOTE — Clinical Note
Please review my office visit note from today regarding her concerns with a chronic chest pain . I believe this is all chest wall pain but have to include you in this discussion

## 2024-09-27 LAB — CREAT UR-MCNC: 147 MG/DL

## 2024-10-01 ENCOUNTER — TELEPHONE (OUTPATIENT)
Dept: CARDIOLOGY | Facility: CLINIC | Age: 74
End: 2024-10-01
Payer: MEDICARE

## 2024-10-01 LAB
DHC UR CFM-MCNC: 638 NG/ML
DHC/CREAT UR: 434 NG/MG {CREAT}
GABAPENTIN UR QL CFM: PRESENT
HYDROCODONE UR CFM-MCNC: 1260 NG/ML
HYDROCODONE/CREAT UR: 857 NG/MG {CREAT}
HYDROMORPHONE UR CFM-MCNC: 537 NG/ML
HYDROMORPHONE/CREAT UR: 365 NG/MG {CREAT}

## 2024-10-01 NOTE — TELEPHONE ENCOUNTER
Msg received 10/1/24 @ 7341:    Thanks for call her, her report is reassuring. No new recs. -     ------------------------------------------------------------    Pt called and updated that  had no new recommendations. Advised pt to call at the end of the month to set-up follow-up in February. Provided pt scheduling phone number. buck

## 2024-10-01 NOTE — TELEPHONE ENCOUNTER
"Michelle -- LEBRON, see update below. Sounds musculoskeletal. Any changes prior to follow-up 2/2025? Toneyx. Renard    ------------------------------------------------------    Spoke with pt who reports that her chronic chest pain has actually improved. She reports that she stopped taking methocarbamol and is only on baclofen which has seemed to help. She reports continued sharp pains that last seconds, same as last OV with Michelle Eldridge, and that she hasn't needed any nitroglycerin for this pain. She also reports that she has a spot on her chest that is tender to \"pushing\" and that her PCP \"tapped\" that spot and it \"hurt real bad\". Assured her that I would pass this along to Michelle Eldridge and reach back out should she have any recommendations.     Noted pt is due for follow-up 2/2025. renard  "

## 2024-10-01 NOTE — TELEPHONE ENCOUNTER
----- Message -----  From: Michelle Eldridge PA-C  Sent: 9/30/2024   1:41 PM CDT  To: [facility] Cv Rn Team Z    Can we please contact this patient and/or her daughter in regards to chest pain.    This message is from her primary care physician, ongoing atypical chest pain that during our last visit in February had been overall stable with similar characteristics since 2022.  If they feel her chest pain is changing or progressing this may warrant ischemic evaluation.    Thanks  MH      ----- Message -----  From: Gerber Jain MD  Sent: 9/26/2024   5:40 PM CDT  To: Michelle Eldridge PA-C    Please review my office visit note from today regarding her concerns with a chronic chest pain . I believe this is all chest wall pain but have to include you in this discussion

## 2024-10-21 ENCOUNTER — TELEPHONE (OUTPATIENT)
Dept: PHARMACY | Facility: CLINIC | Age: 74
End: 2024-10-21
Payer: MEDICARE

## 2024-10-21 DIAGNOSIS — M79.7 FIBROMYALGIA: ICD-10-CM

## 2024-10-21 DIAGNOSIS — F11.90 CHRONIC, CONTINUOUS USE OF OPIOIDS: ICD-10-CM

## 2024-10-21 DIAGNOSIS — G89.4 CHRONIC PAIN SYNDROME: ICD-10-CM

## 2024-10-21 DIAGNOSIS — G24.3 SPASMODIC TORTICOLLIS: ICD-10-CM

## 2024-10-21 RX ORDER — HYDROCODONE BITARTRATE AND ACETAMINOPHEN 5; 325 MG/1; MG/1
2 TABLET ORAL 3 TIMES DAILY
Qty: 180 TABLET | Refills: 0 | Status: SHIPPED | OUTPATIENT
Start: 2024-10-21

## 2024-10-21 NOTE — TELEPHONE ENCOUNTER
Received voicemail from Carina with home blood pressure reading from today.    120/61, 54    Maria M Gomez, PharmD  Medication Therapy Management Pharmacist

## 2024-10-24 ENCOUNTER — NURSE TRIAGE (OUTPATIENT)
Dept: FAMILY MEDICINE | Facility: CLINIC | Age: 74
End: 2024-10-24
Payer: MEDICARE

## 2024-10-24 NOTE — TELEPHONE ENCOUNTER
Hi nurses,    Patient called me and left message, isn't feeling well with increase in losartan. Appears she is due to recheck BP with nurse and BMP labs - can you please contact patient and get these set up, today if possible? I am not in clinic today.    Thanks,  Maria M Gomez, PharmD  Medication Therapy Management Pharmacist

## 2024-10-25 ENCOUNTER — APPOINTMENT (OUTPATIENT)
Dept: CT IMAGING | Facility: CLINIC | Age: 74
End: 2024-10-25
Attending: EMERGENCY MEDICINE
Payer: MEDICARE

## 2024-10-25 ENCOUNTER — HOSPITAL ENCOUNTER (EMERGENCY)
Facility: CLINIC | Age: 74
Discharge: HOME | End: 2024-10-25
Attending: EMERGENCY MEDICINE | Admitting: EMERGENCY MEDICINE
Payer: MEDICARE

## 2024-10-25 VITALS
DIASTOLIC BLOOD PRESSURE: 75 MMHG | HEART RATE: 47 BPM | SYSTOLIC BLOOD PRESSURE: 155 MMHG | OXYGEN SATURATION: 96 % | TEMPERATURE: 97.7 F | RESPIRATION RATE: 22 BRPM | BODY MASS INDEX: 26.37 KG/M2 | WEIGHT: 135 LBS

## 2024-10-25 DIAGNOSIS — I10 HYPERTENSION GOAL BP (BLOOD PRESSURE) < 140/90: ICD-10-CM

## 2024-10-25 DIAGNOSIS — R00.1 SINUS BRADYCARDIA: ICD-10-CM

## 2024-10-25 DIAGNOSIS — R25.3 JERKING: ICD-10-CM

## 2024-10-25 DIAGNOSIS — R26.9 ABNORMAL GAIT: ICD-10-CM

## 2024-10-25 DIAGNOSIS — I21.4 NSTEMI (NON-ST ELEVATED MYOCARDIAL INFARCTION) (H): ICD-10-CM

## 2024-10-25 DIAGNOSIS — R42 LIGHTHEADEDNESS: ICD-10-CM

## 2024-10-25 LAB
ALBUMIN SERPL BCG-MCNC: 4.2 G/DL (ref 3.5–5.2)
ALBUMIN UR-MCNC: NEGATIVE MG/DL
ALBUMIN UR-MCNC: NEGATIVE MG/DL
ALP SERPL-CCNC: 52 U/L (ref 40–150)
ALT SERPL W P-5'-P-CCNC: 9 U/L (ref 0–50)
AMMONIA PLAS-SCNC: 16 UMOL/L (ref 11–51)
ANION GAP SERPL CALCULATED.3IONS-SCNC: 10 MMOL/L (ref 7–15)
APPEARANCE UR: CLEAR
APPEARANCE UR: CLEAR
AST SERPL W P-5'-P-CCNC: 20 U/L (ref 0–45)
ATRIAL RATE - MUSE: 47 BPM
BASOPHILS # BLD AUTO: 0 10E3/UL (ref 0–0.2)
BASOPHILS NFR BLD AUTO: 0 %
BILIRUB DIRECT SERPL-MCNC: <0.2 MG/DL (ref 0–0.3)
BILIRUB SERPL-MCNC: 0.4 MG/DL
BILIRUB UR QL STRIP: NEGATIVE
BILIRUB UR QL STRIP: NEGATIVE
BUN SERPL-MCNC: 15.2 MG/DL (ref 8–23)
CALCIUM SERPL-MCNC: 9.6 MG/DL (ref 8.8–10.4)
CHLORIDE SERPL-SCNC: 105 MMOL/L (ref 98–107)
COLOR UR AUTO: ABNORMAL
COLOR UR AUTO: ABNORMAL
CREAT SERPL-MCNC: 0.89 MG/DL (ref 0.51–0.95)
DIASTOLIC BLOOD PRESSURE - MUSE: NORMAL MMHG
EGFRCR SERPLBLD CKD-EPI 2021: 69 ML/MIN/1.73M2
EOSINOPHIL # BLD AUTO: 0.1 10E3/UL (ref 0–0.7)
EOSINOPHIL NFR BLD AUTO: 1 %
ERYTHROCYTE [DISTWIDTH] IN BLOOD BY AUTOMATED COUNT: 13.2 % (ref 10–15)
GLUCOSE SERPL-MCNC: 100 MG/DL (ref 70–99)
GLUCOSE UR STRIP-MCNC: NEGATIVE MG/DL
GLUCOSE UR STRIP-MCNC: NEGATIVE MG/DL
HCO3 SERPL-SCNC: 28 MMOL/L (ref 22–29)
HCT VFR BLD AUTO: 39.8 % (ref 35–47)
HGB BLD-MCNC: 13.2 G/DL (ref 11.7–15.7)
HGB UR QL STRIP: ABNORMAL
HGB UR QL STRIP: ABNORMAL
HOLD SPECIMEN: NORMAL
IMM GRANULOCYTES # BLD: 0 10E3/UL
IMM GRANULOCYTES NFR BLD: 0 %
INTERPRETATION ECG - MUSE: NORMAL
KETONES UR STRIP-MCNC: NEGATIVE MG/DL
KETONES UR STRIP-MCNC: NEGATIVE MG/DL
LEUKOCYTE ESTERASE UR QL STRIP: ABNORMAL
LEUKOCYTE ESTERASE UR QL STRIP: NEGATIVE
LYMPHOCYTES # BLD AUTO: 2.2 10E3/UL (ref 0.8–5.3)
LYMPHOCYTES NFR BLD AUTO: 43 %
MAGNESIUM SERPL-MCNC: 2.4 MG/DL (ref 1.7–2.3)
MCH RBC QN AUTO: 31.6 PG (ref 26.5–33)
MCHC RBC AUTO-ENTMCNC: 33.2 G/DL (ref 31.5–36.5)
MCV RBC AUTO: 95 FL (ref 78–100)
MONOCYTES # BLD AUTO: 0.5 10E3/UL (ref 0–1.3)
MONOCYTES NFR BLD AUTO: 11 %
MUCOUS THREADS #/AREA URNS LPF: PRESENT /LPF
MUCOUS THREADS #/AREA URNS LPF: PRESENT /LPF
NEUTROPHILS # BLD AUTO: 2.3 10E3/UL (ref 1.6–8.3)
NEUTROPHILS NFR BLD AUTO: 46 %
NITRATE UR QL: NEGATIVE
NITRATE UR QL: NEGATIVE
NRBC # BLD AUTO: 0 10E3/UL
NRBC BLD AUTO-RTO: 0 /100
P AXIS - MUSE: 71 DEGREES
PH UR STRIP: 5.5 [PH] (ref 5–7)
PH UR STRIP: 6 [PH] (ref 5–7)
PLATELET # BLD AUTO: 134 10E3/UL (ref 150–450)
POTASSIUM SERPL-SCNC: 4.8 MMOL/L (ref 3.4–5.3)
PR INTERVAL - MUSE: 172 MS
PROT SERPL-MCNC: 7 G/DL (ref 6.4–8.3)
QRS DURATION - MUSE: 84 MS
QT - MUSE: 522 MS
QTC - MUSE: 461 MS
R AXIS - MUSE: 52 DEGREES
RBC # BLD AUTO: 4.18 10E6/UL (ref 3.8–5.2)
RBC URINE: 3 /HPF
RBC URINE: <1 /HPF
SODIUM SERPL-SCNC: 143 MMOL/L (ref 135–145)
SP GR UR STRIP: 1.02 (ref 1–1.03)
SP GR UR STRIP: 1.02 (ref 1–1.03)
SQUAMOUS EPITHELIAL: 2 /HPF
SQUAMOUS EPITHELIAL: <1 /HPF
SYSTOLIC BLOOD PRESSURE - MUSE: NORMAL MMHG
T AXIS - MUSE: 31 DEGREES
TSH SERPL DL<=0.005 MIU/L-ACNC: 1.12 UIU/ML (ref 0.3–4.2)
UROBILINOGEN UR STRIP-MCNC: 2 MG/DL
UROBILINOGEN UR STRIP-MCNC: <2 MG/DL
VENTRICULAR RATE- MUSE: 47 BPM
WBC # BLD AUTO: 5.1 10E3/UL (ref 4–11)
WBC URINE: 1 /HPF
WBC URINE: 3 /HPF

## 2024-10-25 PROCEDURE — 85004 AUTOMATED DIFF WBC COUNT: CPT | Performed by: EMERGENCY MEDICINE

## 2024-10-25 PROCEDURE — 93005 ELECTROCARDIOGRAM TRACING: CPT | Performed by: EMERGENCY MEDICINE

## 2024-10-25 PROCEDURE — 84443 ASSAY THYROID STIM HORMONE: CPT | Performed by: EMERGENCY MEDICINE

## 2024-10-25 PROCEDURE — 82248 BILIRUBIN DIRECT: CPT | Performed by: EMERGENCY MEDICINE

## 2024-10-25 PROCEDURE — 82140 ASSAY OF AMMONIA: CPT | Performed by: EMERGENCY MEDICINE

## 2024-10-25 PROCEDURE — 83735 ASSAY OF MAGNESIUM: CPT | Performed by: EMERGENCY MEDICINE

## 2024-10-25 PROCEDURE — 36415 COLL VENOUS BLD VENIPUNCTURE: CPT | Performed by: EMERGENCY MEDICINE

## 2024-10-25 PROCEDURE — 99285 EMERGENCY DEPT VISIT HI MDM: CPT | Mod: 25

## 2024-10-25 PROCEDURE — 82947 ASSAY GLUCOSE BLOOD QUANT: CPT | Performed by: EMERGENCY MEDICINE

## 2024-10-25 PROCEDURE — 70450 CT HEAD/BRAIN W/O DYE: CPT | Mod: MA

## 2024-10-25 PROCEDURE — 81003 URINALYSIS AUTO W/O SCOPE: CPT | Performed by: EMERGENCY MEDICINE

## 2024-10-25 RX ORDER — METOPROLOL TARTRATE 25 MG/1
25 TABLET, FILM COATED ORAL 2 TIMES DAILY
Qty: 60 TABLET | Refills: 0 | Status: SHIPPED | OUTPATIENT
Start: 2024-10-25 | End: 2024-11-24

## 2024-10-25 ASSESSMENT — COLUMBIA-SUICIDE SEVERITY RATING SCALE - C-SSRS
1. IN THE PAST MONTH, HAVE YOU WISHED YOU WERE DEAD OR WISHED YOU COULD GO TO SLEEP AND NOT WAKE UP?: NO
6. HAVE YOU EVER DONE ANYTHING, STARTED TO DO ANYTHING, OR PREPARED TO DO ANYTHING TO END YOUR LIFE?: NO
2. HAVE YOU ACTUALLY HAD ANY THOUGHTS OF KILLING YOURSELF IN THE PAST MONTH?: NO

## 2024-10-25 ASSESSMENT — ACTIVITIES OF DAILY LIVING (ADL): ADLS_ACUITY_SCORE: 0

## 2024-10-25 NOTE — DISCHARGE INSTRUCTIONS
Call Dr. Jain before making changes to your medications.  It is unlikely the change in the blood pressure medication losartan caused this problem.  However, your metoprolol can cause this increased weakness and even though your blood pressure has been high, you may be feeling weak because your heart rate has been too low so I would try decreasing your metoprolol dose to 25 mg twice daily.    Let him know of the new jerking and the need to be re-evaluated in clinic within a

## 2024-10-25 NOTE — ED TRIAGE NOTES
Earlier this week she had dizziness, confusion and jerkiness.  States she took half of her Losartan (which is new for her).  She states she feels better after taking only half.  Was seen here a month ago for palpitations, but the symptom persists.

## 2024-10-25 NOTE — TELEPHONE ENCOUNTER
Symptoms    Describe your symptoms: Sluggish, dizzy arms and hands have been jerking like a strong jolt hits her arm or hands, confusion, and 2-3days cannot sleep at night, thinking 100 mg of losartan was too much. BP for today, 10/25/24, was 172/85    Any pain: No    How long have you been having symptoms: 1  weeks    Have you been seen for this:  No    Appointment offered?: No    Triage offered?: Yes:     Home remedies tried: Patient has started taking half of the 100 mg pills    Preferred Pharmacy:     [facility] [address] [address] [address]  Phone: [phone] Fax: [phone]      Okay to leave a detailed message?:[phone] Isra, daughter

## 2024-10-25 NOTE — TELEPHONE ENCOUNTER
Called patient. She reports that her blood pressure is 172/85 this morning with home blood pressure cuff.     She reports taking losartan 100 mg tablet and metoprolol 50 mg BID. This morning she only took half a tablet of losartan. She took both medications right before taking blood pressure    Yesterday she was having headache, but reports that being a chronic symptom. Every once in awhile gets a little pain in left side of breast. She had SOB yesterday. Reports that she does not feel normal. She couldn't remember her symptoms and said that she wrote them down, she has to get the piece of paper to see what she wrote. She found it and states that she feels sluggish, has a little jerking happening, she is dizzy, and confused here and there. Denies any blurred vision or double vision. Denies trouble walking.     Triage disposition: Go to ED Now   Advised that she go to the ED Now. Recommended having someone drive her. Pt reports that she does not have anyone with her right now, she could call her daughter. Advised calling 911 or having daughter bring her.     Pt then replied that she doesn't think that she needs to go to the emergency room. She is going to lay down.     Reiterated multiple times importance of being seen at ED for her symptoms and recommendations to go. Pt continuing to state that she doesn't think she needs to go.    Reason for Disposition   Systolic BP >= 160 OR Diastolic >= 100, and any cardiac (e.g., breathing difficulty, chest pain) or neurologic symptoms (e.g., new-onset blurred or double vision)    Additional Information   Negative: Sounds like a life-threatening emergency to the triager   Negative: Symptom is main concern (e.g., headache, chest pain)   Negative: Low blood pressure is main concern    Protocols used: Blood Pressure - High-A-OH  Maddy Danielle RN

## 2024-10-25 NOTE — TELEPHONE ENCOUNTER
"Patient was previously adamant about not going to ED   Will reach out to daughter, Isra to review our concerns and hopefully daughter can check on patient and talk her into going to ED.  (signed consent to communicate PHI on file)    Left message on Isra's VM requesting a return call to [facility] [phone]     When Isra calls back, please review PORSHA Real's note (summary on top of her note) with daughter and recommend ED due to multiple red flag symptoms     High BP  HA  Pain on left side of breast  SOB yesterday  Does \"not feel normal\"  Could not remember symptoms and had to write down to look back  Confusion  Sluggish  \"Jerking\" in arm/hands  Dizziness    Earline Emery RN  [facility]    "

## 2024-10-25 NOTE — ED PROVIDER NOTES
Emergency Department Encounter     Evaluation Date & Time:   No admission date for patient encounter.    CHIEF COMPLAINT:  Dizziness and Altered Mental Status      Triage Note:Earlier this week she had dizziness, confusion and jerkiness.  States she took half of her Losartan (which is new for her).  She states she feels better after taking only half.  Was seen here a month ago for palpitations, but the symptom persists.            FINAL IMPRESSION:    ICD-10-CM    1. Jerking  R25.3       2. Abnormal gait  R26.9       3. NSTEMI (non-ST elevated myocardial infarction) (H)  I21.4 metoprolol tartrate (LOPRESSOR) 25 MG tablet      4. Hypertension goal BP (blood pressure) < 140/90  I10 metoprolol tartrate (LOPRESSOR) 25 MG tablet      5. Sinus bradycardia  R00.1       6. Lightheadedness  R42           Impression and Plan     ED COURSE & MEDICAL DECISION MAKING:        ED Course as of 10/25/24 1443   Fri Oct 25, 2024   1208 Patient describes episodes of myoclonus.  Her chart was reviwed including her last specialist visit in September with Dr. Figueroa.  She is not on diuretic to cause low sodium but will check electrolytes.  Check ua for uti, but pt states didn't use medicated wipes beforehand so I am cancelling the culture off of that.  Will have them recollect.  She takes opiates for pain control so if developing renal failure may be accumulating though doesn't seem lethargic this could certainly cause all her symptoms.  Also on baclofen so again may need to decrease the dose.  No localizing signs of neuro dysfunction to suggest cva but certainly may have nph so will do ct head if that hasn't been done for her gait disturbance.  Considered if jerking related to her chronic spinal issues but as no new weakness no new emergent mr imaging needed and can defer to her pmd.   1401 Patient will be signed out to Dr. Ramirez to follow up on repeat ua and the head ct and ammonia levels.  If has significant abnormality would need  admission, but otherwise consider discharge home and follow up with her physician.  Offer walker if she feels that would be helpful.   1437 Her repeat ua (after she appropriately cleaned first) confirms that there is no uti.  Will offer her walker.  Ct shows no evidence of mass effect or radiology evidence of nph.  Patient discharged to follow up with her specialist.       At the conclusion of the encounter I discussed the results of all the tests and the disposition. The questions were answered. The patient or family acknowledged understanding and was agreeable with the care plan.          0 minutes of critical care time        MEDICATIONS GIVEN IN THE EMERGENCY DEPARTMENT:  Medications - No data to display    NEW PRESCRIPTIONS STARTED AT TODAY'S ED VISIT:  Current Discharge Medication List          HPI     HPI     Carina Calvert is a 74 year old female with a pertinent history of hypertension and chronic gait disturbance who presents to this ED with her daughter  for evaluation of shakiness, arm jerking, and some mild confusion.  Patient notes that onset of symptoms was about 3 days ago.  She started on a new dose of her blood pressure medication at a higher level because of poorly controlled blood pressure and was started on losartan.  She notes that since doing that she has been having problems with occasional arm jerking on both of her upper arms.  She also feels generally shaky.  May be some confusion intermittently.  She also notes some decreased appetite.  Denies any abdominal pain when she eats, however and has not had any vomiting or diarrhea.  No other new medications or medication changes within the past few weeks.  Her primary issue recently has been a poorly controlled high blood pressure.  No headache.  When seeing her walk-in she walks with a widened gait and her daughter and she both state that this is chronic for her over the long-term and she typically uses furniture to help her get around her  home or her daughter's arm.  No new incontinence.  She is on chronic opioids for her pain control, unsure of name.  Daughter notes saw jerking once prior when pt hospitalized with pneumonia.  No cough/cold now.  No fevers at home or rigors.    REVIEW OF SYSTEMS:  Review of Systems  remainder of systems are all otherwise negative.        Medical History     Past Medical History:   Diagnosis Date    Chronic constipation     Chronic sciatica     DDD (degenerative disc disease), cervical     Fibromyalgia     History of hepatitis C     Hypertension goal BP (blood pressure) < 140/90     Kidney stones     Migraine headaches     Neuropathy     Osteopenia     PUD (peptic ulcer disease)     Recurrent cold sores     Restless leg syndrome     Spasmodic torticollis     Subclinical hyperthyroidism     Vertigo     Vitamin B12 deficiency     Vitamin D deficiency        Past Surgical History:   Procedure Laterality Date    COLONOSCOPY N/A 5/25/2023    Procedure: Colonoscopy;  Surgeon: Leo Loyd MD;  Location:  GI    ESOPHAGOSCOPY, GASTROSCOPY, DUODENOSCOPY (EGD), COMBINED N/A 5/25/2023    Procedure: Esophagoscopy, gastroscopy, duodenoscopy (EGD), combined;  Surgeon: Leo Loyd MD;  Location:  GI    HERNIA REPAIR  9/2009    ventral    LITHOTRIPSY      2 x     TONSILLECTOMY      TUBAL LIGATION         Family History   Problem Relation Age of Onset    Neurologic Disorder Mother 65        Parkinsons    Alcohol/Drug Mother     Cerebrovascular Disease Mother     Respiratory Father         COPD    Alcohol/Drug Father     Cancer Brother     Diabetes Sister 52    Cancer Maternal Aunt     Cancer Maternal Uncle     C.A.D. No family hx of        Social History     Tobacco Use    Smoking status: Former     Current packs/day: 0.20     Average packs/day: 0.2 packs/day for 15.0 years (3.0 ttl pk-yrs)     Types: Cigarettes     Passive exposure: Past    Smokeless tobacco: Never    Tobacco comments:     4 cigs a day, just  lost    Vaping Use    Vaping status: Never Used   Substance Use Topics    Alcohol use: No    Drug use: No       acetaminophen (TYLENOL) 500 MG tablet  aspirin 81 MG tablet  baclofen (LIORESAL) 10 MG tablet  famotidine (PEPCID) 40 MG tablet  gabapentin (NEURONTIN) 300 MG capsule  HYDROcodone-acetaminophen (NORCO) 5-325 MG tablet  losartan (COZAAR) 100 MG tablet  losartan (COZAAR) 50 MG tablet  metoprolol tartrate (LOPRESSOR) 25 MG tablet  nitroGLYcerin (NITROSTAT) 0.4 MG sublingual tablet  omeprazole (PRILOSEC) 20 MG DR capsule  polyethylene glycol (MIRALAX/GLYCOLAX) powder  Probiotic Product (PROBIOTIC BLEND PO)  rosuvastatin (CRESTOR) 10 MG tablet  traZODone (DESYREL) 100 MG tablet  VITAMIN B-12 1000 MCG OR TABS  vitamin D3 (CHOLECALCIFEROL) 50 mcg (2000 units) tablet        Physical Exam     First Vitals:  Patient Vitals for the past 24 hrs:   BP Temp Temp src Pulse Resp SpO2 Weight   10/25/24 1427 -- -- -- (!) 48 30 97 % --   10/25/24 1418 (!) 155/75 -- -- (!) 48 30 97 % --   10/25/24 1357 (!) 176/75 -- -- (!) 45 18 97 % --   10/25/24 1350 (!) 194/81 -- -- -- -- -- --   10/25/24 1349 -- -- -- (!) 46 21 97 % --   10/25/24 1109 (!) 171/73 97.7  F (36.5  C) Oral (!) 49 16 97 % 61.2 kg (135 lb)       PHYSICAL EXAM:   Constitutional:   seen in triage interview room chair in mason with patient permission.  In nad and can walk independently but slow with widened gait across ER.  Better with arm of daughter for assist.  Slender frail appearing elderly female   HENT:  Normocephalic, posterior pharynx wnl, mucous membranes moist and light pink   Eyes:  PERRL, EOMI, Conjunctiva normal, No discharge, no scleral icterus.  Respiratory:  Breathing easily, cta without tachypnea on walking across ER  Cardiovascular:  rrr nl s1s2 0 murmurs, rubs, or gallops.  Peripheral pulses dp, pt, and radial are wnl.  no peripheral edema   GI:  Bowel sounds normal, Soft, No tenderness, No flank tenderness, nondistended.  :No CVA  tenderness.   Musculoskeletal:  Moves all extremities.  No erythematous or swollen major joints,   Integument:  pale  Neurologic:  Alert & oriented x 3, Normal motor function without localized weakness on extremities, Normal sensory function to light touch but shoes left on, No focal deficits noted. Normal speech.  No clonus at b ankles, dtr in b biceps trace to 1plus, wide based gait with slow turns across er.  No muscle fasciculations noted,   Psychiatric:  Affect normal, Judgment normal, Mood normal.     Results     LAB AND RADIOLOGY:  All pertinent labs reviewed and interpreted  Results for orders placed or performed during the hospital encounter of 10/25/24   Head CT w/o contrast     Status: None    Narrative    EXAM: CT HEAD W/O CONTRAST  LOCATION: [facility]  DATE: 10/25/2024    INDICATION: Increasing lightheadedness the last few days with myoclonus described and chronically wide gait  COMPARISON: None  TECHNIQUE: Routine CT Head without IV contrast. Multiplanar reformats. Dose reduction techniques were used.    FINDINGS:  INTRACRANIAL CONTENTS: No acute intracranial hemorrhage, extra-axial fluid collection, or mass effect. No evidence of an acute transcortical confluent infarct. Mild presumed chronic small vessel ischemic changes. Mild generalized cerebral parenchymal   volume loss. No hydrocephalus.     VISUALIZED ORBITS/SINUSES/MASTOIDS: No acute intraorbital finding. No evidence of significant paranasal sinus or mastoid mucosal disease.     BONES/SOFT TISSUES: No acute abnormality.      Impression    IMPRESSION:  1.  No acute intracranial abnormality.  2.  Mild chronic age-related changes, as described.   UA with Microscopic reflex to Culture     Status: Abnormal    Specimen: Urine, Clean Catch   Result Value Ref Range    Color Urine Light Yellow Colorless, Straw, Light Yellow, Yellow    Appearance Urine Clear Clear    Glucose Urine Negative Negative mg/dL    Bilirubin Urine  Negative Negative    Ketones Urine Negative Negative mg/dL    Specific Gravity Urine 1.016 1.001 - 1.030    Blood Urine 0.03 mg/dL (A) Negative    pH Urine 6.0 5.0 - 7.0    Protein Albumin Urine Negative Negative mg/dL    Urobilinogen Urine 2.0 (A) <2.0 mg/dL    Nitrite Urine Negative Negative    Leukocyte Esterase Urine 250 Phill/uL (A) Negative    Mucus Urine Present (A) None Seen /LPF    RBC Urine 3 (H) <=2 /HPF    WBC Urine 3 <=5 /HPF    Squamous Epithelials Urine 2 (H) <=1 /HPF    Narrative    Urine Culture ordered based on laboratory criteria   TSH with free T4 reflex     Status: Normal   Result Value Ref Range    TSH 1.12 0.30 - 4.20 uIU/mL   Basic metabolic panel     Status: Abnormal   Result Value Ref Range    Sodium 143 135 - 145 mmol/L    Potassium 4.8 3.4 - 5.3 mmol/L    Chloride 105 98 - 107 mmol/L    Carbon Dioxide (CO2) 28 22 - 29 mmol/L    Anion Gap 10 7 - 15 mmol/L    Urea Nitrogen 15.2 8.0 - 23.0 mg/dL    Creatinine 0.89 0.51 - 0.95 mg/dL    GFR Estimate 69 >60 mL/min/1.73m2    Calcium 9.6 8.8 - 10.4 mg/dL    Glucose 100 (H) 70 - 99 mg/dL   Hepatic function panel     Status: Normal   Result Value Ref Range    Protein Total 7.0 6.4 - 8.3 g/dL    Albumin 4.2 3.5 - 5.2 g/dL    Bilirubin Total 0.4 <=1.2 mg/dL    Alkaline Phosphatase 52 40 - 150 U/L    AST 20 0 - 45 U/L    ALT 9 0 - 50 U/L    Bilirubin Direct <0.20 0.00 - 0.30 mg/dL   Ammonia     Status: Normal   Result Value Ref Range    Ammonia 16 11 - 51 umol/L   Magnesium     Status: Abnormal   Result Value Ref Range    Magnesium 2.4 (H) 1.7 - 2.3 mg/dL   CBC with platelets and differential     Status: Abnormal   Result Value Ref Range    WBC Count 5.1 4.0 - 11.0 10e3/uL    RBC Count 4.18 3.80 - 5.20 10e6/uL    Hemoglobin 13.2 11.7 - 15.7 g/dL    Hematocrit 39.8 35.0 - 47.0 %    MCV 95 78 - 100 fL    MCH 31.6 26.5 - 33.0 pg    MCHC 33.2 31.5 - 36.5 g/dL    RDW 13.2 10.0 - 15.0 %    Platelet Count 134 (L) 150 - 450 10e3/uL    % Neutrophils 46 %    %  Lymphocytes 43 %    % Monocytes 11 %    % Eosinophils 1 %    % Basophils 0 %    % Immature Granulocytes 0 %    NRBCs per 100 WBC 0 <1 /100    Absolute Neutrophils 2.3 1.6 - 8.3 10e3/uL    Absolute Lymphocytes 2.2 0.8 - 5.3 10e3/uL    Absolute Monocytes 0.5 0.0 - 1.3 10e3/uL    Absolute Eosinophils 0.1 0.0 - 0.7 10e3/uL    Absolute Basophils 0.0 0.0 - 0.2 10e3/uL    Absolute Immature Granulocytes 0.0 <=0.4 10e3/uL    Absolute NRBCs 0.0 10e3/uL   Extra Tube     Status: None    Narrative    The following orders were created for panel order Extra Tube.  Procedure                               Abnormality         Status                     ---------                               -----------         ------                     Extra Purple Top Tube[[id number]]                            Final result                 Please view results for these tests on the individual orders.   Extra Purple Top Tube     Status: None   Result Value Ref Range    Hold Specimen JIC    UA with Microscopic reflex to Culture     Status: Abnormal    Specimen: Urine, Clean Catch   Result Value Ref Range    Color Urine Light Yellow Colorless, Straw, Light Yellow, Yellow    Appearance Urine Clear Clear    Glucose Urine Negative Negative mg/dL    Bilirubin Urine Negative Negative    Ketones Urine Negative Negative mg/dL    Specific Gravity Urine 1.017 1.001 - 1.030    Blood Urine 0.03 mg/dL (A) Negative    pH Urine 5.5 5.0 - 7.0    Protein Albumin Urine Negative Negative mg/dL    Urobilinogen Urine <2.0 <2.0 mg/dL    Nitrite Urine Negative Negative    Leukocyte Esterase Urine Negative Negative    Mucus Urine Present (A) None Seen /LPF    RBC Urine <1 <=2 /HPF    WBC Urine 1 <=5 /HPF    Squamous Epithelials Urine <1 <=1 /HPF    Narrative    Urine Culture not indicated   CBC with platelets differential     Status: Abnormal    Narrative    The following orders were created for panel order CBC with platelets differential.  Procedure                                Abnormality         Status                     ---------                               -----------         ------                     CBC with platelets and d...[[id number]]  Abnormal            Final result               RBC and Platelet Morphology[[id number]]                                                   Please view results for these tests on the individual orders.         ECG:    Performed at: 1348    Impression: nsb rate of 47    Nsb rate of 47, no acute st changes, low voltage EKG.  Pr l172ms, qrs 84ms, qtc 461 ms, prt axes 71 52 31.  Compared to 5/15/24 no significant changes but hr is a bit slower.    I have independently reviewed and interpreted the EKS(s) documented above    PROCEDURES:  Procedures:      [facility] Documentation     Medical Decision Making  Obtained supplemental history:Supplemental history obtained?: Documented in chart and Family Member/Significant Other  Reviewed external records: External records reviewed?: Documented in chart  Care impacted by chronic illness:Hypertension  Did you consider but not order tests?: Work up considered but not performed and documented in chart, if applicable  Did you interpret images independently?: Independent interpretation of ECG and images noted in documentation, when applicable.  Consultation discussion with other provider:Did you involve another provider (consultant, , pharmacy, etc.)?: I discussed the care with another health care provider, see documentation for details.  Admission considered. Patient was signed out to the oncoming physician, disposition pending.    MIPS: Not Applicable    Cherelle Garner MD  Emergency Medicine  [facility] EMERGENCY ROOM         Cherelle Garner MD  10/25/24 4800

## 2024-10-25 NOTE — TELEPHONE ENCOUNTER
Daughter Isra called back, and stated that she will bring patient to ER now.    She stated understanding and agreeable with the plan of care.     Camila RHODES RN  Triage Nurse  [facility]

## 2024-10-25 NOTE — TELEPHONE ENCOUNTER
I agree that patient should be seen in emergency room with this history. I think another valid option is to contact and review these symptoms with patients daughter. She has a consent to communicate and is so extremely helpful and knowledgeable about her mother the patient. I would completely feel satisfied if daughter could see her mom and do some assessment and update us with moms condition or take mom to emergency room.    Gerber Jain MD

## 2024-11-13 ENCOUNTER — TELEPHONE (OUTPATIENT)
Dept: CARDIOLOGY | Facility: CLINIC | Age: 74
End: 2024-11-13
Payer: MEDICARE

## 2024-11-13 NOTE — TELEPHONE ENCOUNTER
[facility] Call Center    Phone Message    May a detailed message be left on voicemail: yes     Reason for Call: Other: pt is having an oral surgery procedure that is needing to be schedule but oral surgeon will not schedule without cardiac clearance from pt's cardiology team. Procedure would be done at [facility] in [place]. Please call pt back to discuss further.      Action Taken: Other: cardiology     Travel Screening: Not Applicable        Thank you!  [facility]

## 2024-11-13 NOTE — LETTER
2024      Carina Calvert  [address]   [place] [address]        To Whom It May Concern,     I am writing on behalf of my patient Carina Calvert ( 1950). She has reached out to my team to inform us that she will be undergoing oral surgery and is needing cardiac clearance to do so. It is my opinion that Ms. Carina Calvert is stable from a cardiac standpoint, and therefore, I give her cardiac clearance to move forward with her oral procedure. Please don't hesitate to reach out to me should you need any further documentation or have questions.       Sincerely,      Michelle Lopez PA-C

## 2024-11-13 NOTE — TELEPHONE ENCOUNTER
MH -- any concerns regarding cardiac clearance for oral surgery? Do you need to see her prior to this? Thank you. aj

## 2024-11-18 DIAGNOSIS — F11.90 CHRONIC, CONTINUOUS USE OF OPIOIDS: ICD-10-CM

## 2024-11-18 DIAGNOSIS — G24.3 SPASMODIC TORTICOLLIS: ICD-10-CM

## 2024-11-18 DIAGNOSIS — G89.4 CHRONIC PAIN SYNDROME: ICD-10-CM

## 2024-11-18 DIAGNOSIS — M79.7 FIBROMYALGIA: ICD-10-CM

## 2024-11-18 RX ORDER — HYDROCODONE BITARTRATE AND ACETAMINOPHEN 5; 325 MG/1; MG/1
2 TABLET ORAL 3 TIMES DAILY
Qty: 180 TABLET | Refills: 0 | Status: SHIPPED | OUTPATIENT
Start: 2024-11-18

## 2024-11-18 NOTE — TELEPHONE ENCOUNTER
Spoke with pt regarding 's comments. Pt requests that letter of cardiac clearance be sent to her house. Confirmed her address and assured her that I would mail that letter out. Asked if she would like to schedule her follow-up with  in February. She reports that her daughter Isra drives her and schedules for her so she asked that I call her.     Called Isra and again discussed that I would mail a letter of cardiac clearance to her mother's house. Isra was appreciative of that and was agreeable to scheduling follow-up for February. Isra was transferred to scheduling to arrange. aj    -----------------------------------------------------  Msg received:  From: Michelle Eldridge PA-C  Sent: 11/18/2024   8:29 AM CST    Can proceed with oral surgery.

## 2024-11-27 ENCOUNTER — TELEPHONE (OUTPATIENT)
Dept: CARDIOLOGY | Facility: CLINIC | Age: 74
End: 2024-11-27
Payer: MEDICARE

## 2024-11-27 NOTE — TELEPHONE ENCOUNTER
----- Message from Hortencia Valle sent at 11/27/2024 10:02 AM CST -----  Regarding: FW: ISABELA PATIENT    ----- Message -----  From: Latisha Farooq  Sent: 11/27/2024   9:33 AM CST  To: [facility] Cv Rn Team Y  Subject: ISABELA PATIENT                                 General phone call:    [facility]    Primary cardiologist: MILTON    Detailed reason for call: NEEDING OK TO SEDATE PATIENT FOR ORAL SURG, PATIENT REQUESTING TO BE ASLEEP FOR THE PROCEDURE SCHEDULED FOR 12/4/24    New or active symptoms? NO    Best phone number: [phone]    Best time to contact: ANY    Ok to leave a detailedmessage? YES    Device? NO    Additional Info:

## 2024-11-27 NOTE — TELEPHONE ENCOUNTER
Called [facility] to discuss message further. Per staff and oral surgeon there, Carina needs a tooth extracted. She is very nervous, so they will be using light to moderate sedation with IV versed, fentanyl and precedex. The oral surgeon requests risk stratification if the patient can safely have this tooth extracted under above sedation from a cardiac standpoint.     Patient has not seen Dr. Echevarria in over 2 years. Pt last saw Michelle in February 2024. Will route to see if she would be willing to comment on this. -[facility]          Michelle,  See above. Would you be willing to comment on cardiac risk for tooth extraction under IV sedation or defer to PMD team?   Thanks,  Cem

## 2024-12-02 ENCOUNTER — TELEPHONE (OUTPATIENT)
Dept: CARDIOLOGY | Facility: CLINIC | Age: 74
End: 2024-12-02
Payer: MEDICARE

## 2024-12-02 NOTE — TELEPHONE ENCOUNTER
----- Message from Jazmin Pizarro sent at 12/2/2024 10:56 AM CST -----  General phone call:    Caller: Darling from [facility]  Primary cardiologist: DR TAVARES  Detailed reason for call: NEEDS UPDATED LETTER SENT  New or active symptoms? NO  Best phone number: [phone],  FAX [phone]  Best time to contact: ANY TIME  Ok to leave a detailedmessage? YES  Device? NO    Additional Info:  
See telephone encounter from 11/27 for follow-up. aj  
Spoke with  who will review this request and respond when able. buck  
Detail Level: Zone

## 2024-12-02 NOTE — TELEPHONE ENCOUNTER
Noted message. Faxed to [facility] oral surgery. OK to proceed with planned surgery with use of sedation and no need to be seen prior as long as her symptoms are stable and without change. Faxed to [phone]. -[facility] [place]          ----- Message -----  From: Michelle Eldridge PA-C  Sent: 12/2/2024  12:52 PM CST  To: Sommer Chahal RN    Reviewed with Dr. Echevarria. Oral surgery is low risk procedure, recent EKG looks stable. If patient continues to have no change to her intermittent atypical chest pain as has been the case for the last two years she does not need evaluation prior to surgery.  ----- Message -----  From: Sommer Chahal RN  Sent: 12/2/2024  11:17 AM CST  To: Michelle Eldridge PA-C    ----- Message from Sommer Chahal RN sent at 12/2/2024 11:17 AM CST -----  Gonzalo Johnson  The oral surgery team is calling again regarding this patient's tooth extraction on Wednesday. Any message I can pass along regarding sedation?  Thanks,  Cem

## 2024-12-06 DIAGNOSIS — I10 HYPERTENSION GOAL BP (BLOOD PRESSURE) < 140/90: ICD-10-CM

## 2024-12-09 RX ORDER — LOSARTAN POTASSIUM 25 MG/1
25 TABLET ORAL DAILY
Qty: 30 TABLET | Refills: 0 | OUTPATIENT
Start: 2024-12-09

## 2024-12-17 DIAGNOSIS — G89.4 CHRONIC PAIN SYNDROME: ICD-10-CM

## 2024-12-17 DIAGNOSIS — G24.3 SPASMODIC TORTICOLLIS: ICD-10-CM

## 2024-12-17 DIAGNOSIS — F11.90 CHRONIC, CONTINUOUS USE OF OPIOIDS: ICD-10-CM

## 2024-12-17 DIAGNOSIS — M79.7 FIBROMYALGIA: ICD-10-CM

## 2024-12-17 RX ORDER — HYDROCODONE BITARTRATE AND ACETAMINOPHEN 5; 325 MG/1; MG/1
2 TABLET ORAL 3 TIMES DAILY
Qty: 180 TABLET | Refills: 0 | Status: SHIPPED | OUTPATIENT
Start: 2024-12-17

## 2024-12-17 NOTE — TELEPHONE ENCOUNTER
Medication Question or Refill        What medication are you calling about (include dose and sig)?:  HYDROcodone-acetaminophen (NORCO) 5-325 MG tablet     Preferred Pharmacy:       [facility] [address] [address] [address]  Phone: [phone] Fax: [phone]        Controlled Substance Agreement on file:   CSA -- Patient Level:     [Media Unavailable] Controlled Substance Agreement - Opioid - Scan on 9/27/2024  8:33 AM   [Media Unavailable] Controlled Substance Agreement - Opioid - Scan on 11/3/2023 12:21 PM   [Media Unavailable] Controlled Substance Agreement - Opioid - Scan on 9/23/2022  6:46 PM   [Media Unavailable] Controlled Substance Agreement - Opioid - Scan on 7/27/2021  7:53 AM       Who prescribed the medication?:  Santos Linn    Do you need a refill? Yes    When did you use the medication last?  today    Patient offered an appointment? No    Do you have any questions or concerns?  Yes: patient is requesting a prescription sent in by today. Her last supply is done today, and wants to have the Rx sent to her pharmacy today.       Okay to leave a detailed message?: Yes at Cell number on file:    Telephone Information:   Mobile [phone]

## 2024-12-22 NOTE — TELEPHONE ENCOUNTER
Based on patients aging and fragile overall health along with questions on her mental status  I am unwilling to continue this prescription at the same level. Here's my proposal and I am willing to review with patient or daughter if she wishes, but I am following all the recommended actions within the Practice of Medicine     The current dose is 2 of the 7.5 milligram tabs 3 times a day - total daily dose of 45 milligram     I will refill this only with 2 of the 5 milligram HYDROcodone (VICODIN) - total daily dose of of 30 milligrams    Please reroute with replies, if appropriate so I can sent the new prescription . She will also require a face to face encounter requested for any additional refills    Gerber Jain MD     Thank you for your new patient visit with rheumatology.    We evaluated you for complaints of facial rash.  We discussed that the  features of your rash is not consistent with typical malar rash but advised possible dermatology consult for biopsy when they reappear.    Your current autoimmune antibody testing are negative and the history and exam is currently unremarkable for suspicion for ongoing active lupus disease.    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  We discussed obtaining genetic testing for EDS for definitive confirmation.  Your primary care can help you with this.    Please continue multi-modal-treatment approach including medical lifestyle modification, adequate sleep hygiene, self-motivated exercises and pain management as needed.    You do not need further testing or longitudinal follow-up with rheumatology.

## 2025-01-14 DIAGNOSIS — M79.7 FIBROMYALGIA: ICD-10-CM

## 2025-01-14 DIAGNOSIS — F11.90 CHRONIC, CONTINUOUS USE OF OPIOIDS: ICD-10-CM

## 2025-01-14 DIAGNOSIS — G89.4 CHRONIC PAIN SYNDROME: ICD-10-CM

## 2025-01-14 DIAGNOSIS — G24.3 SPASMODIC TORTICOLLIS: ICD-10-CM

## 2025-01-14 RX ORDER — HYDROCODONE BITARTRATE AND ACETAMINOPHEN 5; 325 MG/1; MG/1
2 TABLET ORAL 3 TIMES DAILY
Qty: 180 TABLET | Refills: 0 | Status: SHIPPED | OUTPATIENT
Start: 2025-01-14

## 2025-01-14 NOTE — TELEPHONE ENCOUNTER
Medication Question or Refill    Contacts       Contact Date/Time Type Contact Phone/Fax    01/14/2025 08:43 AM CST Phone (Incoming) Carina Calvert (Self) [phone] (H)     ok to leave a detailed voicemail            What medication are you calling about (include dose and sig)?: HYDROcodone-acetaminophen (NORCO) 5-325 MG tablet     Preferred Pharmacy:       [address] [address]  Phone: [phone] Fax: [phone]        Controlled Substance Agreement on file:   CSA -- Patient Level:     [Media Unavailable] Controlled Substance Agreement - Opioid - Scan on 9/27/2024  8:33 AM   [Media Unavailable] Controlled Substance Agreement - Opioid - Scan on 11/3/2023 12:21 PM   [Media Unavailable] Controlled Substance Agreement - Opioid - Scan on 9/23/2022  6:46 PM   [Media Unavailable] Controlled Substance Agreement - Opioid - Scan on 7/27/2021  7:53 AM       Who prescribed the medication?:     Do you need a refill? Yes    When did you use the medication last? 1/14/25 am    Patient offered an appointment? No    Do you have any questions or concerns?  No      Okay to leave a detailed message?: Yes at Home number on file [phone] (home)

## 2025-01-27 DIAGNOSIS — M79.7 FIBROMYALGIA: ICD-10-CM

## 2025-01-27 DIAGNOSIS — G24.3 SPASMODIC TORTICOLLIS: ICD-10-CM

## 2025-01-27 RX ORDER — BACLOFEN 10 MG/1
10 TABLET ORAL 4 TIMES DAILY
Qty: 360 TABLET | Refills: 3 | Status: SHIPPED | OUTPATIENT
Start: 2025-01-27

## 2025-02-06 ENCOUNTER — VIRTUAL VISIT (OUTPATIENT)
Dept: INTERNAL MEDICINE | Facility: CLINIC | Age: 75
End: 2025-02-06
Payer: MEDICARE

## 2025-02-06 DIAGNOSIS — R20.0 NUMBNESS OF RIGHT FOOT: Primary | ICD-10-CM

## 2025-02-06 DIAGNOSIS — M85.80 OSTEOPENIA, UNSPECIFIED LOCATION: ICD-10-CM

## 2025-02-06 DIAGNOSIS — R68.89 FORGETFULNESS: ICD-10-CM

## 2025-02-06 DIAGNOSIS — Z12.31 VISIT FOR SCREENING MAMMOGRAM: ICD-10-CM

## 2025-02-06 DIAGNOSIS — F33.2 SEVERE EPISODE OF RECURRENT MAJOR DEPRESSIVE DISORDER, WITHOUT PSYCHOTIC FEATURES (H): ICD-10-CM

## 2025-02-06 ASSESSMENT — PATIENT HEALTH QUESTIONNAIRE - PHQ9
SUM OF ALL RESPONSES TO PHQ QUESTIONS 1-9: 0
SUM OF ALL RESPONSES TO PHQ QUESTIONS 1-9: 0
10. IF YOU CHECKED OFF ANY PROBLEMS, HOW DIFFICULT HAVE THESE PROBLEMS MADE IT FOR YOU TO DO YOUR WORK, TAKE CARE OF THINGS AT HOME, OR GET ALONG WITH OTHER PEOPLE: NOT DIFFICULT AT ALL

## 2025-02-06 NOTE — PROGRESS NOTES
Carina is a 74 year old who is being evaluated via a billable telephone visit.    What phone number would you like to be contacted at? [phone]  How would you like to obtain your AVS? Mail a copy  Originating Location (pt. Location): Home    Distant Location (provider location):  On-site  Telephone visit completed due to the patient did not consent to a video visit.    Assessment & Plan     Numbness of right foot  See as detailed below   - REVIEW OF HEALTH MAINTENANCE PROTOCOL ORDERS    Severe episode of recurrent major depressive disorder, without psychotic features (H)  We reviewed her symptoms , problem is stable and ongoing monitoring    - REVIEW OF HEALTH MAINTENANCE PROTOCOL ORDERS    Forgetfulness  We are aware she has had some mild cognitive dysfunction . This also will need to be further characterized with a face to face encounter in June , that is to say, in 4 months   - REVIEW OF HEALTH MAINTENANCE PROTOCOL ORDERS    Osteopenia, unspecified location  She declines DEXA scan   - REVIEW OF HEALTH MAINTENANCE PROTOCOL ORDERS    Visit for screening mammogram  She declines mammogram   - REVIEW OF HEALTH MAINTENANCE PROTOCOL ORDERS          BMI  Estimated body mass index is 26.37 kg/m  as calculated from the following:    Height as of 9/26/24: 1.524 m (5').    Weight as of 10/25/24: 61.2 kg (135 lb).   Weight management plan: Discussed healthy diet and exercise guidelines        Subjective   Carina is a 74 year old, presenting for the following health issues:  Recheck Medication (Pain medication) and Foot Burn (Pins and needles pain in seb feet )      2/6/2025     5:33 PM   Additional Questions   Roomed by isai     History of Present Illness       Reason for visit:  Recheck medication, pain meds   She is taking medications regularly.     Telephone MD visit     Call began at 5:44 PM   Call ended at 5:58 PM     Patient well known to me. She is having this telephone MD visit in order to be in compliance  with her chronic pain care package. This is a disabled woman with multiple medical problems and an overlap of mental health and genuine medical problems. Quality of life is ok except for several different issues - I am hearing about some symptoms of one foot [ right ]  - she has had for quite a long time, it's a feeling of tingling and numbess type sensation with her right foot. She does have right foot with the great toenail fell off, maybe a year ago . The toenail did grow back . She denies insomnia from this. She wonders if it is a peripheral neuropathy ? She denies any tenderness to palpation . Denies any history of an injury to the foot. This one needs a face to face encounter and direct physical exam to make sense of this    She is feeling ok overall , she denies any depression symptoms , she lives with daughter and since   some years ago. Daughter is worried by Jeane's possible or early beginning stages of Alzhemiers dementia , see assessment and plan section          Review of Systems  Constitutional, HEENT, cardiovascular, pulmonary, gi and gu systems are negative, except as otherwise noted.      Objective           Vitals:  No vitals were obtained today due to virtual visit.    Physical Exam   General: Alert and no distress //Respiratory: No audible wheeze, cough, or shortness of breath // Psychiatric:  Appropriate affect, tone, and pace of words      Orders Placed This Encounter   Procedures    REVIEW OF HEALTH MAINTENANCE PROTOCOL ORDERS           Phone call duration: 14 minutes  Signed Electronically by: Gerber Jain MD

## 2025-02-06 NOTE — PATIENT INSTRUCTIONS
To make follow up appointments easier I am recommending you schedule the follow up appointment now before you leave the building today. You can give this patient after-visit summary to the clerks downstairs at the . This appointment needs to be in 4 months . I am ok with having face to face encounter just once a year, at the very least. The other appointments can be telephone MD visits or virtual office visits.

## 2025-02-09 DIAGNOSIS — E78.5 HYPERLIPIDEMIA WITH TARGET LDL LESS THAN 100: ICD-10-CM

## 2025-02-09 DIAGNOSIS — K92.2 GASTRIC BLEED: ICD-10-CM

## 2025-02-09 DIAGNOSIS — I10 HYPERTENSION GOAL BP (BLOOD PRESSURE) < 140/90: ICD-10-CM

## 2025-02-09 DIAGNOSIS — I21.4 NSTEMI (NON-ST ELEVATED MYOCARDIAL INFARCTION) (H): ICD-10-CM

## 2025-02-10 DIAGNOSIS — M79.7 FIBROMYALGIA: ICD-10-CM

## 2025-02-10 DIAGNOSIS — G89.4 CHRONIC PAIN SYNDROME: ICD-10-CM

## 2025-02-10 DIAGNOSIS — F11.90 CHRONIC, CONTINUOUS USE OF OPIOIDS: ICD-10-CM

## 2025-02-10 DIAGNOSIS — G24.3 SPASMODIC TORTICOLLIS: ICD-10-CM

## 2025-02-10 RX ORDER — ROSUVASTATIN CALCIUM 10 MG/1
10 TABLET, COATED ORAL DAILY
Qty: 90 TABLET | Refills: 3 | Status: SHIPPED | OUTPATIENT
Start: 2025-02-10

## 2025-02-10 RX ORDER — FAMOTIDINE 40 MG/1
40 TABLET, FILM COATED ORAL EVERY EVENING
Qty: 90 TABLET | Refills: 3 | Status: SHIPPED | OUTPATIENT
Start: 2025-02-10

## 2025-02-10 RX ORDER — METOPROLOL TARTRATE 50 MG
TABLET ORAL
Qty: 180 TABLET | Refills: 3 | Status: SHIPPED | OUTPATIENT
Start: 2025-02-10

## 2025-02-10 RX ORDER — OMEPRAZOLE 20 MG/1
20 CAPSULE, DELAYED RELEASE ORAL DAILY
Qty: 90 CAPSULE | Refills: 3 | Status: SHIPPED | OUTPATIENT
Start: 2025-02-10

## 2025-02-10 RX ORDER — HYDROCODONE BITARTRATE AND ACETAMINOPHEN 5; 325 MG/1; MG/1
2 TABLET ORAL 3 TIMES DAILY
Qty: 180 TABLET | Refills: 0 | Status: SHIPPED | OUTPATIENT
Start: 2025-02-10

## 2025-02-10 NOTE — TELEPHONE ENCOUNTER
Medication Question or Refill    Contacts       Contact Date/Time Type Contact Phone/Fax    02/10/2025 08:08 AM CST Phone (Incoming) Carina Calvert (Self) [phone] (H)            What medication are you calling about (include dose and sig)?: HYDROcodone-acetaminophen (NORCO) 5-325 MG tablet     Preferred Pharmacy: [place] [address] [place] [address] [address]  Phone: [phone] Fax: [phone]    Controlled Substance Agreement on file:   CSA -- Patient Level:     [Media Unavailable] Controlled Substance Agreement - Opioid - Scan on 9/27/2024  8:33 AM   [Media Unavailable] Controlled Substance Agreement - Opioid - Scan on 11/3/2023 12:21 PM   [Media Unavailable] Controlled Substance Agreement - Opioid - Scan on 9/23/2022  6:46 PM   [Media Unavailable] Controlled Substance Agreement - Opioid - Scan on 7/27/2021  7:53 AM       Who prescribed the medication?: Gerber Jain     Do you need a refill? Yes  Do you have any questions or concerns?  Yes: She has only enough for today. Please send over ASAP to help get this ready for picked up. (Last seen, 2/6/2025)  Okay to leave a detailed message?: Yes at Home number on file [phone] (home)

## 2025-02-18 ENCOUNTER — OFFICE VISIT (OUTPATIENT)
Dept: CARDIOLOGY | Facility: CLINIC | Age: 75
End: 2025-02-18
Payer: MEDICARE

## 2025-02-18 VITALS
DIASTOLIC BLOOD PRESSURE: 46 MMHG | HEIGHT: 64 IN | RESPIRATION RATE: 16 BRPM | SYSTOLIC BLOOD PRESSURE: 112 MMHG | BODY MASS INDEX: 23.42 KG/M2 | WEIGHT: 137.2 LBS | HEART RATE: 60 BPM

## 2025-02-18 DIAGNOSIS — R07.2 PRECORDIAL PAIN: ICD-10-CM

## 2025-02-18 DIAGNOSIS — E78.5 HYPERLIPIDEMIA LDL GOAL <70: ICD-10-CM

## 2025-02-18 DIAGNOSIS — I10 HYPERTENSION, UNSPECIFIED TYPE: ICD-10-CM

## 2025-02-18 DIAGNOSIS — I25.10 CORONARY ARTERY DISEASE INVOLVING NATIVE CORONARY ARTERY OF NATIVE HEART WITHOUT ANGINA PECTORIS: Primary | ICD-10-CM

## 2025-02-18 PROCEDURE — 99214 OFFICE O/P EST MOD 30 MIN: CPT

## 2025-02-18 NOTE — PATIENT INSTRUCTIONS
It was a pleasure taking part in your care today:    - Continue current medications.  - Follow up in 1 year    Please call the [facility] clinic with any questions or concerns at [phone].     Michelle Eldridge PA-C

## 2025-02-18 NOTE — LETTER
2/18/2025    Gerber Jain MD  [address] [place] [address]    RE: Carina BERNSTEIN Enrike       Dear Colleague,     I had the pleasure of seeing Carina AMANDEEP Enrike in the [facility].    HEART CARE ENCOUNTER CONSULTATON NOTE      [facility]  [phone]      Assessment/Recommendations   Assessment:   Coronary artery disease: s/p PCI of RCA (May 2015) with MATHEW, and PCI to LCx with DESx1, LAD with DESx1, and POBA to D1 (6/2/15) - aspirin 81 mg daily  - Continues to have atypical sharp chest pain lasting seconds, unchanged from visit 7/2022. Cardiac MRI with stress 9/2022 negative for myocardial ischemia/infarction with LVEF 59%.  Appears compensated on exam, no evidence of fluid retention despite slow weight gain.  - Episode 1 day ago of chest discomfort lasting hours, similar sharp chest pain to above but she recalls being able to palpate tenderness by pressing on her left pectoral muscle.  This is likely MSK/fibromyalgia with tenderness to palpation.  Dyslipidemia: Controlled on rosuvastatin 10 mg  Intermittent hypertension: Controlled today - losartan 100 mg, metoprolol tartrate 25 mg twice daily  Bradycardia: Metoprolol reduced to 25 mg twice daily in October presumably due to bradycardia in the emergency room after presentation with dizziness.  Denies lightheadedness, dizziness.  Will continue metoprolol tartrate 25 mg twice daily.    Mild cognitive dysfunction       Plan:   Continue antihypertensive and hyperlipidemia regimens  No further cardiac workup at this time given precordial pain reproducible with palpation, consistent with MSK/fibromyalgia.  Continue reduced dose of metoprolol to tartrate with history of bradycardia        Follow up in 1 year or sooner as needed     History of Present Illness/Subjective    HPI: Carina G Enrike is a 73 year old female with PMHx of CAD/PCI, HLD, intermittent hypertension Hx tobacco use presents for follow up.      Patient is  "accompanied by her daughter today.  She reports an episode of chest pain 1 day ago that lasted for hours, was constant.  It was reproducible, aggravated by pressing on her chest.  Continues to have her known atypical \"twinges \"of sharp chest pain over the same area, left pectoral region as well.  This has been ongoing for years and evaluated with cardiac MRI stress test which showed no evidence of ischemia or infarction.    She visited to ED in October and had dizziness. Recommended to reduce metoprolol she says due, this was likely due to sinus bradycardia on EKG. Troponin monitoring at various emergency room visits over the last year have been negative.  No EKG changes. Continues losartan in the morning.      Cardiac MRI with stress 9/2022 Results:  1.  Pharmacological Regadenoson stress cardiac MRI is negative for inducible myocardial ischemia.   2.  Pharmacological stress ECG is negative for inducible myocardial ischemia. Frequent PVCs noted.   3.  Normal left ventricular size, wall thickness and systolic function. The quantified left ventricular  ejection fraction is 59%.  No myocardial scar is identified.    4.  Normal right ventricular size and systolic function.    5.  Mild eccentric anterior directed mitral regurgitation  6.  Moderate left atrial enlargement.  Mild right atrial enlargement.       Physical Examination  Review of Systems   Vitals: /46 (BP Location: Left arm, Patient Position: Sitting, Cuff Size: Adult Regular)   Pulse 60   Resp 16   Ht 1.626 m (5' 4\")   Wt 62.2 kg (137 lb 3.2 oz)   BMI 23.55 kg/m    BMI= Body mass index is 23.55 kg/m .  Wt Readings from Last 3 Encounters:   02/18/25 62.2 kg (137 lb 3.2 oz)   10/25/24 61.2 kg (135 lb)   09/26/24 62.8 kg (138 lb 6.4 oz)           ENT/Mouth: membranes moist, no oral lesions or bleeding gums.      EYES:  no scleral icterus, normal conjunctivae                    Neck: No carotid bruit    Chest/Lungs:   lungs are clear to auscultation, " no rales or wheezing, equal chest wall expansion    Cardiovascular:   Regular. Normal first and second heart sounds with no murmurs, rubs, or gallops; the carotid, radial pulses are intact, absent edema bilaterally        Extremities: no cyanosis or clubbing   Skin: no xanthelasma, warm.    Neurologic: no tremors     Psychiatric: alert and oriented x3, calm        Please refer above for cardiac ROS details.        Medical History  Surgical History Family History Social History   Past Medical History:   Diagnosis Date     Chronic constipation      Chronic sciatica     Pain clinic     DDD (degenerative disc disease), cervical      Fibromyalgia      History of hepatitis C      Hypertension goal BP (blood pressure) < 140/90      Kidney stones      Migraine headaches      Neuropathy      Osteopenia     boniva infusions w Dr Rayo at [facility]     PUD (peptic ulcer disease)     h/o bleeding ulcer     Recurrent cold sores      Restless leg syndrome      Spasmodic torticollis      Subclinical hyperthyroidism     NONTOXIC MULTINODULAR GOITER      Vertigo      Vitamin B12 deficiency      Vitamin D deficiency      Past Surgical History:   Procedure Laterality Date     COLONOSCOPY N/A 5/25/2023    Procedure: Colonoscopy;  Surgeon: Leo Loyd MD;  Location:  GI     ESOPHAGOSCOPY, GASTROSCOPY, DUODENOSCOPY (EGD), COMBINED N/A 5/25/2023    Procedure: Esophagoscopy, gastroscopy, duodenoscopy (EGD), combined;  Surgeon: Leo Loyd MD;  Location:  GI     HERNIA REPAIR  9/2009    ventral     LITHOTRIPSY      2 x      TONSILLECTOMY       TUBAL LIGATION       Family History   Problem Relation Age of Onset     Neurologic Disorder Mother 65        Parkinsons     Alcohol/Drug Mother      Cerebrovascular Disease Mother      Respiratory Father         COPD     Alcohol/Drug Father      Cancer Brother      Diabetes Sister 52     Cancer Maternal Aunt      Cancer Maternal Uncle      C.A.D. No family hx of         Social  History     Socioeconomic History     Marital status:      Spouse name: Not on file     Number of children: 4     Years of education: 11     Highest education level: Not on file   Occupational History     Occupation: housecleaning     Employer: [employer],[address] [place] [address]   Tobacco Use     Smoking status: Former     Current packs/day: 0.20     Average packs/day: 0.2 packs/day for 15.0 years (3.0 ttl pk-yrs)     Types: Cigarettes     Passive exposure: Past     Smokeless tobacco: Never     Tobacco comments:     4 cigs a day, just lost    Vaping Use     Vaping status: Never Used   Substance and Sexual Activity     Alcohol use: No     Drug use: No     Sexual activity: Never     Partners: Male     Birth control/protection: Surgical, Post-menopausal   Other Topics Concern     Parent/sibling w/ CABG, MI or angioplasty before 65F 55M? 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     Hobby Hazards No     Sleep Concern Yes     Comment: On medication     Stress Concern No     Weight Concern Yes     Comment: would like to gain weight     Special Diet No     Back Care No     Exercise No     Bike Helmet Not Asked     Seat Belt Yes     Self-Exams Yes   Social History Narrative     Not on file     Social Drivers of Health     Financial Resource Strain: Not on file   Food Insecurity: Not on file   Transportation Needs: Not on file   Physical Activity: Not on file   Stress: Not on file   Social Connections: Not on file   Interpersonal Safety: Low Risk  (5/31/2024)    Interpersonal Safety      Do you feel physically and emotionally safe where you currently live?: Yes      Within the past 12 months, have you been hit, slapped, kicked or otherwise physically hurt by someone?: No      Within the past 12 months, have you been humiliated or emotionally abused in other ways by your partner  or ex-partner?: No   Housing Stability: Not on file           Medications  Allergies   Current Outpatient Medications   Medication Sig Dispense Refill     acetaminophen (TYLENOL) 500 MG tablet Take 500 mg by mouth At Bedtime       aspirin 81 MG tablet Take 81 mg by mouth daily       baclofen (LIORESAL) 10 MG tablet TAKE 1 TABLET BY MOUTH FOUR TIMES A  tablet 3     famotidine (PEPCID) 40 MG tablet TAKE 1 TABLET BY MOUTH EVERY EVENING 90 tablet 3     gabapentin (NEURONTIN) 300 MG capsule Take by mouth 300 mg every morning, 300 mg every afternoon, and 600 mg at bedtime 360 capsule 3     HYDROcodone-acetaminophen (NORCO) 5-325 MG tablet Take 2 tablets by mouth 3 times daily. 28 days or more between refills 180 tablet 0     losartan (COZAAR) 100 MG tablet Take 1 tablet (100 mg) by mouth daily. 90 tablet 1     metoprolol tartrate (LOPRESSOR) 25 MG tablet Take 1 tablet (25 mg) by mouth 2 times daily. TAKE 1 TABLET BY MOUTH TWICE A DAY 60 tablet 0     nitroGLYcerin (NITROSTAT) 0.4 MG sublingual tablet For chest pain place 1 tablet under the tongue every 5 minutes for 3 doses. If symptoms persist 5 minutes after 1st dose call 911. 25 tablet 3     omeprazole (PRILOSEC) 20 MG DR capsule TAKE 1 CAPSULE BY MOUTH EVERY DAY 90 capsule 3     polyethylene glycol (MIRALAX/GLYCOLAX) powder Take 17 g by mouth daily as needed Reported on 5/15/2017       Probiotic Product (PROBIOTIC BLEND PO) Take 1 each by mouth 2 times daily       rosuvastatin (CRESTOR) 10 MG tablet TAKE 1 TABLET (10 MG) BY MOUTH DAILY. 90 tablet 3     traZODone (DESYREL) 100 MG tablet Take 1.5 tablets (150 mg) by mouth at bedtime. 135 tablet 1     VITAMIN B-12 1000 MCG OR TABS Take 1,000 mcg by mouth daily       vitamin D3 (CHOLECALCIFEROL) 50 mcg (2000 units) tablet Take 1 tablet by mouth daily       losartan (COZAAR) 50 MG tablet Take 1 tablet (50 mg) by mouth daily (Patient not taking: Reported on 2/18/2025) 90 tablet 1     metoprolol tartrate (LOPRESSOR) 50  "MG tablet TAKE 1 TABLET BY MOUTH TWICE A DAY (Patient not taking: Reported on 2/18/2025) 180 tablet 3       Allergies   Allergen Reactions     Iodinated Contrast Media Anaphylaxis and Hives     Unable to breathe     Diatrizoate Hives     Hypaque     Droperidol      Ivp Dye [Contrast Dye]           Lab Results    Chemistry/lipid CBC Cardiac Enzymes/BNP/TSH/INR   Recent Labs   Lab Test 05/26/22  1505   CHOL 96   HDL 43*   LDL 39   TRIG 72     Recent Labs   Lab Test 05/26/22  1505 04/08/21  1711 02/04/20  1117   LDL 39 40 40     Recent Labs   Lab Test 03/01/23  1223      POTASSIUM 4.2   CHLORIDE 102   CO2 30*   GLC 90   BUN 9.2   CR 0.63   GFRESTIMATED >90   CARMINA 9.6     Recent Labs   Lab Test 03/01/23  1223 07/20/22  1859 05/26/22  1505   CR 0.63 0.80 0.79     No results for input(s): \"A1C\" in the last 77475 hours.       Recent Labs   Lab Test 03/01/23  1223   WBC 6.8   HGB 13.8   HCT 43.4   MCV 97   *     Recent Labs   Lab Test 03/01/23  1223 07/20/22  1859 05/26/22  1505   HGB 13.8 11.9 12.1    Recent Labs   Lab Test 07/20/22  2142 07/20/22  1842   TROPONINI <0.01 <0.01     Recent Labs   Lab Test 03/01/23  1223   NTBNPI 690     Recent Labs   Lab Test 07/24/23  1356   TSH 0.83     No results for input(s): \"INR\" in the last 66201 hours.       This note has been dictated using voice recognition software. Any grammatical, typographical, or context distortions are unintentional and inherent to the software    Michelle Eldridge PA-C                                         Thank you for allowing me to participate in the care of your patient.      Sincerely,     Michelle Lopez PA-C     [facility]  cc:   Luis E Echevarria, DO  [address] [place] [address]      "

## 2025-02-18 NOTE — PROGRESS NOTES
HEART CARE ENCOUNTER CONSULTATON NOTE      [facility]  [phone]      Assessment/Recommendations   Assessment:   Coronary artery disease: s/p PCI of RCA (May 2015) with MATHEW, and PCI to LCx with DESx1, LAD with DESx1, and POBA to D1 (6/2/15) - aspirin 81 mg daily  - Continues to have atypical sharp chest pain lasting seconds, unchanged from visit 7/2022. Cardiac MRI with stress 9/2022 negative for myocardial ischemia/infarction with LVEF 59%.  Appears compensated on exam, no evidence of fluid retention despite slow weight gain.  - Episode 1 day ago of chest discomfort lasting hours, similar sharp chest pain to above but she recalls being able to palpate tenderness by pressing on her left pectoral muscle.  This is likely MSK/fibromyalgia with tenderness to palpation.  Dyslipidemia: Controlled on rosuvastatin 10 mg  Intermittent hypertension: Controlled today - losartan 100 mg, metoprolol tartrate 25 mg twice daily  Bradycardia: Metoprolol reduced to 25 mg twice daily in October presumably due to bradycardia in the emergency room after presentation with dizziness.  Denies lightheadedness, dizziness.  Will continue metoprolol tartrate 25 mg twice daily.    Mild cognitive dysfunction       Plan:   Continue antihypertensive and hyperlipidemia regimens  No further cardiac workup at this time given precordial pain reproducible with palpation, consistent with MSK/fibromyalgia.  Continue reduced dose of metoprolol to tartrate with history of bradycardia        Follow up in 1 year or sooner as needed     History of Present Illness/Subjective    HPI: Carina Calvert is a 73 year old female with PMHx of CAD/PCI, HLD, intermittent hypertension Hx tobacco use presents for follow up.      Patient is accompanied by her daughter today.  She reports an episode of chest pain 1 day ago that lasted for hours, was constant.  It was reproducible, aggravated by pressing on her chest.  Continues to have her known  "atypical \"twinges \"of sharp chest pain over the same area, left pectoral region as well.  This has been ongoing for years and evaluated with cardiac MRI stress test which showed no evidence of ischemia or infarction.    She visited to ED in October and had dizziness. Recommended to reduce metoprolol she says due, this was likely due to sinus bradycardia on EKG. Troponin monitoring at various emergency room visits over the last year have been negative.  No EKG changes. Continues losartan in the morning.      Cardiac MRI with stress 9/2022 Results:  1.  Pharmacological Regadenoson stress cardiac MRI is negative for inducible myocardial ischemia.   2.  Pharmacological stress ECG is negative for inducible myocardial ischemia. Frequent PVCs noted.   3.  Normal left ventricular size, wall thickness and systolic function. The quantified left ventricular  ejection fraction is 59%.  No myocardial scar is identified.    4.  Normal right ventricular size and systolic function.    5.  Mild eccentric anterior directed mitral regurgitation  6.  Moderate left atrial enlargement.  Mild right atrial enlargement.       Physical Examination  Review of Systems   Vitals: /46 (BP Location: Left arm, Patient Position: Sitting, Cuff Size: Adult Regular)   Pulse 60   Resp 16   Ht 1.626 m (5' 4\")   Wt 62.2 kg (137 lb 3.2 oz)   BMI 23.55 kg/m    BMI= Body mass index is 23.55 kg/m .  Wt Readings from Last 3 Encounters:   02/18/25 62.2 kg (137 lb 3.2 oz)   10/25/24 61.2 kg (135 lb)   09/26/24 62.8 kg (138 lb 6.4 oz)           ENT/Mouth: membranes moist, no oral lesions or bleeding gums.      EYES:  no scleral icterus, normal conjunctivae                    Neck: No carotid bruit    Chest/Lungs:   lungs are clear to auscultation, no rales or wheezing, equal chest wall expansion    Cardiovascular:   Regular. Normal first and second heart sounds with no murmurs, rubs, or gallops; the carotid, radial pulses are intact, absent edema " bilaterally        Extremities: no cyanosis or clubbing   Skin: no xanthelasma, warm.    Neurologic: no tremors     Psychiatric: alert and oriented x3, calm        Please refer above for cardiac ROS details.        Medical History  Surgical History Family History Social History   Past Medical History:   Diagnosis Date    Chronic constipation     Chronic sciatica     Pain clinic    DDD (degenerative disc disease), cervical     Fibromyalgia     History of hepatitis C     Hypertension goal BP (blood pressure) < 140/90     Kidney stones     Migraine headaches     Neuropathy     Osteopenia     boniva infusions w Dr Rayo at [facility]    PUD (peptic ulcer disease)     h/o bleeding ulcer    Recurrent cold sores     Restless leg syndrome     Spasmodic torticollis     Subclinical hyperthyroidism     NONTOXIC MULTINODULAR GOITER     Vertigo     Vitamin B12 deficiency     Vitamin D deficiency      Past Surgical History:   Procedure Laterality Date    COLONOSCOPY N/A 5/25/2023    Procedure: Colonoscopy;  Surgeon: Leo Loyd MD;  Location:  GI    ESOPHAGOSCOPY, GASTROSCOPY, DUODENOSCOPY (EGD), COMBINED N/A 5/25/2023    Procedure: Esophagoscopy, gastroscopy, duodenoscopy (EGD), combined;  Surgeon: Leo Loyd MD;  Location:  GI    HERNIA REPAIR  9/2009    ventral    LITHOTRIPSY      2 x     TONSILLECTOMY      TUBAL LIGATION       Family History   Problem Relation Age of Onset    Neurologic Disorder Mother 65        Parkinsons    Alcohol/Drug Mother     Cerebrovascular Disease Mother     Respiratory Father         COPD    Alcohol/Drug Father     Cancer Brother     Diabetes Sister 52    Cancer Maternal Aunt     Cancer Maternal Uncle     C.A.D. No family hx of         Social History     Socioeconomic History    Marital status:      Spouse name: Not on file    Number of children: 4    Years of education: 11    Highest education level: Not on file   Occupational History    Occupation: housecleaning      Employer: [employer],[address]   Tobacco Use    Smoking status: Former     Current packs/day: 0.20     Average packs/day: 0.2 packs/day for 15.0 years (3.0 ttl pk-yrs)     Types: Cigarettes     Passive exposure: Past    Smokeless tobacco: Never    Tobacco comments:     4 cigs a day, just lost    Vaping Use    Vaping status: Never Used   Substance and Sexual Activity    Alcohol use: No    Drug use: No    Sexual activity: Never     Partners: Male     Birth control/protection: Surgical, Post-menopausal   Other Topics Concern    Parent/sibling w/ CABG, MI or angioplasty before 65F 55M? 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    Hobby Hazards No    Sleep Concern Yes     Comment: On medication    Stress Concern No    Weight Concern Yes     Comment: would like to gain weight    Special Diet No    Back Care No    Exercise No    Bike Helmet Not Asked    Seat Belt Yes    Self-Exams Yes   Social History Narrative    Not on file     Social Drivers of Health     Financial Resource Strain: Not on file   Food Insecurity: Not on file   Transportation Needs: Not on file   Physical Activity: Not on file   Stress: Not on file   Social Connections: Not on file   Interpersonal Safety: Low Risk  (5/31/2024)    Interpersonal Safety     Do you feel physically and emotionally safe where you currently live?: Yes     Within the past 12 months, have you been hit, slapped, kicked or otherwise physically hurt by someone?: No     Within the past 12 months, have you been humiliated or emotionally abused in other ways by your partner or ex-partner?: No   Housing Stability: Not on file           Medications  Allergies   Current Outpatient Medications   Medication Sig Dispense Refill    acetaminophen (TYLENOL) 500 MG tablet Take 500 mg by mouth At Bedtime      aspirin 81 MG tablet Take 81 mg by  mouth daily      baclofen (LIORESAL) 10 MG tablet TAKE 1 TABLET BY MOUTH FOUR TIMES A  tablet 3    famotidine (PEPCID) 40 MG tablet TAKE 1 TABLET BY MOUTH EVERY EVENING 90 tablet 3    gabapentin (NEURONTIN) 300 MG capsule Take by mouth 300 mg every morning, 300 mg every afternoon, and 600 mg at bedtime 360 capsule 3    HYDROcodone-acetaminophen (NORCO) 5-325 MG tablet Take 2 tablets by mouth 3 times daily. 28 days or more between refills 180 tablet 0    losartan (COZAAR) 100 MG tablet Take 1 tablet (100 mg) by mouth daily. 90 tablet 1    metoprolol tartrate (LOPRESSOR) 25 MG tablet Take 1 tablet (25 mg) by mouth 2 times daily. TAKE 1 TABLET BY MOUTH TWICE A DAY 60 tablet 0    nitroGLYcerin (NITROSTAT) 0.4 MG sublingual tablet For chest pain place 1 tablet under the tongue every 5 minutes for 3 doses. If symptoms persist 5 minutes after 1st dose call 911. 25 tablet 3    omeprazole (PRILOSEC) 20 MG DR capsule TAKE 1 CAPSULE BY MOUTH EVERY DAY 90 capsule 3    polyethylene glycol (MIRALAX/GLYCOLAX) powder Take 17 g by mouth daily as needed Reported on 5/15/2017      Probiotic Product (PROBIOTIC BLEND PO) Take 1 each by mouth 2 times daily      rosuvastatin (CRESTOR) 10 MG tablet TAKE 1 TABLET (10 MG) BY MOUTH DAILY. 90 tablet 3    traZODone (DESYREL) 100 MG tablet Take 1.5 tablets (150 mg) by mouth at bedtime. 135 tablet 1    VITAMIN B-12 1000 MCG OR TABS Take 1,000 mcg by mouth daily      vitamin D3 (CHOLECALCIFEROL) 50 mcg (2000 units) tablet Take 1 tablet by mouth daily      losartan (COZAAR) 50 MG tablet Take 1 tablet (50 mg) by mouth daily (Patient not taking: Reported on 2/18/2025) 90 tablet 1    metoprolol tartrate (LOPRESSOR) 50 MG tablet TAKE 1 TABLET BY MOUTH TWICE A DAY (Patient not taking: Reported on 2/18/2025) 180 tablet 3       Allergies   Allergen Reactions    Iodinated Contrast Media Anaphylaxis and Hives     Unable to breathe    Diatrizoate Hives     Hypaque    Droperidol     Ivp Dye [Contrast  "Dye]           Lab Results    Chemistry/lipid CBC Cardiac Enzymes/BNP/TSH/INR   Recent Labs   Lab Test 05/26/22  1505   CHOL 96   HDL 43*   LDL 39   TRIG 72     Recent Labs   Lab Test 05/26/22  1505 04/08/21  1711 02/04/20  1117   LDL 39 40 40     Recent Labs   Lab Test 03/01/23  1223      POTASSIUM 4.2   CHLORIDE 102   CO2 30*   GLC 90   BUN 9.2   CR 0.63   GFRESTIMATED >90   CARMINA 9.6     Recent Labs   Lab Test 03/01/23  1223 07/20/22  1859 05/26/22  1505   CR 0.63 0.80 0.79     No results for input(s): \"A1C\" in the last 00524 hours.       Recent Labs   Lab Test 03/01/23  1223   WBC 6.8   HGB 13.8   HCT 43.4   MCV 97   *     Recent Labs   Lab Test 03/01/23  1223 07/20/22  1859 05/26/22  1505   HGB 13.8 11.9 12.1    Recent Labs   Lab Test 07/20/22  2142 07/20/22  1842   TROPONINI <0.01 <0.01     Recent Labs   Lab Test 03/01/23  1223   NTBNPI 690     Recent Labs   Lab Test 07/24/23  1356   TSH 0.83     No results for input(s): \"INR\" in the last 21734 hours.       This note has been dictated using voice recognition software. Any grammatical, typographical, or context distortions are unintentional and inherent to the software    Michelle Eldridge PA-C                                       "

## 2025-03-10 ENCOUNTER — TELEPHONE (OUTPATIENT)
Dept: FAMILY MEDICINE | Facility: CLINIC | Age: 75
End: 2025-03-10
Payer: MEDICARE

## 2025-03-10 DIAGNOSIS — G89.4 CHRONIC PAIN SYNDROME: ICD-10-CM

## 2025-03-10 DIAGNOSIS — G24.3 SPASMODIC TORTICOLLIS: ICD-10-CM

## 2025-03-10 DIAGNOSIS — M79.7 FIBROMYALGIA: ICD-10-CM

## 2025-03-10 DIAGNOSIS — F11.90 CHRONIC, CONTINUOUS USE OF OPIOIDS: ICD-10-CM

## 2025-03-10 DIAGNOSIS — G62.9 NEUROPATHY: ICD-10-CM

## 2025-03-10 RX ORDER — GABAPENTIN 300 MG/1
CAPSULE ORAL
Qty: 360 CAPSULE | Refills: 3 | Status: SHIPPED | OUTPATIENT
Start: 2025-03-10

## 2025-03-10 RX ORDER — HYDROCODONE BITARTRATE AND ACETAMINOPHEN 5; 325 MG/1; MG/1
2 TABLET ORAL 3 TIMES DAILY
Qty: 180 TABLET | Refills: 0 | Status: SHIPPED | OUTPATIENT
Start: 2025-03-10

## 2025-03-10 NOTE — TELEPHONE ENCOUNTER
Patient calling for refills of her norco and gabapentin.    See follow up plan per recent virtual visit:    Instructions      Return in about 4 months (around 6/6/2025).    Routed to Dr. Jain to send refills requested/carson'd.   Pharmacy selected.    Laura BARRETO RN  [facility] Triage

## 2025-03-31 DIAGNOSIS — I10 HYPERTENSION GOAL BP (BLOOD PRESSURE) < 140/90: ICD-10-CM

## 2025-03-31 RX ORDER — LOSARTAN POTASSIUM 100 MG/1
100 TABLET ORAL DAILY
Qty: 90 TABLET | Refills: 1 | Status: SHIPPED | OUTPATIENT
Start: 2025-03-31

## 2025-04-07 DIAGNOSIS — M79.7 FIBROMYALGIA: ICD-10-CM

## 2025-04-07 DIAGNOSIS — G24.3 SPASMODIC TORTICOLLIS: ICD-10-CM

## 2025-04-07 DIAGNOSIS — F11.90 CHRONIC, CONTINUOUS USE OF OPIOIDS: ICD-10-CM

## 2025-04-07 DIAGNOSIS — G89.4 CHRONIC PAIN SYNDROME: ICD-10-CM

## 2025-04-07 RX ORDER — HYDROCODONE BITARTRATE AND ACETAMINOPHEN 5; 325 MG/1; MG/1
2 TABLET ORAL 3 TIMES DAILY
Qty: 180 TABLET | Refills: 0 | Status: SHIPPED | OUTPATIENT
Start: 2025-04-07

## 2025-04-08 DIAGNOSIS — F51.01 PRIMARY INSOMNIA: ICD-10-CM

## 2025-04-08 RX ORDER — TRAZODONE HYDROCHLORIDE 100 MG/1
200 TABLET ORAL AT BEDTIME
Qty: 180 TABLET | Refills: 3 | Status: SHIPPED | OUTPATIENT
Start: 2025-04-08

## 2025-04-14 ENCOUNTER — TELEPHONE (OUTPATIENT)
Dept: PHARMACY | Facility: CLINIC | Age: 75
End: 2025-04-14
Payer: MEDICARE

## 2025-04-14 NOTE — TELEPHONE ENCOUNTER
Received voicemail from Jeane, she'd like to schedule MTM appt, tried calling [facility] but couldn't get it scheduled.    Scheduled for 5/16/25    Maria M Gomez, PazD  Medication Therapy Management Pharmacist

## 2025-05-05 DIAGNOSIS — M79.7 FIBROMYALGIA: ICD-10-CM

## 2025-05-05 DIAGNOSIS — G24.3 SPASMODIC TORTICOLLIS: ICD-10-CM

## 2025-05-05 DIAGNOSIS — F11.90 CHRONIC, CONTINUOUS USE OF OPIOIDS: ICD-10-CM

## 2025-05-05 DIAGNOSIS — G89.4 CHRONIC PAIN SYNDROME: ICD-10-CM

## 2025-05-05 RX ORDER — HYDROCODONE BITARTRATE AND ACETAMINOPHEN 5; 325 MG/1; MG/1
2 TABLET ORAL 3 TIMES DAILY
Qty: 180 TABLET | Refills: 0 | Status: SHIPPED | OUTPATIENT
Start: 2025-05-05

## 2025-06-02 DIAGNOSIS — M79.7 FIBROMYALGIA: ICD-10-CM

## 2025-06-02 DIAGNOSIS — G89.4 CHRONIC PAIN SYNDROME: ICD-10-CM

## 2025-06-02 DIAGNOSIS — G24.3 SPASMODIC TORTICOLLIS: ICD-10-CM

## 2025-06-02 DIAGNOSIS — F11.90 CHRONIC, CONTINUOUS USE OF OPIOIDS: ICD-10-CM

## 2025-06-02 RX ORDER — HYDROCODONE BITARTRATE AND ACETAMINOPHEN 5; 325 MG/1; MG/1
2 TABLET ORAL 3 TIMES DAILY
Qty: 180 TABLET | Refills: 0 | Status: SHIPPED | OUTPATIENT
Start: 2025-06-02

## 2025-06-02 NOTE — TELEPHONE ENCOUNTER
Has appointment scheduled 6/6 with PCP. Has enough norco left for today.    Michelle Ponce RN on 6/2/2025 at 9:33 AM

## 2025-06-03 ENCOUNTER — TELEPHONE (OUTPATIENT)
Dept: FAMILY MEDICINE | Facility: CLINIC | Age: 75
End: 2025-06-03
Payer: MEDICARE

## 2025-06-03 NOTE — TELEPHONE ENCOUNTER
Routing to PCP    Pharmacy calling in. They state that the patient is prescribed gabapentin, baclofen, and hydrocodone. Pharmacy states this combination can affect breathing, abuse, or dependency. The pharmacy acknowledges that the patient has been on these medications for quite some time, but they are required to get confirmation from PCP. They need confirmation from prescribing provider that the benefits outweigh the risks of this medication combination. Please advise, then route back to team to call pharmacy to update them.    Patient wants to  prescriptions this afternoon per pharmacy staff.     Yeni COLON RN  [facility]

## 2025-06-03 NOTE — TELEPHONE ENCOUNTER
Patient calling to see if her hydrocodone Rx was sent.   I see we need to call the pharmacy.  I hung up the patient call and called [facility].   Advised pharmacist of provider's response; verbal approval is fine, they just had a software update and the system is asking them to review and re-verify provider responses again.   No forms needed.    They will fill this for Carina.    I called and spoke to Carina, advised her Rx should be available this evening.    Laura BARRETO RN  [facility] Triage

## 2025-06-03 NOTE — TELEPHONE ENCOUNTER
Patient calling to check on status of this. She states she has been out of hydrocodone since yesterday and needs to get this figured out. She states she is not feeling well. RN discussed any withdrawal sx should go to ED.     Patient asking if any covering providers can review and advise on this. She states she has been on all 3 medications for a long time and never had this issue.     Pharmacy needing clarification on     Routing to PCP     Pharmacy calling in. They state that the patient is prescribed gabapentin, baclofen, and hydrocodone. Pharmacy states this combination can affect breathing, abuse, or dependency. The pharmacy acknowledges that the patient has been on these medications for quite some time, but they are required to get confirmation from PCP. They need confirmation from prescribing provider that the benefits outweigh the risks of this medication combination. Please advise, then route back to team to call pharmacy to update them.       Karely Simpson RN on 6/3/2025 at 3:04 PM

## 2025-06-03 NOTE — TELEPHONE ENCOUNTER
PCP please see rené's note below.    Pt calling for update on medication. Informed pt that the med was sent in, but pharmacy is wanting clarification. Pt understanding. Please call pt when provider is able to respond.    Michelle Ponce RN on 6/3/2025 at 11:03 AM

## 2025-06-03 NOTE — TELEPHONE ENCOUNTER
Her HYDROcodone (VICODIN) prescription was sent last night to her pharmacy via [employer]. Was there a failed eprescribing ?    Yes, I have been prescribing  these same medications for this patient for over 10 years and am well aware of the issues and dx .    Would be pleased to check any boxes or sign any form needed so the pharmacy  is comfortable  with the prescription. Please reviewed and reroute as soon as possible so we can end these unnecessary delays    Gerber Jain MD

## 2025-06-07 PROBLEM — Z00.00 ENCOUNTER FOR SUBSEQUENT ANNUAL WELLNESS VISIT (AWV) IN MEDICARE PATIENT: Status: RESOLVED | Noted: 2024-02-12 | Resolved: 2025-06-07

## 2025-06-20 NOTE — PROGRESS NOTES
ASSESSMENT & PLAN      Today we discussed the underlying etiology/pathology of patient's   1. Cervical pain (neck), left >right trapezius    2. Osteoarthritis of cervical spine-addressed by Dr. Castro at [facility] 2022- Cervical facet arthritis left C3-4 and C5-6  surrounding left C4/5 autofusion    3. Chronic pain syndrome- Dr. Jain managing gabapentin, baclofen, and hydrocodone.    4. Hx of heart artery stent- 2015 ST elevation myocardial infarction followed by 3 stents and  long term following with cardiology        Today a shared decision making model was used. The patient's values and choices were respected. The following information represents what was discussed and decided upon by the provider and the patient.  - We discussed today with the patient and patient's daughter that she is having left greater than right cervical and trapezius pain which is intermittent but when it occurs it is a stabbing pain.  She denies any anterior, lateral or posterior shoulder pain.  Symptoms also will transverse over to the right trapezius region.  - We reviewed patient's left shoulder x-rays today which showed glenohumeral joint space largely maintained with some slight early degenerative changes of the glenohumeral joint with maintained cartilage zone.  Subacromial space maintained with no high riding humeral head.  No significant AC joint arthrosis.  No evidence of fracture or dislocation.  There is a small lytic lesion noted within the proximal medial diaphysis of the humerus which does not appear aggressive.  - We discussed the patient has full active range of motion of both shoulders with some slight loss of terminal flexion on the left which passively can be made symmetric without reproduction of symptoms.  She has 5 out of 5 rotator cuff tone without reproduction of symptoms of the shoulder or neck as well as negative impingement testing.  She has tenderness through the trapezius musculature as well as the  paraspinous muscles of the cervical spine with decreased range of motion of the C-spine with reproduction of the symptoms around her trapezius musculature with cervical spine motion.  - We discussed the patient has seen Dr. Castro at [facility] with last assessment in 2022.  Patient at that time had failed oral steroids as well as formal physical therapy.  Last clinical note suggested medial branch blocks which patient did not proceed with.  -Patient's previous cervical spine MRI noted at [facility] is not available for review but clinical note indicates changes at C3/C4 as well as auto fusing at C4/C5 as well as degenerative changes at C5/C6 left-sided.  - We discussed that I certainly could obtain a MRI of the left shoulder to rule in/out shoulder pathology but based on physical exam I do not see a whole lot of shoulder pathology reproducing patient's symptoms.  Patient and her daughter discussed this and MRI of the shoulder was declined  - I discussed that they certainly could follow-up with Dr. Castro who they are established with to reengage about cervical spine pathology as well as further recommendations or I certainly could order MRI of the cervical spine and have her referred to one of my partners in the spine department [facility].  At this time they are in agreement that they will go back and see Dr. Castro for continued management and recommendations  -Patient's last encounter note from [facility] with Dr. Castro in 2022 provided to the patient and patient's daughter to assist in follow-up appointment at [facility] as well as pertinent information.  - Patient will continue with chronic medication including gabapentin, baclofen and Norco for chronic pain syndrome.  -Patient to follow-up as needed    - Appointment line phone number is [[phone]]     Darrell Delatorre PA-C  [facility]    This note was completed in part using a voice recognition  software, any grammatical or context distortion are unintentional and inherent to the software.       SUBJECTIVE  Carina Calvert is a/an 75 year old female who is seen in consultation at the request of  Gerber Jain M.D. for evaluation of left shoulder/neck pain. The patient is seen with their daughter, Isra.    Expanded HPI: Patient has had chronic cervical neck pain as well as pain along the upper trapezius region of both shoulders left greater than right.  She has been managed previously at [facility] with Dr. Castro.  Patient had MRIs of the cervical spine showing degenerative changes from C3-C6 with autofusion of C4/5.  Patient did formal physical therapy as has trialed on Medrol Dosepak and other agents.  Patient has chronically been on gabapentin, baclofen and Norco managed by her primary care team for chronic pain syndrome.  Patient has not seen Dr. Castro since 2022 but at that last assessment medial branch blocks were recommended but not pursued.  Patient complains of a sharp stabbing pain in the left upper trapezius region which she believes is related to shoulder motion.  Currently she denies any numbness or tingling of the upper extremities.  Patient has a history of cervical spine injury as an adult around age 40-50 where she was on a trampoline trying to do a gymnastic maneuver and flew off the trampoline causing C-spine injury.  Patient states symptoms are only improved when laying down or supporting her neck.    Onset: many years 4+ but symptoms improved for a time period and have waxed and waned but more symptomatic now over the last 3 months.  Did massage to help go away. Reports insidious onset without acute precipitating event.  Location of Pain: left shoulder/neck, posterior trapezius region left greater than right  Rating of Pain at worst: 10/10  Rating of Pain Currently: 6/10  Worsened by: certain movements.Unable to left arm above her shoulder.  Better with: feels better when she  holds her posterior should/neck area.  Lying down  Treatments tried: rest/activity avoidance, heat, and other medications: CBD, tiger balms.  Massage gun is too heavy but it does feel okay.  Quality: sharp, stabbing, it just hurts bad  Associated symptoms: no distal numbness or tingling; denies swelling or warmth  Orthopedic history related to this area/condition: NO  Fell on a trampoline about 20+ years ago.  Was told it looks like she broke her neck but nothing was ever done.  Relevant surgical history for this area: NO  Social history: social history: retired.  Right handed.  Loves to be with her family.    Past Medical History:   Diagnosis Date    Chronic constipation     Chronic sciatica     Pain clinic    DDD (degenerative disc disease), cervical     Fibromyalgia     History of hepatitis C     Hypertension goal BP (blood pressure) < 140/90     Kidney stones     Migraine headaches     Neuropathy     Osteopenia     boniva infusions w Dr Rayo at [facility]    PUD (peptic ulcer disease)     h/o bleeding ulcer    Recurrent cold sores     Restless leg syndrome     Spasmodic torticollis     Subclinical hyperthyroidism     NONTOXIC MULTINODULAR GOITER     Vertigo     Vitamin B12 deficiency     Vitamin D deficiency      Social History     Socioeconomic History    Marital status:     Number of children: 4    Years of education: 11   Occupational History    Occupation: housecleaning     Employer: [employer],[address] [place] [address]   Tobacco Use    Smoking status: Former     Current packs/day: 0.20     Average packs/day: 0.2 packs/day for 15.0 years (3.0 ttl pk-yrs)     Types: Cigarettes     Passive exposure: Past    Smokeless tobacco: Never    Tobacco comments:     4 cigs a day, just lost    Vaping Use    Vaping status: Never Used   Substance and Sexual Activity    Alcohol use: No    Drug use: No    Sexual activity: Never     Partners: Male     Birth control/protection: Surgical, Post-menopausal    Other Topics Concern     Service No    Blood Transfusions No    Caffeine Concern No    Occupational Exposure Yes     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    Hobby Hazards No    Sleep Concern Yes     Comment: On medication    Stress Concern No    Weight Concern Yes     Comment: would like to gain weight    Special Diet No    Back Care No    Exercise No    Seat Belt Yes    Self-Exams Yes     Social Drivers of Health     Interpersonal Safety: Low Risk  (5/31/2024)    Interpersonal Safety     Do you feel physically and emotionally safe where you currently live?: Yes     Within the past 12 months, have you been hit, slapped, kicked or otherwise physically hurt by someone?: No     Within the past 12 months, have you been humiliated or emotionally abused in other ways by your partner or ex-partner?: No         Patient's past medical, surgical, social, and family histories were personally reviewed today and no changes are noted.    REVIEW OF SYSTEMS:  10 point ROS is negative other than symptoms noted above in HPI, Past Medical History or as stated below  Constitutional: NEGATIVE for fever, chills, change in weight  Skin: NEGATIVE for worrisome rashes, moles or lesions  GI/: NEGATIVE for bowel or bladder changes  Neuro: NEGATIVE for weakness, dizziness or paresthesias    OBJECTIVE:  Vital signs as noted in EPIC for 6/20/2025  General: healthy, alert and in no distress  HEENT: no scleral icterus or conjunctival erythema  Skin: no suspicious lesions or rash. No jaundice.  CV: no pedal edema  Resp: normal respiratory effort without conversational dyspnea   Psych: normal mood and affect        MSK:  Exam shows a pleasant 75-year-old female who ambulates full weightbearing with a small shuffle gait using her daughter for balance and assistance with walking.  Alert and orientated x 3 but patient does have some memory retention issues.  Examination of both shoulders show normal height and symmetry without  atrophy.  Patient describes mild discomfort when palpating the sternum, SC joint and clavicles but of equal severity.  No pain over the AC joints.  No pain along the anterior shoulder, subacromial space, deltoid or subdeltoid bursa.  No pain on the posterior shoulder girdle.  No previous surgical incisions.  Tenderness is noted along the trapezius musculature left greater than right.  Patient has tenderness along the paraspinous muscles of the neck greatest around the C5-7 interval.  Patient has decreased range of motion of the C-spine lacking forward flexion from the chin to chest measuring the width of my 5 fingers.  Neck extension is limited causing cervical neck pain and trapezius muscle pain.  Head tilt is diminished as well as rotation.  Examination of the shoulders shows full active range of motion of the right shoulder reaching above 155 degrees of flexion, abduction above 90 degrees and internal rotation to the T10 spinous process.  Left shoulder shows active range of motion to 145 degrees with passive elevation up to 155 degrees of symmetry, abduction to 90 degrees passively and 75 degrees actively but internal rotation symmetric and T10.  She has negative impingement testing of the left shoulder.  She has adequate rotator cuff tone 5 out of 5 in all planes.  She gets a sharp stabbing pain in the left trapezius region with cervical spine motion but not with any shoulder motion or testing.  No pain to palpation throughout the bicep or tricep.  Patient grossly neurovascular intact throughout C5-C8 of the left upper extremity.  +2 radial pulses.          RADIOLOGY AND LABORATORY:   Personal Independent visualization of the below images done today:  Three-view x-ray of the patient's left shoulder are obtained and reviewed today.  There is small lytic lucency noted over the proximal medial humeral region with no cortical bone erosion.  Very mild degenerative changes noted at the glenohumeral joint.  No high  riding humeral head.  No evidence of fracture or dislocation.  Mild AC joint arthrosis.  No evidence of calcific tendinitis.    Outside records from [facility] from 2022/Dr. Castro reviewed today    Patient's conditions were thoroughly discussed during today's clinical visit with total time reviewing patient's previous medical records/history/radiology, face-to-face examination and discussion and plan of care with the patient and documentation being 75 minutes on today's clinical visit  Darrell Delatorre PA-C  [facility]    This note was completed in part using a voice recognition software, any grammatical or context distortion are unintentional and inherent to the software.

## 2025-06-25 ENCOUNTER — OFFICE VISIT (OUTPATIENT)
Dept: ORTHOPEDICS | Facility: CLINIC | Age: 75
End: 2025-06-25
Attending: INTERNAL MEDICINE
Payer: MEDICARE

## 2025-06-25 ENCOUNTER — ANCILLARY PROCEDURE (OUTPATIENT)
Dept: GENERAL RADIOLOGY | Facility: CLINIC | Age: 75
End: 2025-06-25
Attending: PHYSICIAN ASSISTANT
Payer: MEDICARE

## 2025-06-25 VITALS — WEIGHT: 135 LBS | BODY MASS INDEX: 23.05 KG/M2 | HEIGHT: 64 IN

## 2025-06-25 DIAGNOSIS — M25.512 LEFT SHOULDER PAIN, UNSPECIFIED CHRONICITY: ICD-10-CM

## 2025-06-25 DIAGNOSIS — G89.4 CHRONIC PAIN SYNDROME: ICD-10-CM

## 2025-06-25 DIAGNOSIS — Z95.5 HX OF HEART ARTERY STENT: ICD-10-CM

## 2025-06-25 DIAGNOSIS — M54.2 CERVICAL PAIN (NECK): Primary | ICD-10-CM

## 2025-06-25 DIAGNOSIS — M47.812 OSTEOARTHRITIS OF CERVICAL SPINE, UNSPECIFIED SPINAL OSTEOARTHRITIS COMPLICATION STATUS: ICD-10-CM

## 2025-06-25 PROCEDURE — 99417 PROLNG OP E/M EACH 15 MIN: CPT | Performed by: PHYSICIAN ASSISTANT

## 2025-06-25 PROCEDURE — 99205 OFFICE O/P NEW HI 60 MIN: CPT | Performed by: PHYSICIAN ASSISTANT

## 2025-06-25 PROCEDURE — 73030 X-RAY EXAM OF SHOULDER: CPT | Mod: TC | Performed by: RADIOLOGY

## 2025-06-25 NOTE — LETTER
6/25/2025      Carina Calvert  [address]  [facility] [address]      Dear Colleague,    Thank you for referring your patient, Carina Calvert, to the [facility]. Please see a copy of my visit note below.    ASSESSMENT & PLAN       Today we discussed the underlying etiology/pathology of patient's   1. Cervical pain (neck), left >right trapezius    2. Osteoarthritis of cervical spine-addressed by Dr. Castro at [facility] 2022- Cervical facet arthritis left C3-4 and C6 5-6  surrounding left C4/5 autofusion    3. Chronic pain syndrome- Dr. Jain managing gabapentin, baclofen, and hydrocodone.    4. Hx of heart artery stent- 2015 ST elevation myocardial infarction followed by 3 stents and  long term following with cardiology        Today a shared decision making model was used. The patient's values and choices were respected. The following information represents what was discussed and decided upon by the provider and the patient.  - We discussed today with the patient and patient's daughter that she is having left greater than right cervical and trapezius pain which is intermittent but when it occurs it is a stabbing pain.  She denies any anterior, lateral or posterior shoulder pain.  Symptoms also will transverse over to the left trapezius region.  - We reviewed patient's left shoulder x-rays today which showed glenohumeral joint space largely maintained with some slight early degenerative changes of the glenohumeral joint with maintained cartilage zone.  Subacromial space maintained with no high riding humeral head.  No significant AC joint arthrosis.  No evidence of fracture or dislocation.  There is a small lytic lesion noted within the proximal medial diaphysis of the humerus which does not appear aggressive.  - We discussed the patient has full active range of motion of both shoulders with some slight loss of terminal flexion on the left which  passively can be made symmetric without reproduction of symptoms.  She has 5 out of 5 rotator cuff tone without reproduction of symptoms of the shoulder or neck as well as negative impingement testing.  She has tenderness through the trapezius musculature as well as the paraspinous muscles of the cervical spine with decreased range of motion of the C-spine with reproduction of the symptoms around her trapezius musculature with cervical spine motion.  - We discussed the patient has seen Dr. Castro at [facility] with last assessment in 2022.  Patient at that time had failed oral steroids as well as formal physical therapy.  Last clinical note suggested medial branch blocks which patient did not proceed with.  -Patient's previous cervical spine MRI noted at [facility] is not available for review but clinical note indicates changes at C3/C4 as well as auto fusing at C4/C5 as well as degenerative changes at C5/C6 left-sided.  - We discussed that I certainly could obtain a MRI of the left shoulder to rule in/out shoulder pathology but based on physical exam I do not see a whole lot of shoulder pathology reproducing patient's symptoms.  Patient and her daughter discussed this and MRI of the shoulder was declined  - I discussed that they certainly could follow-up with Dr. Castro who they are established with to reengage about cervical spine pathology as well as further recommendations or I certainly could order MRI of the cervical spine and have her referred to one of my partners in the spine department [facility].  At this time they are in agreement that they will go back and see Dr. Castro for continued management and recommendations  - Patient will continue with chronic medication including gabapentin, baclofen and Norco for chronic pain syndrome.  -Patient to follow-up as needed    - Appointment line phone number is [[phone]]     Darrell Delatorre PA-C  [facility]    This note  was completed in part using a voice recognition software, any grammatical or context distortion are unintentional and inherent to the software.           SUBJECTIVE  Carina Calvert is a/an 75 year old female who is seen in consultation at the request of  Gerber Jain M.D. for evaluation of left shoulder/neck pain. The patient is seen with their daughter, Isra.    Expanded HPI: Patient has had chronic cervical neck pain as well as pain along the upper trapezius region of both shoulders left greater than right.  She has been managed previously at [facility] with Dr. Castro.  Patient had MRIs of the cervical spine showing degenerative changes from C3-C6 with autofusion of C4/5.  Patient did formal physical therapy as has trialed on Medrol Dosepak and other agents.  Patient has chronically been on gabapentin, baclofen and Norco managed by her primary care team for chronic pain syndrome.  Patient has not seen Dr. Castro since 2022 but at that last assessment medial branch blocks were recommended but not pursued.  Patient complains of a sharp stabbing pain in the left upper trapezius region which she believes is related to shoulder motion.  Currently she denies any numbness or tingling of the upper extremities.  Patient has a history of cervical spine injury as an adult around age 40-50 where she was on a trampoline trying to do a gymnastic maneuver and flew off the trampoline causing C-spine injury.  Patient states symptoms are only improved when laying down or supporting her neck.    Onset: many years 4+ but symptoms improved for a time period and have waxed and waned but more symptomatic now over the last 3 months.  Did massage to help go away. Reports insidious onset without acute precipitating event.  Location of Pain: left shoulder/neck, posterior trapezius region left greater than right  Rating of Pain at worst: 10/10  Rating of Pain Currently: 6/10  Worsened by: certain movements.Unable to left arm above  her shoulder.  Better with: feels better when she holds her posterior should/neck area.  Lying down  Treatments tried: rest/activity avoidance, heat, and other medications: CBD, tiger balms.  Massage gun is too heavy but it does feel okay.  Quality: sharp, stabbing, it just hurts bad  Associated symptoms: no distal numbness or tingling; denies swelling or warmth  Orthopedic history related to this area/condition: NO  Fell on a trampoline about 20+ years ago.  Was told it looks like she broke her neck but nothing was ever done.  Relevant surgical history for this area: NO  Social history: social history: retired.  Right handed.  Loves to be with her family.    Past Medical History:   Diagnosis Date     Chronic constipation      Chronic sciatica     Pain clinic     DDD (degenerative disc disease), cervical      Fibromyalgia      History of hepatitis C      Hypertension goal BP (blood pressure) < 140/90      Kidney stones      Migraine headaches      Neuropathy      Osteopenia     boniva infusions w Dr Rayo at [facility]     PUD (peptic ulcer disease)     h/o bleeding ulcer     Recurrent cold sores      Restless leg syndrome      Spasmodic torticollis      Subclinical hyperthyroidism     NONTOXIC MULTINODULAR GOITER      Vertigo      Vitamin B12 deficiency      Vitamin D deficiency      Social History     Socioeconomic History     Marital status:      Number of children: 4     Years of education: 11   Occupational History     Occupation: housecleaning     Employer: [employer],[address] [place] [address]   Tobacco Use     Smoking status: Former     Current packs/day: 0.20     Average packs/day: 0.2 packs/day for 15.0 years (3.0 ttl pk-yrs)     Types: Cigarettes     Passive exposure: Past     Smokeless tobacco: Never     Tobacco comments:     4 cigs a day, just lost    Vaping Use     Vaping status: Never Used   Substance and Sexual Activity     Alcohol use: No     Drug use: No     Sexual activity: Never      Partners: Male     Birth control/protection: Surgical, Post-menopausal   Other Topics Concern      Service No     Blood Transfusions No     Caffeine Concern No     Occupational Exposure Yes     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     Hobby Hazards No     Sleep Concern Yes     Comment: On medication     Stress Concern No     Weight Concern Yes     Comment: would like to gain weight     Special Diet No     Back Care No     Exercise No     Seat Belt Yes     Self-Exams Yes     Social Drivers of Health     Interpersonal Safety: Low Risk  (5/31/2024)    Interpersonal Safety      Do you feel physically and emotionally safe where you currently live?: Yes      Within the past 12 months, have you been hit, slapped, kicked or otherwise physically hurt by someone?: No      Within the past 12 months, have you been humiliated or emotionally abused in other ways by your partner or ex-partner?: No         Patient's past medical, surgical, social, and family histories were personally reviewed today and no changes are noted.    REVIEW OF SYSTEMS:  10 point ROS is negative other than symptoms noted above in HPI, Past Medical History or as stated below  Constitutional: NEGATIVE for fever, chills, change in weight  Skin: NEGATIVE for worrisome rashes, moles or lesions  GI/: NEGATIVE for bowel or bladder changes  Neuro: NEGATIVE for weakness, dizziness or paresthesias    OBJECTIVE:  Vital signs as noted in EPIC for 6/20/2025  General: healthy, alert and in no distress  HEENT: no scleral icterus or conjunctival erythema  Skin: no suspicious lesions or rash. No jaundice.  CV: no pedal edema  Resp: normal respiratory effort without conversational dyspnea   Psych: normal mood and affect        MSK:  Exam shows a pleasant 75-year-old female who ambulates full weightbearing with a small shuffle gait using her daughter for balance and assistance with walking.  Alert and orientated x 3 but patient does have some memory  retention issues.  Examination of both shoulders show normal height and symmetry without atrophy.  Patient describes mild discomfort when palpating the sternum, SC joint and clavicles but of equal severity.  No pain over the AC joints.  No pain along the anterior shoulder, subacromial space, deltoid or subdeltoid bursa.  No pain on the posterior shoulder girdle.  No previous surgical incisions.  Tenderness is noted along the trapezius musculature left greater than right.  Patient has tenderness along the paraspinous muscles of the neck greatest around the C5-7 interval.  Patient has decreased range of motion of the C-spine lacking forward flexion from the chin to chest measuring the width of my 5 fingers.  Neck extension is limited causing cervical neck pain and trapezius muscle pain.  Head tilt is diminished as well as rotation.  Examination of the shoulders shows full active range of motion of the right shoulder reaching above 155 degrees of flexion, abduction above 90 degrees and internal rotation to the T10 spinous process.  Left shoulder shows active range of motion to 145 degrees with passive elevation up to 155 degrees of symmetry, abduction to 90 degrees passively and 75 degrees actively but internal rotation symmetric and T10.  She has negative impingement testing of the left shoulder.  She has adequate rotator cuff tone 5 out of 5 in all planes.  She gets a sharp stabbing pain in the left trapezius region with cervical spine motion but not with any shoulder motion or testing.  No pain to palpation throughout the bicep or tricep.  Patient grossly neurovascular intact throughout C5-C8 of the left upper extremity.  +2 radial pulses.          RADIOLOGY AND LABORATORY:   Personal Independent visualization of the below images done today:  Three-view x-ray of the patient's left shoulder are obtained and reviewed today.  There is small lytic lucency noted over the proximal medial humeral region with no cortical  bone erosion.  Very mild degenerative changes noted at the glenohumeral joint.  No high riding humeral head.  No evidence of fracture or dislocation.  Mild AC joint arthrosis.  No evidence of calcific tendinitis.    Outside records from [facility] from 2022/Dr. Castro reviewed today    Patient's conditions were thoroughly discussed during today's clinical visit with total time reviewing patient's previous medical records/history/radiology, face-to-face examination and discussion and plan of care with the patient and documentation being 75 minutes on today's clinical visit  Darrell Delatorre PA-C  [facility]    This note was completed in part using a voice recognition software, any grammatical or context distortion are unintentional and inherent to the software.     Again, thank you for allowing me to participate in the care of your patient.        Sincerely,        Darrell Delatorre PA-C    Electronically signed

## 2025-06-25 NOTE — PATIENT INSTRUCTIONS
Thank you for allowing me to be part of your care team. My personal goal for your visit today is that you felt that I listened to you, you understood your diagnosis and treatment options and our staff/clinic met your expectations. We strive to provide you excellent care.  If you felt like your expectations were not met at your visit today or if you have further questions about your visit or care, please send me a SoftArtt message and I would be happy answer any questions or listen to your feedback.  If you do not have MyChart, you can call [phone] to speak with me.      If advanced imaging (MRI, CT scan) or blood work was ordered at your appointment today, I will contact you as we discussed today either by telephone call or Movity message within 48 hours after your advanced imaging testing has been completed or when ALL your lab results are available to review/discuss with you.       Today we discussed the underlying etiology/pathology of patient's   1. Cervical pain (neck), left >right trapezius    2. Osteoarthritis of cervical spine-addressed by Dr. Castro at [facility] 2022- Cervical facet arthritis left C3-4 and C5-6  surrounding left C4/5 autofusion    3. Chronic pain syndrome- Dr. Jain managing gabapentin, baclofen, and hydrocodone.    4. Hx of heart artery stent- 2015 ST elevation myocardial infarction followed by 3 stents and  long term following with cardiology        Today a shared decision making model was used. The patient's values and choices were respected. The following information represents what was discussed and decided upon by the provider and the patient.  - We discussed today with the patient and patient's daughter that she is having left greater than right cervical and trapezius pain which is intermittent but when it occurs it is a stabbing pain.  She denies any anterior, lateral or posterior shoulder pain.  Symptoms also will transverse over to the right trapezius region.  - We  reviewed patient's left shoulder x-rays today which showed glenohumeral joint space largely maintained with some slight early degenerative changes of the glenohumeral joint with maintained cartilage zone.  Subacromial space maintained with no high riding humeral head.  No significant AC joint arthrosis.  No evidence of fracture or dislocation.  There is a small lytic lesion noted within the proximal medial diaphysis of the humerus which does not appear aggressive.  - We discussed the patient has full active range of motion of both shoulders with some slight loss of terminal flexion on the left which passively can be made symmetric without reproduction of symptoms.  She has 5 out of 5 rotator cuff tone without reproduction of symptoms of the shoulder or neck as well as negative impingement testing.  She has tenderness through the trapezius musculature as well as the paraspinous muscles of the cervical spine with decreased range of motion of the C-spine with reproduction of the symptoms around her trapezius musculature with cervical spine motion.  - We discussed the patient has seen Dr. Castro at [facility] with last assessment in 2022.  Patient at that time had failed oral steroids as well as formal physical therapy.  Last clinical note suggested medial branch blocks which patient did not proceed with.  -Patient's previous cervical spine MRI noted at [facility] is not available for review but clinical note indicates changes at C3/C4 as well as auto fusing at C4/C5 as well as degenerative changes at C5/C6 left-sided.  - We discussed that I certainly could obtain a MRI of the left shoulder to rule in/out shoulder pathology but based on physical exam I do not see a whole lot of shoulder pathology reproducing patient's symptoms.  Patient and her daughter discussed this and MRI of the shoulder was declined  - I discussed that they certainly could follow-up with Dr. Castro who they are established with to  reengage about cervical spine pathology as well as further recommendations or I certainly could order MRI of the cervical spine and have her referred to one of my partners in the spine department [facility].  At this time they are in agreement that they will go back and see Dr. Castro for continued management and recommendations  -Patient's last encounter note from [facility] with Dr. Castro in 2022 provided to the patient and patient's daughter to assist in follow-up appointment at [facility] as well as pertinent information.  - Patient will continue with chronic medication including gabapentin, baclofen and Norco for chronic pain syndrome.  -Patient to follow-up as needed    - Appointment line phone number is [[phone]]     Darrell Delatorre PA-C  [facility]    This note was completed in part using a voice recognition software, any grammatical or context distortion are unintentional and inherent to the software.

## 2025-06-30 ENCOUNTER — TELEPHONE (OUTPATIENT)
Dept: FAMILY MEDICINE | Facility: CLINIC | Age: 75
End: 2025-06-30
Payer: MEDICARE

## 2025-06-30 DIAGNOSIS — G89.4 CHRONIC PAIN SYNDROME: ICD-10-CM

## 2025-06-30 DIAGNOSIS — F11.90 CHRONIC, CONTINUOUS USE OF OPIOIDS: ICD-10-CM

## 2025-06-30 DIAGNOSIS — M79.7 FIBROMYALGIA: ICD-10-CM

## 2025-06-30 DIAGNOSIS — G24.3 SPASMODIC TORTICOLLIS: ICD-10-CM

## 2025-06-30 RX ORDER — HYDROCODONE BITARTRATE AND ACETAMINOPHEN 5; 325 MG/1; MG/1
2 TABLET ORAL 3 TIMES DAILY
Qty: 180 TABLET | Refills: 0 | Status: SHIPPED | OUTPATIENT
Start: 2025-06-30

## 2025-07-14 ENCOUNTER — OFFICE VISIT (OUTPATIENT)
Dept: PHARMACY | Facility: CLINIC | Age: 75
End: 2025-07-14
Payer: MEDICARE

## 2025-07-14 VITALS — SYSTOLIC BLOOD PRESSURE: 131 MMHG | DIASTOLIC BLOOD PRESSURE: 75 MMHG | WEIGHT: 133.2 LBS | BODY MASS INDEX: 22.86 KG/M2

## 2025-07-14 DIAGNOSIS — G89.4 CHRONIC PAIN SYNDROME: ICD-10-CM

## 2025-07-14 DIAGNOSIS — I10 HYPERTENSION GOAL BP (BLOOD PRESSURE) < 140/90: Primary | ICD-10-CM

## 2025-07-14 NOTE — PROGRESS NOTES
"Medication Therapy Management (MTM) Encounter    ASSESSMENT:                            Medication Adherence/Access: No issues identified.    Hypertension   /CAD:  Blood pressure today at goal <140/90 mmHg and very near goal <130/80 mmHg.      Pain   Stable, still tolerating multiple CNS acting medications well.     PLAN:                            Continue your current medications  Since our next visit will be over the phone, check your blood pressure at home for a few days before this    Follow-up: 1/30 at 2:30PM for a phone call    SUBJECTIVE/OBJECTIVE:                          Carina Calvert is a 75 year old female coming in for a follow-up visit from 6/13/25 with Maria M Gomez, PharmD.  Patient was accompanied by her daughter, Isra, for our visit today.     Reason for visit: blood pressure follow up.    Allergies/ADRs: Reviewed in chart  Past Medical History: Reviewed in chart  Tobacco: She reports that she has quit smoking. Her smoking use included cigarettes. She has a 3 pack-year smoking history. She has been exposed to tobacco smoke. She has never used smokeless tobacco.  Alcohol: none  Caffeine: 1-2 cups of coffee daily  Medication Adherence/Access: no issues reported.    Hypertension   /CAD:  Metoprolol tartrate 25 mg twice daily  Losartan 100 mg daily   Aspirin 81 mg daily   Nitroglycerin SL if needed, no recent use  Amlodipine 2.5 mg daily  Follows with cardiology. History of three stents.  Patient reports no current medication side effects  Patient has a home blood pressure cuff, doesn't always check, needs daughter Isra to help her use this. Was at an appointment recently where she was told her blood pressure was in the \"normal range\"     Pain   Gabapentin 200 mg every morning, 200 mg at noon, 600 mg at bedtime  Norco 5-325 mg, takes two every morning, two at noon and one at 5 pm and one at bedtime  Baclofen 10 mg four times daily (also for hiccups, rachel horses)  Acetaminophen 500 mg at " bedtime  Patient reports no current medication side effects, denied dizziness/drowsiness.  Planning to see Dr. 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 Medications are working as well as she feels they can.         Today's Vitals: /75   Wt 133 lb 3.2 oz (60.4 kg)   BMI 22.86 kg/m    ----------------      I spent 20 minutes with this patient today. All changes were made via collaborative practice agreement with Gerber Jain MD. A copy of the visit note was provided to the patient's provider(s).    A summary of these recommendations was declined by the patient.    Aaliyah Yarbrough, PharmD  Medication Therapy Management Pharmacist  [facility]  [phone]       Medication Therapy Recommendations  No medication therapy recommendations to display

## 2025-07-14 NOTE — PATIENT INSTRUCTIONS
"Recommendations from today's MTM visit:                                                    MTM (medication therapy management) is a service provided by a clinical pharmacist designed to help you get the most of out of your medicines.   Today we reviewed what your medicines are for, how to know if they are working, that your medicines are safe and how to make your medicine regimen as easy as possible.      Continue your current medications  Since our next visit will be over the phone, check your blood pressure at home for a few days before this    Follow-up: 1/30 at 2:30PM for a phone call    It was great speaking with you today.  I value your experience and would be very thankful for your time in providing feedback in our clinic survey. In the next few days, you may receive an email or text message from [employer] with a link to a survey related to your  clinical pharmacist.\"     To schedule another MTM appointment, please call the clinic directly or you may call the MTM scheduling line at [phone] or toll-free at [phone].     My Clinical Pharmacist's contact information:                                                      Please feel free to contact me with any questions or concerns you have.      Aaliyah Yarbrough, PharmD  Medication Therapy Management Pharmacist  [facility]  [phone]   "

## 2025-07-14 NOTE — Clinical Note
FYI - saw patient for an MTM visit.  Aaliyah Yarbrough, PharmD Medication Therapy Management Pharmacist [facility]

## 2025-07-28 ENCOUNTER — TRANSFERRED RECORDS (OUTPATIENT)
Dept: HEALTH INFORMATION MANAGEMENT | Facility: CLINIC | Age: 75
End: 2025-07-28
Payer: MEDICARE

## 2025-07-28 DIAGNOSIS — F11.90 CHRONIC, CONTINUOUS USE OF OPIOIDS: ICD-10-CM

## 2025-07-28 DIAGNOSIS — G89.4 CHRONIC PAIN SYNDROME: ICD-10-CM

## 2025-07-28 DIAGNOSIS — M79.7 FIBROMYALGIA: ICD-10-CM

## 2025-07-28 DIAGNOSIS — G24.3 SPASMODIC TORTICOLLIS: ICD-10-CM

## 2025-07-28 RX ORDER — HYDROCODONE BITARTRATE AND ACETAMINOPHEN 5; 325 MG/1; MG/1
2 TABLET ORAL 3 TIMES DAILY
Qty: 180 TABLET | Refills: 0 | Status: SHIPPED | OUTPATIENT
Start: 2025-07-30

## 2025-07-28 NOTE — TELEPHONE ENCOUNTER
Routing TE to PCP    Pt needing a refill for HYDROcodone-acetaminophen (NORCO) 5-325 MG tablet    Able to provide refill, medication and pharmacy attached.     Batool Garcia RN

## 2025-08-05 DIAGNOSIS — I10 HYPERTENSION GOAL BP (BLOOD PRESSURE) < 140/90: ICD-10-CM

## 2025-08-05 DIAGNOSIS — G62.9 NEUROPATHY: ICD-10-CM

## 2025-08-05 DIAGNOSIS — G89.4 CHRONIC PAIN SYNDROME: ICD-10-CM

## 2025-08-05 DIAGNOSIS — M79.7 FIBROMYALGIA: ICD-10-CM

## 2025-08-05 RX ORDER — METOPROLOL TARTRATE 25 MG/1
12.5 TABLET, FILM COATED ORAL 2 TIMES DAILY
Qty: 90 TABLET | Refills: 3 | Status: SHIPPED | OUTPATIENT
Start: 2025-08-05

## 2025-08-07 PROBLEM — G89.4 CHRONIC PAIN SYNDROME: Status: RESOLVED | Noted: 2024-12-17 | Resolved: 2025-08-07

## 2025-08-07 PROBLEM — Z23 NEED FOR SHINGLES VACCINE: Status: RESOLVED | Noted: 2024-02-12 | Resolved: 2025-08-07

## 2025-08-07 RX ORDER — GABAPENTIN 100 MG/1
600 CAPSULE ORAL AT BEDTIME
Qty: 540 CAPSULE | Refills: 1 | Status: SHIPPED | OUTPATIENT
Start: 2025-08-07

## 2025-08-13 PROBLEM — Z72.0 TOBACCO USE: Status: RESOLVED | Noted: 2019-10-08 | Resolved: 2025-08-13

## 2025-08-13 PROBLEM — R20.0 NUMBNESS OF RIGHT FOOT: Status: RESOLVED | Noted: 2025-02-06 | Resolved: 2025-08-13

## 2025-08-27 ENCOUNTER — NURSE TRIAGE (OUTPATIENT)
Dept: FAMILY MEDICINE | Facility: CLINIC | Age: 75
End: 2025-08-27

## 2025-08-27 DIAGNOSIS — G24.3 SPASMODIC TORTICOLLIS: ICD-10-CM

## 2025-08-27 DIAGNOSIS — F11.90 CHRONIC, CONTINUOUS USE OF OPIOIDS: ICD-10-CM

## 2025-08-27 DIAGNOSIS — G89.4 CHRONIC PAIN SYNDROME: ICD-10-CM

## 2025-08-27 DIAGNOSIS — M79.7 FIBROMYALGIA: ICD-10-CM

## 2025-08-27 RX ORDER — HYDROCODONE BITARTRATE AND ACETAMINOPHEN 5; 325 MG/1; MG/1
2 TABLET ORAL 3 TIMES DAILY
Qty: 180 TABLET | Refills: 0 | Status: SHIPPED | OUTPATIENT
Start: 2025-08-27